# Patient Record
Sex: FEMALE | Race: WHITE | NOT HISPANIC OR LATINO
[De-identification: names, ages, dates, MRNs, and addresses within clinical notes are randomized per-mention and may not be internally consistent; named-entity substitution may affect disease eponyms.]

---

## 2017-03-14 ENCOUNTER — APPOINTMENT (OUTPATIENT)
Dept: ORTHOPEDIC SURGERY | Facility: CLINIC | Age: 82
End: 2017-03-14

## 2018-12-04 ENCOUNTER — INPATIENT (INPATIENT)
Facility: HOSPITAL | Age: 83
LOS: 15 days | Discharge: HOME | DRG: 683 | End: 2018-12-20
Attending: INTERNAL MEDICINE | Admitting: INTERNAL MEDICINE
Payer: MEDICARE

## 2018-12-04 VITALS
OXYGEN SATURATION: 98 % | HEART RATE: 71 BPM | SYSTOLIC BLOOD PRESSURE: 80 MMHG | DIASTOLIC BLOOD PRESSURE: 54 MMHG | WEIGHT: 169.98 LBS | TEMPERATURE: 98 F | RESPIRATION RATE: 19 BRPM

## 2018-12-04 DIAGNOSIS — E87.1 HYPO-OSMOLALITY AND HYPONATREMIA: ICD-10-CM

## 2018-12-04 DIAGNOSIS — N17.9 ACUTE KIDNEY FAILURE, UNSPECIFIED: ICD-10-CM

## 2018-12-04 DIAGNOSIS — N30.00 ACUTE CYSTITIS WITHOUT HEMATURIA: ICD-10-CM

## 2018-12-04 DIAGNOSIS — E03.9 HYPOTHYROIDISM, UNSPECIFIED: ICD-10-CM

## 2018-12-04 DIAGNOSIS — I10 ESSENTIAL (PRIMARY) HYPERTENSION: ICD-10-CM

## 2018-12-04 DIAGNOSIS — F32.9 MAJOR DEPRESSIVE DISORDER, SINGLE EPISODE, UNSPECIFIED: ICD-10-CM

## 2018-12-04 DIAGNOSIS — Z29.9 ENCOUNTER FOR PROPHYLACTIC MEASURES, UNSPECIFIED: ICD-10-CM

## 2018-12-04 DIAGNOSIS — Z71.89 OTHER SPECIFIED COUNSELING: ICD-10-CM

## 2018-12-04 LAB
ALBUMIN SERPL ELPH-MCNC: 4.4 G/DL — SIGNIFICANT CHANGE UP (ref 3.3–5)
ALP SERPL-CCNC: 73 U/L — SIGNIFICANT CHANGE UP (ref 40–120)
ALT FLD-CCNC: 13 U/L — SIGNIFICANT CHANGE UP (ref 10–45)
ANION GAP SERPL CALC-SCNC: 15 MMOL/L — SIGNIFICANT CHANGE UP (ref 5–17)
ANION GAP SERPL CALC-SCNC: 16 MMOL/L — SIGNIFICANT CHANGE UP (ref 5–17)
APPEARANCE UR: CLEAR — SIGNIFICANT CHANGE UP
APTT BLD: 29.3 SEC — SIGNIFICANT CHANGE UP (ref 27.5–36.3)
AST SERPL-CCNC: 13 U/L — SIGNIFICANT CHANGE UP (ref 10–40)
BASE EXCESS BLDV CALC-SCNC: -2.3 MMOL/L — LOW (ref -2–2)
BASE EXCESS BLDV CALC-SCNC: -3.3 MMOL/L — LOW (ref -2–2)
BASOPHILS # BLD AUTO: 0.1 K/UL — SIGNIFICANT CHANGE UP (ref 0–0.2)
BASOPHILS NFR BLD AUTO: 0.4 % — SIGNIFICANT CHANGE UP (ref 0–2)
BILIRUB SERPL-MCNC: 0.5 MG/DL — SIGNIFICANT CHANGE UP (ref 0.2–1.2)
BILIRUB UR-MCNC: NEGATIVE — SIGNIFICANT CHANGE UP
BUN SERPL-MCNC: 136 MG/DL — HIGH (ref 7–23)
BUN SERPL-MCNC: 157 MG/DL — HIGH (ref 7–23)
CA-I SERPL-SCNC: 1.17 MMOL/L — SIGNIFICANT CHANGE UP (ref 1.12–1.3)
CA-I SERPL-SCNC: 1.27 MMOL/L — SIGNIFICANT CHANGE UP (ref 1.12–1.3)
CALCIUM SERPL-MCNC: 10.2 MG/DL — SIGNIFICANT CHANGE UP (ref 8.4–10.5)
CALCIUM SERPL-MCNC: 9.4 MG/DL — SIGNIFICANT CHANGE UP (ref 8.4–10.5)
CHLORIDE BLDV-SCNC: 107 MMOL/L — SIGNIFICANT CHANGE UP (ref 96–108)
CHLORIDE BLDV-SCNC: 97 MMOL/L — SIGNIFICANT CHANGE UP (ref 96–108)
CHLORIDE SERPL-SCNC: 100 MMOL/L — SIGNIFICANT CHANGE UP (ref 96–108)
CHLORIDE SERPL-SCNC: 94 MMOL/L — LOW (ref 96–108)
CO2 BLDV-SCNC: 24 MMOL/L — SIGNIFICANT CHANGE UP (ref 22–30)
CO2 BLDV-SCNC: 24 MMOL/L — SIGNIFICANT CHANGE UP (ref 22–30)
CO2 SERPL-SCNC: 19 MMOL/L — LOW (ref 22–31)
CO2 SERPL-SCNC: 20 MMOL/L — LOW (ref 22–31)
COLOR SPEC: SIGNIFICANT CHANGE UP
CREAT SERPL-MCNC: 2.9 MG/DL — HIGH (ref 0.5–1.3)
CREAT SERPL-MCNC: 3.42 MG/DL — HIGH (ref 0.5–1.3)
DIFF PNL FLD: NEGATIVE — SIGNIFICANT CHANGE UP
EOSINOPHIL # BLD AUTO: 0.1 K/UL — SIGNIFICANT CHANGE UP (ref 0–0.5)
EOSINOPHIL NFR BLD AUTO: 0.8 % — SIGNIFICANT CHANGE UP (ref 0–6)
GAS PNL BLDV: 126 MMOL/L — LOW (ref 136–145)
GAS PNL BLDV: 129 MMOL/L — LOW (ref 136–145)
GAS PNL BLDV: SIGNIFICANT CHANGE UP
GAS PNL BLDV: SIGNIFICANT CHANGE UP
GLUCOSE BLDV-MCNC: 137 MG/DL — HIGH (ref 70–99)
GLUCOSE BLDV-MCNC: 148 MG/DL — HIGH (ref 70–99)
GLUCOSE SERPL-MCNC: 141 MG/DL — HIGH (ref 70–99)
GLUCOSE SERPL-MCNC: 160 MG/DL — HIGH (ref 70–99)
GLUCOSE UR QL: NEGATIVE — SIGNIFICANT CHANGE UP
HCO3 BLDV-SCNC: 22 MMOL/L — SIGNIFICANT CHANGE UP (ref 21–29)
HCO3 BLDV-SCNC: 23 MMOL/L — SIGNIFICANT CHANGE UP (ref 21–29)
HCT VFR BLD CALC: 40.5 % — SIGNIFICANT CHANGE UP (ref 34.5–45)
HCT VFR BLDA CALC: 38 % — LOW (ref 39–50)
HCT VFR BLDA CALC: 43 % — SIGNIFICANT CHANGE UP (ref 39–50)
HGB BLD CALC-MCNC: 12.5 G/DL — SIGNIFICANT CHANGE UP (ref 11.5–15.5)
HGB BLD CALC-MCNC: 13.9 G/DL — SIGNIFICANT CHANGE UP (ref 11.5–15.5)
HGB BLD-MCNC: 14.3 G/DL — SIGNIFICANT CHANGE UP (ref 11.5–15.5)
INR BLD: 1.17 RATIO — HIGH (ref 0.88–1.16)
KETONES UR-MCNC: NEGATIVE — SIGNIFICANT CHANGE UP
LACTATE BLDV-MCNC: 1.1 MMOL/L — SIGNIFICANT CHANGE UP (ref 0.7–2)
LACTATE BLDV-MCNC: 1.2 MMOL/L — SIGNIFICANT CHANGE UP (ref 0.7–2)
LEUKOCYTE ESTERASE UR-ACNC: NEGATIVE — SIGNIFICANT CHANGE UP
LYMPHOCYTES # BLD AUTO: 14.8 % — SIGNIFICANT CHANGE UP (ref 13–44)
LYMPHOCYTES # BLD AUTO: 2.4 K/UL — SIGNIFICANT CHANGE UP (ref 1–3.3)
MCHC RBC-ENTMCNC: 30.7 PG — SIGNIFICANT CHANGE UP (ref 27–34)
MCHC RBC-ENTMCNC: 35.2 GM/DL — SIGNIFICANT CHANGE UP (ref 32–36)
MCV RBC AUTO: 87.1 FL — SIGNIFICANT CHANGE UP (ref 80–100)
MONOCYTES # BLD AUTO: 1.2 K/UL — HIGH (ref 0–0.9)
MONOCYTES NFR BLD AUTO: 7.9 % — SIGNIFICANT CHANGE UP (ref 2–14)
NEUTROPHILS # BLD AUTO: 12.1 K/UL — HIGH (ref 1.8–7.4)
NEUTROPHILS NFR BLD AUTO: 76.1 % — SIGNIFICANT CHANGE UP (ref 43–77)
NITRITE UR-MCNC: NEGATIVE — SIGNIFICANT CHANGE UP
OSMOLALITY UR: 395 MOS/KG — SIGNIFICANT CHANGE UP (ref 300–900)
OTHER CELLS CSF MANUAL: 9 ML/DL — LOW (ref 18–22)
PCO2 BLDV: 43 MMHG — SIGNIFICANT CHANGE UP (ref 35–50)
PCO2 BLDV: 45 MMHG — SIGNIFICANT CHANGE UP (ref 35–50)
PH BLDV: 7.33 — LOW (ref 7.35–7.45)
PH BLDV: 7.33 — LOW (ref 7.35–7.45)
PH UR: 5.5 — SIGNIFICANT CHANGE UP (ref 5–8)
PLATELET # BLD AUTO: 244 K/UL — SIGNIFICANT CHANGE UP (ref 150–400)
PO2 BLDV: 30 MMHG — SIGNIFICANT CHANGE UP (ref 25–45)
PO2 BLDV: 31 MMHG — SIGNIFICANT CHANGE UP (ref 25–45)
POTASSIUM BLDV-SCNC: 4.1 MMOL/L — SIGNIFICANT CHANGE UP (ref 3.5–5.3)
POTASSIUM BLDV-SCNC: 5 MMOL/L — SIGNIFICANT CHANGE UP (ref 3.5–5.3)
POTASSIUM SERPL-MCNC: 4.5 MMOL/L — SIGNIFICANT CHANGE UP (ref 3.5–5.3)
POTASSIUM SERPL-MCNC: 5 MMOL/L — SIGNIFICANT CHANGE UP (ref 3.5–5.3)
POTASSIUM SERPL-SCNC: 4.5 MMOL/L — SIGNIFICANT CHANGE UP (ref 3.5–5.3)
POTASSIUM SERPL-SCNC: 5 MMOL/L — SIGNIFICANT CHANGE UP (ref 3.5–5.3)
PROT SERPL-MCNC: 8.2 G/DL — SIGNIFICANT CHANGE UP (ref 6–8.3)
PROT UR-MCNC: NEGATIVE — SIGNIFICANT CHANGE UP
PROTHROM AB SERPL-ACNC: 13.5 SEC — HIGH (ref 10–12.9)
RAPID RVP RESULT: SIGNIFICANT CHANGE UP
RBC # BLD: 4.65 M/UL — SIGNIFICANT CHANGE UP (ref 3.8–5.2)
RBC # FLD: 12 % — SIGNIFICANT CHANGE UP (ref 10.3–14.5)
SAO2 % BLDV: 51 % — LOW (ref 67–88)
SAO2 % BLDV: 55 % — LOW (ref 67–88)
SODIUM SERPL-SCNC: 130 MMOL/L — LOW (ref 135–145)
SODIUM SERPL-SCNC: 134 MMOL/L — LOW (ref 135–145)
SODIUM UR-SCNC: <20 MMOL/L — SIGNIFICANT CHANGE UP
SP GR SPEC: 1.01 — SIGNIFICANT CHANGE UP (ref 1.01–1.02)
T3 SERPL-MCNC: 59 NG/DL — LOW (ref 80–200)
T4 AB SER-ACNC: 7.2 UG/DL — SIGNIFICANT CHANGE UP (ref 4.6–12)
TSH SERPL-MCNC: 1.3 UIU/ML — SIGNIFICANT CHANGE UP (ref 0.27–4.2)
UROBILINOGEN FLD QL: NEGATIVE — SIGNIFICANT CHANGE UP
WBC # BLD: 15.9 K/UL — HIGH (ref 3.8–10.5)
WBC # FLD AUTO: 15.9 K/UL — HIGH (ref 3.8–10.5)

## 2018-12-04 PROCEDURE — 99223 1ST HOSP IP/OBS HIGH 75: CPT

## 2018-12-04 PROCEDURE — 99497 ADVNCD CARE PLAN 30 MIN: CPT | Mod: 25

## 2018-12-04 PROCEDURE — 71045 X-RAY EXAM CHEST 1 VIEW: CPT | Mod: 26

## 2018-12-04 PROCEDURE — 99291 CRITICAL CARE FIRST HOUR: CPT

## 2018-12-04 PROCEDURE — 74176 CT ABD & PELVIS W/O CONTRAST: CPT | Mod: 26

## 2018-12-04 RX ORDER — SODIUM CHLORIDE 9 MG/ML
1000 INJECTION, SOLUTION INTRAVENOUS ONCE
Qty: 0 | Refills: 0 | Status: COMPLETED | OUTPATIENT
Start: 2018-12-04 | End: 2018-12-04

## 2018-12-04 RX ORDER — PIPERACILLIN AND TAZOBACTAM 4; .5 G/20ML; G/20ML
3.38 INJECTION, POWDER, LYOPHILIZED, FOR SOLUTION INTRAVENOUS ONCE
Qty: 0 | Refills: 0 | Status: COMPLETED | OUTPATIENT
Start: 2018-12-04 | End: 2018-12-04

## 2018-12-04 RX ORDER — HEPARIN SODIUM 5000 [USP'U]/ML
5000 INJECTION INTRAVENOUS; SUBCUTANEOUS EVERY 8 HOURS
Qty: 0 | Refills: 0 | Status: DISCONTINUED | OUTPATIENT
Start: 2018-12-04 | End: 2018-12-20

## 2018-12-04 RX ORDER — LEVOTHYROXINE SODIUM 125 MCG
50 TABLET ORAL DAILY
Qty: 0 | Refills: 0 | Status: DISCONTINUED | OUTPATIENT
Start: 2018-12-04 | End: 2018-12-20

## 2018-12-04 RX ORDER — SODIUM CHLORIDE 9 MG/ML
1000 INJECTION INTRAMUSCULAR; INTRAVENOUS; SUBCUTANEOUS
Qty: 0 | Refills: 0 | Status: DISCONTINUED | OUTPATIENT
Start: 2018-12-04 | End: 2018-12-05

## 2018-12-04 RX ORDER — CEFTRIAXONE 500 MG/1
1 INJECTION, POWDER, FOR SOLUTION INTRAMUSCULAR; INTRAVENOUS EVERY 24 HOURS
Qty: 0 | Refills: 0 | Status: DISCONTINUED | OUTPATIENT
Start: 2018-12-04 | End: 2018-12-07

## 2018-12-04 RX ORDER — SODIUM CHLORIDE 9 MG/ML
2000 INJECTION, SOLUTION INTRAVENOUS ONCE
Qty: 0 | Refills: 0 | Status: COMPLETED | OUTPATIENT
Start: 2018-12-04 | End: 2018-12-04

## 2018-12-04 RX ORDER — DIAZEPAM 5 MG
2.5 TABLET ORAL AT BEDTIME
Qty: 0 | Refills: 0 | Status: DISCONTINUED | OUTPATIENT
Start: 2018-12-04 | End: 2018-12-05

## 2018-12-04 RX ORDER — NYSTATIN CREAM 100000 [USP'U]/G
1 CREAM TOPICAL
Qty: 0 | Refills: 0 | Status: DISCONTINUED | OUTPATIENT
Start: 2018-12-04 | End: 2018-12-20

## 2018-12-04 RX ORDER — ASPIRIN/CALCIUM CARB/MAGNESIUM 324 MG
81 TABLET ORAL DAILY
Qty: 0 | Refills: 0 | Status: DISCONTINUED | OUTPATIENT
Start: 2018-12-04 | End: 2018-12-20

## 2018-12-04 RX ADMIN — SODIUM CHLORIDE 100 MILLILITER(S): 9 INJECTION INTRAMUSCULAR; INTRAVENOUS; SUBCUTANEOUS at 20:38

## 2018-12-04 RX ADMIN — SODIUM CHLORIDE 1000 MILLILITER(S): 9 INJECTION, SOLUTION INTRAVENOUS at 12:50

## 2018-12-04 RX ADMIN — SODIUM CHLORIDE 2000 MILLILITER(S): 9 INJECTION, SOLUTION INTRAVENOUS at 12:20

## 2018-12-04 RX ADMIN — HEPARIN SODIUM 5000 UNIT(S): 5000 INJECTION INTRAVENOUS; SUBCUTANEOUS at 21:18

## 2018-12-04 RX ADMIN — SODIUM CHLORIDE 1000 MILLILITER(S): 9 INJECTION, SOLUTION INTRAVENOUS at 17:06

## 2018-12-04 RX ADMIN — SODIUM CHLORIDE 2000 MILLILITER(S): 9 INJECTION, SOLUTION INTRAVENOUS at 10:48

## 2018-12-04 RX ADMIN — CEFTRIAXONE 100 GRAM(S): 500 INJECTION, POWDER, FOR SOLUTION INTRAMUSCULAR; INTRAVENOUS at 21:18

## 2018-12-04 RX ADMIN — PIPERACILLIN AND TAZOBACTAM 200 GRAM(S): 4; .5 INJECTION, POWDER, LYOPHILIZED, FOR SOLUTION INTRAVENOUS at 10:48

## 2018-12-04 RX ADMIN — PIPERACILLIN AND TAZOBACTAM 3.38 GRAM(S): 4; .5 INJECTION, POWDER, LYOPHILIZED, FOR SOLUTION INTRAVENOUS at 12:20

## 2018-12-04 NOTE — ED PROVIDER NOTE - PHYSICAL EXAMINATION
Attending Maryana Blake: Gen: NAD, heent: atrauamtic, eomi, perrla, mmm, op pink, uvula midline, neck; nttp, no nuchal rigidity, chest: nttp, no crepitus, cv: rrr, +murmur, lungs: ctab, abd: soft,ostomy in placer, nondistended, no peritoneal signs, +BS, no guarding, ext: wwp, mild ttp left lower back no midline ttp skin: no rash, neuro: awake and alert, following commands, speech clear, sensation and strength intact, no focal deficits

## 2018-12-04 NOTE — H&P ADULT - HISTORY OF PRESENT ILLNESS
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 Per daughter at bedside pt lives alone in a house with an aid, who noticed that she was not her self for the last week. She seemed to have low energy and was fatigued intermittently for a week. The PT that comes to the house reported that the pt was have intermittent L flank pain as well during the PT session only. She currently has L flank pain, 8/10, non radiating, moving exacerbates it. She denies fever, chills, cough, abdominal pain, and any changes in the ileostomy output. Per daughter (Pia) started complaining of increased urinary urgency 4 days ago, so her Daughter Sandra (NP) prescribed her cipro. subsequently her urinary complaints improved. Pt denies any dysuria, increased urinary frequency. Per dtr pt has had decreased po intake, has not been drinking enough water. the family is concerned that the patient has depression, which has lead to the decreased appetite.       As per family at bedside, pt was seen by PCP 1 week ago, and was noted to have mild elevation of BUN/Cr    In the ED pt noted to have LUIS with leukocytosis

## 2018-12-04 NOTE — H&P ADULT - PROBLEM SELECTOR PLAN 8
Patient's DTR Sandra is her HCP, The patient has a MOLST form completed, and wishes to be DNR/DNI, advised family to bring in MOLST form and HCP for hospital records. Patient's DTR Sandra is her HCP, The patient has a MOLST form completed, and wishes to be DNR/DNI, advised family to bring in MOLST form and HCP for hospital records. time spent with advance care planning 31 min

## 2018-12-04 NOTE — H&P ADULT - NSHPLABSRESULTS_GEN_ALL_CORE
Labs personally reviewed:                        14.3   15.9  )-----------( 244      ( 04 Dec 2018 10:15 )             40.5       12-04    130<L>  |  94<L>  |  157<H>  ----------------------------<  160<H>  5.0   |  20<L>  |  3.42<H>    Ca    10.2      04 Dec 2018 10:15    TPro  8.2  /  Alb  4.4  /  TBili  0.5  /  DBili  x   /  AST  13  /  ALT  13  /  AlkPhos  73  12-04      PT/INR - ( 04 Dec 2018 10:15 )   PT: 13.5 sec;   INR: 1.17 ratio         PTT - ( 04 Dec 2018 10:15 )  PTT:29.3 sec    Imaging personally reviewed: CT abd results reviewed, CXR: clear lungs, follow official read    EKG personally reviewed: NSR

## 2018-12-04 NOTE — H&P ADULT - PROBLEM SELECTOR PLAN 6
presenting Na: 130, urine lytes result noted, SIADH ruled out likely due to poor po intake and dehydration   - s/p 3L LR Na improved to 134   - c/w IVF  - monitor BMP

## 2018-12-04 NOTE — ED PROVIDER NOTE - ATTENDING CONTRIBUTION TO CARE
Attending MD Maryana Blake:  I personally have seen and examined this patient.  Resident note reviewed and agree on plan of care and except where noted.  See HPI, PE, and MDM for details.

## 2018-12-04 NOTE — H&P ADULT - GENERAL
From: Fuad Lima  To: Abdifatah Cruz MD  Sent: 6/16/2017 7:21 AM CDT  Subject: Test Strips    Dr. Cruz,    I have tried four times in the past week to  a prescription for test strips and lancelets. [facility] didn't work because [facility] does not accept Medicare for those two items, so we transferred the prescription to [facility] in [place]. The most recent problem at [facility] is that the prescription does not include two items Medicare needs to pay for the strips and lancelets: an indication of whether or not I am \"insulin-dependent\" and a diagnostic code.    The pharmacist explained to me last night that if I am considered as \"diabetic,\" Medicare will cover the cost. If I am viewed as \"pre-diabetic,\" insurance will not cover the costs. A box of 100 strips would cost me just under $180.00.    I hve been checking my blood sugar once a week for the past two years, and the results have been pretty consistent. While I was taking the glibizide (or is it glyburide?) daily, the results generally averaged between 115 and 125. Since I started taking why I call \"my sugar pill\" two or three times a week, my average blood sugar reading over the past three months has been 123.    I would rather not pay $180.00 for the strips if Medicare will not cover them, so my question is whether or not I need to continue checking my blood sugar once a week, or can we just do the A1C every six months and make decisions accordingly?    Let me know how you want to proceed. I did call yesterday, and a nurse said she would make sure a new prescription was sent in to [facility] with all the necessary information completed, but when I went to  the prescription, I found out that they had not received an updated prescription form.    Hope all is well.    Fuad   details…

## 2018-12-04 NOTE — ED PROVIDER NOTE - OBJECTIVE STATEMENT
85 YOF pmh HTN, ileostomy p/w Right flank pain intermittent x 1 week, no fever, no cough, no abdominal pain, no diarrhea, no decreased output in ileostomy. Pt states she has low urine output. Pt also has had decreased po intake. Pt appears weaker than her usual self. Currently, denies any pain. R flank pain is intermittent, non-radiating. 85 YOF pmh HTN, ileostomy p/w Right flank pain intermittent x 1 week, no fever, no cough, no abdominal pain, no diarrhea, no decreased output in ileostomy. Pt states she has low urine output. Pt also has had decreased po intake. Pt appears weaker than her usual self. Currently, denies any pain. R flank pain is intermittent, non-radiating.  Attending Maryana Blake: 81y/o female h/o thyroid disease with previous ostomy presenting with weakness and lower abck pain. no recent abx use. pain for last one week. no recent falls or trauma. no dysuria but states has not been urinating as much. no vomiting or diarrhea. normal output in ostomy 85 YOF pmh HTN, ileostomy p/w left flank pain intermittent x 1 week, no fever, no cough, no abdominal pain, no diarrhea, no decreased output in ileostomy. Pt states she has low urine output. Pt also has had decreased po intake. Pt appears weaker than her usual self. Currently, denies any pain. left flank pain is intermittent, non-radiating.  Attending Maryana Blake: 81y/o female h/o thyroid disease with previous ostomy presenting with weakness and lower abck pain. no recent abx use. pain for last one week. no recent falls or trauma. no dysuria but states has not been urinating as much. no vomiting or diarrhea. normal output in ostomy

## 2018-12-04 NOTE — ED ADULT NURSE NOTE - OBJECTIVE STATEMENT
85 y f came to the ed c/o right sided lower flank pain. states the pain started for one week. patient did not want to come to the hospital so she tried to let it go away on its own. has hx of kidney cyst and uti. patient denies any fevers, chills, chest pain, sob. abdomen is soft and nontender. has ostomy bag which she states has had normal output. c/o decreased urine output. skin is warm and dry.

## 2018-12-04 NOTE — H&P ADULT - PROBLEM SELECTOR PLAN 1
LUIS likely due to dehydration, Pt is s/p 3L LR in the ED repeat BMP showed improvement in Cr, BUN/Cr ratio 45 on admission.   - start 100ml/hr NS   - avoid nephrotoxic medication  - if Cr worsens will send urine lytes    - monitor BMP

## 2018-12-04 NOTE — H&P ADULT - PROBLEM SELECTOR PLAN 5
- family is concerned about Pt being depressed, would like the patient to be see by psychiatry   - consider psych consult and starting SSRI.

## 2018-12-04 NOTE — ED PROVIDER NOTE - CONSTITUTIONAL, MLM
normal... ill appearing, awake, alert, oriented to person, place, time/situation and in mild apparent distress.

## 2018-12-04 NOTE — ED PROVIDER NOTE - PSH
S/P arthroscopy of right knee    S/P cholecystectomy  at age 20 ,s  S/P foot joint surgery  right ( 10 years ago )  S/P ileostomy  colectomy ( 2005 )

## 2018-12-04 NOTE — H&P ADULT - PROBLEM SELECTOR PLAN 2
Pt is symptomatic with urinary urgency, however UA neg today, Pt was started on cipro by Dtr(NP) 4 days ago, + leukocytosis, dose not have CVA tenderness    - will c/w CTX likely treat for 3 days   - f/u urine cx  - f/u CBC

## 2018-12-04 NOTE — ED PROVIDER NOTE - PROGRESS NOTE DETAILS
Attending Maryana Blake: BP improving. blood work showed new LUIS and elvated BUN. per family pt has not been eating and drinking as much. CT shows no evidence of stones. will continue fluid hydration

## 2018-12-04 NOTE — ED PROVIDER NOTE - NS ED ROS FT
CONSTITUTIONAL: No fevers, no chills  Eyes: no visual changes  Ears: no ear drainage, no ear pain  Nose: no nasal congestion  Mouth/Throat: no sore throat  Cardiovascular: No Chest pain  Respiratory: No SOB  Gastrointestinal: +right flank pain.   Genitourinary: no dysuria, no hematuria  SKIN: no rashes.  NEURO: no headache

## 2018-12-04 NOTE — ED ADULT NURSE REASSESSMENT NOTE - NS ED NURSE REASSESS COMMENT FT1
patient is resting in the [facility] waiting for a bed upstairs. patient is c/o being hungry and food will be provided if approved by md. denies any other complaints. family at bedside. vss/nad. will continue to monitor.

## 2018-12-04 NOTE — ED PROVIDER NOTE - GASTROINTESTINAL, MLM
Abdomen soft, non-tender, no guarding. Mild Right CVAT Abdomen soft, non-tender, no guarding. Mild left CVAT

## 2018-12-04 NOTE — ED ADULT NURSE NOTE - NSIMPLEMENTINTERV_GEN_ALL_ED
Implemented All Fall with Harm Risk Interventions:  [place] to call system. Call bell, personal items and telephone within reach. Instruct patient to call for assistance. Room bathroom lighting operational. Non-slip footwear when patient is off stretcher. Physically safe environment: no spills, clutter or unnecessary equipment. Stretcher in lowest position, wheels locked, appropriate side rails in place. Provide visual cue, wrist band, yellow gown, etc. Monitor gait and stability. Monitor for mental status changes and reorient to person, place, and time. Review medications for side effects contributing to fall risk. Reinforce activity limits and safety measures with patient and family. Provide visual clues: red socks.

## 2018-12-04 NOTE — H&P ADULT - NSHPSOCIALHISTORY_GEN_ALL_CORE
Patient denies using tobacco, illicit drugs and alcohol. She walks with a walker at home, is dependent on most of her ADls.

## 2018-12-04 NOTE — ED PROVIDER NOTE - CRITICAL CARE PROVIDED
direct patient care (not related to procedure)/additional history taking/documentation/conducted a detailed discussion of DNR status/interpretation of diagnostic studies

## 2018-12-04 NOTE — ED PROVIDER NOTE - MEDICAL DECISION MAKING DETAILS
Right flank pain, US without hydronephrosis, concern for UTI, concern for dehydration. Pt is hypotensive will hydrate and give empiric abx. Left flank pain, US without hydronephrosis, concern for UTI, concern for dehydration. Pt is hypotensive will hydrate and give empiric abx.  Attending Maryana Blake: 86 y/o female presenting with lower back pain and weakness. upon arrival pt found to be hypotensive. afebrile rectally. not on steroids. no known h/o ureteral colic. abdomen soft on exam without pulsatile mass. reviewed urine cultures which have been sensitive to zosyn. no recent dental procedures. pt does have a murmur on exam but per pt is normal for her. pt pan cultured. appears dehydrated on exam. pocus with hyperdynamic LVEF. will give IV abx and IVF, ct abd/pel and re-eval./ pt tba. less likely myxedema coma

## 2018-12-05 LAB
ANION GAP SERPL CALC-SCNC: 13 MMOL/L — SIGNIFICANT CHANGE UP (ref 5–17)
ANION GAP SERPL CALC-SCNC: 16 MMOL/L — SIGNIFICANT CHANGE UP (ref 5–17)
BUN SERPL-MCNC: 106 MG/DL — HIGH (ref 7–23)
BUN SERPL-MCNC: 95 MG/DL — HIGH (ref 7–23)
CALCIUM SERPL-MCNC: 9 MG/DL — SIGNIFICANT CHANGE UP (ref 8.4–10.5)
CALCIUM SERPL-MCNC: 9.4 MG/DL — SIGNIFICANT CHANGE UP (ref 8.4–10.5)
CHLORIDE SERPL-SCNC: 105 MMOL/L — SIGNIFICANT CHANGE UP (ref 96–108)
CHLORIDE SERPL-SCNC: 106 MMOL/L — SIGNIFICANT CHANGE UP (ref 96–108)
CO2 SERPL-SCNC: 18 MMOL/L — LOW (ref 22–31)
CO2 SERPL-SCNC: 20 MMOL/L — LOW (ref 22–31)
CREAT ?TM UR-MCNC: 79 MG/DL — SIGNIFICANT CHANGE UP
CREAT SERPL-MCNC: 1.78 MG/DL — HIGH (ref 0.5–1.3)
CREAT SERPL-MCNC: 2.27 MG/DL — HIGH (ref 0.5–1.3)
CULTURE RESULTS: NO GROWTH — SIGNIFICANT CHANGE UP
GLUCOSE SERPL-MCNC: 138 MG/DL — HIGH (ref 70–99)
GLUCOSE SERPL-MCNC: 175 MG/DL — HIGH (ref 70–99)
HCT VFR BLD CALC: 36.9 % — SIGNIFICANT CHANGE UP (ref 34.5–45)
HGB BLD-MCNC: 12.5 G/DL — SIGNIFICANT CHANGE UP (ref 11.5–15.5)
MCHC RBC-ENTMCNC: 30 PG — SIGNIFICANT CHANGE UP (ref 27–34)
MCHC RBC-ENTMCNC: 33.9 GM/DL — SIGNIFICANT CHANGE UP (ref 32–36)
MCV RBC AUTO: 88.7 FL — SIGNIFICANT CHANGE UP (ref 80–100)
OSMOLALITY SERPL: 340 MOS/KG — HIGH (ref 275–300)
PLATELET # BLD AUTO: 227 K/UL — SIGNIFICANT CHANGE UP (ref 150–400)
POTASSIUM SERPL-MCNC: 4.2 MMOL/L — SIGNIFICANT CHANGE UP (ref 3.5–5.3)
POTASSIUM SERPL-MCNC: 4.2 MMOL/L — SIGNIFICANT CHANGE UP (ref 3.5–5.3)
POTASSIUM SERPL-SCNC: 4.2 MMOL/L — SIGNIFICANT CHANGE UP (ref 3.5–5.3)
POTASSIUM SERPL-SCNC: 4.2 MMOL/L — SIGNIFICANT CHANGE UP (ref 3.5–5.3)
PROT ?TM UR-MCNC: 6 MG/DL — SIGNIFICANT CHANGE UP (ref 0–12)
PROT/CREAT UR-RTO: 0.1 RATIO — SIGNIFICANT CHANGE UP (ref 0–0.2)
RBC # BLD: 4.16 M/UL — SIGNIFICANT CHANGE UP (ref 3.8–5.2)
RBC # FLD: 13.7 % — SIGNIFICANT CHANGE UP (ref 10.3–14.5)
SODIUM SERPL-SCNC: 138 MMOL/L — SIGNIFICANT CHANGE UP (ref 135–145)
SODIUM SERPL-SCNC: 140 MMOL/L — SIGNIFICANT CHANGE UP (ref 135–145)
SPECIMEN SOURCE: SIGNIFICANT CHANGE UP
WBC # BLD: 14.96 K/UL — HIGH (ref 3.8–10.5)
WBC # FLD AUTO: 14.96 K/UL — HIGH (ref 3.8–10.5)

## 2018-12-05 RX ORDER — SODIUM CHLORIDE 9 MG/ML
1000 INJECTION, SOLUTION INTRAVENOUS
Qty: 0 | Refills: 0 | Status: DISCONTINUED | OUTPATIENT
Start: 2018-12-05 | End: 2018-12-10

## 2018-12-05 RX ORDER — ONDANSETRON 8 MG/1
4 TABLET, FILM COATED ORAL EVERY 6 HOURS
Qty: 0 | Refills: 0 | Status: DISCONTINUED | OUTPATIENT
Start: 2018-12-05 | End: 2018-12-20

## 2018-12-05 RX ORDER — PROCHLORPERAZINE MALEATE 5 MG
5 TABLET ORAL ONCE
Qty: 0 | Refills: 0 | Status: DISCONTINUED | OUTPATIENT
Start: 2018-12-05 | End: 2018-12-07

## 2018-12-05 RX ADMIN — Medication 81 MILLIGRAM(S): at 14:44

## 2018-12-05 RX ADMIN — CEFTRIAXONE 100 GRAM(S): 500 INJECTION, POWDER, FOR SOLUTION INTRAMUSCULAR; INTRAVENOUS at 21:17

## 2018-12-05 RX ADMIN — HEPARIN SODIUM 5000 UNIT(S): 5000 INJECTION INTRAVENOUS; SUBCUTANEOUS at 21:16

## 2018-12-05 RX ADMIN — HEPARIN SODIUM 5000 UNIT(S): 5000 INJECTION INTRAVENOUS; SUBCUTANEOUS at 14:43

## 2018-12-05 RX ADMIN — ONDANSETRON 4 MILLIGRAM(S): 8 TABLET, FILM COATED ORAL at 16:28

## 2018-12-05 RX ADMIN — Medication 2.5 MILLIGRAM(S): at 00:08

## 2018-12-05 RX ADMIN — Medication 50 MICROGRAM(S): at 05:35

## 2018-12-05 RX ADMIN — HEPARIN SODIUM 5000 UNIT(S): 5000 INJECTION INTRAVENOUS; SUBCUTANEOUS at 05:35

## 2018-12-05 RX ADMIN — SODIUM CHLORIDE 75 MILLILITER(S): 9 INJECTION, SOLUTION INTRAVENOUS at 21:17

## 2018-12-05 RX ADMIN — NYSTATIN CREAM 1 APPLICATION(S): 100000 CREAM TOPICAL at 06:03

## 2018-12-05 NOTE — CONSULT NOTE ADULT - ASSESSMENT
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 no history of CKD, baseline serum creatinine 0.8 in 2015 presents with LUIS and hyponatremia, renal service consulted. patient has high output ileostomy as cause for her dehydration, also has H/O Kidney stones very common in ileostomy patients.

## 2018-12-05 NOTE — PROGRESS NOTE ADULT - ASSESSMENT
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  discussed w/ Radiology Attending need official US kidney to better characterize ordered     HTN HLD - c /w current meds     DVT PPX     labs vital imaging reviewed pt counseled at length this morning.  discussed w/ NP and nursing on the floor

## 2018-12-05 NOTE — CONSULT NOTE ADULT - SUBJECTIVE AND OBJECTIVE BOX
[facility] - QUINN Cornejo / QUINN Headley / ABDIAS Greene/ QUINN Fiore/ QUINN Meng/ FANI De León / ANDREY Pham / ELI Taylor  -------------------------------------------------------------------------------------------------------  The patient seen and examined today.  HPI:  85 y.o female with PMHx of HTN, ileostomy due to complication of Cdiff () p/w feeling fatigued and left flank pain. Per daughter at bedside pt lives alone in a house with an aid, who noticed that she was not her self for the last week. She seemed to have low energy and was fatigued intermittently for a week. The PT that comes to the house reported that the pt was have intermittent L flank pain as well during the PT session only. She currently has L flank pain, 8/10, non radiating, moving exacerbates it. She denies fever, chills, cough, abdominal pain, and any changes in the ileostomy output. Per daughter (Pia) started complaining of increased urinary urgency 4 days ago, so her Daughter Sandra (NP) prescribed her cipro. subsequently her urinary complaints improved. Pt denies any dysuria, increased urinary frequency. Per dtr pt has had decreased po intake, has not been drinking enough water. the family is concerned that the patient has depression, which has lead to the decreased appetite.       As per family at bedside, pt was seen by PCP 1 week ago, and was noted to have mild elevation of BUN/Cr    In the ED pt noted to have LUIS with leukocytosis (04 Dec 2018 16:16)    The patient says that she has had kidney stones in the past.    PAST MEDICAL & SURGICAL HISTORY:  Fistula, perirectal  Hiatal hernia  Hypothyroidism  Hypertension  S/P foot joint surgery: right ( 10 years ago )  S/P arthroscopy of right knee  S/P ileostomy: colectomy (  )  S/P cholecystectomy: at age 20 ,s    Allergies :- iodine (Other; Anaphylaxis)  shellfish (Anaphylaxis)  sulfa drugs (Other)    Home Medications Reviewed  Hospital Medications:   MEDICATIONS  (STANDING):  aspirin enteric coated 81 milliGRAM(s) Oral daily  cefTRIAXone   IVPB 1 Gram(s) IV Intermittent every 24 hours  heparin  Injectable 5000 Unit(s) SubCutaneous every 8 hours  lactated ringers. 1000 milliLiter(s) (75 mL/Hr) IV Continuous <Continuous>  levothyroxine 50 MICROGram(s) Oral daily    SOCIAL HISTORY:  Denies ETOh,Smoking,   FAMILY HISTORY:  No pertinent family history      REVIEW OF SYSTEMS:  CONSTITUTIONAL: No weakness, fevers or chills  EYES/ENT: No visual changes;  No vertigo or throat pain   NECK: No pain or stiffness  RESPIRATORY: No cough, wheezing, hemoptysis; No shortness of breath  CARDIOVASCULAR: No chest pain or palpitations.  GASTROINTESTINAL: No abdominal or epigastric pain. No nausea, vomiting, or hematemesis; No diarrhea or constipation. No melena or hematochezia.  GENITOURINARY: No dysuria, frequency, foamy urine, urinary urgency, incontinence or hematuria  NEUROLOGICAL: No numbness or weakness  SKIN: No itching, burning, rashes, or lesions   VASCULAR: No bilateral lower extremity edema.   All other review of systems is negative unless indicated above.    VITALS:  T(F): 98.2 (18 @ 04:39), Max: 98.6 (18 @ 19:29)  HR: 80 (18 @ 04:39)  BP: 108/62 (18 04:39)  RR: 17 (18 @ 04:39)  SpO2: 98% (18 04:39)  Wt(kg): --     @ 07:01  -   07:00  --------------------------------------------------------  IN: 1340 mL / OUT: 450 mL / NET: 890 mL      Height (cm): 160.02 ( @ 20:00)  Weight (kg): 81 ( 20:00)  BMI (kg/m2): 31.6 ( @ 20:00)  BSA (m2): 1.84 ( @ 20:00)    PHYSICAL EXAM:  Constitutional: NAD  HEENT: anicteric sclera, oropharynx clear, MMM  Neck: supple.   Respiratory: Bilateral equal breath sounds , no wheezes, no crackles  Cardiovascular: S1, S2, Regular, Murmur present.  Gastrointestinal: Bowel Sound present, soft, NT/ND  Extremities: No cyanosis or clubbing. No peripheral edema  Neurological: Alert and oriented x 3, no focal deficits  Psychiatric: Normal mood, normal affect  : No CVA tenderness. No palafox.   Skin: No rashes    Data:      140  |  106  |  106<H>  ----------------------------<  138<H>  4.2   |  18<L>  |  2.27<H>    Ca    9.4      05 Dec 2018 07:08    TPro  8.2  /  Alb  4.4  /  TBili  0.5  /  DBili      /  AST  13  /  ALT  13  /  AlkPhos  73      Creatinine Trend: 2.27 <--, 2.90 <--, 3.42 <--                        12.5   14.96 )-----------( 227      ( 05 Dec 2018 08:15 )             36.9     Urine Studies:  Urinalysis Basic - ( 04 Dec 2018 13:28 )    Color: Light Yellow / Appearance: Clear / S.014 / pH:   Gluc:  / Ketone: Negative  / Bili: Negative / Urobili: Negative   Blood:  / Protein: Negative / Nitrite: Negative   Leuk Esterase: Negative / RBC:  / WBC    Sq Epi:  / Non Sq Epi:  / Bacteria:       Creatinine, Random Urine: 79 mg/dL ( @ 16:38)  Protein/Creatinine Ratio Calculation: 0.1 Ratio ( @ 16:38)  Osmolality, Random Urine: 395 mos/kg ( @ 13:29)  Sodium, Random Urine: <20 mmol/L ( @ 13:29)

## 2018-12-05 NOTE — CONSULT NOTE ADULT - PROBLEM SELECTOR RECOMMENDATION 9
Labs reviewed  All consistent with dehydration  Hyponatremia corrected at an acceptable rate  No longer hypotensive  Please change IVF to LR @ 75cc/hr  BMP q 8hrs for today to monitor sodium change  Will follow

## 2018-12-05 NOTE — PROGRESS NOTE ADULT - SUBJECTIVE AND OBJECTIVE BOX
Patient is a 85y old  Female who presents with a chief complaint of dehydration and LUIS (04 Dec 2018 16:16)      SUBJECTIVE / OVERNIGHT EVENTS:    pt ambulating in room notes arm discomfort has improved a bit .  pt notes continues swelling and discomfort no fever chills back pain    Vital Signs Last 24 Hrs  T(C): 36.8 (05 Dec 2018 04:39), Max: 37 (04 Dec 2018 19:29)  T(F): 98.2 (05 Dec 2018 04:39), Max: 98.6 (04 Dec 2018 19:29)  HR: 80 (05 Dec 2018 04:39) (58 - 88)  BP: 108/62 (05 Dec 2018 04:39) (94/63 - 108/62)  BP(mean): 81 (04 Dec 2018 16:16) (81 - 81)  RR: 17 (05 Dec 2018 04:39) (17 - 18)  SpO2: 98% (05 Dec 2018 04:39) (96% - 98%)  I&O's Summary    04 Dec 2018 07:01  -  05 Dec 2018 07:00  --------------------------------------------------------  IN: 1340 mL / OUT: 450 mL / NET: 890 mL            LABS:                        12.5   14.96 )-----------( 227      ( 05 Dec 2018 08:15 )             36.9     12-05    140  |  106  |  106<H>  ----------------------------<  138<H>  4.2   |  18<L>  |  2.27<H>    Ca    9.4      05 Dec 2018 07:08    TPro  8.2  /  Alb  4.4  /  TBili  0.5  /  DBili  x   /  AST  13  /  ALT  13  /  AlkPhos  73  12-04    PT/INR - ( 04 Dec 2018 10:15 )   PT: 13.5 sec;   INR: 1.17 ratio         PTT - ( 04 Dec 2018 10:15 )  PTT:29.3 sec  CAPILLARY BLOOD GLUCOSE            Urinalysis Basic - ( 04 Dec 2018 13:28 )    Color: Light Yellow / Appearance: Clear / S.014 / pH: x  Gluc: x / Ketone: Negative  / Bili: Negative / Urobili: Negative   Blood: x / Protein: Negative / Nitrite: Negative   Leuk Esterase: Negative / RBC: x / WBC x   Sq Epi: x / Non Sq Epi: x / Bacteria: x        RADIOLOGY & ADDITIONAL TESTS:    Imaging Personally Reviewed:  [x] YES  [ ] NO    Consultant(s) Notes Reviewed:  [x] YES  [ ] NO      MEDICATIONS  (STANDING):  aspirin enteric coated 81 milliGRAM(s) Oral daily  cefTRIAXone   IVPB 1 Gram(s) IV Intermittent every 24 hours  heparin  Injectable 5000 Unit(s) SubCutaneous every 8 hours  lactated ringers. 1000 milliLiter(s) (75 mL/Hr) IV Continuous <Continuous>  levothyroxine 50 MICROGram(s) Oral daily    MEDICATIONS  (PRN):  diazepam    Tablet 2.5 milliGRAM(s) Oral at bedtime PRN sleep / anxiety  nystatin Powder 1 Application(s) Topical two times a day PRN for rash      Care Discussed with Consultants/Other Providers [x] YES  [ ] NO    HEALTH ISSUES - PROBLEM Dx:  Advance care planning: Advance care planning  Prophylactic measure: Prophylactic measure  Hyponatremia: Hyponatremia  Depression: Depression  Essential hypertension: Essential hypertension  Hypothyroidism: Hypothyroidism  Acute cystitis without hematuria: Acute cystitis without hematuria  LUIS (acute kidney injury): LUIS (acute kidney injury)

## 2018-12-06 ENCOUNTER — TRANSCRIPTION ENCOUNTER (OUTPATIENT)
Age: 83
End: 2018-12-06

## 2018-12-06 LAB
ANION GAP SERPL CALC-SCNC: 13 MMOL/L — SIGNIFICANT CHANGE UP (ref 5–17)
ANION GAP SERPL CALC-SCNC: 14 MMOL/L — SIGNIFICANT CHANGE UP (ref 5–17)
BUN SERPL-MCNC: 74 MG/DL — HIGH (ref 7–23)
BUN SERPL-MCNC: 83 MG/DL — HIGH (ref 7–23)
CALCIUM SERPL-MCNC: 9 MG/DL — SIGNIFICANT CHANGE UP (ref 8.4–10.5)
CALCIUM SERPL-MCNC: 9.2 MG/DL — SIGNIFICANT CHANGE UP (ref 8.4–10.5)
CHLORIDE SERPL-SCNC: 105 MMOL/L — SIGNIFICANT CHANGE UP (ref 96–108)
CHLORIDE SERPL-SCNC: 107 MMOL/L — SIGNIFICANT CHANGE UP (ref 96–108)
CO2 SERPL-SCNC: 18 MMOL/L — LOW (ref 22–31)
CO2 SERPL-SCNC: 21 MMOL/L — LOW (ref 22–31)
CREAT SERPL-MCNC: 1.62 MG/DL — HIGH (ref 0.5–1.3)
CREAT SERPL-MCNC: 1.69 MG/DL — HIGH (ref 0.5–1.3)
GLUCOSE SERPL-MCNC: 141 MG/DL — HIGH (ref 70–99)
GLUCOSE SERPL-MCNC: 206 MG/DL — HIGH (ref 70–99)
HCT VFR BLD CALC: 34.7 % — SIGNIFICANT CHANGE UP (ref 34.5–45)
HGB BLD-MCNC: 11.5 G/DL — SIGNIFICANT CHANGE UP (ref 11.5–15.5)
MCHC RBC-ENTMCNC: 29.9 PG — SIGNIFICANT CHANGE UP (ref 27–34)
MCHC RBC-ENTMCNC: 33.1 GM/DL — SIGNIFICANT CHANGE UP (ref 32–36)
MCV RBC AUTO: 90.1 FL — SIGNIFICANT CHANGE UP (ref 80–100)
PLATELET # BLD AUTO: 205 K/UL — SIGNIFICANT CHANGE UP (ref 150–400)
POTASSIUM SERPL-MCNC: 4.4 MMOL/L — SIGNIFICANT CHANGE UP (ref 3.5–5.3)
POTASSIUM SERPL-MCNC: 4.5 MMOL/L — SIGNIFICANT CHANGE UP (ref 3.5–5.3)
POTASSIUM SERPL-SCNC: 4.4 MMOL/L — SIGNIFICANT CHANGE UP (ref 3.5–5.3)
POTASSIUM SERPL-SCNC: 4.5 MMOL/L — SIGNIFICANT CHANGE UP (ref 3.5–5.3)
RBC # BLD: 3.85 M/UL — SIGNIFICANT CHANGE UP (ref 3.8–5.2)
RBC # FLD: 14.1 % — SIGNIFICANT CHANGE UP (ref 10.3–14.5)
SODIUM SERPL-SCNC: 138 MMOL/L — SIGNIFICANT CHANGE UP (ref 135–145)
SODIUM SERPL-SCNC: 140 MMOL/L — SIGNIFICANT CHANGE UP (ref 135–145)
WBC # BLD: 19.81 K/UL — HIGH (ref 3.8–10.5)
WBC # FLD AUTO: 19.81 K/UL — HIGH (ref 3.8–10.5)

## 2018-12-06 PROCEDURE — 71045 X-RAY EXAM CHEST 1 VIEW: CPT | Mod: 26

## 2018-12-06 PROCEDURE — 76770 US EXAM ABDO BACK WALL COMP: CPT | Mod: 26

## 2018-12-06 RX ORDER — ACETAMINOPHEN 500 MG
650 TABLET ORAL EVERY 6 HOURS
Qty: 0 | Refills: 0 | Status: DISCONTINUED | OUTPATIENT
Start: 2018-12-06 | End: 2018-12-08

## 2018-12-06 RX ADMIN — Medication 650 MILLIGRAM(S): at 16:08

## 2018-12-06 RX ADMIN — ONDANSETRON 4 MILLIGRAM(S): 8 TABLET, FILM COATED ORAL at 11:13

## 2018-12-06 RX ADMIN — CEFTRIAXONE 100 GRAM(S): 500 INJECTION, POWDER, FOR SOLUTION INTRAMUSCULAR; INTRAVENOUS at 21:30

## 2018-12-06 RX ADMIN — NYSTATIN CREAM 1 APPLICATION(S): 100000 CREAM TOPICAL at 05:47

## 2018-12-06 RX ADMIN — Medication 650 MILLIGRAM(S): at 16:38

## 2018-12-06 RX ADMIN — Medication 50 MICROGRAM(S): at 05:42

## 2018-12-06 RX ADMIN — Medication 81 MILLIGRAM(S): at 12:08

## 2018-12-06 RX ADMIN — HEPARIN SODIUM 5000 UNIT(S): 5000 INJECTION INTRAVENOUS; SUBCUTANEOUS at 21:30

## 2018-12-06 RX ADMIN — HEPARIN SODIUM 5000 UNIT(S): 5000 INJECTION INTRAVENOUS; SUBCUTANEOUS at 05:42

## 2018-12-06 RX ADMIN — HEPARIN SODIUM 5000 UNIT(S): 5000 INJECTION INTRAVENOUS; SUBCUTANEOUS at 13:22

## 2018-12-06 NOTE — PHYSICAL THERAPY INITIAL EVALUATION ADULT - ACTIVE RANGE OF MOTION EXAMINATION, REHAB EVAL
Right LE Active ROM was WFL (within functional limits)/Right UE Active ROM was WFL (within functional limits)/Left LE Active ROM was WFL (within functional limits)/Left UE Active ROM was WFL (within functional limits)/hx R TKR, hx L hip sx

## 2018-12-06 NOTE — PHYSICAL THERAPY INITIAL EVALUATION ADULT - TRANSFER SAFETY CONCERNS NOTED: SIT/STAND, REHAB EVAL
decreased weight-shifting ability/decreased balance during turns/decreased step length/decreased safety awareness

## 2018-12-06 NOTE — DISCHARGE NOTE ADULT - CARE PROVIDER_API CALL
Sadnra Webb,   Primary care doctor  Phone: (   )    -  Fax: (   )    - Miguel Ángel Bledsoe), Internal Medicine  [address] [place] [address]  Phone: [phone]  Fax: [phone]    Vel Blackwood), Obstetrics and Gynecology  [address] [place] [address]  Phone: [phone]  Fax: [phone]    Braydon Ruiz), ColonRectal Surgery; Surgery  [address] [facility]  [address]  [place] [address]  Phone: [phone]  Fax: [phone]

## 2018-12-06 NOTE — DISCHARGE NOTE ADULT - HOSPITAL COURSE
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 Renal consulted. Recommended IVF and monitor BMP.   Ct abdomen/pelvis showed A 4.7 cm indeterminate slightly hyperdense lesion at the right interpole, may reflect a hemorrhagic cyst. A Renal US was done which showed. 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  Fever / leukocytosis  fevers now resolved and leukocytosis downtrending   CRP elevated 11.96  rectal culture grow enterococcus faecalis and avium  check MRI abd with contrast confirms large rectovaginal fistula as above  will obtain gyn eval  cont zosyn per ID, GI fu noted, CRS cs  urine and blood cultures NTD, can repeat BC if febrile  CT head showed sphenoid sinusitis  CT A/P splenic lesion, renal cyst, splenic US shows stable cyst 1.6 x 1.9 x 1.6 cm  TTE no thrombus  endometrial fluid on pelvic sono - known rectovaginal fistula - appreciate gyn recs, patient and dtr refused exam at this time but agreeable if MRI abnormal  LUIS / Dehydration  2/2 excessive ostomy output, creat improved, appreciate renal recs, strict i/o  Dysphagia  speech and swallow eval - pt refused MBS, ENT sp laryngoscopy showing pooling of secretions and nml vocal cords  dysphagia diet  Anemia  stool occult neg  despite red drainage from rectum, h/h stable  no further workup indicated  ferrous sulfate  HTN HLD  BP stable off meds, at times borderline hypotensive  DVT PPX   PT OOB to chair.  Pt stable for discharge. Instructed to follow up as above. 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  Fever / leukocytosis  fevers now resolved and leukocytosis downtrending   CRP elevated 11.96  rectal culture grow enterococcus faecalis and avium  check MRI abd with contrast confirms large rectovaginal fistula as above  will obtain gyn eval  cont zosyn per ID, GI fu noted, CRS cs  urine and blood cultures NTD, can repeat BC if febrile  CT head showed sphenoid sinusitis  CT A/P splenic lesion, renal cyst, splenic US shows stable cyst 1.6 x 1.9 x 1.6 cm  TTE no thrombus  endometrial fluid on pelvic sono - known rectovaginal fistula - appreciate gyn recs, patient and dtr refused exam at this time but agreeable if MRI abnormal  LUIS / Dehydration  2/2 excessive ostomy output, creat improved, appreciate renal recs, strict i/o  Dysphagia  speech and swallow eval - pt refused MBS, ENT sp laryngoscopy showing pooling of secretions and nml vocal cords  dysphagia diet  Anemia  stool occult neg  despite red drainage from rectum, h/h stable  no further workup indicated  ferrous sulfate  HTN HLD  BP stable off meds, at times borderline hypotensive  DVT PPX   PT OOB to chair.  Pt stable for discharge. Per Colorectal Surgery - No acute surgical intervention at this time.   ** Pt to  follow up urology consult ass Out Patient, for cystoscopy to evaluate cystitis and rule out rectovesical fistula.   Instructed to follow up as above.

## 2018-12-06 NOTE — DISCHARGE NOTE ADULT - NSTOBACCOHOTLINE_GEN_A_NCS
[facility] Smokers Quitline ([id number]) [facility] Smokers Quitline ([id number])

## 2018-12-06 NOTE — PHYSICAL THERAPY INITIAL EVALUATION ADULT - PERTINENT HX OF CURRENT PROBLEM, REHAB EVAL
Pt is a 85 y.o female admitted to [facility] on 12/4/18 for fatigue adn L flank pain. +low energy and fatigued x1wk.She currently has L flank pain, 8/10, non radiating, moving exacerbates it. She denies fever, chills, cough, abdominal pain, and any changes in the ileostomy output. Per daughter (Pia) started complaining of increased urinary urgency 4 days ago, so her Daughter Sandra (NP) prescribed her cipro. subsequently her urinary complaints improved.

## 2018-12-06 NOTE — DISCHARGE NOTE ADULT - PLAN OF CARE
Improved secondary to dehydration  Received IV hydration in the hospital  Follow up with your Primary care doctor in 1 week Take all antibiotics as ordered.  Call you Health care provider upon arrival home to make a one week follow up appointment.  If you develop fever, chills, malaise, or change in mental status call your Health Care Provider or go to the Emergency Department.  Nutrition is important, eat small frequent meals to help ensure you get adequate calories.  Do not stay in bed all day!  Increase your activity daily as tolerated. Low salt diet  Activity as tolerated.  Take all medication as prescribed.  Follow up with your medical doctor for routine blood pressure monitoring at your next visit.  Notify your doctor if you have any of the following symptoms:   Dizziness, Lightheadedness, Blurry vision, Headache, Chest pain, Shortness of breath You completed antibiotics.   Drink enough water and fluids to keep your urine clear or pale yellow.  Avoid caffeine, tea, and carbonated beverages. They tend to irritate your bladder.  Empty your bladder often. Avoid holding urine for long periods of time.  Empty your bladder before and after sexual intercourse.  After a bowel movement, women should cleanse from front to back. Use each tissue only once.  SEEK MEDICAL CARE IF:  You have back pain.  You develop a fever.  Your symptoms do not begin to resolve within 3 days.  SEEK IMMEDIATE MEDICAL CARE IF:  You have severe back pain or lower abdominal pain.  You develop chills.  You have nausea or vomiting.  You have continued burning or discomfort with urination.    Call you Health care provider upon arrival home to make a one week follow up appointment.  If you develop fever, chills, malaise, or change in mental status call your Health Care Provider or go to the Emergency Department.  Nutrition is important, eat small frequent meals to help ensure you get adequate calories.  Do not stay in bed all day!  Increase your activity daily as tolerated. Take your medication as prescribed.   Follow up with your medical doctor for routine blood  work monitoring, and to establish long term treatment goals. Resolved. Follow up with your Primary Care MD for monitoring of your blood work (White Blood Cells).

## 2018-12-06 NOTE — PROGRESS NOTE ADULT - SUBJECTIVE AND OBJECTIVE BOX
[facility] - QUINN Cornejo / QUINN Headley / ABDIAS Greene/ QUINN Fiore/ QUINN Meng/ FANI De León / ANDREY Pham / ELI Taylor  ---------------------------------------------------------------------------------------------------------------  seen and examined today for LUIS from dehydration  Interval : Feeling better  VITALS:  T(F): 100.3 (12-06-18 @ 14:02), Max: 100.3 (12-06-18 @ 14:02)  HR: 97 (12-06-18 @ 14:02)  BP: 123/75 (12-06-18 @ 14:02)  RR: 18 (12-06-18 @ 14:02)  SpO2: 99% (12-06-18 @ 14:02)  Wt(kg): --    12-05 @ 07:01  -  12-06 @ 07:00  --------------------------------------------------------  IN: 1100 mL / OUT: 850 mL / NET: 250 mL      Physical Exam :-  Constitutional: NAD  Neck: Supple.  Respiratory: Bilateral equal breath sounds,  Cardiovascular: S1, S2 normal,  Gastrointestinal: Bowel Sounds present, soft, non tender.  Extremities: No edema  Neurological: Alert and Oriented x 3, no focal deficits  Psychiatric: Normal mood, normal affect  Data:-  Allergies :   iodine (Other; Anaphylaxis)  shellfish (Anaphylaxis)  sulfa drugs (Other)    Hospital Medications:   MEDICATIONS  (STANDING):  aspirin enteric coated 81 milliGRAM(s) Oral daily  cefTRIAXone   IVPB 1 Gram(s) IV Intermittent every 24 hours  heparin  Injectable 5000 Unit(s) SubCutaneous every 8 hours  lactated ringers. 1000 milliLiter(s) (75 mL/Hr) IV Continuous <Continuous>  levothyroxine 50 MICROGram(s) Oral daily  prochlorperazine   Injectable 5 milliGRAM(s) IV Push once    12-06    140  |  105  |  74<H>  ----------------------------<  206<H>  4.4   |  21<L>  |  1.62<H>    Ca    9.2      06 Dec 2018 11:20      Creatinine Trend: 1.62 <--, 1.69 <--, 1.78 <--, 2.27 <--, 2.90 <--, 3.42 <--                        12.5   14.96 )-----------( 227      ( 05 Dec 2018 08:15 )             36.9

## 2018-12-06 NOTE — DISCHARGE NOTE ADULT - PATIENT PORTAL LINK FT
You can access the [facility] Portal, offered by [facility], by registering with the following website: [email or web address]

## 2018-12-06 NOTE — DISCHARGE NOTE ADULT - PROVIDER TOKENS
FREE:[LAST:[Sandra Webb],PHONE:[(   )    -],FAX:[(   )    -],ADDRESS:[Primary care doctor]] TOKEN:'[id number]',TOKEN:'[id number]',TOKEN:'[id number]'

## 2018-12-06 NOTE — PROGRESS NOTE ADULT - SUBJECTIVE AND OBJECTIVE BOX
Patient is a 85y old  Female who presents with a chief complaint of dehydration and LUIS (06 Dec 2018 14:37)      SUBJECTIVE / OVERNIGHT EVENTS:    pt resting in bed no fever chills back pain.  pt notes weakness but is improving     Vital Signs Last 24 Hrs  T(C): 37.9 (06 Dec 2018 14:02), Max: 37.9 (06 Dec 2018 14:02)  T(F): 100.3 (06 Dec 2018 14:02), Max: 100.3 (06 Dec 2018 14:02)  HR: 97 (06 Dec 2018 14:02) (60 - 97)  BP: 123/75 (06 Dec 2018 14:02) (100/65 - 123/75)  BP(mean): --  RR: 18 (06 Dec 2018 14:02) (18 - 18)  SpO2: 99% (06 Dec 2018 14:02) (96% - 99%)  I&O's Summary    05 Dec 2018 07:01  -  06 Dec 2018 07:00  --------------------------------------------------------  IN: 1100 mL / OUT: 850 mL / NET: 250 mL            LABS:                        11.5   19.81 )-----------( 205      ( 06 Dec 2018 13:41 )             34.7     12-06    140  |  105  |  74<H>  ----------------------------<  206<H>  4.4   |  21<L>  |  1.62<H>    Ca    9.2      06 Dec 2018 11:20        CAPILLARY BLOOD GLUCOSE                RADIOLOGY & ADDITIONAL TESTS:    Imaging Personally Reviewed:  [x] YES  [ ] NO    Consultant(s) Notes Reviewed:  [x] YES  [ ] NO      MEDICATIONS  (STANDING):  aspirin enteric coated 81 milliGRAM(s) Oral daily  cefTRIAXone   IVPB 1 Gram(s) IV Intermittent every 24 hours  heparin  Injectable 5000 Unit(s) SubCutaneous every 8 hours  lactated ringers. 1000 milliLiter(s) (75 mL/Hr) IV Continuous <Continuous>  levothyroxine 50 MICROGram(s) Oral daily  prochlorperazine   Injectable 5 milliGRAM(s) IV Push once    MEDICATIONS  (PRN):  diazepam    Tablet 2.5 milliGRAM(s) Oral at bedtime PRN sleep / anxiety  nystatin Powder 1 Application(s) Topical two times a day PRN for rash  ondansetron Injectable 4 milliGRAM(s) IV Push every 6 hours PRN Nausea and/or Vomiting      Care Discussed with Consultants/Other Providers [x] YES  [ ] NO    HEALTH ISSUES - PROBLEM Dx:  Advance care planning: Advance care planning  Prophylactic measure: Prophylactic measure  Hyponatremia: Hyponatremia  Depression: Depression  Essential hypertension: Essential hypertension  Hypothyroidism: Hypothyroidism  Acute cystitis without hematuria: Acute cystitis without hematuria  LUIS (acute kidney injury): LUIS (acute kidney injury)

## 2018-12-06 NOTE — PHYSICAL THERAPY INITIAL EVALUATION ADULT - PRECAUTIONS/LIMITATIONS, REHAB EVAL
Pt denies any dysuria, increased urinary frequency. Per dtr pt has had decreased po intake, has not been drinking enough water. the family is concerned that the patient has depression, which has lead to the decreased appetite. 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 Hospital course: Us kidney: No hydronephrosis. Nonobstructing left renal calculus again noted. 4.7 cm lesion on CT scan.  discussed w/ Radiology Attending need official US kidney to better characterize ordered fall precautions/Pt denies any dysuria, increased urinary frequency. Per dtr pt has had decreased po intake, has not been drinking enough water. the family is concerned that the patient has depression, which has lead to the decreased appetite. 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 Hospital course: Us kidney: No hydronephrosis. Nonobstructing left renal calculus again noted. 4.7 cm lesion on CT scan.  discussed w/ Radiology Attending need official US kidney to better characterize ordered

## 2018-12-06 NOTE — DISCHARGE NOTE ADULT - ADDITIONAL INSTRUCTIONS
- Will follow up urology consult for cystoscopy to evaluate cystitis and rule out rectovesical fistula  - No acute surgical intervention at this time Follow up with your Primary Care MD within 1 week after discharge from Rehab.  ASK YOUR PRIMARY CARE MD FOR REFERRAL TO UROLOGIST - You need to follow up urology consult for cystoscopy to evaluate cystitis and rule out rectovesical fistula - as per Colorectal Surgery recommendations.   Follow up with Dr Blackwood in 1 week after Rehab, and with Dr Ruiz in 1 month after your discharge from Rehab.  Call to schedule appointments.

## 2018-12-06 NOTE — PHYSICAL THERAPY INITIAL EVALUATION ADULT - ADDITIONAL COMMENTS
pt lives alone at home, PTA amb with rolling walker, assist recently via HHA, 2 entry  steps, independent with ADL's, self care and mobility, has walker at home.

## 2018-12-06 NOTE — PHYSICAL THERAPY INITIAL EVALUATION ADULT - LEVEL OF INDEPENDENCE: GAIT, REHAB EVAL
wt shifts laterally, 1 small shuffle step with PT assist/moderate assist (50% patients effort)/maximum assist (25% patients effort)

## 2018-12-06 NOTE — PHYSICAL THERAPY INITIAL EVALUATION ADULT - LEVEL OF INDEPENDENCE: SIT/STAND, REHAB EVAL
did not perform 12/6 did not perform 12/6/ 12/7 min-modAx2/minimum assist (75% patients effort)/moderate assist (50% patients effort)

## 2018-12-06 NOTE — DISCHARGE NOTE ADULT - MEDICATION SUMMARY - MEDICATIONS TO TAKE
I will START or STAY ON the medications listed below when I get home from the hospital:    aspirin 81 mg oral delayed release tablet  -- 1 tab(s) by mouth once a day  -- Indication: For Heart    aluminum hydroxide-magnesium hydroxide 200 mg-200 mg/5 mL oral suspension  -- 30 milliliter(s) by mouth every 4 hours, As needed, Dyspepsia  -- Indication: For Nausea    diazePAM 5 mg oral tablet  -- 0.5 to 1 tab(s) by mouth once a day (in the evening), As Needed- sleep  -- Indication: For Mood    Compazine 5 mg oral tablet  -- 1 tab(s) by mouth 3 times a day, As Needed  for nausea.   -- Indication: For Antiemetic    nadolol 40 mg oral tablet  -- 1 tab(s) by mouth once a day  -- Indication: For Essential hypertension    nystatin 100,000 units/g topical powder  -- Apply on skin to affected area 2 times a day, As Needed  -- Indication: For Rash    ferrous sulfate 325 mg (65 mg elemental iron) oral tablet  -- 1 tab(s) by mouth once a day   -- Indication: For Supplement    pantoprazole 40 mg oral delayed release tablet  -- 1 tab(s) by mouth once a day  -- Indication: For Stomach acid    Synthroid 50 mcg (0.05 mg) oral tablet  -- 1 tab(s) by mouth once a day  -- Indication: For Hypothyroidism    Multiple Vitamins oral tablet  -- 1 tab(s) by mouth once a day  -- Indication: For Supplement I will START or STAY ON the medications listed below when I get home from the hospital:    aspirin 81 mg oral delayed release tablet  -- 1 tab(s) by mouth once a day  -- Indication: For Heart    aluminum hydroxide-magnesium hydroxide 200 mg-200 mg/5 mL oral suspension  -- 30 milliliter(s) by mouth every 4 hours, As needed, Dyspepsia  -- Indication: For Nausea    diazePAM 5 mg oral tablet  -- 0.5 to 1 tab(s) by mouth once a day (in the evening), As Needed- sleep  -- Indication: For Mood    Compazine 5 mg oral tablet  -- 1 tab(s) by mouth 3 times a day, As Needed  for nausea.   -- Indication: For Antiemetic    nadolol 40 mg oral tablet  -- 1 tab(s) by mouth once a day  -- Indication: For Essential hypertension    cephalexin 250 mg oral capsule  -- 1 cap(s) by mouth once a day  -- Indication: For Prophylaxis    nystatin 100,000 units/g topical powder  -- Apply on skin to affected area 2 times a day, As Needed  -- Indication: For Rash    ferrous sulfate 325 mg (65 mg elemental iron) oral tablet  -- 1 tab(s) by mouth once a day   -- Indication: For Supplement    pantoprazole 40 mg oral delayed release tablet  -- 1 tab(s) by mouth once a day  -- Indication: For Stomach acid    Synthroid 50 mcg (0.05 mg) oral tablet  -- 1 tab(s) by mouth once a day  -- Indication: For Hypothyroidism    Multiple Vitamins oral tablet  -- 1 tab(s) by mouth once a day  -- Indication: For Supplement

## 2018-12-06 NOTE — PHYSICAL THERAPY INITIAL EVALUATION ADULT - IMPAIRED TRANSFERS: SIT/STAND, REHAB EVAL
pain/decreased strength/impaired balance/cognition/decr endurance , anxious/impaired postural control

## 2018-12-06 NOTE — PHYSICAL THERAPY INITIAL EVALUATION ADULT - DISCHARGE DISPOSITION, PT EVAL
DC subacute rehab for strengthening, balance training, endurance training, improve overall fxl mob prior to DC home, CM Thom aware, pt agrees./rehabilitation facility

## 2018-12-06 NOTE — PHYSICAL THERAPY INITIAL EVALUATION ADULT - PLANNED THERAPY INTERVENTIONS, PT EVAL
bed mobility training/strengthening/stair neg: GOAL: Pt will neg 2 steps to enter ind in 4wks./gait training/transfer training

## 2018-12-06 NOTE — DISCHARGE NOTE ADULT - CARE PROVIDERS DIRECT ADDRESSES
,DirectAddress_Unknown ,[email or web address],DirectAddress_Unknown,[email or web address]

## 2018-12-06 NOTE — PROGRESS NOTE ADULT - ASSESSMENT
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

## 2018-12-07 DIAGNOSIS — D72.829 ELEVATED WHITE BLOOD CELL COUNT, UNSPECIFIED: ICD-10-CM

## 2018-12-07 LAB
ANION GAP SERPL CALC-SCNC: 13 MMOL/L — SIGNIFICANT CHANGE UP (ref 5–17)
APPEARANCE UR: CLEAR — SIGNIFICANT CHANGE UP
BACTERIA # UR AUTO: NEGATIVE — SIGNIFICANT CHANGE UP
BILIRUB UR-MCNC: NEGATIVE — SIGNIFICANT CHANGE UP
BUN SERPL-MCNC: 53 MG/DL — HIGH (ref 7–23)
C DIFF GDH STL QL: NEGATIVE — SIGNIFICANT CHANGE UP
C DIFF GDH STL QL: SIGNIFICANT CHANGE UP
CALCIUM SERPL-MCNC: 9 MG/DL — SIGNIFICANT CHANGE UP (ref 8.4–10.5)
CHLORIDE SERPL-SCNC: 102 MMOL/L — SIGNIFICANT CHANGE UP (ref 96–108)
CO2 SERPL-SCNC: 20 MMOL/L — LOW (ref 22–31)
COLOR SPEC: YELLOW — SIGNIFICANT CHANGE UP
CREAT SERPL-MCNC: 1.49 MG/DL — HIGH (ref 0.5–1.3)
DIFF PNL FLD: ABNORMAL
EPI CELLS # UR: 1 /HPF — SIGNIFICANT CHANGE UP (ref 0–5)
GLUCOSE SERPL-MCNC: 131 MG/DL — HIGH (ref 70–99)
GLUCOSE UR QL: NEGATIVE MG/DL — SIGNIFICANT CHANGE UP
HCT VFR BLD CALC: 34.8 % — SIGNIFICANT CHANGE UP (ref 34.5–45)
HGB BLD-MCNC: 11.7 G/DL — SIGNIFICANT CHANGE UP (ref 11.5–15.5)
HYALINE CASTS # UR AUTO: 2 /LPF — SIGNIFICANT CHANGE UP (ref 0–7)
KETONES UR-MCNC: NEGATIVE — SIGNIFICANT CHANGE UP
LEUKOCYTE ESTERASE UR-ACNC: NEGATIVE — SIGNIFICANT CHANGE UP
MCHC RBC-ENTMCNC: 30.5 PG — SIGNIFICANT CHANGE UP (ref 27–34)
MCHC RBC-ENTMCNC: 33.7 GM/DL — SIGNIFICANT CHANGE UP (ref 32–36)
MCV RBC AUTO: 90.4 FL — SIGNIFICANT CHANGE UP (ref 80–100)
NITRITE UR-MCNC: NEGATIVE — SIGNIFICANT CHANGE UP
PH UR: 6 — SIGNIFICANT CHANGE UP (ref 5–8)
PLATELET # BLD AUTO: 200 K/UL — SIGNIFICANT CHANGE UP (ref 150–400)
POTASSIUM SERPL-MCNC: 4.3 MMOL/L — SIGNIFICANT CHANGE UP (ref 3.5–5.3)
POTASSIUM SERPL-SCNC: 4.3 MMOL/L — SIGNIFICANT CHANGE UP (ref 3.5–5.3)
PROT UR-MCNC: NEGATIVE MG/DL — SIGNIFICANT CHANGE UP
RBC # BLD: 3.85 M/UL — SIGNIFICANT CHANGE UP (ref 3.8–5.2)
RBC # FLD: 12.9 % — SIGNIFICANT CHANGE UP (ref 10.3–14.5)
RBC CASTS # UR COMP ASSIST: 8 /HPF — HIGH (ref 0–4)
SODIUM SERPL-SCNC: 135 MMOL/L — SIGNIFICANT CHANGE UP (ref 135–145)
SP GR SPEC: 1.02 — SIGNIFICANT CHANGE UP (ref 1.01–1.02)
UROBILINOGEN FLD QL: NEGATIVE MG/DL — SIGNIFICANT CHANGE UP
WBC # BLD: 22.7 K/UL — HIGH (ref 3.8–10.5)
WBC # FLD AUTO: 22.7 K/UL — HIGH (ref 3.8–10.5)
WBC UR QL: 0 /HPF — SIGNIFICANT CHANGE UP (ref 0–5)

## 2018-12-07 PROCEDURE — 71250 CT THORAX DX C-: CPT | Mod: 26

## 2018-12-07 RX ORDER — FAMOTIDINE 10 MG/ML
20 INJECTION INTRAVENOUS ONCE
Qty: 0 | Refills: 0 | Status: COMPLETED | OUTPATIENT
Start: 2018-12-07 | End: 2018-12-07

## 2018-12-07 RX ORDER — PROCHLORPERAZINE MALEATE 5 MG
5 TABLET ORAL ONCE
Qty: 0 | Refills: 0 | Status: COMPLETED | OUTPATIENT
Start: 2018-12-07 | End: 2018-12-07

## 2018-12-07 RX ORDER — PROCHLORPERAZINE MALEATE 5 MG
5 TABLET ORAL EVERY 8 HOURS
Qty: 0 | Refills: 0 | Status: DISCONTINUED | OUTPATIENT
Start: 2018-12-07 | End: 2018-12-20

## 2018-12-07 RX ORDER — PANTOPRAZOLE SODIUM 20 MG/1
40 TABLET, DELAYED RELEASE ORAL DAILY
Qty: 0 | Refills: 0 | Status: DISCONTINUED | OUTPATIENT
Start: 2018-12-07 | End: 2018-12-20

## 2018-12-07 RX ADMIN — SODIUM CHLORIDE 75 MILLILITER(S): 9 INJECTION, SOLUTION INTRAVENOUS at 19:41

## 2018-12-07 RX ADMIN — HEPARIN SODIUM 5000 UNIT(S): 5000 INJECTION INTRAVENOUS; SUBCUTANEOUS at 13:22

## 2018-12-07 RX ADMIN — HEPARIN SODIUM 5000 UNIT(S): 5000 INJECTION INTRAVENOUS; SUBCUTANEOUS at 21:37

## 2018-12-07 RX ADMIN — NYSTATIN CREAM 1 APPLICATION(S): 100000 CREAM TOPICAL at 05:50

## 2018-12-07 RX ADMIN — Medication 5 MILLIGRAM(S): at 13:22

## 2018-12-07 RX ADMIN — ONDANSETRON 4 MILLIGRAM(S): 8 TABLET, FILM COATED ORAL at 10:50

## 2018-12-07 RX ADMIN — HEPARIN SODIUM 5000 UNIT(S): 5000 INJECTION INTRAVENOUS; SUBCUTANEOUS at 05:50

## 2018-12-07 RX ADMIN — PANTOPRAZOLE SODIUM 40 MILLIGRAM(S): 20 TABLET, DELAYED RELEASE ORAL at 13:22

## 2018-12-07 RX ADMIN — Medication 50 MICROGRAM(S): at 05:50

## 2018-12-07 RX ADMIN — SODIUM CHLORIDE 75 MILLILITER(S): 9 INJECTION, SOLUTION INTRAVENOUS at 05:50

## 2018-12-07 RX ADMIN — FAMOTIDINE 20 MILLIGRAM(S): 10 INJECTION INTRAVENOUS at 21:37

## 2018-12-07 NOTE — PROGRESS NOTE ADULT - SUBJECTIVE AND OBJECTIVE BOX
Patient is a 85y old  Female who presents with a chief complaint of dehydration and LUIS (07 Dec 2018 08:48)      SUBJECTIVE / OVERNIGHT EVENTS:    pt resting in bed feeling a bit better notes weakness and chill no events overnight     Vital Signs Last 24 Hrs  T(C): 36.8 (07 Dec 2018 05:41), Max: 37.9 (06 Dec 2018 14:02)  T(F): 98.3 (07 Dec 2018 05:41), Max: 100.3 (06 Dec 2018 14:02)  HR: 67 (07 Dec 2018 05:41) (67 - 97)  BP: 90/63 (07 Dec 2018 05:41) (90/63 - 123/75)  BP(mean): --  RR: 18 (07 Dec 2018 05:41) (18 - 19)  SpO2: 95% (07 Dec 2018 05:41) (93% - 99%)  I&O's Summary    06 Dec 2018 07:01  -  07 Dec 2018 07:00  --------------------------------------------------------  IN: 1240 mL / OUT: 850 mL / NET: 390 mL    07 Dec 2018 07:01  -  07 Dec 2018 10:48  --------------------------------------------------------  IN: 0 mL / OUT: 400 mL / NET: -400 mL            LABS:                        11.7   22.7  )-----------( 200      ( 07 Dec 2018 07:26 )             34.8     12-07    135  |  102  |  53<H>  ----------------------------<  131<H>  4.3   |  20<L>  |  1.49<H>    Ca    9.0      07 Dec 2018 07:26        CAPILLARY BLOOD GLUCOSE                RADIOLOGY & ADDITIONAL TESTS:    Imaging Personally Reviewed:  [x] YES  [ ] NO    Consultant(s) Notes Reviewed:  [x] YES  [ ] NO      MEDICATIONS  (STANDING):  aspirin enteric coated 81 milliGRAM(s) Oral daily  heparin  Injectable 5000 Unit(s) SubCutaneous every 8 hours  lactated ringers. 1000 milliLiter(s) (75 mL/Hr) IV Continuous <Continuous>  levothyroxine 50 MICROGram(s) Oral daily  prochlorperazine   Injectable 5 milliGRAM(s) IV Push once    MEDICATIONS  (PRN):  acetaminophen   Tablet .. 650 milliGRAM(s) Oral every 6 hours PRN Temp greater or equal to 38C (100.4F), Mild Pain (1 - 3)  diazepam    Tablet 2.5 milliGRAM(s) Oral at bedtime PRN sleep / anxiety  nystatin Powder 1 Application(s) Topical two times a day PRN for rash  ondansetron Injectable 4 milliGRAM(s) IV Push every 6 hours PRN Nausea and/or Vomiting      Care Discussed with Consultants/Other Providers [x] YES  [ ] NO    HEALTH ISSUES - PROBLEM Dx:  Leukocytosis: Leukocytosis  Advance care planning: Advance care planning  Prophylactic measure: Prophylactic measure  Hyponatremia: Hyponatremia  Depression: Depression  Essential hypertension: Essential hypertension  Hypothyroidism: Hypothyroidism  Acute cystitis without hematuria: Acute cystitis without hematuria  LUIS (acute kidney injury): LUIS (acute kidney injury)

## 2018-12-07 NOTE — CONSULT NOTE ADULT - ASSESSMENT
85f with htn, hypothyroid admitted with weakness  leukocytosis, martita   complicated by low grade temp

## 2018-12-07 NOTE — PROGRESS NOTE ADULT - ASSESSMENT
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

## 2018-12-07 NOTE — CONSULT NOTE ADULT - SUBJECTIVE AND OBJECTIVE BOX
[facility], Division of Infectious Diseases  ABDIAS Minor A. Lee  [phone]  GEORGE ROSE  85y, Female  [id number]    HPI: history per pt and chart.    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    pt lives alone in a house with an aid, who noticed that she was not her self for the last week. She seemed to have low energy and was fatigued intermittently for a week. The PT that comes to the house reported that the pt was have intermittent L flank pain as well during the PT session only. She currently has L flank pain, 8/10, non radiating, moving exacerbates it. She denies fever, chills, cough, abdominal pain, and any changes in the ileostomy output. Per daughter (Pia) started complaining of increased urinary urgency 4 days ago, so her Daughter Sandra (NP) prescribed her cipro. subsequently her urinary complaints improved. Pt denies any dysuria, increased urinary frequency. Per dtr pt has had decreased po intake, has not been drinking enough water.   Today pt would like to go home.  denies sick contacts  IN ED, pt with leukocytosis given a dose of zosyn  then on floor low grade temp, started on ceftriaxone      PMH/PSH--  Fistula, perirectal  Hiatal hernia  Hypothyroidism  Hypertension  S/P foot joint surgery  S/P arthroscopy of right knee  S/P ileostomy  S/P cholecystectomy      Allergies--iodine      Medications--  Antibiotics: cefTRIAXone   IVPB 1 Gram(s) IV Intermittent every 24 hours    Immunologic:   Other: acetaminophen   Tablet .. PRN  aspirin enteric coated  diazepam    Tablet PRN  heparin  Injectable  lactated ringers.  levothyroxine  nystatin Powder PRN  ondansetron Injectable PRN  prochlorperazine   Injectable      Social History--  EtOH: denies ***  Tobacco: denies ***  Drug Use: denies ***    Family/Marital History--  NC  not     Remainder not relevant to clinical concern.    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     Pertinent positives and negatives--  Constitutional: No fevers. No Chills. No Rigors.   Eyes: no blurry vision  ENMT: no sore throat  Cardiovascular: No chest pain. No palpitations.  Respiratory: No shortness of breath. No cough.  Gastrointestinal: No nausea or vomiting. No diarrhea or constipation.   Genitourinary: no dysuria  Musculoskeletal: + weekness  Skin: no rash  Neurologic: no headache  Psychiatric: + depression    Review of systems otherwise negative except as previously noted.    Physical Exam--  Vital Signs: T(F): 98.3 (12-07-18 @ 05:41), Max: 100.3 (12-06-18 @ 14:02)  HR: 67 (12-07-18 @ 05:41)  BP: 90/63 (12-07-18 @ 05:41)  RR: 18 (12-07-18 @ 05:41)  SpO2: 95% (12-07-18 @ 05:41)  Wt(kg): --  General: Nontoxic-appearing Female in no acute distress.  HEENT: AT/NC. . Anicteric. Conjunctiva pink and moist. Oropharynx clear. Dentition fair.  Neck: Not rigid. No sense of mass.  Nodes: None palpable.  Lungs: Clear bilaterally without rales, wheezing or rhonchi  Heart: Regular rate and rhythm. + murmur  Abdomen: Bowel sounds present and normoactive. Soft. Nondistended.   Back: No spinal tenderness. No costovertebral angle tenderness.   Extremities: No cyanosis or clubbing. ++ edema.   Skin: Warm. Dry. Good turgor. No rash. No vasculitic stigmata.  Psychiatric: Appropriate affect and mood for situation.         Laboratory & Imaging Data--  CBC                        11.7   22.7  )-----------( 200      ( 07 Dec 2018 07:26 )             34.8       Chemistries  12-07    135  |  102  |  53<H>  ----------------------------<  131<H>  4.3   |  20<L>  |  1.49<H>    Ca    9.0      07 Dec 2018 07:26        Culture Data    Culture - Urine (collected 04 Dec 2018 17:06)  Source: .Urine Clean Catch (Midstream)  Final Report (05 Dec 2018 22:51):    No growth    Culture - Blood (collected 04 Dec 2018 14:59)  Source: .Blood Blood-Peripheral  Preliminary Report (05 Dec 2018 15:05):    No growth to date.    Culture - Blood (collected 04 Dec 2018 12:52)  Source: .Blood Blood-Peripheral  Preliminary Report (05 Dec 2018 13:02):    No growth to date.      < from: US Kidney and Bladder (12.06.18 @ 09:56) >    EXAM:  US KIDNEYS AND BLADDER                            PROCEDURE DATE:  12/06/2018            INTERPRETATION:  CLINICAL INFORMATION: Left flank pain, renal lesion seen   on CT scan    COMPARISON: CT abdomen 12/4/2018, and ultrasound of the abdomen 12/4/2018    TECHNIQUE: Sonography of the kidneys and bladder.     FINDINGS:    Right kidney:  Right kidney measures 10.5 cm. There is a 4.8 x 3.3 x 4.0   cm lesion in the right mid pole with questionable septation.  There is also a 3.2 x 2.3 x 2.7 cm right lower pole cyst. There is no   hydronephrosis.    Left kidney:  The left kidney measures 10.7 cm. 1.1 cm renal calculus   visualized within the inferior pole of the left kidney. There is a small   subcentimeter cyst in the left lateral inferior pole. There is no   hydronephrosis.    Urinary bladder: Within normal limits.    IMPRESSION:     No hydronephrosis. Nonobstructing left renal calculus again noted.      < end of copied text >    < from: CT Abdomen and Pelvis No Cont (12.04.18 @ 11:21) >    EXAM:  CT ABDOMEN AND PELVIS                            PROCEDURE DATE:  12/04/2018            INTERPRETATION:  CLINICAL INFORMATION: Right flank pain for a few weeks.    COMPARISON: CT abdomen pelvis 10/1/2010.    PROCEDURE:   CT of the Abdomen and Pelvis was performed without intravenous contrast.   Intravenous contrast: None.  Oral contrast: None.  Sagittal and coronal reformats were performed.    FINDINGS:    LOWER CHEST: Scattered areas of tree-in-bud opacities bilaterally, most   compatible with small airways disease. Coronary artery atherosclerotic   calcifications.    Evaluation of the solid visceral organs is limited without the   administration of intravenous contrast.    LIVER: Within normal limits.  BILE DUCTS: Normal caliber.  GALLBLADDER: Cholecystectomy.  SPLEEN: A 1.8 cm lesion in the spleen, indeterminate, new since 10/1/2010.  PANCREAS: Within normal limits.  ADRENALS: Within normal limits.  KIDNEYS/URETERS: 2 adjacent nonobstructing stones at the lower pole   measuring up to 0.6 cm, stable since 2010 . No hydronephrosis. A right   lower pole renal cyst. An indeterminate hyperdense lesion at the right   interpole measuring 4.7 cm, at site of prior cyst, likely hemorrhagic   cyst.     BLADDER: Within normal limits.  REPRODUCTIVE ORGANS: Fluid distended cervical canal measuring 1.8 cm. No   adnexal masses.    BOWEL: Subtotal colectomy with a right lower quadrant ileostomy with a   moderate to large parastomal hernia containing nonobstructed small bowel.   No bowel obstruction.   PERITONEUM: No ascites. A 1.7 cm round fat and soft tissue attenuation   lesion in the left upper quadrant with rim calcification is decreased   from 2.8 cm on 2010 study, likely reflects fat necrosis.  VESSELS:  Atherosclerotic change. Circumaortic left renal vein.  RETROPERITONEUM: No lymphadenopathy.    ABDOMINAL WALL: Right lower quadrant ileostomy with parastomal hernia, as   above.    BONES: Multilevel degenerative change. Left hip replacement.    IMPRESSION:     A stable 0.6 cm nonobstructing left lower pole stone. No hydronephrosis.    A 4.7 cm indeterminate slightly hyperdense lesion at the right interpole,   may reflect a hemorrhagic cyst. Consider further characterization with   ultrasound.    Fluid distended cervical canal. Recommend clinical correlation and   consider pelvic ultrasound for further evaluation.      < end of copied text >    < from: Xray Chest 1 View AP/PA (12.04.18 @ 16:24) >    EXAM:  XR CHEST AP OR PA 1V                            PROCEDURE DATE:  12/04/2018            INTERPRETATION:  CLINICAL INFORMATION: Hypotension. Back pain.    EXAM: AP portable chest from 12/4/2018.    COMPARISON: Chest radiograph from 11/17/2015    FINDINGS:  The lungs are clear.  There is no evidence of pleural effusion or pneumothorax.  The cardiomediastinal silhouette is unremarkable.  The visualized osseous and soft tissue structures demonstrate no acute   pathology.    IMPRESSION:   Clear lungs.      < end of copied text >      Rapid Respiratory Viral Panel (12.04.18 @ 11:22)    Rapid RVP Result: NotDetec: The Film[employer] RVP Rapid uses polymerase chain reaction (PCR) and melt  curve analysis to screen for adenovirus; coronavirus HKU1, NL63, 229E,  OC43; human metapneumovirus (hMPV); human enterovirus/rhinovirus  (Entero/RV); influenza A; influenza A/H1;influenza A/H3; influenza  A/H1-2009; influenza B; parainfluenza viruses 1, 2, 3, 4; respiratory  syncytial virus; Bordetella pertussis; Mycoplasma pneumoniae; and  Chlamydophila pneumoniae.        Urinalysis (12.04.18 @ 13:28)    Blood, Urine: Negative    Glucose Qualitative, Urine: Negative    pH Urine: 5.5    Color: Light Yellow    Urine Appearance: Clear    Bilirubin: Negative    Ketone - Urine: Negative    Specific Gravity: 1.014    Protein, Urine: Negative    Urobilinogen: Negative    Nitrite: Negative    Leukocyte Esterase Concentration: Negative

## 2018-12-07 NOTE — CONSULT NOTE ADULT - PROBLEM SELECTOR RECOMMENDATION 9
blood cx ntd  urine without pyuria and cx neg and pt did have recent ciprofloxacin  but no symptoms or imaging consistent with upper tract disease  subtotal colectomy, can send stool for cdiff   anc check CT chest  follow wbc  would d/c ceftriaxone now. blood cx ntd  rvp neg  urine without pyuria and cx neg and pt did have recent ciprofloxacin  but no symptoms or imaging consistent with upper tract disease  subtotal colectomy, can send stool for cdiff   anc check CT chest  follow wbc  would d/c ceftriaxone now.

## 2018-12-08 LAB
ANION GAP SERPL CALC-SCNC: 10 MMOL/L — SIGNIFICANT CHANGE UP (ref 5–17)
BUN SERPL-MCNC: 39 MG/DL — HIGH (ref 7–23)
CALCIUM SERPL-MCNC: 8.6 MG/DL — SIGNIFICANT CHANGE UP (ref 8.4–10.5)
CHLORIDE SERPL-SCNC: 106 MMOL/L — SIGNIFICANT CHANGE UP (ref 96–108)
CO2 SERPL-SCNC: 20 MMOL/L — LOW (ref 22–31)
CREAT SERPL-MCNC: 1.26 MG/DL — SIGNIFICANT CHANGE UP (ref 0.5–1.3)
CULTURE RESULTS: NO GROWTH — SIGNIFICANT CHANGE UP
GLUCOSE SERPL-MCNC: 130 MG/DL — HIGH (ref 70–99)
HCT VFR BLD CALC: 30.6 % — LOW (ref 34.5–45)
HGB BLD-MCNC: 10.3 G/DL — LOW (ref 11.5–15.5)
MCHC RBC-ENTMCNC: 30.6 PG — SIGNIFICANT CHANGE UP (ref 27–34)
MCHC RBC-ENTMCNC: 33.7 GM/DL — SIGNIFICANT CHANGE UP (ref 32–36)
MCV RBC AUTO: 90.8 FL — SIGNIFICANT CHANGE UP (ref 80–100)
PLATELET # BLD AUTO: 191 K/UL — SIGNIFICANT CHANGE UP (ref 150–400)
POTASSIUM SERPL-MCNC: 4.5 MMOL/L — SIGNIFICANT CHANGE UP (ref 3.5–5.3)
POTASSIUM SERPL-SCNC: 4.5 MMOL/L — SIGNIFICANT CHANGE UP (ref 3.5–5.3)
RBC # BLD: 3.37 M/UL — LOW (ref 3.8–5.2)
RBC # FLD: 14.2 % — SIGNIFICANT CHANGE UP (ref 10.3–14.5)
SODIUM SERPL-SCNC: 136 MMOL/L — SIGNIFICANT CHANGE UP (ref 135–145)
SPECIMEN SOURCE: SIGNIFICANT CHANGE UP
WBC # BLD: 21.1 K/UL — HIGH (ref 3.8–10.5)
WBC # FLD AUTO: 21.1 K/UL — HIGH (ref 3.8–10.5)

## 2018-12-08 RX ORDER — ACETAMINOPHEN 500 MG
650 TABLET ORAL EVERY 6 HOURS
Qty: 0 | Refills: 0 | Status: DISCONTINUED | OUTPATIENT
Start: 2018-12-08 | End: 2018-12-20

## 2018-12-08 RX ADMIN — HEPARIN SODIUM 5000 UNIT(S): 5000 INJECTION INTRAVENOUS; SUBCUTANEOUS at 14:38

## 2018-12-08 RX ADMIN — HEPARIN SODIUM 5000 UNIT(S): 5000 INJECTION INTRAVENOUS; SUBCUTANEOUS at 05:35

## 2018-12-08 RX ADMIN — Medication 5 MILLIGRAM(S): at 11:36

## 2018-12-08 RX ADMIN — Medication 50 MICROGRAM(S): at 05:35

## 2018-12-08 RX ADMIN — HEPARIN SODIUM 5000 UNIT(S): 5000 INJECTION INTRAVENOUS; SUBCUTANEOUS at 21:29

## 2018-12-08 RX ADMIN — Medication 650 MILLIGRAM(S): at 16:19

## 2018-12-08 RX ADMIN — PANTOPRAZOLE SODIUM 40 MILLIGRAM(S): 20 TABLET, DELAYED RELEASE ORAL at 11:37

## 2018-12-08 RX ADMIN — SODIUM CHLORIDE 75 MILLILITER(S): 9 INJECTION, SOLUTION INTRAVENOUS at 21:29

## 2018-12-08 NOTE — PROGRESS NOTE ADULT - PROBLEM SELECTOR PLAN 1
Defer antibiotics  ECHO eval for thrombus  Need for any further W/U of splenic lesion TBD  Monitor CBC

## 2018-12-08 NOTE — PROGRESS NOTE ADULT - ASSESSMENT
85f with htn, hypothyroid admitted with weakness  leukocytosis, martita   complicated by low grade temp  No clear or convincing evidence of infection on the basis of exam  Per admit note left flank pain was present on admission as well  Given finding of a splenic lesion, question whether there is any causality  Doubt splenic abscess  Splenic infarct possible, though not classically wedge shaped  Per d/w daughter at bedside (RN) there is also a strong family history of lymphoma  No other significant LAD on CT A/P. Primary splenic lymphoma unusual not impossible  Per daughter WBC in October was normal

## 2018-12-08 NOTE — PROGRESS NOTE ADULT - SUBJECTIVE AND OBJECTIVE BOX
[facility], Division of Infectious Diseases  ABDIAS Minor A. Lee  [phone]    Name: GEORGE ROSE  Age: 85y  Gender: Female  MRN: [id number]    Interval History--  Notes reviewed. Seen earlier today. No new issues.  For placement Monday  WBC remains elevated  Afebrile    Past Medical History--  Fistula, perirectal  Hiatal hernia  Hypothyroidism  Hypertension  S/P foot joint surgery  S/P arthroscopy of right knee  S/P ileostomy  S/P cholecystectomy      For details regarding the patient's social history, family history, and other miscellaneous elements, please refer the initial infectious diseases consultation and/or the admitting history and physical examination for this admission.    Allergies    iodine (Other; Anaphylaxis)  shellfish (Anaphylaxis)  sulfa drugs (Other)    Intolerances        Medications--  Antibiotics:    Immunologic:    Other:  acetaminophen   Tablet .. PRN  aspirin enteric coated  diazepam    Tablet PRN  heparin  Injectable  lactated ringers.  levothyroxine  nystatin Powder PRN  ondansetron Injectable PRN  pantoprazole    Tablet  prochlorperazine   Injectable PRN      Review of Systems--  A 10-point review of systems was obtained.   Review of systems otherwise negative except as previously noted.    Physical Examination--  Vital Signs: T(F): 98.9 (12-08-18 @ 13:37), Max: 100 (12-07-18 @ 21:34)  HR: 64 (12-08-18 @ 13:37)  BP: 122/76 (12-08-18 @ 13:37)  RR: 18 (12-08-18 @ 13:37)  SpO2: 97% (12-08-18 @ 13:37)  Wt(kg): --  General: Nontoxic-appearing Female in no acute distress.  HEENT: AT/NC. Anicteric. Conjunctiva pink and moist. Oropharynx clear.   Neck: Not rigid. No sense of mass.  Nodes: None palpable.  Lungs: Clear bilaterally without rales, wheezing or rhonchi  Heart: Regular rate and rhythm. No Murmur. No rub. No gallop. No palpable thrill.  Abdomen: Bowel sounds present and normoactive. Soft. Nondistended. Nontender. No sense of mass. No organomegaly.  Extremities: No cyanosis or clubbing. No edema.   Skin: Warm. Dry. Good turgor. No rash. No vasculitic stigmata.  Psychiatric: Appropriate affect and mood for situation.         Laboratory Studies--  CBC                        10.3   21.10 )-----------( 191      ( 08 Dec 2018 09:05 )             30.6         Chemistries  12-08    136  |  106  |  39<H>  ----------------------------<  130<H>  4.5   |  20<L>  |  1.26    Ca    8.6      08 Dec 2018 07:14        Culture Data    Culture - Blood (collected 06 Dec 2018 18:45)  Source: .Blood Blood  Preliminary Report (07 Dec 2018 19:01):    No growth to date.    Culture - Urine (collected 04 Dec 2018 17:06)  Source: .Urine Clean Catch (Midstream)  Final Report (05 Dec 2018 22:51):    No growth    Culture - Blood (collected 04 Dec 2018 14:59)  Source: .Blood Blood-Peripheral  Preliminary Report (05 Dec 2018 15:05):    No growth to date.    Culture - Blood (collected 04 Dec 2018 12:52)  Source: .Blood Blood-Peripheral  Preliminary Report (05 Dec 2018 13:02):    No growth to date.

## 2018-12-08 NOTE — PROGRESS NOTE ADULT - SUBJECTIVE AND OBJECTIVE BOX
Patient is a 85y old  Female who presents with a chief complaint of dehydration and LUIS (07 Dec 2018 10:50)      INTERVAL HPI/OVERNIGHT EVENTS:      Vital Signs Last 24 Hrs  T(C): 37.3 (08 Dec 2018 05:31), Max: 37.8 (07 Dec 2018 21:34)  T(F): 99.1 (08 Dec 2018 05:31), Max: 100 (07 Dec 2018 21:34)  HR: 82 (08 Dec 2018 05:31) (68 - 86)  BP: 100/63 (08 Dec 2018 05:31) (100/63 - 136/66)  BP(mean): --  RR: 18 (08 Dec 2018 05:31) (18 - 18)  SpO2: 95% (08 Dec 2018 05:31) (95% - 97%)    acetaminophen   Tablet .. 650 milliGRAM(s) Oral every 6 hours PRN  aspirin enteric coated 81 milliGRAM(s) Oral daily  diazepam    Tablet 2.5 milliGRAM(s) Oral at bedtime PRN  heparin  Injectable 5000 Unit(s) SubCutaneous every 8 hours  lactated ringers. 1000 milliLiter(s) IV Continuous <Continuous>  levothyroxine 50 MICROGram(s) Oral daily  nystatin Powder 1 Application(s) Topical two times a day PRN  ondansetron Injectable 4 milliGRAM(s) IV Push every 6 hours PRN  pantoprazole    Tablet 40 milliGRAM(s) Oral daily  prochlorperazine   Injectable 5 milliGRAM(s) IV Push every 8 hours PRN      PHYSICAL EXAM:  GENERAL: NAD,   EYES: conjunctiva and sclera clear  ENMT: Moist mucous membranes  NECK: Supple, No JVD, Normal thyroid  NERVOUS SYSTEM:  Alert & Oriented X3,   CHEST/LUNG: Clear to auscultation bilaterally; No rales, rhonchi, wheezing, or rubs  HEART: Regular rate and rhythm; No murmurs, rubs, or gallops  ABDOMEN: Soft, Nontender, Nondistended; Bowel sounds present  EXTREMITIES:  2+ Peripheral Pulses, No clubbing, cyanosis, or edema  LYMPH: No lymphadenopathy noted  SKIN: No rashes or lesions    Consultant(s) Notes Reviewed:  [x ] YES  [ ] NO  Care Discussed with Consultants/Other Providers [ x] YES  [ ] NO    LABS:                        10.3   21.10 )-----------( 191      ( 08 Dec 2018 09:05 )             30.6     12-08    136  |  106  |  39<H>  ----------------------------<  130<H>  4.5   |  20<L>  |  1.26    Ca    8.6      08 Dec 2018 07:14        Urinalysis Basic - ( 07 Dec 2018 15:02 )    Color: Yellow / Appearance: Clear / S.018 / pH: x  Gluc: x / Ketone: Negative  / Bili: Negative / Urobili: Negative mg/dL   Blood: x / Protein: Negative mg/dL / Nitrite: Negative   Leuk Esterase: Negative / RBC: 8 /HPF / WBC 0 /HPF   Sq Epi: x / Non Sq Epi: 1 /HPF / Bacteria: Negative      CAPILLARY BLOOD GLUCOSE            Urinalysis Basic - ( 07 Dec 2018 15:02 )    Color: Yellow / Appearance: Clear / S.018 / pH: x  Gluc: x / Ketone: Negative  / Bili: Negative / Urobili: Negative mg/dL   Blood: x / Protein: Negative mg/dL / Nitrite: Negative   Leuk Esterase: Negative / RBC: 8 /HPF / WBC 0 /HPF   Sq Epi: x / Non Sq Epi: 1 /HPF / Bacteria: Negative        RADIOLOGY & ADDITIONAL TESTS:    Imaging Personally Reviewed:  [ ] YES  [ ] NO

## 2018-12-08 NOTE — PROGRESS NOTE ADULT - SUBJECTIVE AND OBJECTIVE BOX
Patient seen and examined  no complaints    iodine (Other; Anaphylaxis)  shellfish (Anaphylaxis)  sulfa drugs (Other)    Hospital Medications:   MEDICATIONS  (STANDING):  aspirin enteric coated 81 milliGRAM(s) Oral daily  heparin  Injectable 5000 Unit(s) SubCutaneous every 8 hours  lactated ringers. 1000 milliLiter(s) (75 mL/Hr) IV Continuous <Continuous>  levothyroxine 50 MICROGram(s) Oral daily  pantoprazole    Tablet 40 milliGRAM(s) Oral daily      VITALS:  T(F): 98.8 (18 @ 17:42), Max: 101.3 (18 @ 15:50)  HR: 95 (18 @ 15:50)  BP: 111/67 (18 @ 15:50)  RR: 18 (18 @ 15:50)  SpO2: 97% (18 @ 15:50)  Wt(kg): --     @ 07:01  -   @ 07:00  --------------------------------------------------------  IN: 2460 mL / OUT: 700 mL / NET: 1760 mL     @ 07:01  -   @ 18:05  --------------------------------------------------------  IN: 300 mL / OUT: 1 mL / NET: 299 mL      Physical Exam :-  Constitutional: NAD  Neck: Supple.  Respiratory: Bilateral equal breath sounds,  Cardiovascular: S1, S2 normal,  Gastrointestinal: Bowel Sounds present, soft, non tender.  Extremities: No edema  Neurological: Alert and Oriented x 3, no focal deficits  Psychiatric: Normal mood, normal affect    LABS:      136  |  106  |  39<H>  ----------------------------<  130<H>  4.5   |  20<L>  |  1.26    Ca    8.6      08 Dec 2018 07:14      Creatinine Trend: 1.26 <--, 1.49 <--, 1.62 <--, 1.69 <--, 1.78 <--, 2.27 <--, 2.90 <--, 3.42 <--                        10.3   21.10 )-----------( 191      ( 08 Dec 2018 09:05 )             30.6     Urine Studies:  Urinalysis Basic - ( 07 Dec 2018 15:02 )    Color: Yellow / Appearance: Clear / S.018 / pH:   Gluc:  / Ketone: Negative  / Bili: Negative / Urobili: Negative mg/dL   Blood:  / Protein: Negative mg/dL / Nitrite: Negative   Leuk Esterase: Negative / RBC: 8 /HPF / WBC 0 /HPF   Sq Epi:  / Non Sq Epi: 1 /HPF / Bacteria: Negative      Creatinine, Random Urine: 79 mg/dL ( @ 16:38)  Protein/Creatinine Ratio Calculation: 0.1 Ratio ( @ 16:38)  Osmolality, Random Urine: 395 mos/kg ( @ 13:29)  Sodium, Random Urine: <20 mmol/L ( @ 13:29)    RADIOLOGY & ADDITIONAL STUDIES:

## 2018-12-08 NOTE — CHART NOTE - NSCHARTNOTEFT_GEN_A_CORE
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     BLood cx's neg to date; await urine cx results (in-lab)    Patient is followed by ID who saw her today; and not recc abx at this time 2/2 No clear or convincing evidence of infection on the basis of exam    If patient spikes temp again, will repeat fever w/up    Addendum: D/w with Dr Huff; blood cx x2 sets will be ordered      MONI Lyon  [id number]

## 2018-12-08 NOTE — PROGRESS NOTE ADULT - ASSESSMENT
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

## 2018-12-08 NOTE — PROGRESS NOTE ADULT - ATTENDING COMMENTS
Left message for Dr. Velasquez.    I'm available for issues over the remainder of the weekend, please call if ID input needed.    Arvind Sarmiento MD  [phone]

## 2018-12-09 LAB
ANION GAP SERPL CALC-SCNC: 13 MMOL/L — SIGNIFICANT CHANGE UP (ref 5–17)
BUN SERPL-MCNC: 30 MG/DL — HIGH (ref 7–23)
CALCIUM SERPL-MCNC: 8.7 MG/DL — SIGNIFICANT CHANGE UP (ref 8.4–10.5)
CHLORIDE SERPL-SCNC: 105 MMOL/L — SIGNIFICANT CHANGE UP (ref 96–108)
CO2 SERPL-SCNC: 20 MMOL/L — LOW (ref 22–31)
CREAT SERPL-MCNC: 1.19 MG/DL — SIGNIFICANT CHANGE UP (ref 0.5–1.3)
CULTURE RESULTS: SIGNIFICANT CHANGE UP
CULTURE RESULTS: SIGNIFICANT CHANGE UP
GLUCOSE SERPL-MCNC: 113 MG/DL — HIGH (ref 70–99)
HCT VFR BLD CALC: 29.6 % — LOW (ref 34.5–45)
HGB BLD-MCNC: 9.7 G/DL — LOW (ref 11.5–15.5)
MCHC RBC-ENTMCNC: 29.8 PG — SIGNIFICANT CHANGE UP (ref 27–34)
MCHC RBC-ENTMCNC: 32.8 GM/DL — SIGNIFICANT CHANGE UP (ref 32–36)
MCV RBC AUTO: 91.1 FL — SIGNIFICANT CHANGE UP (ref 80–100)
PLATELET # BLD AUTO: 206 K/UL — SIGNIFICANT CHANGE UP (ref 150–400)
POTASSIUM SERPL-MCNC: 4.5 MMOL/L — SIGNIFICANT CHANGE UP (ref 3.5–5.3)
POTASSIUM SERPL-SCNC: 4.5 MMOL/L — SIGNIFICANT CHANGE UP (ref 3.5–5.3)
RBC # BLD: 3.25 M/UL — LOW (ref 3.8–5.2)
RBC # FLD: 14.2 % — SIGNIFICANT CHANGE UP (ref 10.3–14.5)
SODIUM SERPL-SCNC: 138 MMOL/L — SIGNIFICANT CHANGE UP (ref 135–145)
SPECIMEN SOURCE: SIGNIFICANT CHANGE UP
SPECIMEN SOURCE: SIGNIFICANT CHANGE UP
WBC # BLD: 18.83 K/UL — HIGH (ref 3.8–10.5)
WBC # FLD AUTO: 18.83 K/UL — HIGH (ref 3.8–10.5)

## 2018-12-09 RX ORDER — CELECOXIB 200 MG/1
200 CAPSULE ORAL DAILY
Qty: 0 | Refills: 0 | Status: DISCONTINUED | OUTPATIENT
Start: 2018-12-09 | End: 2018-12-09

## 2018-12-09 RX ADMIN — Medication 5 MILLIGRAM(S): at 17:08

## 2018-12-09 RX ADMIN — SODIUM CHLORIDE 75 MILLILITER(S): 9 INJECTION, SOLUTION INTRAVENOUS at 21:05

## 2018-12-09 RX ADMIN — Medication 50 MICROGRAM(S): at 05:03

## 2018-12-09 RX ADMIN — HEPARIN SODIUM 5000 UNIT(S): 5000 INJECTION INTRAVENOUS; SUBCUTANEOUS at 12:25

## 2018-12-09 RX ADMIN — Medication 650 MILLIGRAM(S): at 13:06

## 2018-12-09 RX ADMIN — PANTOPRAZOLE SODIUM 40 MILLIGRAM(S): 20 TABLET, DELAYED RELEASE ORAL at 12:19

## 2018-12-09 RX ADMIN — HEPARIN SODIUM 5000 UNIT(S): 5000 INJECTION INTRAVENOUS; SUBCUTANEOUS at 05:03

## 2018-12-09 RX ADMIN — Medication 650 MILLIGRAM(S): at 12:21

## 2018-12-09 RX ADMIN — Medication 81 MILLIGRAM(S): at 12:19

## 2018-12-09 RX ADMIN — HEPARIN SODIUM 5000 UNIT(S): 5000 INJECTION INTRAVENOUS; SUBCUTANEOUS at 21:05

## 2018-12-09 RX ADMIN — Medication 1 TABLET(S): at 12:19

## 2018-12-09 NOTE — PROGRESS NOTE ADULT - SUBJECTIVE AND OBJECTIVE BOX
Patient is a 85y old  Female who presents with a chief complaint of dehydration and LUIS (09 Dec 2018 12:07)      INTERVAL HPI/OVERNIGHT EVENTS: noted, tm101, dgtr at bedside  pt was unable to take in solid food-c/o dry mouth and choking sensation      Vital Signs Last 24 Hrs  T(C): 37.1 (09 Dec 2018 11:37), Max: 38.5 (08 Dec 2018 15:50)  T(F): 98.7 (09 Dec 2018 11:37), Max: 101.3 (08 Dec 2018 15:50)  HR: 84 (09 Dec 2018 11:37) (67 - 95)  BP: 114/73 (09 Dec 2018 04:56) (99/67 - 137/80)  BP(mean): --  RR: 18 (09 Dec 2018 11:37) (18 - 18)  SpO2: 96% (09 Dec 2018 11:37) (95% - 97%)    acetaminophen   Tablet .. 650 milliGRAM(s) Oral every 6 hours PRN  aspirin enteric coated 81 milliGRAM(s) Oral daily  diazepam    Tablet 2.5 milliGRAM(s) Oral at bedtime PRN  heparin  Injectable 5000 Unit(s) SubCutaneous every 8 hours  lactated ringers. 1000 milliLiter(s) IV Continuous <Continuous>  levothyroxine 50 MICROGram(s) Oral daily  multivitamin 1 Tablet(s) Oral daily  nystatin Powder 1 Application(s) Topical two times a day PRN  ondansetron Injectable 4 milliGRAM(s) IV Push every 6 hours PRN  pantoprazole    Tablet 40 milliGRAM(s) Oral daily  prochlorperazine   Injectable 5 milliGRAM(s) IV Push every 8 hours PRN      PHYSICAL EXAM:  GENERAL: NAD,   EYES: conjunctiva and sclera clear  ENMT: Moist mucous membranes  NECK: Supple, No JVD, Normal thyroid  NERVOUS SYSTEM:  Alert & Oriented X3,   CHEST/LUNG: Clear to auscultation bilaterally; No rales, rhonchi, wheezing, or rubs  HEART: Regular rate and rhythm; No murmurs, rubs, or gallops  ABDOMEN: Soft, Nontender, Nondistended; Bowel sounds present  EXTREMITIES:  2+ Peripheral Pulses, No clubbing, cyanosis, or edema  LYMPH: No lymphadenopathy noted  SKIN: No rashes or lesions    Consultant(s) Notes Reviewed:  [x ] YES  [ ] NO  Care Discussed with Consultants/Other Providers [ x] YES  [ ] NO    LABS:                        9.7    18.83 )-----------( 206      ( 09 Dec 2018 08:05 )             29.6     12-    138  |  105  |  30<H>  ----------------------------<  113<H>  4.5   |  20<L>  |  1.19    Ca    8.7      09 Dec 2018 06:45        Urinalysis Basic - ( 07 Dec 2018 15:02 )    Color: Yellow / Appearance: Clear / S.018 / pH: x  Gluc: x / Ketone: Negative  / Bili: Negative / Urobili: Negative mg/dL   Blood: x / Protein: Negative mg/dL / Nitrite: Negative   Leuk Esterase: Negative / RBC: 8 /HPF / WBC 0 /HPF   Sq Epi: x / Non Sq Epi: 1 /HPF / Bacteria: Negative      CAPILLARY BLOOD GLUCOSE            Urinalysis Basic - ( 07 Dec 2018 15:02 )    Color: Yellow / Appearance: Clear / S.018 / pH: x  Gluc: x / Ketone: Negative  / Bili: Negative / Urobili: Negative mg/dL   Blood: x / Protein: Negative mg/dL / Nitrite: Negative   Leuk Esterase: Negative / RBC: 8 /HPF / WBC 0 /HPF   Sq Epi: x / Non Sq Epi: 1 /HPF / Bacteria: Negative        RADIOLOGY & ADDITIONAL TESTS:    Imaging Personally Reviewed:  x] YES  [ ] NO

## 2018-12-09 NOTE — DIETITIAN INITIAL EVALUATION ADULT. - OTHER INFO
Nutrition consult received for decreased oral intake. Patient's  family concerned about depression as noted, patient admitted w/ fatigue and left flank pain found to have LUIS w/ UTI. Patient admitted from home where she lives with an aide. Nutrition consult received for decreased oral intake. Patient's daughter at bedside staes he sebastian stopped eating well 2 months ago, refuses ensure because it makes her ileostomy output high. Patient also complained about loss of appetiet and  nausea (now relieved with zofran)., Patient counseled with daughter presne trabout relationship of food, calories and protein with strength maintenance, skin integrity, mood elevation(family concerned about depression).Patient's daughter  requests ensure 3x daily. Patient agreed to drink. Patient admitted w/ fatigue and left flank pain found to have LUIS w/ UTI. Patient admitted from home where she lives with an HHA.

## 2018-12-09 NOTE — PROGRESS NOTE ADULT - ASSESSMENT
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

## 2018-12-09 NOTE — DIETITIAN INITIAL EVALUATION ADULT. - PROBLEM SELECTOR PLAN 4
- will hold HTN meds at pt had hypotension on presentation and currently BP is stable at 110s/60s, 2/2 dehydration   - start home nadolol once BP improves

## 2018-12-09 NOTE — DIETITIAN INITIAL EVALUATION ADULT. - PROBLEM SELECTOR PLAN 8
Patient's DTR Sandra is her HCP, The patient has a MOLST form completed, and wishes to be DNR/DNI, advised family to bring in MOLST form and HCP for hospital records. time spent with advance care planning 31 min

## 2018-12-09 NOTE — CHART NOTE - NSCHARTNOTEFT_GEN_A_CORE
Upon Nutritional Assessment by the Registered Dietitian your patient was determined to meet criteria / has evidence of the following diagnosis/diagnoses:          [ ]  Mild Protein Calorie Malnutrition        [X ]  Moderate Protein Calorie Malnutrition        [ ] Severe Protein Calorie Malnutrition        [ ] Unspecified Protein Calorie Malnutrition        [ ] Underweight / BMI <19        [ ] Morbid Obesity / BMI > 40      Findings as based on:  [ X] Comprehensive nutrition assessment: acute decrease in oral intake with  weight loss 10% body weight  in 2 months  [X ] Nutrition Focused Physical Exam:    generalized muscle loss, hanging loose skin upper body  [X ] Other:  patient c/o loss of appetite, nausea, addressed with zofran      Nutrition Plan/Recommendations: 1.  1. add ensure 3x daily (patient's daughter in an RN and specifically requests)  2.Monitor/encourage PO intake and hydration.   PROVIDER Section:     By signing this assessment you are acknowledging and agree with the diagnosis/diagnoses assigned by the Registered Dietitian    Comments:

## 2018-12-09 NOTE — DIETITIAN INITIAL EVALUATION ADULT. - ENERGY NEEDS
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

## 2018-12-09 NOTE — PROGRESS NOTE ADULT - SUBJECTIVE AND OBJECTIVE BOX
Patient seen and examined  has dec po intake     iodine (Other; Anaphylaxis)  shellfish (Anaphylaxis)  sulfa drugs (Other)    Hospital Medications:   MEDICATIONS  (STANDING):  aspirin enteric coated 81 milliGRAM(s) Oral daily  celecoxib 200 milliGRAM(s) Oral daily  heparin  Injectable 5000 Unit(s) SubCutaneous every 8 hours  lactated ringers. 1000 milliLiter(s) (75 mL/Hr) IV Continuous <Continuous>  levothyroxine 50 MICROGram(s) Oral daily  multivitamin 1 Tablet(s) Oral daily  pantoprazole    Tablet 40 milliGRAM(s) Oral daily        VITALS:  T(F): 98.7 (18 @ 11:37), Max: 101.3 (18 @ 15:50)  HR: 84 (18 @ 11:37)  BP: 114/73 (18 @ 04:56)  RR: 18 (18 @ 11:37)  SpO2: 96% (18 @ 11:37)  Wt(kg): --     @ 07:01  -   @ 07:00  --------------------------------------------------------  IN: 1490 mL / OUT: 301 mL / NET: 1189 mL     @ 07:01  -   @ 12:08  --------------------------------------------------------  IN: 240 mL / OUT: 0 mL / NET: 240 mL      Physical Exam :-  Constitutional: NAD  Neck: Supple.  Respiratory: Bilateral equal breath sounds,  Cardiovascular: S1, S2 normal,  Gastrointestinal: Bowel Sounds present, soft, non tender.  Extremities: No edema  Neurological: Alert and Oriented x 3, no focal deficits  Psychiatric: Normal mood, normal affect      LABS:      138  |  105  |  30<H>  ----------------------------<  113<H>  4.5   |  20<L>  |  1.19    Ca    8.7      09 Dec 2018 06:45      Creatinine Trend: 1.19 <--, 1.26 <--, 1.49 <--, 1.62 <--, 1.69 <--, 1.78 <--, 2.27 <--, 2.90 <--, 3.42 <--                        9.7    18.83 )-----------( 206      ( 09 Dec 2018 08:05 )             29.6     Urine Studies:  Urinalysis Basic - ( 07 Dec 2018 15:02 )    Color: Yellow / Appearance: Clear / S.018 / pH:   Gluc:  / Ketone: Negative  / Bili: Negative / Urobili: Negative mg/dL   Blood:  / Protein: Negative mg/dL / Nitrite: Negative   Leuk Esterase: Negative / RBC: 8 /HPF / WBC 0 /HPF   Sq Epi:  / Non Sq Epi: 1 /HPF / Bacteria: Negative      Creatinine, Random Urine: 79 mg/dL ( @ 16:38)  Protein/Creatinine Ratio Calculation: 0.1 Ratio ( @ 16:38)  Osmolality, Random Urine: 395 mos/kg ( @ 13:29)  Sodium, Random Urine: <20 mmol/L ( @ 13:29)    RADIOLOGY & ADDITIONAL STUDIES:

## 2018-12-10 LAB
ANION GAP SERPL CALC-SCNC: 12 MMOL/L — SIGNIFICANT CHANGE UP (ref 5–17)
ANISOCYTOSIS BLD QL: SLIGHT — SIGNIFICANT CHANGE UP
BASOPHILS # BLD AUTO: 0 K/UL — SIGNIFICANT CHANGE UP (ref 0–0.2)
BUN SERPL-MCNC: 24 MG/DL — HIGH (ref 7–23)
CALCIUM SERPL-MCNC: 9.1 MG/DL — SIGNIFICANT CHANGE UP (ref 8.4–10.5)
CHLORIDE SERPL-SCNC: 104 MMOL/L — SIGNIFICANT CHANGE UP (ref 96–108)
CO2 SERPL-SCNC: 22 MMOL/L — SIGNIFICANT CHANGE UP (ref 22–31)
CREAT SERPL-MCNC: 1.17 MG/DL — SIGNIFICANT CHANGE UP (ref 0.5–1.3)
EOSINOPHIL # BLD AUTO: 0.1 K/UL — SIGNIFICANT CHANGE UP (ref 0–0.5)
GLUCOSE SERPL-MCNC: 162 MG/DL — HIGH (ref 70–99)
HCT VFR BLD CALC: 31.6 % — LOW (ref 34.5–45)
HGB BLD-MCNC: 10.7 G/DL — LOW (ref 11.5–15.5)
HYPOCHROMIA BLD QL: SLIGHT — SIGNIFICANT CHANGE UP
LYMPHOCYTES # BLD AUTO: 1.3 K/UL — SIGNIFICANT CHANGE UP (ref 1–3.3)
LYMPHOCYTES # BLD AUTO: 9 % — LOW (ref 13–44)
MACROCYTES BLD QL: SLIGHT — SIGNIFICANT CHANGE UP
MAGNESIUM SERPL-MCNC: 1.4 MG/DL — LOW (ref 1.6–2.6)
MCHC RBC-ENTMCNC: 30.8 PG — SIGNIFICANT CHANGE UP (ref 27–34)
MCHC RBC-ENTMCNC: 33.8 GM/DL — SIGNIFICANT CHANGE UP (ref 32–36)
MCV RBC AUTO: 90.9 FL — SIGNIFICANT CHANGE UP (ref 80–100)
MICROCYTES BLD QL: SLIGHT — SIGNIFICANT CHANGE UP
MONOCYTES # BLD AUTO: 0.8 K/UL — SIGNIFICANT CHANGE UP (ref 0–0.9)
MONOCYTES NFR BLD AUTO: 6 % — SIGNIFICANT CHANGE UP (ref 2–14)
MYELOCYTES NFR BLD: 1 % — HIGH (ref 0–0)
NEUTROPHILS # BLD AUTO: 13.3 K/UL — HIGH (ref 1.8–7.4)
NEUTROPHILS NFR BLD AUTO: 79 % — HIGH (ref 43–77)
NEUTS BAND # BLD: 5 % — SIGNIFICANT CHANGE UP (ref 0–8)
PHOSPHATE SERPL-MCNC: 2.6 MG/DL — SIGNIFICANT CHANGE UP (ref 2.5–4.5)
PLAT MORPH BLD: NORMAL — SIGNIFICANT CHANGE UP
PLATELET # BLD AUTO: 217 K/UL — SIGNIFICANT CHANGE UP (ref 150–400)
POTASSIUM SERPL-MCNC: 4.4 MMOL/L — SIGNIFICANT CHANGE UP (ref 3.5–5.3)
POTASSIUM SERPL-SCNC: 4.4 MMOL/L — SIGNIFICANT CHANGE UP (ref 3.5–5.3)
RAPID RVP RESULT: SIGNIFICANT CHANGE UP
RBC # BLD: 3.48 M/UL — LOW (ref 3.8–5.2)
RBC # FLD: 12.7 % — SIGNIFICANT CHANGE UP (ref 10.3–14.5)
RBC BLD AUTO: ABNORMAL
SODIUM SERPL-SCNC: 138 MMOL/L — SIGNIFICANT CHANGE UP (ref 135–145)
WBC # BLD: 15.5 K/UL — HIGH (ref 3.8–10.5)
WBC # FLD AUTO: 15.5 K/UL — HIGH (ref 3.8–10.5)

## 2018-12-10 PROCEDURE — 71045 X-RAY EXAM CHEST 1 VIEW: CPT | Mod: 26

## 2018-12-10 PROCEDURE — 76856 US EXAM PELVIC COMPLETE: CPT | Mod: 26

## 2018-12-10 RX ORDER — ACETAMINOPHEN 500 MG
325 TABLET ORAL ONCE
Qty: 0 | Refills: 0 | Status: COMPLETED | OUTPATIENT
Start: 2018-12-10 | End: 2018-12-10

## 2018-12-10 RX ORDER — MAGNESIUM SULFATE 500 MG/ML
2 VIAL (ML) INJECTION ONCE
Qty: 0 | Refills: 0 | Status: COMPLETED | OUTPATIENT
Start: 2018-12-10 | End: 2018-12-10

## 2018-12-10 RX ADMIN — Medication 50 MICROGRAM(S): at 05:52

## 2018-12-10 RX ADMIN — Medication 1 TABLET(S): at 12:35

## 2018-12-10 RX ADMIN — Medication 81 MILLIGRAM(S): at 12:35

## 2018-12-10 RX ADMIN — HEPARIN SODIUM 5000 UNIT(S): 5000 INJECTION INTRAVENOUS; SUBCUTANEOUS at 21:18

## 2018-12-10 RX ADMIN — HEPARIN SODIUM 5000 UNIT(S): 5000 INJECTION INTRAVENOUS; SUBCUTANEOUS at 05:52

## 2018-12-10 RX ADMIN — HEPARIN SODIUM 5000 UNIT(S): 5000 INJECTION INTRAVENOUS; SUBCUTANEOUS at 12:35

## 2018-12-10 RX ADMIN — Medication 325 MILLIGRAM(S): at 17:47

## 2018-12-10 RX ADMIN — Medication 650 MILLIGRAM(S): at 13:06

## 2018-12-10 RX ADMIN — Medication 650 MILLIGRAM(S): at 13:36

## 2018-12-10 RX ADMIN — PANTOPRAZOLE SODIUM 40 MILLIGRAM(S): 20 TABLET, DELAYED RELEASE ORAL at 12:35

## 2018-12-10 RX ADMIN — Medication 325 MILLIGRAM(S): at 18:19

## 2018-12-10 RX ADMIN — Medication 50 GRAM(S): at 17:15

## 2018-12-10 NOTE — PROGRESS NOTE ADULT - SUBJECTIVE AND OBJECTIVE BOX
infectious diseases progress note:    GEORGE ROSE is a 85y y. o. Female patient    Patient reports: no new issues    ROS:    EYES:  Negative  blurry vision or double vision  GASTROINTESTINAL:  Negative for nausea, vomiting, diarrhea  -otherwise negative except for subjective    Allergies    iodine (Other; Anaphylaxis)  shellfish (Anaphylaxis)  sulfa drugs (Other)    Intolerances        ANTIBIOTICS/RELEVANT:  antimicrobials    immunologic:    OTHER:  acetaminophen   Tablet .. 650 milliGRAM(s) Oral every 6 hours PRN  aspirin enteric coated 81 milliGRAM(s) Oral daily  diazepam    Tablet 2.5 milliGRAM(s) Oral at bedtime PRN  heparin  Injectable 5000 Unit(s) SubCutaneous every 8 hours  levothyroxine 50 MICROGram(s) Oral daily  multivitamin 1 Tablet(s) Oral daily  nystatin Powder 1 Application(s) Topical two times a day PRN  ondansetron Injectable 4 milliGRAM(s) IV Push every 6 hours PRN  pantoprazole    Tablet 40 milliGRAM(s) Oral daily  prochlorperazine   Injectable 5 milliGRAM(s) IV Push every 8 hours PRN      Objective:  Last 24-Vital Signs Last 24 Hrs  T(C): 37.6 (10 Dec 2018 00:15), Max: 37.7 (09 Dec 2018 15:39)  T(F): 99.6 (10 Dec 2018 00:15), Max: 99.9 (09 Dec 2018 15:39)  HR: 71 (10 Dec 2018 00:15) (71 - 93)  BP: 110/70 (10 Dec 2018 00:15) (110/70 - 116/67)  BP(mean): --  RR: 18 (10 Dec 2018 00:15) (18 - 19)  SpO2: 95% (10 Dec 2018 00:15) (93% - 96%)    T(C): 37.6 (12-10-18 @ 00:15), Max: 38.5 (12-08-18 @ 15:50)  T(F): 99.6 (12-10-18 @ 00:15), Max: 101.3 (12-08-18 @ 15:50)  T(C): 37.6 (12-10-18 @ 00:15), Max: 38.5 (12-08-18 @ 15:50)  T(F): 99.6 (12-10-18 @ 00:15), Max: 101.3 (12-08-18 @ 15:50)  T(C): 37.6 (12-10-18 @ 00:15), Max: 38.5 (12-08-18 @ 15:50)  T(F): 99.6 (12-10-18 @ 00:15), Max: 101.3 (12-08-18 @ 15:50)    PHYSICAL EXAM:  Constitutional: Well-developed, well nourished  Eyes: PERRLA, EOMI  Ear/Nose/Throat: oropharynx normal	  Neck: no JVD, no lymphadenopathy, supple  Respiratory: no accessory muscle use, lung fields bilaterally clear  Cardiovascular: RRR, normal S1, S2  Gastrointestinal: soft, NT, no HSM, BS-normal, noted ostomy with bag  Extremities: no clubbing, no cyanosis, edema absent  Neuro: patient alert, oriented and appropriate  Skin: no sig lesions      LABS:                        9.7    18.83 )-----------( 206      ( 09 Dec 2018 08:05 )             29.6       WBC 18.83  12-09 @ 08:05  WBC 21.10  12-08 @ 09:05  WBC 22.7  12-07 @ 07:26  WBC 19.81  12-06 @ 13:41  WBC 14.96  12-05 @ 08:15  WBC 15.9  12-04 @ 10:15      12-09    138  |  105  |  30<H>  ----------------------------<  113<H>  4.5   |  20<L>  |  1.19    Ca    8.7      09 Dec 2018 06:45        Creatinine, Serum: 1.19 mg/dL (12-09-18 @ 06:45)  Creatinine, Serum: 1.26 mg/dL (12-08-18 @ 07:14)  Creatinine, Serum: 1.49 mg/dL (12-07-18 @ 07:26)  Creatinine, Serum: 1.62 mg/dL (12-06-18 @ 11:20)  Creatinine, Serum: 1.69 mg/dL (12-06-18 @ 00:37)  Creatinine, Serum: 1.78 mg/dL (12-05-18 @ 16:24)  Creatinine, Serum: 2.27 mg/dL (12-05-18 @ 07:08)  Creatinine, Serum: 2.90 mg/dL (12-04-18 @ 15:54)  Creatinine, Serum: 3.42 mg/dL (12-04-18 @ 10:15)                MICROBIOLOGY:              RADIOLOGY & ADDITIONAL STUDIES:

## 2018-12-10 NOTE — PROGRESS NOTE ADULT - SUBJECTIVE AND OBJECTIVE BOX
Patient seen and examined   still has dec po intake    iodine (Other; Anaphylaxis)  shellfish (Anaphylaxis)  sulfa drugs (Other)    Hospital Medications:   MEDICATIONS  (STANDING):  aspirin enteric coated 81 milliGRAM(s) Oral daily  heparin  Injectable 5000 Unit(s) SubCutaneous every 8 hours  lactated ringers. 1000 milliLiter(s) (75 mL/Hr) IV Continuous <Continuous>  levothyroxine 50 MICROGram(s) Oral daily  multivitamin 1 Tablet(s) Oral daily  pantoprazole    Tablet 40 milliGRAM(s) Oral daily        VITALS:  T(F): 99.6 (12-10-18 @ 00:15), Max: 99.9 (18 @ 15:39)  HR: 71 (12-10-18 @ 00:15)  BP: 110/70 (12-10-18 @ 00:15)  RR: 18 (12-10-18 @ 00:15)  SpO2: 95% (12-10-18 @ 00:15)  Wt(kg): --     @ 07:01  -   @ 07:00  --------------------------------------------------------  IN: 1490 mL / OUT: 301 mL / NET: 1189 mL     @ 07:01  -  12-10 @ 05:54  --------------------------------------------------------  IN: 1015 mL / OUT: 250 mL / NET: 765 mL      Physical Exam :-  Constitutional: NAD  Neck: Supple.  Respiratory: Bilateral equal breath sounds,  Cardiovascular: S1, S2 normal,  Gastrointestinal: Bowel Sounds present, soft, non tender.  Extremities: No edema  Neurological: Alert and Oriented x 3, no focal deficits  Psychiatric: Normal mood, normal affect    LABS:      138  |  105  |  30<H>  ----------------------------<  113<H>  4.5   |  20<L>  |  1.19    Ca    8.7      09 Dec 2018 06:45      Creatinine Trend: 1.19 <--, 1.26 <--, 1.49 <--, 1.62 <--, 1.69 <--, 1.78 <--, 2.27 <--, 2.90 <--, 3.42 <--                        9.7    18.83 )-----------( 206      ( 09 Dec 2018 08:05 )             29.6     Urine Studies:  Urinalysis Basic - ( 07 Dec 2018 15:02 )    Color: Yellow / Appearance: Clear / S.018 / pH:   Gluc:  / Ketone: Negative  / Bili: Negative / Urobili: Negative mg/dL   Blood:  / Protein: Negative mg/dL / Nitrite: Negative   Leuk Esterase: Negative / RBC: 8 /HPF / WBC 0 /HPF   Sq Epi:  / Non Sq Epi: 1 /HPF / Bacteria: Negative      Creatinine, Random Urine: 79 mg/dL ( @ 16:38)  Protein/Creatinine Ratio Calculation: 0.1 Ratio ( @ 16:38)  Osmolality, Random Urine: 395 mos/kg ( @ 13:29)  Sodium, Random Urine: <20 mmol/L ( @ 13:29)    RADIOLOGY & ADDITIONAL STUDIES:

## 2018-12-10 NOTE — PROGRESS NOTE ADULT - ASSESSMENT
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

## 2018-12-10 NOTE — PROGRESS NOTE ADULT - SUBJECTIVE AND OBJECTIVE BOX
Patient is a 85y old  Female who presents with a chief complaint of dehydration and LUIS (10 Dec 2018 09:48)      INTERVAL HPI/OVERNIGHT EVENTS: feels weak, denies cough/sob      Vital Signs Last 24 Hrs  T(C): 37.6 (10 Dec 2018 00:15), Max: 37.7 (09 Dec 2018 15:39)  T(F): 99.6 (10 Dec 2018 00:15), Max: 99.9 (09 Dec 2018 15:39)  HR: 71 (10 Dec 2018 00:15) (71 - 93)  BP: 110/70 (10 Dec 2018 00:15) (110/70 - 116/67)  BP(mean): --  RR: 18 (10 Dec 2018 00:15) (18 - 19)  SpO2: 95% (10 Dec 2018 00:15) (93% - 96%)    acetaminophen   Tablet .. 650 milliGRAM(s) Oral every 6 hours PRN  aspirin enteric coated 81 milliGRAM(s) Oral daily  diazepam    Tablet 2.5 milliGRAM(s) Oral at bedtime PRN  heparin  Injectable 5000 Unit(s) SubCutaneous every 8 hours  levothyroxine 50 MICROGram(s) Oral daily  multivitamin 1 Tablet(s) Oral daily  nystatin Powder 1 Application(s) Topical two times a day PRN  ondansetron Injectable 4 milliGRAM(s) IV Push every 6 hours PRN  pantoprazole    Tablet 40 milliGRAM(s) Oral daily  prochlorperazine   Injectable 5 milliGRAM(s) IV Push every 8 hours PRN      PHYSICAL EXAM:  GENERAL: NAD,   EYES: conjunctiva and sclera clear  ENMT: Moist mucous membranes  NECK: Supple, No JVD, Normal thyroid  NERVOUS SYSTEM:  Alert & Oriented X3,   CHEST/LUNG: Clear to auscultation bilaterally; No rales, rhonchi, wheezing, or rubs  HEART: Regular rate and rhythm; No murmurs, rubs, or gallops  ABDOMEN: Soft, Nontender, Nondistended; Bowel sounds present  EXTREMITIES:  2+ Peripheral Pulses, No clubbing, cyanosis, or edema  LYMPH: No lymphadenopathy noted  SKIN: No rashes or lesions    Consultant(s) Notes Reviewed:  [x ] YES  [ ] NO  Care Discussed with Consultants/Other Providers [ x] YES  [ ] NO    LABS:                        10.7   15.5  )-----------( 217      ( 10 Dec 2018 10:42 )             31.6     12-10    138  |  104  |  24<H>  ----------------------------<  162<H>  4.4   |  22  |  1.17    Ca    9.1      10 Dec 2018 10:39  Phos  2.6     12-10  Mg     1.4     12-10          CAPILLARY BLOOD GLUCOSE                RADIOLOGY & ADDITIONAL TESTS:    Imaging Personally Reviewed:  [ ] YES  [ ] NO

## 2018-12-11 DIAGNOSIS — R49.0 DYSPHONIA: ICD-10-CM

## 2018-12-11 LAB
ANION GAP SERPL CALC-SCNC: 10 MMOL/L — SIGNIFICANT CHANGE UP (ref 5–17)
APPEARANCE UR: ABNORMAL
BACTERIA # UR AUTO: ABNORMAL
BASOPHILS # BLD AUTO: 0 K/UL — SIGNIFICANT CHANGE UP (ref 0–0.2)
BASOPHILS NFR BLD AUTO: 0 % — SIGNIFICANT CHANGE UP (ref 0–2)
BILIRUB UR-MCNC: NEGATIVE — SIGNIFICANT CHANGE UP
BUN SERPL-MCNC: 27 MG/DL — HIGH (ref 7–23)
CALCIUM SERPL-MCNC: 8.7 MG/DL — SIGNIFICANT CHANGE UP (ref 8.4–10.5)
CHLORIDE SERPL-SCNC: 106 MMOL/L — SIGNIFICANT CHANGE UP (ref 96–108)
CO2 SERPL-SCNC: 25 MMOL/L — SIGNIFICANT CHANGE UP (ref 22–31)
COLOR SPEC: YELLOW — SIGNIFICANT CHANGE UP
CREAT SERPL-MCNC: 1.19 MG/DL — SIGNIFICANT CHANGE UP (ref 0.5–1.3)
CULTURE RESULTS: SIGNIFICANT CHANGE UP
CULTURE RESULTS: SIGNIFICANT CHANGE UP
DIFF PNL FLD: ABNORMAL
EOSINOPHIL # BLD AUTO: 0.12 K/UL — SIGNIFICANT CHANGE UP (ref 0–0.5)
EOSINOPHIL NFR BLD AUTO: 0.9 % — SIGNIFICANT CHANGE UP (ref 0–6)
EPI CELLS # UR: 1 /HPF — SIGNIFICANT CHANGE UP
GLUCOSE SERPL-MCNC: 151 MG/DL — HIGH (ref 70–99)
GLUCOSE UR QL: NEGATIVE — SIGNIFICANT CHANGE UP
HCT VFR BLD CALC: 26.5 % — LOW (ref 34.5–45)
HGB BLD-MCNC: 8.8 G/DL — LOW (ref 11.5–15.5)
HYALINE CASTS # UR AUTO: 1 /LPF — SIGNIFICANT CHANGE UP (ref 0–2)
KETONES UR-MCNC: NEGATIVE — SIGNIFICANT CHANGE UP
LEUKOCYTE ESTERASE UR-ACNC: ABNORMAL
LYMPHOCYTES # BLD AUTO: 0.24 K/UL — LOW (ref 1–3.3)
LYMPHOCYTES # BLD AUTO: 1.8 % — LOW (ref 13–44)
MAGNESIUM SERPL-MCNC: 1.9 MG/DL — SIGNIFICANT CHANGE UP (ref 1.6–2.6)
MANUAL SMEAR VERIFICATION: SIGNIFICANT CHANGE UP
MCHC RBC-ENTMCNC: 29.7 PG — SIGNIFICANT CHANGE UP (ref 27–34)
MCHC RBC-ENTMCNC: 33.2 GM/DL — SIGNIFICANT CHANGE UP (ref 32–36)
MCV RBC AUTO: 89.5 FL — SIGNIFICANT CHANGE UP (ref 80–100)
MONOCYTES # BLD AUTO: 0.7 K/UL — SIGNIFICANT CHANGE UP (ref 0–0.9)
MONOCYTES NFR BLD AUTO: 5.3 % — SIGNIFICANT CHANGE UP (ref 2–14)
NEUTROPHILS # BLD AUTO: 12.2 K/UL — HIGH (ref 1.8–7.4)
NEUTROPHILS NFR BLD AUTO: 87.6 % — HIGH (ref 43–77)
NEUTS BAND # BLD: 4.4 % — SIGNIFICANT CHANGE UP (ref 0–8)
NITRITE UR-MCNC: NEGATIVE — SIGNIFICANT CHANGE UP
OB PNL STL: NEGATIVE — SIGNIFICANT CHANGE UP
PH UR: 6 — SIGNIFICANT CHANGE UP (ref 5–8)
PLAT MORPH BLD: NORMAL — SIGNIFICANT CHANGE UP
PLATELET # BLD AUTO: 223 K/UL — SIGNIFICANT CHANGE UP (ref 150–400)
POTASSIUM SERPL-MCNC: 4.7 MMOL/L — SIGNIFICANT CHANGE UP (ref 3.5–5.3)
POTASSIUM SERPL-SCNC: 4.7 MMOL/L — SIGNIFICANT CHANGE UP (ref 3.5–5.3)
PROT UR-MCNC: ABNORMAL
RBC # BLD: 2.96 M/UL — LOW (ref 3.8–5.2)
RBC # FLD: 14.5 % — SIGNIFICANT CHANGE UP (ref 10.3–14.5)
RBC BLD AUTO: NORMAL — SIGNIFICANT CHANGE UP
RBC CASTS # UR COMP ASSIST: 27 /HPF — HIGH (ref 0–4)
SODIUM SERPL-SCNC: 141 MMOL/L — SIGNIFICANT CHANGE UP (ref 135–145)
SP GR SPEC: 1.01 — SIGNIFICANT CHANGE UP (ref 1.01–1.02)
SPECIMEN SOURCE: SIGNIFICANT CHANGE UP
SPECIMEN SOURCE: SIGNIFICANT CHANGE UP
UROBILINOGEN FLD QL: NEGATIVE — SIGNIFICANT CHANGE UP
WBC # BLD: 13.26 K/UL — HIGH (ref 3.8–10.5)
WBC # FLD AUTO: 13.26 K/UL — HIGH (ref 3.8–10.5)
WBC UR QL: 111 /HPF — HIGH (ref 0–5)

## 2018-12-11 PROCEDURE — 93306 TTE W/DOPPLER COMPLETE: CPT | Mod: 26

## 2018-12-11 PROCEDURE — 99222 1ST HOSP IP/OBS MODERATE 55: CPT | Mod: 25

## 2018-12-11 PROCEDURE — 31575 DIAGNOSTIC LARYNGOSCOPY: CPT

## 2018-12-11 RX ADMIN — HEPARIN SODIUM 5000 UNIT(S): 5000 INJECTION INTRAVENOUS; SUBCUTANEOUS at 13:11

## 2018-12-11 RX ADMIN — Medication 81 MILLIGRAM(S): at 13:11

## 2018-12-11 RX ADMIN — PANTOPRAZOLE SODIUM 40 MILLIGRAM(S): 20 TABLET, DELAYED RELEASE ORAL at 13:11

## 2018-12-11 RX ADMIN — HEPARIN SODIUM 5000 UNIT(S): 5000 INJECTION INTRAVENOUS; SUBCUTANEOUS at 06:11

## 2018-12-11 RX ADMIN — Medication 50 MICROGRAM(S): at 06:11

## 2018-12-11 RX ADMIN — HEPARIN SODIUM 5000 UNIT(S): 5000 INJECTION INTRAVENOUS; SUBCUTANEOUS at 21:42

## 2018-12-11 RX ADMIN — Medication 1 TABLET(S): at 13:11

## 2018-12-11 RX ADMIN — Medication 650 MILLIGRAM(S): at 18:10

## 2018-12-11 NOTE — SWALLOW BEDSIDE ASSESSMENT ADULT - SLP GENERAL OBSERVATIONS
Pt found awake and alert in bed. Daughter present. Patient appears anxious at times. Patient A&O x3. Inconsistent cooperation requiring frequent reinforcement to participate in exam. Pt repeatedly requesting "water" and stating "I don't want it" re" swallow study. Patient able ot follow directions for evaluation purposes. patient on room air.

## 2018-12-11 NOTE — CHART NOTE - NSCHARTNOTEFT_GEN_A_CORE
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     BLood & urine cx's neg to date    ID following, saw patient today, found no clear or convincing evidence of infection on the basis of exam.    On exam no significant change in overall status.    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     BLood & urine cx's neg to date    ID following, saw patient today, found no clear or convincing evidence of infection on the basis of exam.    On exam no significant change in overall status.    Case d/w Dr Velasquez ([facility] attdg)    Plan:  Blood cx x2 sets  ENT evaluation (called)  ID follow-up (to be called by Attdg)    POC d/w patient and daughter at bedside      MONI [place] [id number]

## 2018-12-11 NOTE — PROGRESS NOTE ADULT - SUBJECTIVE AND OBJECTIVE BOX
infectious diseases progress note:    GEORGE ROSE is a 85y y. o. Female patient    Patient reports: no new concerns    ROS:    EYES:  Negative  blurry vision or double vision  GASTROINTESTINAL:  Negative for nausea, vomiting, diarrhea  -otherwise negative except for subjective    Allergies    iodine (Other; Anaphylaxis)  shellfish (Anaphylaxis)  sulfa drugs (Other)    Intolerances        ANTIBIOTICS/RELEVANT:  antimicrobials    immunologic:    OTHER:  acetaminophen   Tablet .. 650 milliGRAM(s) Oral every 6 hours PRN  aspirin enteric coated 81 milliGRAM(s) Oral daily  diazepam    Tablet 2.5 milliGRAM(s) Oral at bedtime PRN  heparin  Injectable 5000 Unit(s) SubCutaneous every 8 hours  levothyroxine 50 MICROGram(s) Oral daily  multivitamin 1 Tablet(s) Oral daily  nystatin Powder 1 Application(s) Topical two times a day PRN  ondansetron Injectable 4 milliGRAM(s) IV Push every 6 hours PRN  pantoprazole    Tablet 40 milliGRAM(s) Oral daily  prochlorperazine   Injectable 5 milliGRAM(s) IV Push every 8 hours PRN      Objective:  Vital Signs Last 24 Hrs  T(C): 37.4 (11 Dec 2018 06:04), Max: 38.7 (10 Dec 2018 13:04)  T(F): 99.3 (11 Dec 2018 06:04), Max: 101.6 (10 Dec 2018 13:04)  HR: 93 (11 Dec 2018 06:04) (89 - 118)  BP: 132/71 (11 Dec 2018 06:04) (96/60 - 132/71)  BP(mean): --  RR: 18 (11 Dec 2018 06:04) (18 - 18)  SpO2: 92% (11 Dec 2018 06:04) (92% - 95%)    T(C): 37.4 (12-11-18 @ 06:04), Max: 38.7 (12-10-18 @ 13:04)  T(C): 37.4 (12-11-18 @ 06:04), Max: 38.7 (12-10-18 @ 13:04)  T(C): 37.4 (12-11-18 @ 06:04), Max: 38.7 (12-10-18 @ 13:04)    PHYSICAL EXAM:  Constitutional: Well-developed, well nourished  Eyes: PERRLA, EOMI  Ear/Nose/Throat: oropharynx normal	  Neck: no JVD, no lymphadenopathy, supple  Respiratory: no accessory muscle use  Cardiovascular: RRR,   Gastrointestinal: soft, NT, ostomy in place  Extremities: no clubbing, no cyanosis, edema absent      LABS:                        8.8    13.26 )-----------( 223      ( 11 Dec 2018 08:00 )             26.5       13.26 12-11 @ 08:00  15.5 12-10 @ 10:42  18.83 12-09 @ 08:05  21.10 12-08 @ 09:05  22.7 12-07 @ 07:26  19.81 12-06 @ 13:41  14.96 12-05 @ 08:15      12-11    141  |  106  |  27<H>  ----------------------------<  151<H>  4.7   |  25  |  1.19    Ca    8.7      11 Dec 2018 07:00  Phos  2.6     12-10  Mg     1.9     12-11        Creatinine, Serum: 1.19 mg/dL (12-11-18 @ 07:00)  Creatinine, Serum: 1.17 mg/dL (12-10-18 @ 10:39)  Creatinine, Serum: 1.19 mg/dL (12-09-18 @ 06:45)  Creatinine, Serum: 1.26 mg/dL (12-08-18 @ 07:14)  Creatinine, Serum: 1.49 mg/dL (12-07-18 @ 07:26)  Creatinine, Serum: 1.62 mg/dL (12-06-18 @ 11:20)  Creatinine, Serum: 1.69 mg/dL (12-06-18 @ 00:37)  Creatinine, Serum: 1.78 mg/dL (12-05-18 @ 16:24)  Creatinine, Serum: 2.27 mg/dL (12-05-18 @ 07:08)  Creatinine, Serum: 2.90 mg/dL (12-04-18 @ 15:54)                MICROBIOLOGY:  Culture Results:   GI PCR Results: NOT detected  *******Please Note:*******  GI panel PCR evaluates for:  Campylobacter, Plesiomonas shigelloides, Salmonella,  Vibrio, Yersinia enterocolitica, Enteroaggregative  Escherichia coli (EAEC), Enteropathogenic E.coli (EPEC),  Enterotoxigenic E. coli (ETEC) lt/st, Shiga-like  toxin-producing E. coli (STEC) stx1/stx2,  Shigella/ Enteroinvasive E. coli (EIEC), Cryptosporidium,  Cyclospora cayetanensis, Entamoeba histolytica,  Giardia lamblia, Adenovirus F 40/41, Astrovirus,  Norovirus GI/GII, Rotavirus A, Sapovirus (12-11 @ 01:52)    RADIOLOGY & ADDITIONAL STUDIES:

## 2018-12-11 NOTE — CONSULT NOTE ADULT - SUBJECTIVE AND OBJECTIVE BOX
CC: hoarseness and fever      HPI: 86 y/o female with PMHx of HTN, ileostomy due to complication of Cdiff (2005) p/w feeling fatigued and left flank pain on 12/4. Per daughter at bedside pt lives alone in a house with an aid, who noticed that she was not her self for the last week. She was admitted for leukocytosis workup, fever developed 3 days ago. According to ID, no clear source of infection found.     ENT called for new onset hoarseness and dysphagia.     As per the patient, she does not have any hoarseness and her voice sounds the same, daughter is at bedside and confirmed the voice sounds the same. Patient denies any pain with swallowing, however, according to S&S bedside swallow exam revealed delayed pharyngeal swallow; + discoordination of swallow most prominent on thin liquids.   Patient further denies any headaches, sinus pain, ear pain, ot throat pain.     PAST MEDICAL & SURGICAL HISTORY:  Fistula, perirectal  Hiatal hernia  Hypothyroidism  Hypertension  S/P foot joint surgery: right ( 10 years ago )  S/P arthroscopy of right knee  S/P ileostomy: colectomy ( 2005 )  S/P cholecystectomy: at age 20 ,s    Allergies    iodine (Other; Anaphylaxis)  shellfish (Anaphylaxis)  sulfa drugs (Other)    Intolerances      MEDICATIONS  (STANDING):  aspirin enteric coated 81 milliGRAM(s) Oral daily  heparin  Injectable 5000 Unit(s) SubCutaneous every 8 hours  levothyroxine 50 MICROGram(s) Oral daily  multivitamin 1 Tablet(s) Oral daily  pantoprazole    Tablet 40 milliGRAM(s) Oral daily    MEDICATIONS  (PRN):  acetaminophen   Tablet .. 650 milliGRAM(s) Oral every 6 hours PRN Temp greater or equal to 38.5C (101.3F), Mild Pain (1 - 3)  diazepam    Tablet 2.5 milliGRAM(s) Oral at bedtime PRN sleep / anxiety  nystatin Powder 1 Application(s) Topical two times a day PRN for rash  ondansetron Injectable 4 milliGRAM(s) IV Push every 6 hours PRN Nausea and/or Vomiting  prochlorperazine   Injectable 5 milliGRAM(s) IV Push every 8 hours PRN nausea      Social History: Patient denies using tobacco, illicit drugs and alcohol. She walks with a walker at home, is dependent on most of her ADls.  Family history: No pertient family hx.    ROS:   ENT: all negative except as noted in HPI   CV: denies palpitations  Pulm: denies SOB, cough, hemoptysis  GI: denies change in apetite, indigestion, n/v  : denies pertinent urinary symptoms, urgency  Neuro: denies numbness/tingling, loss of sensation  Psych: denies anxiety  MS: denies muscle weakness, instability  Heme: denies easy bruising or bleeding  Endo: denies heat/cold intolerance, excessive sweating  Vascular: denies LE edema    Vital Signs Last 24 Hrs  T(C): 39.6 (11 Dec 2018 17:12), Max: 39.6 (11 Dec 2018 17:12)  T(F): 103.3 (11 Dec 2018 17:12), Max: 103.3 (11 Dec 2018 17:12)  HR: 97 (11 Dec 2018 16:59) (89 - 102)  BP: 105/67 (11 Dec 2018 16:59) (96/60 - 132/71)  BP(mean): --  RR: 18 (11 Dec 2018 16:59) (18 - 20)  SpO2: 95% (11 Dec 2018 16:59) (92% - 96%)                          8.8    13.26 )-----------( 223      ( 11 Dec 2018 08:00 )             26.5    12-11    141  |  106  |  27<H>  ----------------------------<  151<H>  4.7   |  25  |  1.19    Ca    8.7      11 Dec 2018 07:00  Phos  2.6     12-10  Mg     1.9     12-11         PHYSICAL EXAM:  Gen: NAD  Skin: No rashes, bruises, or lesions  Head: Normocephalic, Atraumatic  Face: no edema, erythema, or fluctuance. Parotid glands soft without mass  Eyes: no scleral injection  Ears: Right - ear canal clear, TM intact without effusion or erythema. No evidence of any fluid drainage. No mastoid tenderness, erythema, or ear bulging            Left - ear canal clear, TM intact without effusion or erythema. No evidence of any fluid drainage. No mastoid tenderness, erythema, or ear bulging  Nose: Nares bilaterally patent, no discharge  Mouth: No Stridor / Drooling / Trismus.  Mucosa moist, tongue/uvula midline, oropharynx clear  Neck: Flat, supple, no lymphadenopathy, trachea midline, no masses  Lymphatic: No lymphadenopathy  Resp: breathing easily, no stridor  CV: no peripheral edema/cyanosis  GI: nondistended   Peripheral vascular: no JVD or edema  Neuro: facial nerve intact, no facial droop    Indirect laryngoscopy: deferred, patient would like ot have this done tomorrow. At this time there is no emergent need to scope at this time. Will reassess tomorrow with attending CC: hoarseness and fever      HPI: 86 y/o female with PMHx of HTN, ileostomy due to complication of Cdiff (2005) p/w feeling fatigued and left flank pain on 12/4. Per daughter at bedside pt lives alone in a house with an aid, who noticed that she was not her self for the last week. She was admitted for leukocytosis workup, fever developed 3 days ago. According to ID, no clear source of infection found.     ENT called for new onset hoarseness and dysphagia.     As per the patient, she does not have any hoarseness and her voice sounds the same, daughter is at bedside and confirmed the voice sounds the same. Patient denies any pain with swallowing, however, according to S&S bedside swallow exam revealed delayed pharyngeal swallow; + discoordination of swallow most prominent on thin liquids.   Patient further denies any headaches, sinus pain, ear pain, or throat pain. Patient further denies weakness, facial droop, visual or hearing disturbances. No history of CVA.     PAST MEDICAL & SURGICAL HISTORY:  Fistula, perirectal  Hiatal hernia  Hypothyroidism  Hypertension  S/P foot joint surgery: right ( 10 years ago )  S/P arthroscopy of right knee  S/P ileostomy: colectomy ( 2005 )  S/P cholecystectomy: at age 20 ,s    Allergies    iodine (Other; Anaphylaxis)  shellfish (Anaphylaxis)  sulfa drugs (Other)    Intolerances      MEDICATIONS  (STANDING):  aspirin enteric coated 81 milliGRAM(s) Oral daily  heparin  Injectable 5000 Unit(s) SubCutaneous every 8 hours  levothyroxine 50 MICROGram(s) Oral daily  multivitamin 1 Tablet(s) Oral daily  pantoprazole    Tablet 40 milliGRAM(s) Oral daily    MEDICATIONS  (PRN):  acetaminophen   Tablet .. 650 milliGRAM(s) Oral every 6 hours PRN Temp greater or equal to 38.5C (101.3F), Mild Pain (1 - 3)  diazepam    Tablet 2.5 milliGRAM(s) Oral at bedtime PRN sleep / anxiety  nystatin Powder 1 Application(s) Topical two times a day PRN for rash  ondansetron Injectable 4 milliGRAM(s) IV Push every 6 hours PRN Nausea and/or Vomiting  prochlorperazine   Injectable 5 milliGRAM(s) IV Push every 8 hours PRN nausea      Social History: Patient denies using tobacco, illicit drugs and alcohol. She walks with a walker at home, is dependent on most of her ADls.  Family history: No pertient family hx.    ROS:   ENT: all negative except as noted in HPI   CV: denies palpitations  Pulm: denies SOB, cough, hemoptysis  GI: denies change in apetite, indigestion, n/v  : denies pertinent urinary symptoms, urgency  Neuro: denies numbness/tingling, loss of sensation  Psych: denies anxiety  MS: denies muscle weakness, instability  Heme: denies easy bruising or bleeding  Endo: denies heat/cold intolerance, excessive sweating  Vascular: denies LE edema    Vital Signs Last 24 Hrs  T(C): 39.6 (11 Dec 2018 17:12), Max: 39.6 (11 Dec 2018 17:12)  T(F): 103.3 (11 Dec 2018 17:12), Max: 103.3 (11 Dec 2018 17:12)  HR: 97 (11 Dec 2018 16:59) (89 - 102)  BP: 105/67 (11 Dec 2018 16:59) (96/60 - 132/71)  BP(mean): --  RR: 18 (11 Dec 2018 16:59) (18 - 20)  SpO2: 95% (11 Dec 2018 16:59) (92% - 96%)                          8.8    13.26 )-----------( 223      ( 11 Dec 2018 08:00 )             26.5    12-11    141  |  106  |  27<H>  ----------------------------<  151<H>  4.7   |  25  |  1.19    Ca    8.7      11 Dec 2018 07:00  Phos  2.6     12-10  Mg     1.9     12-11         PHYSICAL EXAM:  Gen: NAD  Skin: No rashes, bruises, or lesions  Head: Normocephalic, Atraumatic  Face: no edema, erythema, or fluctuance. Parotid glands soft without mass  Eyes: no scleral injection  Ears: Right - ear canal clear, TM intact without effusion or erythema. No evidence of any fluid drainage. No mastoid tenderness, erythema, or ear bulging            Left - ear canal clear, TM intact without effusion or erythema. No evidence of any fluid drainage. No mastoid tenderness, erythema, or ear bulging  Nose: Nares bilaterally patent, no discharge  Mouth: No Stridor / Drooling / Trismus.  Mucosa moist, tongue/uvula midline, oropharynx clear  Neck: Flat, supple, no lymphadenopathy, trachea midline, no masses  Lymphatic: No lymphadenopathy  Resp: breathing easily, no stridor  CV: no peripheral edema/cyanosis  GI: nondistended   Peripheral vascular: no JVD or edema  Neuro: facial nerve intact, no facial droop    Indirect laryngoscopy: deferred, patient would like ot have this done tomorrow. At this time there is no emergent need to scope at this time. Will reassess tomorrow with attending CC: hoarseness and fever      HPI: 84 y/o female with PMHx of HTN, ileostomy due to complication of Cdiff (2005) p/w feeling fatigued and left flank pain on 12/4. Per daughter at bedside pt lives alone in a house with an aid, who noticed that she was not her self for the last week. She was admitted for leukocytosis workup, fever developed 3 days ago. According to ID, no clear source of infection found.     ENT called for new onset hoarseness and dysphagia.     As per the patient, she does not have any hoarseness and her voice sounds the same, daughter is at bedside and confirmed the voice sounds the same. Patient denies any pain with swallowing, however, according to S&S bedside swallow exam revealed delayed pharyngeal swallow; + discoordination of swallow most prominent on thin liquids.   Patient further denies any headaches, sinus pain, ear pain, or throat pain. Patient further denies weakness, facial droop, visual or hearing disturbances. No history of CVA.     PAST MEDICAL & SURGICAL HISTORY:  Fistula, perirectal  Hiatal hernia  Hypothyroidism  Hypertension  S/P foot joint surgery: right ( 10 years ago )  S/P arthroscopy of right knee  S/P ileostomy: colectomy ( 2005 )  S/P cholecystectomy: at age 20 ,s    Allergies    iodine (Other; Anaphylaxis)  shellfish (Anaphylaxis)  sulfa drugs (Other)    Intolerances      MEDICATIONS  (STANDING):  aspirin enteric coated 81 milliGRAM(s) Oral daily  heparin  Injectable 5000 Unit(s) SubCutaneous every 8 hours  levothyroxine 50 MICROGram(s) Oral daily  multivitamin 1 Tablet(s) Oral daily  pantoprazole    Tablet 40 milliGRAM(s) Oral daily    MEDICATIONS  (PRN):  acetaminophen   Tablet .. 650 milliGRAM(s) Oral every 6 hours PRN Temp greater or equal to 38.5C (101.3F), Mild Pain (1 - 3)  diazepam    Tablet 2.5 milliGRAM(s) Oral at bedtime PRN sleep / anxiety  nystatin Powder 1 Application(s) Topical two times a day PRN for rash  ondansetron Injectable 4 milliGRAM(s) IV Push every 6 hours PRN Nausea and/or Vomiting  prochlorperazine   Injectable 5 milliGRAM(s) IV Push every 8 hours PRN nausea      Social History: Patient denies using tobacco, illicit drugs and alcohol. She walks with a walker at home, is dependent on most of her ADls.  Family history: No pertient family hx.    ROS:   ENT: all negative except as noted in HPI   CV: denies palpitations  Pulm: denies SOB, cough, hemoptysis  GI: denies change in apetite, indigestion, n/v  : denies pertinent urinary symptoms, urgency  Neuro: denies numbness/tingling, loss of sensation  Psych: denies anxiety  MS: denies muscle weakness, instability  Heme: denies easy bruising or bleeding  Endo: denies heat/cold intolerance, excessive sweating  Vascular: denies LE edema    Vital Signs Last 24 Hrs  T(C): 39.6 (11 Dec 2018 17:12), Max: 39.6 (11 Dec 2018 17:12)  T(F): 103.3 (11 Dec 2018 17:12), Max: 103.3 (11 Dec 2018 17:12)  HR: 97 (11 Dec 2018 16:59) (89 - 102)  BP: 105/67 (11 Dec 2018 16:59) (96/60 - 132/71)  BP(mean): --  RR: 18 (11 Dec 2018 16:59) (18 - 20)  SpO2: 95% (11 Dec 2018 16:59) (92% - 96%)                          8.8    13.26 )-----------( 223      ( 11 Dec 2018 08:00 )             26.5    12-11    141  |  106  |  27<H>  ----------------------------<  151<H>  4.7   |  25  |  1.19    Ca    8.7      11 Dec 2018 07:00  Phos  2.6     12-10  Mg     1.9     12-11         PHYSICAL EXAM:  Gen: NAD  Skin: No rashes, bruises, or lesions  Head: Normocephalic, Atraumatic  Face: no edema, erythema, or fluctuance. Parotid glands soft without mass  Eyes: no scleral injection  Ears: Right - ear canal clear, TM intact without effusion or erythema. No evidence of any fluid drainage. No mastoid tenderness, erythema, or ear bulging            Left - ear canal clear, TM intact without effusion or erythema. No evidence of any fluid drainage. No mastoid tenderness, erythema, or ear bulging  Nose: Nares bilaterally patent, no discharge  Mouth: No Stridor / Drooling / Trismus.  Mucosa moist, tongue/uvula midline, oropharynx clear  Neck: Flat, supple, no lymphadenopathy, trachea midline, no masses  Lymphatic: No lymphadenopathy  Resp: breathing easily, no stridor  CV: no peripheral edema/cyanosis  GI: nondistended   Peripheral vascular: no JVD or edema  Neuro: facial nerve intact, no facial droop    Reason for Laryngoscopy:  Patient was unable to cooperate with mirror.  Nasopharynx, oropharynx, and hypopharynx clear, no bleeding. Tongue base, posterior pharyngeal wall, vallecula, epiglottis, and subglottis appear normal. Pooling of secretions noted in the postcricoid region. No erythema, edema, masses or lesions. Airway patent, no foreign body visualized. No glottic/supraglottic edema. True vocal cords, arytenoids, vestibular folds, ventricles, pyriform sinuses, and aryepiglottic folds appear normal bilaterally. Vocal cords mobile with good contact b/l.

## 2018-12-11 NOTE — CONSULT NOTE ADULT - ASSESSMENT
84 y/o female with PMHx of HTN, ileostomy due to complication of Cdiff (2005) p/w feeling fatigued and left flank pain on 12/4, fever of unknown origin. ENT called for eval of hoarseness and dysphagia. 86 y/o female with PMHx of HTN, ileostomy due to complication of Cdiff (2005) p/w feeling fatigue and left flank pain on 12/4, fever of unknown origin. ENT called for eval of hoarseness and dysphagia. Bedside laryngoscope revealed pooling of secretions, vocal cords appear normal bilaterally. 84 y/o female with PMHx of HTN, ileostomy due to complication of Cdiff (2005) p/w feeling fatigue and left flank pain on 12/4, fever of unknown origin. ENT called for eval of hoarseness and dysphagia. Bedside laryngoscope revealed pooling of secretions, vocal cords appear normal bilaterally, airway patent. No source of infection found on ENT exam.

## 2018-12-11 NOTE — SWALLOW BEDSIDE ASSESSMENT ADULT - SWALLOW EVAL: RECOMMENDED FEEDING/EATING TECHNIQUES
crush medication (when feasible)/position upright (90 degrees)/maintain upright posture during/after eating for 30 mins/oral hygiene

## 2018-12-11 NOTE — SWALLOW BEDSIDE ASSESSMENT ADULT - ASR SWALLOW ASPIRATION MONITOR
fever/change of breathing pattern/cough/gurgly voice/pneumonia/throat clearing/upper respiratory infection/Monitor for s/s aspiration/laryngeal penetration. If noted:  D/C p.o. intake, provide non-oral nutrition/hydration/meds, and contact this service @ [phone]

## 2018-12-11 NOTE — PROGRESS NOTE ADULT - SUBJECTIVE AND OBJECTIVE BOX
Patient seen and examined  no complaints    iodine (Other; Anaphylaxis)  shellfish (Anaphylaxis)  sulfa drugs (Other)    Hospital Medications:   MEDICATIONS  (STANDING):  aspirin enteric coated 81 milliGRAM(s) Oral daily  heparin  Injectable 5000 Unit(s) SubCutaneous every 8 hours  levothyroxine 50 MICROGram(s) Oral daily  multivitamin 1 Tablet(s) Oral daily  pantoprazole    Tablet 40 milliGRAM(s) Oral daily      VITALS:  T(F): 98.3 (18 @ 13:26), Max: 100 (12-10-18 @ 17:10)  HR: 102 (18 @ 13:26)  BP: 110/71 (18 @ 13:26)  RR: 20 (18 @ 13:26)  SpO2: 96% (18 @ 13:26)  Wt(kg): --    12-10 @ 07:01  -   @ 07:00  --------------------------------------------------------  IN: 180 mL / OUT: 300 mL / NET: -120 mL     @ 07:01  -   @ 14:34  --------------------------------------------------------  IN: 240 mL / OUT: 0 mL / NET: 240 mL          Physical Exam :-  Constitutional: NAD  Neck: Supple.  Respiratory: Bilateral equal breath sounds,  Cardiovascular: S1, S2 normal,  Gastrointestinal: Bowel Sounds present, soft, non tender.  Extremities: No edema  Neurological: Alert and Oriented x 3, no focal deficits  Psychiatric: Normal mood, normal affect      LABS:      141  |  106  |  27<H>  ----------------------------<  151<H>  4.7   |  25  |  1.19    Ca    8.7      11 Dec 2018 07:00  Phos  2.6     12-10  Mg     1.9           Creatinine Trend: 1.19 <--, 1.17 <--, 1.19 <--, 1.26 <--, 1.49 <--, 1.62 <--, 1.69 <--, 1.78 <--, 2.27 <--, 2.90 <--                        8.8    13.26 )-----------( 223      ( 11 Dec 2018 08:00 )             26.5     Urine Studies:  Urinalysis Basic - ( 07 Dec 2018 15:02 )    Color: Yellow / Appearance: Clear / S.018 / pH:   Gluc:  / Ketone: Negative  / Bili: Negative / Urobili: Negative mg/dL   Blood:  / Protein: Negative mg/dL / Nitrite: Negative   Leuk Esterase: Negative / RBC: 8 /HPF / WBC 0 /HPF   Sq Epi:  / Non Sq Epi: 1 /HPF / Bacteria: Negative      Creatinine, Random Urine: 79 mg/dL ( @ 16:38)  Protein/Creatinine Ratio Calculation: 0.1 Ratio ( @ 16:38)    RADIOLOGY & ADDITIONAL STUDIES:

## 2018-12-11 NOTE — SWALLOW BEDSIDE ASSESSMENT ADULT - COMMENTS
Continued: Pt denies any dysuria, increased urinary frequency. Per dtr pt has had decreased po intake, has not been drinking enough water. the family is concerned that the patient has depression, which has lead to the decreased appetite.  In the ED pt noted to have LUIS with leukocytosis  Pt admitted with LUIS likely due to dehydration, Acute cystitis without hematuria, hypothyroidism, essential hypertension, depression, hyponatremia.   Pt is DNR/DNI.   : Renal consult: All consistent with dehydration. Hyponatremia corrected at an acceptable rate. No longer hypotensive  : ID consult re: fever and leukocytosis: blood cx ntd, rvp neg, urine without pyuria and cx neg and pt did have recent ciprofloxacin  but no symptoms or imaging consistent with upper tract disease, subtotal colectomy, can send stool for cdiff, check CT chest, abx d/c'd   : Internal medicine: pt was unable to take in solid food-c/o dry mouth and choking sensation. speech and swallow evaluation for silent aspiration.  12/10: ID: No clear or convincing evidence of infection on the basis of exam. Given finding of a splenic lesion, question whether there is any causality  Doubt splenic abscess. Splenic infarct possible, though not classically wedge shaped     Imagin/7: CT Chest: IMPRESSION: Scattered bilateral mucus impacted distal small airways. No pneumonia. A 6 mm left upper lobe solid pulmonary nodule. Follow-up CT in 6-12 months can be obtained to evaluate for resolution.    CXRAY 12/10: IMPRESSION: Clear lungs.    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 Please correlate with patient's clinical history and obtain further evaluation as warranted.    : US Kidney and bladder: IMPRESSION:  No hydronephrosis. Nonobstructing left renal calculus again noted.

## 2018-12-11 NOTE — CONSULT NOTE ADULT - ATTENDING COMMENTS
Thank you for this consult    For any question, call:  Cell # [phone]  Pager # [phone]  Callback # [phone]
Pt seen and examined with team at time of service, I was physically present for the key portions for evaluation and management (E/M) service provided, and preformed key portions of the procedure. Agree with above. Plan discussed with primary team.

## 2018-12-11 NOTE — SWALLOW BEDSIDE ASSESSMENT ADULT - ORAL PHASE
suspected premature spillover Delayed oral transit time patient expectorated bolus stating "I can't do it" unable to assess

## 2018-12-11 NOTE — SWALLOW BEDSIDE ASSESSMENT ADULT - SWALLOW EVAL: PATIENT/FAMILY GOALS STATEMENT
Pt repeatedly requesting "water". Daughter reports patient will "sometimes" cough post consumption of water.

## 2018-12-11 NOTE — SWALLOW BEDSIDE ASSESSMENT ADULT - PHARYNGEAL PHASE
Delayed pharyngeal swallow/+ dis-coordination of swallow most prominent on thin liquid Within functional limits unable to assess

## 2018-12-11 NOTE — SWALLOW BEDSIDE ASSESSMENT ADULT - ASR SWALLOW RECOMMEND DIAG
A modified barium swallow is recommended however patient declines to participate in further dysphagia work-up.

## 2018-12-11 NOTE — SWALLOW BEDSIDE ASSESSMENT ADULT - SWALLOW EVAL: RECOMMENDED DIET
Based upon results of this exam, a dysphagia 1 diet with nectar thick liquids is recommended. Despite repeated education by clinician and daughter at bedside re: risks/consequences of aspiration (including aspiration pna, respiratory distress, and death), patient continued to request "water".

## 2018-12-11 NOTE — SWALLOW BEDSIDE ASSESSMENT ADULT - SWALLOW EVAL: DIAGNOSIS
Limited exam 2/2 patient refusal for PO trials. Based upon limited exam, patient presents with an oral and suspected pharyngeal dysphagia superimposed upon a reduced coordination of respiration and deglutition. The swallow sequence is characterized by delayed oral transit time, a delayed pharyngeal swallow on all liquid viscosities (most prominent on thin fluid) and s/s indicative of laryngeal penetration/aspiration on thin fluid. Objective swallow study (MBS) recommended to further assess swallow mechanism however, at this time, patient is declining to pursue further dysphagia work-up and dysphagia diet.

## 2018-12-11 NOTE — PROGRESS NOTE ADULT - PROBLEM SELECTOR PLAN 1
Defer antibiotics  ECHO eval for thrombus-will follow official read  -no obv infectious process present.

## 2018-12-11 NOTE — PROGRESS NOTE ADULT - ATTENDING COMMENTS
Effie Velasquez MD  [facility]    Dr Donald will be covering me starting  12 /12 /18  Please page

## 2018-12-11 NOTE — PROGRESS NOTE ADULT - ASSESSMENT
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) p/w feeling fatigued and left flank pain found to have LUIS w/ UTI    LUIS / Dehydration?excessive ostomy output - monitor renal function off ivf , cr improving fu renal , strict i/o    UTI / Fever / leukocytosis - completed Rocephin, monitor off abx, cdiff neg, leucocytosis -wbc trending down- monitor   id consult fu  ct chest reviewed- scattered b/l mucus impacted distal airways, no pna, 6mm pulm nodule  cta/p splenic leison, TTE pending-fu  endometrial fluid- pelvic sono f/u- reviewed-outpt gyn fu  speech and swallow eval -silent aspiration- cont dysphagia diet, pt also c/o hoarse voice-will obtain ent cs      HTN HLD - bp stable off meds, at times borderline hypotensive  sinus tach: rebound from holding bblockers vs dehydration-monitor closely      DVT PPX   pt eval-rehab

## 2018-12-11 NOTE — PROGRESS NOTE ADULT - SUBJECTIVE AND OBJECTIVE BOX
Patient is a 85y old  Female who presents with a chief complaint of dehydration and LUIS (11 Dec 2018 12:06)      INTERVAL HPI/OVERNIGHT EVENTS: noted, swallow eval-dysphagia diet, feels thirsty, off ivf      Vital Signs Last 24 Hrs  T(C): 37.4 (11 Dec 2018 06:04), Max: 37.8 (10 Dec 2018 17:10)  T(F): 99.3 (11 Dec 2018 06:04), Max: 100 (10 Dec 2018 17:10)  HR: 93 (11 Dec 2018 06:04) (89 - 118)  BP: 132/71 (11 Dec 2018 06:04) (96/60 - 132/71)  BP(mean): --  RR: 18 (11 Dec 2018 06:04) (18 - 18)  SpO2: 92% (11 Dec 2018 06:04) (92% - 95%)    acetaminophen   Tablet .. 650 milliGRAM(s) Oral every 6 hours PRN  aspirin enteric coated 81 milliGRAM(s) Oral daily  diazepam    Tablet 2.5 milliGRAM(s) Oral at bedtime PRN  heparin  Injectable 5000 Unit(s) SubCutaneous every 8 hours  levothyroxine 50 MICROGram(s) Oral daily  multivitamin 1 Tablet(s) Oral daily  nystatin Powder 1 Application(s) Topical two times a day PRN  ondansetron Injectable 4 milliGRAM(s) IV Push every 6 hours PRN  pantoprazole    Tablet 40 milliGRAM(s) Oral daily  prochlorperazine   Injectable 5 milliGRAM(s) IV Push every 8 hours PRN      PHYSICAL EXAM:  GENERAL: NAD,   EYES: conjunctiva and sclera clear  ENMT: Moist mucous membranes  NECK: Supple, No JVD, Normal thyroid  NERVOUS SYSTEM:  Alert & Oriented X3,   CHEST/LUNG: Clear to auscultation bilaterally; No rales, rhonchi, wheezing, or rubs  HEART: Regular rate and rhythm; No murmurs, rubs, or gallops  ABDOMEN: Soft, Nontender, Nondistended; Bowel sounds present  EXTREMITIES:  2+ Peripheral Pulses, No clubbing, cyanosis, or edema  LYMPH: No lymphadenopathy noted  SKIN: No rashes or lesions    Consultant(s) Notes Reviewed:  [x ] YES  [ ] NO  Care Discussed with Consultants/Other Providers [ x] YES  [ ] NO    LABS:                        8.8    13.26 )-----------( 223      ( 11 Dec 2018 08:00 )             26.5     12-11    141  |  106  |  27<H>  ----------------------------<  151<H>  4.7   |  25  |  1.19    Ca    8.7      11 Dec 2018 07:00  Phos  2.6     12-10  Mg     1.9     12-11          CAPILLARY BLOOD GLUCOSE                RADIOLOGY & ADDITIONAL TESTS:    Imaging Personally Reviewed:  [x ] YES  [ ] NO

## 2018-12-12 LAB
ANION GAP SERPL CALC-SCNC: 13 MMOL/L — SIGNIFICANT CHANGE UP (ref 5–17)
APPEARANCE UR: CLEAR — SIGNIFICANT CHANGE UP
BACTERIA # UR AUTO: NEGATIVE — SIGNIFICANT CHANGE UP
BILIRUB UR-MCNC: NEGATIVE — SIGNIFICANT CHANGE UP
BUN SERPL-MCNC: 26 MG/DL — HIGH (ref 7–23)
CALCIUM SERPL-MCNC: 8.7 MG/DL — SIGNIFICANT CHANGE UP (ref 8.4–10.5)
CHLORIDE SERPL-SCNC: 104 MMOL/L — SIGNIFICANT CHANGE UP (ref 96–108)
CO2 SERPL-SCNC: 22 MMOL/L — SIGNIFICANT CHANGE UP (ref 22–31)
COLOR SPEC: SIGNIFICANT CHANGE UP
CREAT SERPL-MCNC: 1.08 MG/DL — SIGNIFICANT CHANGE UP (ref 0.5–1.3)
DIFF PNL FLD: ABNORMAL
EPI CELLS # UR: 1 /HPF — SIGNIFICANT CHANGE UP
GLUCOSE SERPL-MCNC: 204 MG/DL — HIGH (ref 70–99)
GLUCOSE UR QL: NEGATIVE — SIGNIFICANT CHANGE UP
HCT VFR BLD CALC: 26.5 % — LOW (ref 34.5–45)
HGB BLD-MCNC: 8.7 G/DL — LOW (ref 11.5–15.5)
HYALINE CASTS # UR AUTO: 2 /LPF — SIGNIFICANT CHANGE UP (ref 0–2)
KETONES UR-MCNC: NEGATIVE — SIGNIFICANT CHANGE UP
LEUKOCYTE ESTERASE UR-ACNC: ABNORMAL
MCHC RBC-ENTMCNC: 29.4 PG — SIGNIFICANT CHANGE UP (ref 27–34)
MCHC RBC-ENTMCNC: 32.8 GM/DL — SIGNIFICANT CHANGE UP (ref 32–36)
MCV RBC AUTO: 89.5 FL — SIGNIFICANT CHANGE UP (ref 80–100)
NITRITE UR-MCNC: NEGATIVE — SIGNIFICANT CHANGE UP
PH UR: 5.5 — SIGNIFICANT CHANGE UP (ref 5–8)
PLATELET # BLD AUTO: 257 K/UL — SIGNIFICANT CHANGE UP (ref 150–400)
POTASSIUM SERPL-MCNC: 4.7 MMOL/L — SIGNIFICANT CHANGE UP (ref 3.5–5.3)
POTASSIUM SERPL-SCNC: 4.7 MMOL/L — SIGNIFICANT CHANGE UP (ref 3.5–5.3)
PROT UR-MCNC: SIGNIFICANT CHANGE UP
RBC # BLD: 2.96 M/UL — LOW (ref 3.8–5.2)
RBC # FLD: 14.5 % — SIGNIFICANT CHANGE UP (ref 10.3–14.5)
RBC CASTS # UR COMP ASSIST: 5 /HPF — HIGH (ref 0–4)
SODIUM SERPL-SCNC: 139 MMOL/L — SIGNIFICANT CHANGE UP (ref 135–145)
SP GR SPEC: 1.01 — SIGNIFICANT CHANGE UP (ref 1.01–1.02)
TSH SERPL-MCNC: 0.74 UIU/ML — SIGNIFICANT CHANGE UP (ref 0.27–4.2)
UROBILINOGEN FLD QL: NEGATIVE — SIGNIFICANT CHANGE UP
VIT B12 SERPL-MCNC: 922 PG/ML — SIGNIFICANT CHANGE UP (ref 232–1245)
WBC # BLD: 11.13 K/UL — HIGH (ref 3.8–10.5)
WBC # FLD AUTO: 11.13 K/UL — HIGH (ref 3.8–10.5)
WBC UR QL: 8 /HPF — HIGH (ref 0–5)

## 2018-12-12 PROCEDURE — 70450 CT HEAD/BRAIN W/O DYE: CPT | Mod: 26

## 2018-12-12 PROCEDURE — 99223 1ST HOSP IP/OBS HIGH 75: CPT

## 2018-12-12 RX ORDER — PIPERACILLIN AND TAZOBACTAM 4; .5 G/20ML; G/20ML
3.38 INJECTION, POWDER, LYOPHILIZED, FOR SOLUTION INTRAVENOUS EVERY 8 HOURS
Qty: 0 | Refills: 0 | Status: DISCONTINUED | OUTPATIENT
Start: 2018-12-12 | End: 2018-12-20

## 2018-12-12 RX ORDER — PIPERACILLIN AND TAZOBACTAM 4; .5 G/20ML; G/20ML
3.38 INJECTION, POWDER, LYOPHILIZED, FOR SOLUTION INTRAVENOUS ONCE
Qty: 0 | Refills: 0 | Status: COMPLETED | OUTPATIENT
Start: 2018-12-12 | End: 2018-12-12

## 2018-12-12 RX ADMIN — PIPERACILLIN AND TAZOBACTAM 200 GRAM(S): 4; .5 INJECTION, POWDER, LYOPHILIZED, FOR SOLUTION INTRAVENOUS at 17:36

## 2018-12-12 RX ADMIN — Medication 650 MILLIGRAM(S): at 12:44

## 2018-12-12 RX ADMIN — Medication 81 MILLIGRAM(S): at 13:23

## 2018-12-12 RX ADMIN — Medication 50 MICROGRAM(S): at 06:20

## 2018-12-12 RX ADMIN — HEPARIN SODIUM 5000 UNIT(S): 5000 INJECTION INTRAVENOUS; SUBCUTANEOUS at 06:20

## 2018-12-12 RX ADMIN — Medication 650 MILLIGRAM(S): at 13:14

## 2018-12-12 RX ADMIN — PANTOPRAZOLE SODIUM 40 MILLIGRAM(S): 20 TABLET, DELAYED RELEASE ORAL at 12:45

## 2018-12-12 RX ADMIN — Medication 1 TABLET(S): at 12:45

## 2018-12-12 RX ADMIN — HEPARIN SODIUM 5000 UNIT(S): 5000 INJECTION INTRAVENOUS; SUBCUTANEOUS at 12:46

## 2018-12-12 RX ADMIN — HEPARIN SODIUM 5000 UNIT(S): 5000 INJECTION INTRAVENOUS; SUBCUTANEOUS at 21:58

## 2018-12-12 NOTE — PROGRESS NOTE ADULT - ASSESSMENT
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    # LUIS / Dehydration  2/2 excessive ostomy output, creat improver, appreciate renal recs, strict i/o    # UTI / Fever / leukocytosis  completed Rocephin, currently off abx, cdiff neg, wbc trending down  tmax 103 yesterday  dtr asked for 2nd opinion for ID, will consult ID  repeat UA positive, fu urine cx  CT chest reviewed - scattered b/l mucus impacted distal airways, no pna, 6mm pulm nodule  cta/p splenic leison, TTE no thrombus  endometrial fluid on pelvic sono - outpt gyn fu    # dysphagia  CT head  speech and swallow eval - pt refused MBS, ENT eval - pt refused laryngoscopy  dysphagia diet    HTN HLD - bp stable off meds, at times borderline hypotensive  sinus tach: rebound from holding bblockers vs dehydration-monitor closely      DVT PPX   pt eval-rehab 84 yo female with PMHx HTN, hypothyroidism, 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    # LUIS / Dehydration  2/2 excessive ostomy output, creat improver, appreciate renal recs, strict i/o    # UTI / Fever / leukocytosis  completed Rocephin, currently off abx, cdiff neg, wbc trending down  tmax 103 yesterday  dtr asked for 2nd opinion for ID, will consult ID  repeat UA positive, fu urine cx  CT chest reviewed - scattered b/l mucus impacted distal airways, no pna, 6mm pulm nodule  cta/p splenic leison, renal cyst  TTE no thrombus  endometrial fluid on pelvic sono - rectovaginal fistula - gyn consult    # dysphagia  CT head  speech and swallow eval - pt refused MBS, ENT eval - pt refused laryngoscopy  dysphagia diet    # splenic lesion  US    # anemia  check stool occult  GI consult    # HTN HLD - bp stable off meds, at times borderline hypotensive  sinus tach: rebound from holding bblockers vs dehydration-monitor closely      DVT PPX   pt eval-rehab

## 2018-12-12 NOTE — PROGRESS NOTE ADULT - SUBJECTIVE AND OBJECTIVE BOX
[facility] - QUINN Cornejo / QUINN Headley / ABDIAS Greene/ QUINN Fiore/ QUINN Meng/ FANI De León / ANDREY Pham / ELI Taylor  ---------------------------------------------------------------------------------------------------------------  seen and examined today for LUIS  Interval : Febrile overnight and today, Luis resolved  VITALS:  T(F): 99.1 (12-12-18 @ 09:12), Max: 103.3 (12-11-18 @ 17:12)  HR: 98 (12-12-18 @ 09:12)  BP: 120/68 (12-12-18 @ 09:12)  RR: 18 (12-12-18 @ 09:12)  SpO2: 97% (12-12-18 @ 09:12)  Wt(kg): --    12-11 @ 07:01  -  12-12 @ 07:00  --------------------------------------------------------  IN: 480 mL / OUT: 100 mL / NET: 380 mL      Physical Exam :-  Constitutional: NAD  Neck: Supple.  Respiratory: Bilateral equal breath sounds,  Cardiovascular: S1, S2 normal,  Gastrointestinal: Bowel Sounds present, soft, non tender.  Extremities: No edema  Neurological: Alert and Oriented x 3, no focal deficits  Psychiatric: Normal mood, normal affect  Data:-  Allergies :   iodine (Other; Anaphylaxis)  shellfish (Anaphylaxis)  sulfa drugs (Other)    Hospital Medications:   MEDICATIONS  (STANDING):  aspirin enteric coated 81 milliGRAM(s) Oral daily  heparin  Injectable 5000 Unit(s) SubCutaneous every 8 hours  levothyroxine 50 MICROGram(s) Oral daily  multivitamin 1 Tablet(s) Oral daily  pantoprazole    Tablet 40 milliGRAM(s) Oral daily    12-12    139  |  104  |  26<H>  ----------------------------<  204<H>  4.7   |  22  |  1.08    Ca    8.7      12 Dec 2018 07:10  Phos  2.6     12-10  Mg     1.9     12-11      Creatinine Trend: 1.08 <--, 1.19 <--, 1.17 <--, 1.19 <--, 1.26 <--, 1.49 <--, 1.62 <--, 1.69 <--, 1.78 <--                        8.7    11.13 )-----------( 257      ( 12 Dec 2018 07:49 )             26.5

## 2018-12-12 NOTE — CHART NOTE - NSCHARTNOTEFT_GEN_A_CORE
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     Patient has been having high fevers but all cx's negative to date; blood cx done 12/11 not resulted as yet.    Family requested 2nd opinion from ID; House ID consulted and pt started on zosyn; obtained urine sample via straight cath for repeat ua & urine cx prior to starting Abx.    ENT Bedside laryngoscope revealed pooling of secretions, vocal cords appear normal bilaterally, airway patent. No source of infection found on ENT exam.     GYN called for consult but patient & dtr refuses exam at this time.      Will continue to monitor.      MONI Lyon [id number]

## 2018-12-12 NOTE — PROGRESS NOTE ADULT - SUBJECTIVE AND OBJECTIVE BOX
Patient is a 85y old  Female who presents with a chief complaint of dehydration and LUIS (11 Dec 2018 18:13)      SUBJECTIVE / OVERNIGHT EVENTS:    Patient seen and examined. states she is not well but does not know why. denies trouble swallowing. denies cp sob. denies dysuria.  tmax 103 yesterday    Vital Signs Last 24 Hrs  T(C): 37.3 (12 Dec 2018 09:12), Max: 39.6 (11 Dec 2018 17:12)  T(F): 99.1 (12 Dec 2018 09:12), Max: 103.3 (11 Dec 2018 17:12)  HR: 98 (12 Dec 2018 09:12) (89 - 102)  BP: 120/68 (12 Dec 2018 09:12) (101/66 - 120/68)  BP(mean): --  RR: 18 (12 Dec 2018 09:12) (18 - 20)  SpO2: 97% (12 Dec 2018 09:12) (95% - 97%)  I&O's Summary    11 Dec 2018 07:01  -  12 Dec 2018 07:00  --------------------------------------------------------  IN: 480 mL / OUT: 100 mL / NET: 380 mL        PE:  GENERAL: NAD, AAOx3  HEAD:  Atraumatic, Normocephalic  EYES: EOMI, PERRLA, conjunctiva and sclera clear  NECK: Supple, No JVD  CHEST/LUNG: CTABL, No wheeze  HEART: Regular rate and rhythm; no murmur  ABDOMEN: Soft, Nontender, Nondistended; Bowel sounds present, ostomy  EXTREMITIES:  2+ Peripheral Pulses, No clubbing, cyanosis, or edema  SKIN: No rashes or lesions  NEURO: No focal deficits    LABS:                        8.7    11.13 )-----------( 257      ( 12 Dec 2018 07:49 )             26.5     12-    139  |  104  |  26<H>  ----------------------------<  204<H>  4.7   |  22  |  1.08    Ca    8.7      12 Dec 2018 07:10  Phos  2.6     12-10  Mg     1.9     12-11        CAPILLARY BLOOD GLUCOSE            Urinalysis Basic - ( 11 Dec 2018 18:43 )    Color: Yellow / Appearance: Slightly Turbid / S.015 / pH: x  Gluc: x / Ketone: Negative  / Bili: Negative / Urobili: Negative   Blood: x / Protein: Trace / Nitrite: Negative   Leuk Esterase: Large / RBC: 27 /hpf /  /hpf   Sq Epi: x / Non Sq Epi: 1 /hpf / Bacteria: Few        RADIOLOGY & ADDITIONAL TESTS:    Imaging Personally Reviewed:  [x] YES  [ ] NO    Consultant(s) Notes Reviewed:  [x] YES  [ ] NO    MEDICATIONS  (STANDING):  aspirin enteric coated 81 milliGRAM(s) Oral daily  heparin  Injectable 5000 Unit(s) SubCutaneous every 8 hours  levothyroxine 50 MICROGram(s) Oral daily  multivitamin 1 Tablet(s) Oral daily  pantoprazole    Tablet 40 milliGRAM(s) Oral daily    MEDICATIONS  (PRN):  acetaminophen   Tablet .. 650 milliGRAM(s) Oral every 6 hours PRN Temp greater or equal to 38.5C (101.3F), Mild Pain (1 - 3)  nystatin Powder 1 Application(s) Topical two times a day PRN for rash  ondansetron Injectable 4 milliGRAM(s) IV Push every 6 hours PRN Nausea and/or Vomiting  prochlorperazine   Injectable 5 milliGRAM(s) IV Push every 8 hours PRN nausea      Care Discussed with Consultants/Other Providers [x] YES  [ ] NO    HEALTH ISSUES - PROBLEM Dx:  Hoarseness: Hoarseness  Leukocytosis: Leukocytosis  Advance care planning: Advance care planning  Prophylactic measure: Prophylactic measure  Hyponatremia: Hyponatremia  Depression: Depression  Essential hypertension: Essential hypertension  Hypothyroidism: Hypothyroidism  Acute cystitis without hematuria: Acute cystitis without hematuria  LUIS (acute kidney injury): LUIS (acute kidney injury) Patient is a 85y old  Female who presents with a chief complaint of dehydration and LUIS (11 Dec 2018 18:13)      SUBJECTIVE / OVERNIGHT EVENTS:    Patient seen and examined. states she is not well but does not know why. denies trouble swallowing. denies cp sob. denies dysuria.  tmax 103 yesterday    Vital Signs Last 24 Hrs  T(C): 37.3 (12 Dec 2018 09:12), Max: 39.6 (11 Dec 2018 17:12)  T(F): 99.1 (12 Dec 2018 09:12), Max: 103.3 (11 Dec 2018 17:12)  HR: 98 (12 Dec 2018 09:12) (89 - 102)  BP: 120/68 (12 Dec 2018 09:12) (101/66 - 120/68)  BP(mean): --  RR: 18 (12 Dec 2018 09:12) (18 - 20)  SpO2: 97% (12 Dec 2018 09:12) (95% - 97%)  I&O's Summary    11 Dec 2018 07:01  -  12 Dec 2018 07:00  --------------------------------------------------------  IN: 480 mL / OUT: 100 mL / NET: 380 mL        PE:  GENERAL: NAD, AAOx3  HEAD:  Atraumatic, Normocephalic  EYES: EOMI, PERRLA, conjunctiva and sclera clear  NECK: Supple, No JVD  CHEST/LUNG: CTABL, No wheeze  HEART: Regular rate and rhythm; + murmur  ABDOMEN: Soft, Nontender, Nondistended; Bowel sounds present, ostomy  EXTREMITIES:  2+ Peripheral Pulses, No clubbing, cyanosis, or edema  SKIN: No rashes or lesions  NEURO: No focal deficits    LABS:                        8.7    11.13 )-----------( 257      ( 12 Dec 2018 07:49 )             26.5     12-    139  |  104  |  26<H>  ----------------------------<  204<H>  4.7   |  22  |  1.08    Ca    8.7      12 Dec 2018 07:10  Phos  2.6     12-10  Mg     1.9     12-11        CAPILLARY BLOOD GLUCOSE            Urinalysis Basic - ( 11 Dec 2018 18:43 )    Color: Yellow / Appearance: Slightly Turbid / S.015 / pH: x  Gluc: x / Ketone: Negative  / Bili: Negative / Urobili: Negative   Blood: x / Protein: Trace / Nitrite: Negative   Leuk Esterase: Large / RBC: 27 /hpf /  /hpf   Sq Epi: x / Non Sq Epi: 1 /hpf / Bacteria: Few        RADIOLOGY & ADDITIONAL TESTS:    Imaging Personally Reviewed:  [x] YES  [ ] NO    Consultant(s) Notes Reviewed:  [x] YES  [ ] NO    MEDICATIONS  (STANDING):  aspirin enteric coated 81 milliGRAM(s) Oral daily  heparin  Injectable 5000 Unit(s) SubCutaneous every 8 hours  levothyroxine 50 MICROGram(s) Oral daily  multivitamin 1 Tablet(s) Oral daily  pantoprazole    Tablet 40 milliGRAM(s) Oral daily    MEDICATIONS  (PRN):  acetaminophen   Tablet .. 650 milliGRAM(s) Oral every 6 hours PRN Temp greater or equal to 38.5C (101.3F), Mild Pain (1 - 3)  nystatin Powder 1 Application(s) Topical two times a day PRN for rash  ondansetron Injectable 4 milliGRAM(s) IV Push every 6 hours PRN Nausea and/or Vomiting  prochlorperazine   Injectable 5 milliGRAM(s) IV Push every 8 hours PRN nausea      Care Discussed with Consultants/Other Providers [x] YES  [ ] NO    HEALTH ISSUES - PROBLEM Dx:  Hoarseness: Hoarseness  Leukocytosis: Leukocytosis  Advance care planning: Advance care planning  Prophylactic measure: Prophylactic measure  Hyponatremia: Hyponatremia  Depression: Depression  Essential hypertension: Essential hypertension  Hypothyroidism: Hypothyroidism  Acute cystitis without hematuria: Acute cystitis without hematuria  LUIS (acute kidney injury): LUIS (acute kidney injury)

## 2018-12-12 NOTE — CONSULT NOTE ADULT - ASSESSMENT
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 Per daughter at bedside pt lives alone in a house with an aid, who noticed that she was not her self for the last week. She seemed to have low energy and was fatigued intermittently for a week. The PT that comes to the house reported that the pt was have intermittent L flank pain as well during the PT session only. She currently has L flank pain, 8/10, non radiating, moving exacerbates it. She denies fever, chills, cough, abdominal pain, and any changes in the ileostomy output. Per daughter (Pia) started complaining of increased urinary urgency 4 days ago, so her Daughter Sandra (NP) prescribed her cipro. subsequently her urinary complaints improved. Pt denies any dysuria, increased urinary frequency. Per dtr pt has had decreased po intake, has not been drinking enough water. the family is concerned that the patient has depression, which has lead to the decreased appetite.       As per family at bedside, pt was seen by PCP 1 week ago, and was noted to have mild elevation of BUN/Cr    In the ED pt noted to have LUIS with leukocytosis (04 Dec 2018 16:16)  CT--> Left lower pole stone, 4.7 cm in the right interpole ( kidney)? hemorrhagic cust, fluid distended cervical canal  pt started on  ROcephin  Pt noted to have low grade fever ID called  12/7 seen by ID Rocephin d/mart  12/8 persistent leukocytosis noted  ECHO suggested  12/9 fevers persisted  12/10- fevers  12/11 echo - < from: Transthoracic Echocardiogram (12.11.18 @ 10:26) >  1. Aortic valve not well visualized; appears mildly  calcified.  2. Normal left ventricular internal dimensions and wall  thicknesses. Mid LV cavity obstruction noted with peak  gradients = 60 mm Hg.  3. Hyperdynamic left ventricular systolic function.  12/12 - family requests a second ID opinion  Pt now with temps up to 103.3 ( recetal)      Unclear why patient with fevers    Of note:  - pt with rectovaginal fistula ( previously known about)  pt with splenic lesion- unclear how old. If lymphoma, would be surprising that no adenopathy and not characteristic look fo infarct  pt with murmur  RVP negative  Pt with pyuria - ? clean catch  pt with progressive anemia  ileostomy fluid is quite liquid- negative c diff and negative stool PCR  pt with possible trouble swallowing     SUGGEST:  repeat blood cultures ( sent last night)  straight cath for u/a , c&s  GYN evaluation  REPEAT LFTS  anemia workup   check ESR and CRP - no sxs to suggest PMR  ultrasound of spleen to better characterize lesion  check DIFf on WBC=- as temps go up the WBC seems to go down   consider empiric zosyn to cover UTI  consider HUONG with murmur- but patient with trouble swallowing now so may not be an option  fungal rash to groin- suggest topical cream 85 y.o female with PMHx of HTN, 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 Per daughter at bedside pt lives alone in a house with an aid, who noticed that she was not her self for the last week. She seemed to have low energy and was fatigued intermittently for a week. The PT that comes to the house reported that the pt was have intermittent L flank pain as well during the PT session only. She currently has L flank pain, 8/10, non radiating, moving exacerbates it. She denies fever, chills, cough, abdominal pain, and any changes in the ileostomy output. Per daughter (Pia) started complaining of increased urinary urgency 4 days ago, so her Daughter Sandra (NP) prescribed her cipro. subsequently her urinary complaints improved. Pt denies any dysuria, increased urinary frequency. Per dtr pt has had decreased po intake, has not been drinking enough water. the family is concerned that the patient has depression, which has lead to the decreased appetite.       As per family at bedside, pt was seen by PCP 1 week ago, and was noted to have mild elevation of BUN/Cr    In the ED pt noted to have LUIS with leukocytosis (04 Dec 2018 16:16)  CT--> Left lower pole stone, 4.7 cm in the right interpole ( kidney)? hemorrhagic cust, fluid distended cervical canal  pt started on  ROcephin  Pt noted to have low grade fever ID called  12/7 seen by ID Rocephin d/mart  12/8 persistent leukocytosis noted  ECHO suggested  12/9 fevers persisted  12/10- fevers  12/11 echo - < from: Transthoracic Echocardiogram (12.11.18 @ 10:26) >  1. Aortic valve not well visualized; appears mildly  calcified.  2. Normal left ventricular internal dimensions and wall  thicknesses. Mid LV cavity obstruction noted with peak  gradients = 60 mm Hg.  3. Hyperdynamic left ventricular systolic function.  12/12 - family requests a second ID opinion  Pt now with temps up to 103.3 ( recetal)      Unclear why patient with fevers    Of note:  - pt with rectovaginal fistula ( previously known about)  pt with splenic lesion- unclear how old. If lymphoma, would be surprising that no adenopathy and not characteristic look fo infarct  pt with murmur  RVP negative  Pt with pyuria - ? clean catch  pt with progressive anemia  ileostomy fluid is quite liquid- negative c diff and negative stool PCR  pt with possible trouble swallowing     SUGGEST:  repeat blood cultures ( sent last night)  straight cath for u/a , c&s  GYN evaluation  REPEAT LFTS  anemia workup   check ESR and CRP - no sxs to suggest PMR  ultrasound of spleen to better characterize lesion  check DIFf on WBC=- as temps go up the WBC seems to go down   consider empiric zosyn to cover UTI  consider HUONG with murmur- but patient with trouble swallowing now so may not be an option  fungal rash to groin- suggest topical cream  neurological evaluation - CT of head to start ( avoid IV contrast)

## 2018-12-12 NOTE — CHART NOTE - NSCHARTNOTEFT_GEN_A_CORE
Nutrition Follow Up Note  Patient seen for malnutrition follow-up. Pt admitted for dehydration, high ileostomy output, LUIS and UTI. Pt refused MBS swallow study and laryngoscopy, and had limited participation in bedside swallow assessment. 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    Source: Patient, medical record    Diet : Dysphagia 1; puree, nectar-thick liquids, low-fiber, Ensure Enlive x3 daily.    Patient reports poor appetite, fair PO intake, feels full quickly and dislikes the food. Pt denies nausea/vomiting, GI distress and no change in ileostomy output. This morning, pt states she consumed Ensure from previous night, half of Ensure from breakfast, and one yogurt. Pt reports that she did not receive assistance today during breakfast, however,.... Pt thought that she did not receive water and was shown the thickened water beverages on her bedside table and pt consumed one water during assessment. Small frequent meals were reinforced and pt was encouraged to continue to drink Ensure. Pt also expressed a concern that she was losing mobility during her hospital stay, consider PT as she was last seen by PT on .    PO intake: poor, <50% and is improving.     Daily Weight in k.1 (-)  No new weight since admission    Pertinent Medications: MEDICATIONS  (STANDING):  aspirin enteric coated 81 milliGRAM(s) Oral daily  heparin  Injectable 5000 Unit(s) SubCutaneous every 8 hours  levothyroxine 50 MICROGram(s) Oral daily  multivitamin 1 Tablet(s) Oral daily  pantoprazole    Tablet 40 milliGRAM(s) Oral daily    MEDICATIONS  (PRN):  acetaminophen   Tablet .. 650 milliGRAM(s) Oral every 6 hours PRN Temp greater or equal to 38.5C (101.3F), Mild Pain (1 - 3)  nystatin Powder 1 Application(s) Topical two times a day PRN for rash  ondansetron Injectable 4 milliGRAM(s) IV Push every 6 hours PRN Nausea and/or Vomiting  prochlorperazine   Injectable 5 milliGRAM(s) IV Push every 8 hours PRN nausea    Pertinent Labs: 12-12 @ 07:10: Na 139, BUN 26<H>, Cr 1.08, <H>, K+ 4.7    Skin per nursing documentation: no pressure ulcers noted  Edema: none    Estimated Needs:   [x] no change since previous assessment  [ ] recalculated:     Previous Nutrition Diagnosis: Severe malnutrition  Nutrition Diagnosis is: ongoing- being addressed with Ensure x3 daily and dysphagia diet.    New Nutrition Diagnosis: none    Interventions:     Recommend  1) Continue dysphagia 1 puree/nectar low-fiber diet  2) Continue Ensure x3 daily- will switch to strawberry upon pt preference.  3) Take current weight    Monitoring and Evaluation:     Continue to monitor Nutritional intake, Tolerance to diet prescription, weights, labs, skin integrity    RD remains available upon request and will follow up per protocol Nutrition Follow Up Note  Patient seen for malnutrition follow-up. Pt admitted for dehydration, high ileostomy output, LUIS and UTI. Pt refused MBS swallow study and laryngoscopy, and had limited participation in bedside swallow assessment. 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    Source: Patient, medical record    Diet : Dysphagia 1; puree, nectar-thick liquids, low-fiber, Ensure Enlive x3 daily.    Patient reports poor appetite, fair PO intake, feels full quickly and dislikes the food. Pt denies nausea/vomiting, GI distress and has no change in ileostomy output (output 300mL today). This morning, pt states she consumed Ensure from previous night, half of Ensure from breakfast, and one yogurt. Pt reports that she did not receive assistance today during breakfast, however,.... Pt thought that she did not receive water and was shown the thickened water beverages on her bedside table and pt consumed one water during assessment. Small frequent meals were reinforced and pt was encouraged to continue to drink Ensure. Pt also expressed a concern that she was losing mobility during her hospital stay, consider PT as she was last seen by PT on .    PO intake: poor, <50% and is improving.     Daily Weight in k.1 (12-04)  No new weight since admission    Pertinent Medications: MEDICATIONS  (STANDING):  aspirin enteric coated 81 milliGRAM(s) Oral daily  heparin  Injectable 5000 Unit(s) SubCutaneous every 8 hours  levothyroxine 50 MICROGram(s) Oral daily  multivitamin 1 Tablet(s) Oral daily  pantoprazole    Tablet 40 milliGRAM(s) Oral daily    MEDICATIONS  (PRN):  acetaminophen   Tablet .. 650 milliGRAM(s) Oral every 6 hours PRN Temp greater or equal to 38.5C (101.3F), Mild Pain (1 - 3)  nystatin Powder 1 Application(s) Topical two times a day PRN for rash  ondansetron Injectable 4 milliGRAM(s) IV Push every 6 hours PRN Nausea and/or Vomiting  prochlorperazine   Injectable 5 milliGRAM(s) IV Push every 8 hours PRN nausea    Pertinent Labs: 12-12 @ 07:10: Na 139, BUN 26<H>, Cr 1.08, <H>, K+ 4.7    Skin per nursing documentation: no pressure ulcers noted  Edema: none    Estimated Needs:   [x] no change since previous assessment  [ ] recalculated:     Previous Nutrition Diagnosis: Severe malnutrition  Nutrition Diagnosis is: ongoing- being addressed with Ensure x3 daily and dysphagia diet.    New Nutrition Diagnosis: none    Recommend  1) Continue dysphagia 1 puree/nectar low-fiber diet  2) Provide feeding assistance at each meal.  3) Continue Ensure x3 daily- will switch to strawberry upon pt preference.  4) Take current weight  5) Continue Multivitamin     Monitoring and Evaluation:   Continue to monitor Nutritional intake, Tolerance to diet prescription, weights, labs, skin integrity    RD remains available upon request and will follow up per protocol Nutrition Follow Up Note  Patient seen for malnutrition follow-up. Pt admitted for dehydration, high ileostomy output, LUIS and UTI. Pt refused MBS swallow study and laryngoscopy, and had limited participation in bedside swallow assessment. 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    Source: Patient, medical record, RN    Diet : Dysphagia 1; puree, nectar-thick liquids, low-fiber, Ensure Enlive x3 daily.    Patient reports poor appetite, fair PO intake, feels full quickly and dislikes the food. Pt denies nausea/vomiting, GI distress and has no change in ileostomy output (output 300mL today). This morning, pt states she consumed Ensure from previous night, half of Ensure from breakfast, and one yogurt. Pt reports that she did not receive assistance today during breakfast, however, per nurse pt is forgetful and has been receiving assistance. Pt thought that she did not receive water and was shown the thickened water beverages on her bedside table and pt consumed one water during assessment. Small frequent meals were reinforced and pt was encouraged to continue to drink Ensure. Pt also expressed a concern that she was losing mobility during her hospital stay, consider PT as she was last seen by PT on , discussed with RN.     PO intake: poor, <50% and is improving.     Daily Weight in k.1 (-)  No new weight since admission    Pertinent Medications: MEDICATIONS  (STANDING):  aspirin enteric coated 81 milliGRAM(s) Oral daily  heparin  Injectable 5000 Unit(s) SubCutaneous every 8 hours  levothyroxine 50 MICROGram(s) Oral daily  multivitamin 1 Tablet(s) Oral daily  pantoprazole    Tablet 40 milliGRAM(s) Oral daily    MEDICATIONS  (PRN):  acetaminophen   Tablet .. 650 milliGRAM(s) Oral every 6 hours PRN Temp greater or equal to 38.5C (101.3F), Mild Pain (1 - 3)  nystatin Powder 1 Application(s) Topical two times a day PRN for rash  ondansetron Injectable 4 milliGRAM(s) IV Push every 6 hours PRN Nausea and/or Vomiting  prochlorperazine   Injectable 5 milliGRAM(s) IV Push every 8 hours PRN nausea    Pertinent Labs: - @ 07:10: Na 139, BUN 26<H>, Cr 1.08, <H>, K+ 4.7    Skin per nursing documentation: no pressure ulcers noted  Edema: none    Estimated Needs:   [x] no change since previous assessment  [ ] recalculated:     Previous Nutrition Diagnosis: Severe malnutrition  Nutrition Diagnosis is: ongoing- being addressed with Ensure x3 daily and dysphagia diet.    New Nutrition Diagnosis: none    Recommend  1) Continue dysphagia 1 puree/nectar low-fiber diet  2) Provide feeding assistance at each meal-discussed with RN  3) Continue Ensure x3 daily- will switch to strawberry upon pt preference.  4) Take current weight  5) Continue Multivitamin     Monitoring and Evaluation:   Continue to monitor Nutritional intake, Tolerance to diet prescription, weights, labs, skin integrity    RD remains available upon request and will follow up per protocol Nutrition Follow Up Note  Patient seen for malnutrition follow-up. Pt admitted for dehydration, high ileostomy output, LUIS and UTI. Pt refused MBS swallow study and laryngoscopy, and had limited participation in bedside swallow assessment. 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 Possible discharge after echo report is received per case coordinator note ().     Source: Patient, medical record, RN    Diet : Dysphagia 1; puree, nectar-thick liquids, low-fiber, Ensure Enlive x3 daily.    Patient reports poor appetite, fair PO intake, feels full quickly and dislikes the food. Pt denies nausea/vomiting, GI distress and has no change in ileostomy output (output 300mL today). This morning, pt states she consumed Ensure from previous night, half of Ensure from breakfast, and one yogurt. Pt reports that she did not receive assistance today during breakfast, however, per nurse pt is forgetful and has been receiving assistance. Pt thought that she did not receive water and was shown the thickened water beverages on her bedside table and pt consumed one water during assessment. Small frequent meals were reinforced and pt was encouraged to continue to drink Ensure. Pt also expressed a concern that she was losing mobility during her hospital stay, consider PT as she was last seen by PT on , discussed with RN.     PO intake: poor, <50% and is improving.     Daily Weight in k.1 (-)  No new weight since admission    Pertinent Medications: MEDICATIONS  (STANDING):  aspirin enteric coated 81 milliGRAM(s) Oral daily  heparin  Injectable 5000 Unit(s) SubCutaneous every 8 hours  levothyroxine 50 MICROGram(s) Oral daily  multivitamin 1 Tablet(s) Oral daily  pantoprazole    Tablet 40 milliGRAM(s) Oral daily    MEDICATIONS  (PRN):  acetaminophen   Tablet .. 650 milliGRAM(s) Oral every 6 hours PRN Temp greater or equal to 38.5C (101.3F), Mild Pain (1 - 3)  nystatin Powder 1 Application(s) Topical two times a day PRN for rash  ondansetron Injectable 4 milliGRAM(s) IV Push every 6 hours PRN Nausea and/or Vomiting  prochlorperazine   Injectable 5 milliGRAM(s) IV Push every 8 hours PRN nausea    Pertinent Labs: - @ 07:10: Na 139, BUN 26<H>, Cr 1.08, <H>, K+ 4.7    Skin per nursing documentation: no pressure ulcers noted  Edema: none    Estimated Needs:   [x] no change since previous assessment  [ ] recalculated:     Previous Nutrition Diagnosis: Severe malnutrition  Nutrition Diagnosis is: ongoing- being addressed with Ensure x3 daily and dysphagia diet.    New Nutrition Diagnosis: none    Recommend  1) Continue dysphagia 1 puree/nectar low-fiber diet  2) Provide feeding assistance at each meal-discussed with RN  3) Continue Ensure x3 daily- will switch to strawberry upon pt preference.  4) Take current weight  5) Continue Multivitamin     Monitoring and Evaluation:   Continue to monitor Nutritional intake, Tolerance to diet prescription, weights, labs, skin integrity    RD remains available upon request and will follow up per protocol Nutrition Follow Up Note  Patient seen for malnutrition follow-up. Pt admitted for dehydration, high ileostomy output, LUIS and UTI. Pt refused MBS swallow study and laryngoscopy, and had limited participation in bedside swallow assessment. 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 Possible discharge after echo report is received per case coordinator note ().     Source: Patient, medical record, RN    Diet : Dysphagia 1; puree, nectar-thick liquids, low-fiber, Ensure Enlive x3 daily.    Patient reports poor appetite, poor PO intake, feels full quickly and dislikes the food. Pt denies nausea/vomiting, GI distress and has no change in ileostomy output (output 300mL today). This morning, pt states she consumed Ensure from previous night, half of Ensure from breakfast, and one yogurt. Pt reports that she did not receive assistance today during breakfast, however, per nurse pt is forgetful and has been receiving assistance. Pt thought that she did not receive water and was shown the thickened water beverages on her bedside table and pt consumed one water during assessment. Small frequent meals were reinforced and pt was encouraged to continue to drink Ensure. Pt also expressed a concern that she was losing mobility during her hospital stay, consider PT as she was last seen by PT on , discussed with RN.     PO intake: poor, <50% and is improving.     Daily Weight in k.1 (-)  No new weight since admission    Pertinent Medications: MEDICATIONS  (STANDING):  aspirin enteric coated 81 milliGRAM(s) Oral daily  heparin  Injectable 5000 Unit(s) SubCutaneous every 8 hours  levothyroxine 50 MICROGram(s) Oral daily  multivitamin 1 Tablet(s) Oral daily  pantoprazole    Tablet 40 milliGRAM(s) Oral daily    MEDICATIONS  (PRN):  acetaminophen   Tablet .. 650 milliGRAM(s) Oral every 6 hours PRN Temp greater or equal to 38.5C (101.3F), Mild Pain (1 - 3)  nystatin Powder 1 Application(s) Topical two times a day PRN for rash  ondansetron Injectable 4 milliGRAM(s) IV Push every 6 hours PRN Nausea and/or Vomiting  prochlorperazine   Injectable 5 milliGRAM(s) IV Push every 8 hours PRN nausea    Pertinent Labs: - @ 07:10: Na 139, BUN 26<H>, Cr 1.08, <H>, K+ 4.7    Skin per nursing documentation: no pressure ulcers noted  Edema: none    Estimated Needs:   [x] no change since previous assessment  [ ] recalculated:     Previous Nutrition Diagnosis: Severe malnutrition  Nutrition Diagnosis is: ongoing- being addressed with Ensure x3 daily and dysphagia diet.    New Nutrition Diagnosis: none    Recommend  1) Continue dysphagia 1 puree/nectar low-fiber diet  2) Provide feeding assistance at each meal-discussed with RN  3) Continue Ensure x3 daily- switched to strawberry upon pt preference.  4) Take current weight  5) Continue Multivitamin     Monitoring and Evaluation:   Continue to monitor Nutritional intake, Tolerance to diet prescription, weights, labs, skin integrity    RD remains available upon request and will follow up per protocol

## 2018-12-12 NOTE — CHART NOTE - NSCHARTNOTEFT_GEN_A_CORE
Pt seen at bedside with family member. Pt and pt's sister currently do not want a GYN evaluation at this time, stating that Dr. 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 She also denies vaginal discharge. The pt's sister notes that she had given the patient an enema prior to her proctoscopy (which did not find any abnormalities) and nothing came out of her vagina.    Please reconsult GYN as needed.    James Mccauley MD PGY2

## 2018-12-12 NOTE — CONSULT NOTE ADULT - SUBJECTIVE AND OBJECTIVE BOX
Patient is a 85y old  Female who presents with a chief complaint of dehydration and LUIS (12 Dec 2018 10:32)      HPI:  85 y.o female with PMHx of HTN, ileostomy due to complication of Cdiff () p/w feeling fatigued and left flank pain. Per daughter at bedside pt lives alone in a house with an aid, who noticed that she was not her self for the last week. She seemed to have low energy and was fatigued intermittently for a week. The PT that comes to the house reported that the pt was have intermittent L flank pain as well during the PT session only. She currently has L flank pain, 8/10, non radiating, moving exacerbates it. She denies fever, chills, cough, abdominal pain, and any changes in the ileostomy output. Per daughter (Pia) started complaining of increased urinary urgency 4 days ago, so her Daughter Sandra (MONI) prescribed her cipro. subsequently her urinary complaints improved. Pt denies any dysuria, increased urinary frequency. Per dtr pt has had decreased po intake, has not been drinking enough water. the family is concerned that the patient has depression, which has lead to the decreased appetite.       As per family at bedside, pt was seen by PCP 1 week ago, and was noted to have mild elevation of BUN/Cr    In the ED pt noted to have LUIS with leukocytosis (04 Dec 2018 16:16)  CT--> Left lower pole stone, 4.7 cm in the right interpole ( kidney)? hemorrhagic cust, fluid distended cervical canal  pt started on  ROcephin  Pt noted to have low grade fever ID called   seen by ID Rocephin d/mart   persistent leukocytosis noted  ECHO suggested   fevers persisted        PAST MEDICAL & SURGICAL HISTORY:  Fistula, perirectal  Hiatal hernia  Hypothyroidism  Hypertension  S/P foot joint surgery: right ( 10 years ago )  S/P arthroscopy of right knee  S/P ileostomy: colectomy (  )  S/P cholecystectomy: at age 20 ,s      Social history:   Social History:    Marital Status:   Occupation:   Lives with:     Substance Use :  Tobacco Usage:  (   ) never smoked   (   ) former smoker   (   ) current smoker  (     ) pack year  (        ) last tobacco use date  Alcohol Usage:  Travel:  Pets:          FAMILY HISTORY:  No pertinent family history      REVIEW OF SYSTEMS  General:	Denies any malaise fatigue or chills. Fevers absent    Skin:No rash  	  Ophthalmologic:Denies any visual complaints,discharge redness or photophobia  	  ENMT:No nasal discharge,headache,sinus congestion or throat pain.No dental complaints    Respiratory and Thorax:No cough,sputum or chest pain.Denies shortness of breath  	  Cardiovascular:	No chest pain,palpitaions or dizziness    Gastrointestinal:	NO nausea,abdominal pain or diarrhea.    Genitourinary:	No dysuria,frequency. No flank pain    Musculoskeletal:	No joint swelling or pain.No weakness    Neurological:No confusion,diziness.No extremity weakness.No bladder or bowel incontinence	    Psychiatric:No delusions or hallucinations	    Hematology/Lymphatics:	No LN swelling.No gum bleeding     Endocrine:	No recent weight gain or loss.No abnormal heat/cold intolerance    Allergic/Immunologic:	No hives or rash     Allergies    iodine (Other; Anaphylaxis)  shellfish (Anaphylaxis)  sulfa drugs (Other)    Intolerances        Antimicrobials:       MEDICATIONS  (STANDING):  cefTRIAXone   IVPB   100 mL/Hr IV Intermittent (18 @ 21:30)   100 mL/Hr IV Intermittent (18 @ 21:17)   100 mL/Hr IV Intermittent (18 @ 21:18)    piperacillin/tazobactam IVPB.   200 mL/Hr IV Intermittent (18 @ 10:48)                 MEDICATIONS  (STANDING):  aspirin enteric coated 81 milliGRAM(s) Oral daily  heparin  Injectable 5000 Unit(s) SubCutaneous every 8 hours  levothyroxine 50 MICROGram(s) Oral daily  multivitamin 1 Tablet(s) Oral daily  pantoprazole    Tablet 40 milliGRAM(s) Oral daily    MEDICATIONS  (PRN):  acetaminophen   Tablet .. 650 milliGRAM(s) Oral every 6 hours PRN Temp greater or equal to 38.5C (101.3F), Mild Pain (1 - 3)  nystatin Powder 1 Application(s) Topical two times a day PRN for rash  ondansetron Injectable 4 milliGRAM(s) IV Push every 6 hours PRN Nausea and/or Vomiting  prochlorperazine   Injectable 5 milliGRAM(s) IV Push every 8 hours PRN nausea        Vital Signs Last 24 Hrs  T(C): 39.3 (12 Dec 2018 12:18), Max: 39.6 (11 Dec 2018 17:12)  T(F): 102.7 (12 Dec 2018 12:18), Max: 103.3 (11 Dec 2018 17:12)  HR: 106 (12 Dec 2018 12:18) (89 - 106)  BP: 128/79 (12 Dec 2018 12:18) (101/66 - 128/79)  BP(mean): --  RR: 18 (12 Dec 2018 12:18) (18 - 20)  SpO2: 96% (12 Dec 2018 12:18) (95% - 97%)    PHYSICAL EXAM:Pleasant patient in no acute distress.      Constitutional:Comfortable.Awake and alert  No cachexia     Eyes:PERRL EOMI.NO discharge or conjunctival injection    ENMT:No sinus tenderness.No thrush.No pharyngeal exudate or erythema.Fair dental hygiene    Neck:Supple,No LN,no JVD      Respiratory:Good air entry bilaterally,CTA    Cardiovascular:S1 S2 wnl, No murmurs,rub or gallops    Gastrointestinal:Soft BS(+) no tenderness no masses ,No rebound or guarding    Genitourinary:No CVA tendereness     Rectal:    Extremities:No cyanosis,clubbing or edema.    Vascular:peripheral pulses felt    Neurological:AAO X 3,No grossly focal deficits    Skin:No rash     Lymph Nodes:No palpable LNs    Musculoskeletal:No joint swelling or LOM    Psychiatric:Affect normal.                                8.7    11.13 )-----------( 257      ( 12 Dec 2018 07:49 )             26.5             139  |  104  |  26<H>  ----------------------------<  204<H>  4.7   |  22  |  1.08    Ca    8.7      12 Dec 2018 07:10  Mg     1.9                 Urinalysis Basic - ( 11 Dec 2018 18:43 )    Color: Yellow / Appearance: Slightly Turbid / S.015 / pH: x  Gluc: x / Ketone: Negative  / Bili: Negative / Urobili: Negative   Blood: x / Protein: Trace / Nitrite: Negative   Leuk Esterase: Large / RBC: 27 /hpf /  /hpf   Sq Epi: x / Non Sq Epi: 1 /hpf / Bacteria: Few        RECENT CULTURES:   @ 01:52  .Stool Feces  --  --  --    GI PCR Results: NOT detected  *******Please Note:*******  GI panel PCR evaluates for:  Campylobacter, Plesiomonas shigelloides, Salmonella,  Vibrio, Yersinia enterocolitica, Enteroaggregative  Escherichia coli (EAEC), Enteropathogenic E.coli (EPEC),  Enterotoxigenic E. coli (ETEC) lt/st, Shiga-like  toxin-producing E. coli (STEC) stx1/stx2,  Shigella/ Enteroinvasive E. coli (EIEC), Cryptosporidium,  Cyclospora cayetanensis, Entamoeba histolytica,  Giardia lamblia, Adenovirus F 40/41, Astrovirus,  Norovirus GI/GII, Rotavirus A, Sapovirus  --   @ 22:28  .Blood Blood-Peripheral  --  --  --    No growth to date.  --   @ :27  .Blood Blood-Peripheral  --  --  --    No growth to date.  --   @ 16:19  .Urine Clean Catch (Midstream)  --  --  --    No growth  --   @ 18:45  .Blood Blood  --  --  --    No growth at 5 days.  --      MICROBIOLOGY:  Culture Results:   GI PCR Results: NOT detected  *******Please Note:*******  GI panel PCR evaluates for:  Campylobacter, Plesiomonas shigelloides, Salmonella,  Vibrio, Yersinia enterocolitica, Enteroaggregative  Escherichia coli (EAEC), Enteropathogenic E.coli (EPEC),  Enterotoxigenic E. coli (ETEC) lt/st, Shiga-like  toxin-producing E. coli (STEC) stx1/stx2,  Shigella/ Enteroinvasive E. coli (EIEC), Cryptosporidium,  Cyclospora cayetanensis, Entamoeba histolytica,  Giardia lamblia, Adenovirus F 40/41, Astrovirus,  Norovirus GI/GII, Rotavirus A, Sapovirus ( @ 01:52)  Culture Results:   No growth to date. ( @ 22:28)  Culture Results:   No growth to date. ( @ 22:27)  Culture Results:   No growth ( @ 16:19)  Culture Results:   No growth at 5 days. ( @ 18:45)        blood culture  --    GI PCR Results: NOT detected  *******Please Note:*******  GI panel PCR evaluates for:  Campylobacter, Plesiomonas shigelloides, Salmonella,  Vibrio, Yersinia enterocolitica, Enteroaggregative  Escherichia coli (EAEC), Enteropathogenic E.coli (EPEC),  Enterotoxigenic E. coli (ETEC) lt/st, Shiga-like  toxin-producing E. coli (STEC) stx1/stx2,  Shigella/ Enteroinvasive E. coli (EIEC), Cryptosporidium,  Cyclospora cayetanensis, Entamoeba histolytica,  Giardia lamblia, Adenovirus F 40/41, Astrovirus,  Norovirus GI/GII, Rotavirus A, Sapovirus  blood culture gram stain  --  surgical site  --  culture urine  --  culture tissue  --        Radiology: Patient is a 85y old  Female who presents with a chief complaint of dehydration and LUIS (12 Dec 2018 10:32)      HPI:  85 y.o female with PMHx of HTN, ileostomy due to complication of Cdiff () p/w feeling fatigued and left flank pain. Per daughter at bedside pt lives alone in a house with an aid, who noticed that she was not her self for the last week. She seemed to have low energy and was fatigued intermittently for a week. The PT that comes to the house reported that the pt was have intermittent L flank pain as well during the PT session only. She currently has L flank pain, 8/10, non radiating, moving exacerbates it. She denies fever, chills, cough, abdominal pain, and any changes in the ileostomy output. Per daughter (Pia) started complaining of increased urinary urgency 4 days ago, so her Daughter Sandra (MONI) prescribed her cipro. subsequently her urinary complaints improved. Pt denies any dysuria, increased urinary frequency. Per dtr pt has had decreased po intake, has not been drinking enough water. the family is concerned that the patient has depression, which has lead to the decreased appetite.       As per family at bedside, pt was seen by PCP 1 week ago, and was noted to have mild elevation of BUN/Cr    In the ED pt noted to have LUIS with leukocytosis (04 Dec 2018 16:16)  CT--> Left lower pole stone, 4.7 cm in the right interpole ( kidney)? hemorrhagic cust, fluid distended cervical canal  pt started on  ROcephin  Pt noted to have low grade fever ID called   seen by ID Rocephin d/mart   persistent leukocytosis noted  ECHO suggested   fevers persisted  12/10- fevers   echo - < from: Transthoracic Echocardiogram (.11.18 @ 10:26) >  1. Aortic valve not well visualized; appears mildly  calcified.  2. Normal left ventricular internal dimensions and wall  thicknesses. Mid LV cavity obstruction noted with peak  gradients = 60 mm Hg.  3. Hyperdynamic left ventricular systolic function.        PAST MEDICAL & SURGICAL HISTORY:  Fistula, perirectal  Hiatal hernia  Hypothyroidism  Hypertension  S/P foot joint surgery: right ( 10 years ago )  S/P arthroscopy of right knee  S/P ileostomy: colectomy (  )  S/P cholecystectomy: at age 20 ,s      Social history:   Marital Status:   Occupation:  former bookeeper  Lives with: self with aide    Substance Use : denies  Tobacco Usage:  (  x ) never smoked   (   ) former smoker   (   ) current smoker  (     ) pack year  (        ) last tobacco use date  Alcohol Usage: denies  Travel: denies  Pets:denies          FAMILY HISTORY:  mother-  cancer of the pituitary  father  had diabetes      REVIEW OF SYSTEMS  General:	fevers, chills , dry mouth    Skin:No rash  	  Ophthalmologic:Denies any visual complaints,discharge redness or photophobia  	  ENMT: dry mouth    Respiratory and Thorax: occasional cough when eats  	  Cardiovascular:	No chest pain,palpitaions or dizziness    Gastrointestinal:	 ileosastomy    Genitourinary:	 frequncy no dysuria  had back pain that resolved     Musculoskeletal:	No joint swelling or pain.No weakness    Neurological:No confusion,diziness.No extremity weakness.No bladder or bowel incontinence	    Psychiatric:No delusions or hallucinations	    Hematology/Lymphatics:	No LN swelling.No gum bleeding     Endocrine:	No recent weight gain or loss.No abnormal heat/cold intolerance    Allergic/Immunologic:	No hives or rash     Allergies    iodine (Other; Anaphylaxis)  shellfish (Anaphylaxis)  sulfa drugs (Other)    Intolerances        Antimicrobials:       MEDICATIONS  (STANDING):  cefTRIAXone   IVPB   100 mL/Hr IV Intermittent (18 @ 21:30)   100 mL/Hr IV Intermittent (18 @ 21:17)   100 mL/Hr IV Intermittent (18 @ 21:18)    piperacillin/tazobactam IVPB.   200 mL/Hr IV Intermittent (18 @ 10:48)           MEDICATIONS  (STANDING):  aspirin enteric coated 81 milliGRAM(s) Oral daily  heparin  Injectable 5000 Unit(s) SubCutaneous every 8 hours  levothyroxine 50 MICROGram(s) Oral daily  multivitamin 1 Tablet(s) Oral daily  pantoprazole    Tablet 40 milliGRAM(s) Oral daily    MEDICATIONS  (PRN):  acetaminophen   Tablet .. 650 milliGRAM(s) Oral every 6 hours PRN Temp greater or equal to 38.5C (101.3F), Mild Pain (1 - 3)  nystatin Powder 1 Application(s) Topical two times a day PRN for rash  ondansetron Injectable 4 milliGRAM(s) IV Push every 6 hours PRN Nausea and/or Vomiting  prochlorperazine   Injectable 5 milliGRAM(s) IV Push every 8 hours PRN nausea        Vital Signs Last 24 Hrs  T(C): 39.3 (12 Dec 2018 12:18), Max: 39.6 (11 Dec 2018 17:12)  T(F): 102.7 (12 Dec 2018 12:18), Max: 103.3 (11 Dec 2018 17:12)  HR: 106 (12 Dec 2018 12:18) (89 - 106)  BP: 128/79 (12 Dec 2018 12:18) (101/66 - 128/79)  BP(mean): --  RR: 18 (12 Dec 2018 12:18) (18 - 20)  SpO2: 96% (12 Dec 2018 12:18) (95% - 97%)    PHYSICAL EXAM:  pt lying in bed, uncomfortable, weak      Constitutional weak but not toxic      Eyes:PERRL EOMI.NO discharge or conjunctival injection    ENMT:No sinus tenderness.No thrush.No pharyngeal exudate or erythema.Fair dental hygiene    Neck:Supple,No LN,no JVD      Respiratory: clear     Cardiovascular:S1 S2 wnl, murmur  Gastrointestinal:Soft BS(+) no tenderness ostomy hernia    Genitourinary:No CVA tendereness     Rectal: hemorrhoids    Extremities:No cyanosis,clubbing or edema.    Vascular:peripheral pulses felt    Neurological:AAO X 3,No grossly focal deficits    Skin:No rash     Lymph Nodes:No palpable LNs    Musculoskeletal:No joint swelling or LOM    Psychiatric:Affect depressed                               8.7    11.13 )-----------( 257      ( 12 Dec 2018 07:49 )             26.5         12-12    139  |  104  |  26<H>  ----------------------------<  204<H>  4.7   |  22  |  1.08    Ca    8.7      12 Dec 2018 07:10  Mg     1.9     12-11            Urinalysis Basic - ( 11 Dec 2018 18:43 )    Color: Yellow / Appearance: Slightly Turbid / S.015 / pH: x  Gluc: x / Ketone: Negative  / Bili: Negative / Urobili: Negative   Blood: x / Protein: Trace / Nitrite: Negative   Leuk Esterase: Large / RBC: 27 /hpf /  /hpf   Sq Epi: x / Non Sq Epi: 1 /hpf / Bacteria: Few        RECENT CULTURES:   @ 01:52  .Stool Feces  --  --  --    GI PCR Results: NOT detected  *******Please Note:*******  GI panel PCR evaluates for:  Campylobacter, Plesiomonas shigelloides, Salmonella,  Vibrio, Yersinia enterocolitica, Enteroaggregative  Escherichia coli (EAEC), Enteropathogenic E.coli (EPEC),  Enterotoxigenic E. coli (ETEC) lt/st, Shiga-like  toxin-producing E. coli (STEC) stx1/stx2,  Shigella/ Enteroinvasive E. coli (EIEC), Cryptosporidium,  Cyclospora cayetanensis, Entamoeba histolytica,  Giardia lamblia, Adenovirus F 40/41, Astrovirus,  Norovirus GI/GII, Rotavirus A, Sapovirus  --    --  --  --    No growth at 5 days.  --      MICROBIOLOGY:  Culture Results:   GI PCR Results: NOT detected  *******Please Note:*******  GI panel PCR evaluates for:  Campylobacter, Plesiomonas shigelloides, Salmonella,  Vibrio, Yersinia enterocolitica, Enteroaggregative  Escherichia coli (EAEC), Enteropathogenic E.coli (EPEC),  Enterotoxigenic E. coli (ETEC) lt/st, Shiga-like  toxin-producing E. coli (STEC) stx1/stx2,  Shigella/ Enteroinvasive E. coli (EIEC), Cryptosporidium,  Cyclospora cayetanensis, Entamoeba histolytica,  Giardia lamblia, Adenovirus F 40/41, Astrovirus,  Norovirus GI/GII, Rotavirus A, Sapovirus ( @ 01:52)  Culture Results:   No growth to date. ( @ 22:28)  Culture Results:   No growth to date. ( @ 22:27)  Culture Results:   No growth ( @ 16:19)  Culture Results:   No growth at 5 days. 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 Please correlate   with patient's clinical history and obtain further evaluation as   warranted.    < end of copied text >        < from: Xray Chest 1 View- PORTABLE-Urgent (12.10.18 @ 14:12) >  IMPRESSION:    Clear lungs.    < end of copied text >    < from: CT Chest No Cont (18 @ 21:19) >  DURE DATE:  2018            INTERPRETATION:  CLINICAL INFORMATION: Fever. Rule out infection.    COMPARISON: CT abdomen and pelvis 2018    PROCEDURE:   CT of the Chest was performed withoutintravenous contrast.  Sagittal and coronal reformats were performed.      FINDINGS:    CHEST:     LUNGS AND LARGE AIRWAYS: Patent central airways. Scattered nodular and   tree-in-bud opacities in all 5 lobes consistent with mucus impacted   distal small airways. Bibasilar subsegmental atelectasis. A 6 mm left   upper lobe solid pulmonary nodule (series 3 image 53)  PLEURA: No pleural effusion.  VESSELS: Atherosclerotic changes of the aorta.  HEART: Heart size is normal. Coronary artery calcifications and coronary   stents. No pericardial effusion.  MEDIASTINUM AND KANDACE: No lymphadenopathy.  CHEST WALL AND LOWER NECK: Within normal limits.  VISUALIZED UPPER ABDOMEN: Unchanged indeterminate 1.8 cm hypodense lesion   in the spleen. Small hiatalhernia is noted.  BONES: Degenerative spinal changes.    IMPRESSION:   Scattered bilateral mucus impacted distal small airways. No pneumonia.    A 6 mm left upper lobe solid pulmonary nodule. Follow-up CT in 6-12   months can be obtained to evaluate for resolution.    < end of copied text > Patient is a 85y old  Female who presents with a chief complaint of dehydration and LUIS (12 Dec 2018 10:32)      HPI:  85 y.o female with PMHx of HTN, ileostomy due to complication of Cdiff () p/w feeling fatigued and left flank pain. Per daughter at bedside pt lives alone in a house with an aid, who noticed that she was not her self for the last week. She seemed to have low energy and was fatigued intermittently for a week. The PT that comes to the house reported that the pt was have intermittent L flank pain as well during the PT session only. She currently has L flank pain, 8/10, non radiating, moving exacerbates it. She denies fever, chills, cough, abdominal pain, and any changes in the ileostomy output. Per daughter (Pia) started complaining of increased urinary urgency 4 days ago, so her Daughter Sandra (MONI) prescribed her cipro. subsequently her urinary complaints improved. Pt denies any dysuria, increased urinary frequency. Per dtr pt has had decreased po intake, has not been drinking enough water. the family is concerned that the patient has depression, which has lead to the decreased appetite.       As per family at bedside, pt was seen by PCP 1 week ago, and was noted to have mild elevation of BUN/Cr    In the ED pt noted to have LUIS with leukocytosis (04 Dec 2018 16:16)  CT--> Left lower pole stone, 4.7 cm in the right interpole ( kidney)? hemorrhagic cust, fluid distended cervical canal  pt started on  ROcephin  Pt noted to have low grade fever ID called   seen by ID Rocephin d/mart   persistent leukocytosis noted  ECHO suggested   fevers persisted  12/10- fevers   echo - < from: Transthoracic Echocardiogram (..18 @ 10:26) >  1. Aortic valve not well visualized; appears mildly  calcified.  2. Normal left ventricular internal dimensions and wall  thicknesses. Mid LV cavity obstruction noted with peak  gradients = 60 mm Hg.  3. Hyperdynamic left ventricular systolic function.   - family requests a second ID opinion        PAST MEDICAL & SURGICAL HISTORY:  Fistula, perirectal  Hiatal hernia  Hypothyroidism  Hypertension  S/P foot joint surgery: right ( 10 years ago )  S/P arthroscopy of right knee  S/P ileostomy: colectomy (  )  S/P cholecystectomy: at age 20 ,s      Social history:   Marital Status:   Occupation:  former   Lives with: self with aide    Substance Use : denies  Tobacco Usage:  (  x ) never smoked   (   ) former smoker   (   ) current smoker  (     ) pack year  (        ) last tobacco use date  Alcohol Usage: denies  Travel: denies  Pets:denies          FAMILY HISTORY:  mother-  cancer of the pituitary  father  had diabetes      REVIEW OF SYSTEMS  General:	fevers, chills , dry mouth    Skin:No rash  	  Ophthalmologic:Denies any visual complaints,discharge redness or photophobia  	  ENMT: dry mouth    Respiratory and Thorax: occasional cough when eats  	  Cardiovascular:	No chest pain,palpitaions or dizziness    Gastrointestinal:	 ileosastomy    Genitourinary:	 frequncy no dysuria  had back pain that resolved     Musculoskeletal:	No joint swelling or pain.No weakness    Neurological:No confusion,diziness.No extremity weakness.No bladder or bowel incontinence	    Psychiatric:No delusions or hallucinations	    Hematology/Lymphatics:	No LN swelling.No gum bleeding     Endocrine:	No recent weight gain or loss.No abnormal heat/cold intolerance    Allergic/Immunologic:	No hives or rash     Allergies    iodine (Other; Anaphylaxis)  shellfish (Anaphylaxis)  sulfa drugs (Other)    Intolerances        Antimicrobials:       MEDICATIONS  (STANDING):  cefTRIAXone   IVPB   100 mL/Hr IV Intermittent (18 @ 21:30)   100 mL/Hr IV Intermittent (18 @ 21:17)   100 mL/Hr IV Intermittent (18 @ 21:18)    piperacillin/tazobactam IVPB.   200 mL/Hr IV Intermittent (18 @ 10:48)           MEDICATIONS  (STANDING):  aspirin enteric coated 81 milliGRAM(s) Oral daily  heparin  Injectable 5000 Unit(s) SubCutaneous every 8 hours  levothyroxine 50 MICROGram(s) Oral daily  multivitamin 1 Tablet(s) Oral daily  pantoprazole    Tablet 40 milliGRAM(s) Oral daily    MEDICATIONS  (PRN):  acetaminophen   Tablet .. 650 milliGRAM(s) Oral every 6 hours PRN Temp greater or equal to 38.5C (101.3F), Mild Pain (1 - 3)  nystatin Powder 1 Application(s) Topical two times a day PRN for rash  ondansetron Injectable 4 milliGRAM(s) IV Push every 6 hours PRN Nausea and/or Vomiting  prochlorperazine   Injectable 5 milliGRAM(s) IV Push every 8 hours PRN nausea        Vital Signs Last 24 Hrs  T(C): 39.3 (12 Dec 2018 12:18), Max: 39.6 (11 Dec 2018 17:12)  T(F): 102.7 (12 Dec 2018 12:18), Max: 103.3 (11 Dec 2018 17:12)  HR: 106 (12 Dec 2018 12:18) (89 - 106)  BP: 128/79 (12 Dec 2018 12:18) (101/66 - 128/79)  BP(mean): --  RR: 18 (12 Dec 2018 12:18) (18 - 20)  SpO2: 96% (12 Dec 2018 12:18) (95% - 97%)    PHYSICAL EXAM:  pt lying in bed, uncomfortable, weak      Constitutional weak but not toxic      Eyes:PERRL EOMI.NO discharge or conjunctival injection    ENMT:No sinus tenderness.No thrush.No pharyngeal exudate or erythema.Fair dental hygiene    Neck:Supple,No LN,no JVD      Respiratory: clear     Cardiovascular:S1 S2 wnl, murmur  Gastrointestinal:Soft BS(+) no tenderness ostomy hernia    Genitourinary:No CVA tendereness     Rectal: hemorrhoids    Extremities:No cyanosis,clubbing or edema.    Vascular:peripheral pulses felt    Neurological:AAO X 3,No grossly focal deficits    Skin:No rash     Lymph Nodes:No palpable LNs    Musculoskeletal:No joint swelling or LOM    Psychiatric:Affect depressed                               8.7    11.13 )-----------( 257      ( 12 Dec 2018 07:49 )             26.5             139  |  104  |  26<H>  ----------------------------<  204<H>  4.7   |  22  |  1.08    Ca    8.7      12 Dec 2018 07:10  Mg     1.9     12-            Urinalysis Basic - ( 11 Dec 2018 18:43 )    Color: Yellow / Appearance: Slightly Turbid / S.015 / pH: x  Gluc: x / Ketone: Negative  / Bili: Negative / Urobili: Negative   Blood: x / Protein: Trace / Nitrite: Negative   Leuk Esterase: Large / RBC: 27 /hpf /  /hpf   Sq Epi: x / Non Sq Epi: 1 /hpf / Bacteria: Few        RECENT CULTURES:   @ 01:52  .Stool Feces  --  --  --    GI PCR Results: NOT detected  *******Please Note:*******  GI panel PCR evaluates for:  Campylobacter, Plesiomonas shigelloides, Salmonella,  Vibrio, Yersinia enterocolitica, Enteroaggregative  Escherichia coli (EAEC), Enteropathogenic E.coli (EPEC),  Enterotoxigenic E. coli (ETEC) lt/st, Shiga-like  toxin-producing E. coli (STEC) stx1/stx2,  Shigella/ Enteroinvasive E. coli (EIEC), Cryptosporidium,  Cyclospora cayetanensis, Entamoeba histolytica,  Giardia lamblia, Adenovirus F 40/41, Astrovirus,  Norovirus GI/GII, Rotavirus A, Sapovirus  --    --  --  --    No growth at 5 days.  --      MICROBIOLOGY:  Culture Results:   GI PCR Results: NOT detected  *******Please Note:*******  GI panel PCR evaluates for:  Campylobacter, Plesiomonas shigelloides, Salmonella,  Vibrio, Yersinia enterocolitica, Enteroaggregative  Escherichia coli (EAEC), Enteropathogenic E.coli (EPEC),  Enterotoxigenic E. coli (ETEC) lt/st, Shiga-like  toxin-producing E. coli (STEC) stx1/stx2,  Shigella/ Enteroinvasive E. coli (EIEC), Cryptosporidium,  Cyclospora cayetanensis, Entamoeba histolytica,  Giardia lamblia, Adenovirus F 40/41, Astrovirus,  Norovirus GI/GII, Rotavirus A, Sapovirus ( @ 01:52)  Culture Results:   No growth to date. ( @ 22:28)  Culture Results:   No growth to date. ( @ 22:27)  Culture Results:   No growth ( @ 16:19)  Culture Results:   No growth at 5 days. 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 Please correlate   with patient's clinical history and obtain further evaluation as   warranted.    < end of copied text >        < from: Xray Chest 1 View- PORTABLE-Urgent (12.10.18 @ 14:12) >  IMPRESSION:    Clear lungs.    < end of copied text >    < from: CT Chest No Cont (18 @ 21:19) >  DURE DATE:  2018            INTERPRETATION:  CLINICAL INFORMATION: Fever. Rule out infection.    COMPARISON: CT abdomen and pelvis 2018    PROCEDURE:   CT of the Chest was performed without intravenous contrast.  Sagittal and coronal reformats were performed.      FINDINGS:    CHEST:     LUNGS AND LARGE AIRWAYS: Patent central airways. Scattered nodular and   tree-in-bud opacities in all 5 lobes consistent with mucus impacted   distal small airways. Bibasilar subsegmental atelectasis. A 6 mm left   upper lobe solid pulmonary nodule (series 3 image 53)  PLEURA: No pleural effusion.  VESSELS: Atherosclerotic changes of the aorta.  HEART: Heart size is normal. Coronary artery calcifications and coronary   stents. No pericardial effusion.  MEDIASTINUM AND KANDACE: No lymphadenopathy.  CHEST WALL AND LOWER NECK: Within normal limits.  VISUALIZED UPPER ABDOMEN: Unchanged indeterminate 1.8 cm hypodense lesion   in the spleen. Small hiatalhernia is noted.  BONES: Degenerative spinal changes.    IMPRESSION:   Scattered bilateral mucus impacted distal small airways. No pneumonia.    A 6 mm left upper lobe solid pulmonary nodule. Follow-up CT in 6-12   months can be obtained to evaluate for resolution.    < end of copied text >      C. difficile GDH &amp; toxins A/B by EIA . (18 @ 16:27)    Clostridium difficile GDH Toxins A&amp;B, EIA:   Negative    Clostridium difficile GDH Interpretation: Negative for toxigenic C. Difficile.  This specimen is negative for C.  Difficile glutamate dehydrogenase (GDH) antigen and negative for C.  Difficile Toxins A & B, by EIA.  GDH is a highly sensitive screening  marker for C. Difficile that is produced in large amounts by all C.  Difficile strains, both toxigenic and nontoxigenic.  This assay has not  been validated as a test of cure.  Repeat testing during the same episode  of diarrhea is of limited value and is discouraged.  The results of this  assay should always be interpreted in conjunction with pateint's clinical  history.    Rapid Respiratory Viral Panel (12.10.18 @ 13:44)    Rapid RVP Result: NotDetec: The [employer] RVP Rapid uses polymerase chain reaction (PCR) and melt  curve analysis to screen for adenovirus; coronavirus HKU1, NL63, 229E,  OC43; human metapneumovirus (hMPV); human enterovirus/rhinovirus  (Entero/RV); influenza A; influenza A/H1;influenza A/H3; influenza  A/H1-2009; influenza B; parainfluenza viruses 1, 2, 3, 4; respiratory  syncytial virus; Bordetella pertussis; Mycoplasma pneumoniae; and  Chlamydophila pneumoniae.

## 2018-12-12 NOTE — PROGRESS NOTE ADULT - SUBJECTIVE AND OBJECTIVE BOX
Per discussion with Dr. Donald family desires second opinion.  Await their input.    Arvind Sarmiento MD  [phone]

## 2018-12-13 LAB
ALBUMIN SERPL ELPH-MCNC: 2.7 G/DL — LOW (ref 3.3–5)
ALP SERPL-CCNC: 81 U/L — SIGNIFICANT CHANGE UP (ref 40–120)
ALT FLD-CCNC: 46 U/L — HIGH (ref 10–45)
ANION GAP SERPL CALC-SCNC: 10 MMOL/L — SIGNIFICANT CHANGE UP (ref 5–17)
AST SERPL-CCNC: 24 U/L — SIGNIFICANT CHANGE UP (ref 10–40)
BASOPHILS # BLD AUTO: 0.02 K/UL — SIGNIFICANT CHANGE UP (ref 0–0.2)
BASOPHILS NFR BLD AUTO: 0.2 % — SIGNIFICANT CHANGE UP (ref 0–2)
BILIRUB SERPL-MCNC: 0.3 MG/DL — SIGNIFICANT CHANGE UP (ref 0.2–1.2)
BUN SERPL-MCNC: 25 MG/DL — HIGH (ref 7–23)
CALCIUM SERPL-MCNC: 8.9 MG/DL — SIGNIFICANT CHANGE UP (ref 8.4–10.5)
CHLORIDE SERPL-SCNC: 104 MMOL/L — SIGNIFICANT CHANGE UP (ref 96–108)
CO2 SERPL-SCNC: 25 MMOL/L — SIGNIFICANT CHANGE UP (ref 22–31)
CREAT SERPL-MCNC: 1.13 MG/DL — SIGNIFICANT CHANGE UP (ref 0.5–1.3)
CRP SERPL-MCNC: 11.96 MG/DL — HIGH (ref 0–0.4)
CULTURE RESULTS: NO GROWTH — SIGNIFICANT CHANGE UP
CULTURE RESULTS: SIGNIFICANT CHANGE UP
CULTURE RESULTS: SIGNIFICANT CHANGE UP
EOSINOPHIL # BLD AUTO: 0.2 K/UL — SIGNIFICANT CHANGE UP (ref 0–0.5)
EOSINOPHIL NFR BLD AUTO: 1.8 % — SIGNIFICANT CHANGE UP (ref 0–6)
ERYTHROCYTE [SEDIMENTATION RATE] IN BLOOD: 43 MM/HR — HIGH (ref 0–20)
FERRITIN SERPL-MCNC: 646 NG/ML — HIGH (ref 15–150)
FOLATE SERPL-MCNC: 19.4 NG/ML — SIGNIFICANT CHANGE UP
GLUCOSE SERPL-MCNC: 160 MG/DL — HIGH (ref 70–99)
HCT VFR BLD CALC: 25.6 % — LOW (ref 34.5–45)
HGB BLD-MCNC: 8.5 G/DL — LOW (ref 11.5–15.5)
IMM GRANULOCYTES NFR BLD AUTO: 1.2 % — SIGNIFICANT CHANGE UP (ref 0–1.5)
IRON SATN MFR SERPL: 11 % — LOW (ref 14–50)
IRON SATN MFR SERPL: 17 UG/DL — LOW (ref 30–160)
LYMPHOCYTES # BLD AUTO: 1.45 K/UL — SIGNIFICANT CHANGE UP (ref 1–3.3)
LYMPHOCYTES # BLD AUTO: 12.8 % — LOW (ref 13–44)
MAGNESIUM SERPL-MCNC: 1.5 MG/DL — LOW (ref 1.6–2.6)
MCHC RBC-ENTMCNC: 29.8 PG — SIGNIFICANT CHANGE UP (ref 27–34)
MCHC RBC-ENTMCNC: 33.2 GM/DL — SIGNIFICANT CHANGE UP (ref 32–36)
MCV RBC AUTO: 89.8 FL — SIGNIFICANT CHANGE UP (ref 80–100)
MONOCYTES # BLD AUTO: 1.11 K/UL — HIGH (ref 0–0.9)
MONOCYTES NFR BLD AUTO: 9.8 % — SIGNIFICANT CHANGE UP (ref 2–14)
NEUTROPHILS # BLD AUTO: 8.38 K/UL — HIGH (ref 1.8–7.4)
NEUTROPHILS NFR BLD AUTO: 74.2 % — SIGNIFICANT CHANGE UP (ref 43–77)
OB PNL STL: NEGATIVE — SIGNIFICANT CHANGE UP
PHOSPHATE SERPL-MCNC: 1.7 MG/DL — LOW (ref 2.5–4.5)
PLATELET # BLD AUTO: 294 K/UL — SIGNIFICANT CHANGE UP (ref 150–400)
POTASSIUM SERPL-MCNC: 4.6 MMOL/L — SIGNIFICANT CHANGE UP (ref 3.5–5.3)
POTASSIUM SERPL-SCNC: 4.6 MMOL/L — SIGNIFICANT CHANGE UP (ref 3.5–5.3)
PROT SERPL-MCNC: 5.9 G/DL — LOW (ref 6–8.3)
RBC # BLD: 2.85 M/UL — LOW (ref 3.8–5.2)
RBC # FLD: 14.4 % — SIGNIFICANT CHANGE UP (ref 10.3–14.5)
SODIUM SERPL-SCNC: 139 MMOL/L — SIGNIFICANT CHANGE UP (ref 135–145)
SPECIMEN SOURCE: SIGNIFICANT CHANGE UP
TIBC SERPL-MCNC: 159 UG/DL — LOW (ref 220–430)
UIBC SERPL-MCNC: 142 UG/DL — SIGNIFICANT CHANGE UP (ref 110–370)
VIT B12 SERPL-MCNC: 1086 PG/ML — SIGNIFICANT CHANGE UP (ref 232–1245)
WBC # BLD: 11.3 K/UL — HIGH (ref 3.8–10.5)
WBC # FLD AUTO: 11.3 K/UL — HIGH (ref 3.8–10.5)

## 2018-12-13 PROCEDURE — 99232 SBSQ HOSP IP/OBS MODERATE 35: CPT

## 2018-12-13 PROCEDURE — 76705 ECHO EXAM OF ABDOMEN: CPT | Mod: 26

## 2018-12-13 RX ORDER — MAGNESIUM SULFATE 500 MG/ML
1 VIAL (ML) INJECTION ONCE
Qty: 0 | Refills: 0 | Status: COMPLETED | OUTPATIENT
Start: 2018-12-13 | End: 2018-12-13

## 2018-12-13 RX ORDER — FERROUS SULFATE 325(65) MG
325 TABLET ORAL DAILY
Qty: 0 | Refills: 0 | Status: DISCONTINUED | OUTPATIENT
Start: 2018-12-13 | End: 2018-12-20

## 2018-12-13 RX ADMIN — Medication 50 MICROGRAM(S): at 05:35

## 2018-12-13 RX ADMIN — PANTOPRAZOLE SODIUM 40 MILLIGRAM(S): 20 TABLET, DELAYED RELEASE ORAL at 12:08

## 2018-12-13 RX ADMIN — Medication 62.5 MILLIMOLE(S): at 13:59

## 2018-12-13 RX ADMIN — HEPARIN SODIUM 5000 UNIT(S): 5000 INJECTION INTRAVENOUS; SUBCUTANEOUS at 05:35

## 2018-12-13 RX ADMIN — Medication 1 TABLET(S): at 12:08

## 2018-12-13 RX ADMIN — HEPARIN SODIUM 5000 UNIT(S): 5000 INJECTION INTRAVENOUS; SUBCUTANEOUS at 21:30

## 2018-12-13 RX ADMIN — HEPARIN SODIUM 5000 UNIT(S): 5000 INJECTION INTRAVENOUS; SUBCUTANEOUS at 12:09

## 2018-12-13 RX ADMIN — PIPERACILLIN AND TAZOBACTAM 25 GRAM(S): 4; .5 INJECTION, POWDER, LYOPHILIZED, FOR SOLUTION INTRAVENOUS at 17:24

## 2018-12-13 RX ADMIN — PIPERACILLIN AND TAZOBACTAM 25 GRAM(S): 4; .5 INJECTION, POWDER, LYOPHILIZED, FOR SOLUTION INTRAVENOUS at 02:15

## 2018-12-13 RX ADMIN — Medication 81 MILLIGRAM(S): at 12:09

## 2018-12-13 RX ADMIN — Medication 100 GRAM(S): at 09:34

## 2018-12-13 RX ADMIN — PIPERACILLIN AND TAZOBACTAM 25 GRAM(S): 4; .5 INJECTION, POWDER, LYOPHILIZED, FOR SOLUTION INTRAVENOUS at 09:53

## 2018-12-13 NOTE — PROGRESS NOTE ADULT - ASSESSMENT
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 Per daughter at bedside pt lives alone in a house with an aid, who noticed that she was not her self for the last week. She seemed to have low energy and was fatigued intermittently for a week. The PT that comes to the house reported that the pt was have intermittent L flank pain as well during the PT session only. She currently has L flank pain, 8/10, non radiating, moving exacerbates it. She denies fever, chills, cough, abdominal pain, and any changes in the ileostomy output. Per daughter (Pia) started complaining of increased urinary urgency 4 days ago, so her Daughter Sandra (NP) prescribed her cipro. subsequently her urinary complaints improved. Pt denies any dysuria, increased urinary frequency. Per dtr pt has had decreased po intake, has not been drinking enough water. the family is concerned that the patient has depression, which has lead to the decreased appetite.       As per family at bedside, pt was seen by PCP 1 week ago, and was noted to have mild elevation of BUN/Cr    In the ED pt noted to have LUIS with leukocytosis (04 Dec 2018 16:16)  CT--> Left lower pole stone, 4.7 cm in the right interpole ( kidney)? hemorrhagic cust, fluid distended cervical canal  pt started on  ROcephin  Pt noted to have low grade fever ID called  12/7 seen by ID Rocephin d/mart  12/8 persistent leukocytosis noted  ECHO suggested  12/9 fevers persisted  12/10- fevers  12/11 echo - < from: Transthoracic Echocardiogram (12.11.18 @ 10:26) >  1. Aortic valve not well visualized; appears mildly  calcified.  2. Normal left ventricular internal dimensions and wall  thicknesses. Mid LV cavity obstruction noted with peak  gradients = 60 mm Hg.  3. Hyperdynamic left ventricular systolic function.  12/12 - family requests a second ID opinion  Pt now with temps up to 103.3 ( recetal)      Unclear why patient with fevers    Of note:  - pt with rectovaginal fistula ( previously known about)  pt with splenic lesion- unclear how old. If lymphoma, would be surprising that no adenopathy and not characteristic look for infarct  pt with murmur  RVP negative  Pt with pyuria - ? clean catch  pt with progressive anemia  ileostomy fluid is quite liquid- negative c diff and negative stool PCR  pt with possible trouble swallowing     SUGGESTed   repeat blood cultures ( sent ) - NGTD  straight cath for u/a , c&s- no dignificant pyuria  GYN evaluation- declined  know fistula   REPEAT LFTS- not elevated  anemia workup - check iron studies   check babesia with splenic lesion and anemia  check haptoglobin, LDH ( hemolysis workup)  check ESR and CRP - no sxs to suggest PMR  ultrasound of spleen to better characterize lesion  check DIFf on WBC=- as temps go up the WBC seems to go down   on zosyn- this should cover the sinuses  consider HUONG with murmur- but patient with trouble swallowing now so may not be an option  fungal rash to groin- suggest topical cream   neurologically improved today 85 y.o female with PMHx of HTN, 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 Per daughter at bedside pt lives alone in a house with an aid, who noticed that she was not her self for the last week. She seemed to have low energy and was fatigued intermittently for a week. The PT that comes to the house reported that the pt was have intermittent L flank pain as well during the PT session only. She currently has L flank pain, 8/10, non radiating, moving exacerbates it. She denies fever, chills, cough, abdominal pain, and any changes in the ileostomy output. Per daughter (Pia) started complaining of increased urinary urgency 4 days ago, so her Daughter Sandra (NP) prescribed her cipro. subsequently her urinary complaints improved. Pt denies any dysuria, increased urinary frequency. Per dtr pt has had decreased po intake, has not been drinking enough water. the family is concerned that the patient has depression, which has lead to the decreased appetite.       As per family at bedside, pt was seen by PCP 1 week ago, and was noted to have mild elevation of BUN/Cr    In the ED pt noted to have LUIS with leukocytosis (04 Dec 2018 16:16)  CT--> Left lower pole stone, 4.7 cm in the right interpole ( kidney)? hemorrhagic cust, fluid distended cervical canal  pt started on  ROcephin  Pt noted to have low grade fever ID called  12/7 seen by ID Rocephin d/mart  12/8 persistent leukocytosis noted  ECHO suggested  12/9 fevers persisted  12/10- fevers  12/11 echo - < from: Transthoracic Echocardiogram (12.11.18 @ 10:26) >  1. Aortic valve not well visualized; appears mildly  calcified.  2. Normal left ventricular internal dimensions and wall  thicknesses. Mid LV cavity obstruction noted with peak  gradients = 60 mm Hg.  3. Hyperdynamic left ventricular systolic function.  12/12 - family requests a second ID opinion  Pt now with temps up to 103.3 ( recetal)      Unclear why patient with fevers    Of note:  - pt with rectovaginal fistula ( previously known about)  pt with splenic lesion- unclear how old. If lymphoma, would be surprising that no adenopathy and not characteristic look for infarct  pt with murmur  RVP negative  Pt with pyuria - ? clean catch  pt with progressive anemia  ileostomy fluid is quite liquid- negative c diff and negative stool PCR  pt with possible trouble swallowing       repeat blood cultures ( sent ) - NGTD  straight cath for u/a , c&s- no significant pyuria  GYN evaluation- declined-  know fistula   REPEAT LFTS- not elevated  anemia workup - check iron studies   check babesia with splenic lesion and anemia  check haptoglobin, LDH ( hemolysis workup)  check ESR and CRP - no sxs to suggest PMR  ultrasound of spleen to better characterize lesion  check DIFf on WBC=- as temps go up the WBC seems to go down   on zosyn- this should cover the sinuses. Acute sphenoid sinusitus seen on CT  consider HUONG with murmur- but patient with trouble swallowing now so may not be an option  fungal rash to groin- suggest topical cream   neurologically improved today

## 2018-12-13 NOTE — CONSULT NOTE ADULT - SUBJECTIVE AND OBJECTIVE BOX
Patient is a 85y old  Female who presents with a chief complaint of dehydration and LUIS (13 Dec 2018 09:29)      HPI:  85 y.o female with PMHx of HTN, ileostomy due to complication of Cdiff () p/w feeling fatigued and left flank pain. Per daughter at bedside pt lives alone in a house with an aid, who noticed that she was not her self for the last week. She seemed to have low energy and was fatigued intermittently for a week. The PT that comes to the house reported that the pt was have intermittent L flank pain as well during the PT session only. She currently has L flank pain, 8/10, non radiating, moving exacerbates it. She denies fever, chills, cough, abdominal pain, and any changes in the ileostomy output. Per daughter (Pia) started complaining of increased urinary urgency 4 days ago, so her Daughter Sandra (MONI) prescribed her cipro. subsequently her urinary complaints improved. Pt denies any dysuria, increased urinary frequency. Per dtr pt has had decreased po intake, has not been drinking enough water.     While hospitalized she has had recurrent fevers and progressive anemia. Noted to have small volume of red blood in her diaper yesterday but unclear if this was rectal/vaginal/urinary and the acute drop in her Hb could not be explained by this. No blood in ostomy with normal ostomy output.      PAST MEDICAL & SURGICAL HISTORY:  Fistula, perirectal  Hiatal hernia  Hypothyroidism  Hypertension  S/P foot joint surgery: right ( 10 years ago )  S/P arthroscopy of right knee  S/P ileostomy: colectomy (  )  S/P cholecystectomy: at age 20 ,s      MEDICATIONS  (STANDING):  aspirin enteric coated 81 milliGRAM(s) Oral daily  heparin  Injectable 5000 Unit(s) SubCutaneous every 8 hours  levothyroxine 50 MICROGram(s) Oral daily  multivitamin 1 Tablet(s) Oral daily  pantoprazole    Tablet 40 milliGRAM(s) Oral daily  piperacillin/tazobactam IVPB. 3.375 Gram(s) IV Intermittent every 8 hours  sodium phosphate IVPB 15 milliMole(s) IV Intermittent once    MEDICATIONS  (PRN):  acetaminophen   Tablet .. 650 milliGRAM(s) Oral every 6 hours PRN Temp greater or equal to 38.5C (101.3F), Mild Pain (1 - 3)  nystatin Powder 1 Application(s) Topical two times a day PRN for rash  ondansetron Injectable 4 milliGRAM(s) IV Push every 6 hours PRN Nausea and/or Vomiting  prochlorperazine   Injectable 5 milliGRAM(s) IV Push every 8 hours PRN nausea      Allergies    iodine (Other; Anaphylaxis)  shellfish (Anaphylaxis)  sulfa drugs (Other)    Intolerances        Review of Systems:    General:  see HPI  CV:  No pain, palpitations, hypo/hypertension  Resp:  No dyspnea, cough, tachypnea, wheezing  GI:  see HPI  :  No pain, bleeding, incontinence, nocturia  Muscle:  No pain, weakness  Neuro:  No weakness, tingling, memory problems  Psych:  No fatigue, insomnia, mood problems, depression  Endocrine:  No polyuria, polydypsia, cold/heat intolerance  Heme:  No petechiae, ecchymosis, easy bruisability  Skin:  No rash, tattoos, scars, edema      Vital Signs Last 24 Hrs  T(C): 37.1 (13 Dec 2018 05:33), Max: 39.3 (12 Dec 2018 12:18)  T(F): 98.8 (13 Dec 2018 05:33), Max: 102.7 (12 Dec 2018 12:18)  HR: 90 (13 Dec 2018 05:33) (90 - 106)  BP: 113/71 (13 Dec 2018 05:33) (112/72 - 128/79)  BP(mean): --  RR: 17 (13 Dec 2018 05:33) (17 - 18)  SpO2: 97% (13 Dec 2018 05:33) (94% - 97%)    PHYSICAL EXAM:    Constitutional: NAD, well-developed  Neck: No LAD, supple  Respiratory: CTA and P  Cardiovascular: S1 and S2, RRR, no M  Gastrointestinal: BS+, soft, NT/ND, neg HSM, ostomy with brown stool. midline scar.  Extremities: No peripheral edema, neg clubbing, cyanosis  Vascular: 2+ peripheral pulses  Neurological: A/O x 3, no focal deficits  Psychiatric: Normal mood, normal affect  Skin: No rashes      LABS:                        8.5    11.30 )-----------( 294      ( 13 Dec 2018 07:30 )             25.6                         8.7    11.13 )-----------( 257      ( 12 Dec 2018 07:49 )             26.5                         8.8    13.26 )-----------( 223      ( 11 Dec 2018 08:00 )             26.5     Hemoglobin: 8.5 g/dL (18 @ 07:30)  Hemoglobin: 8.7 g/dL (18 @ 07:49)  Hemoglobin: 8.8 g/dL (18 @ 08:00)  Hemoglobin: 10.7 g/dL (12-10-18 @ 10:42)  Hemoglobin: 9.7 g/dL (18 @ 08:05)        139  |  104  |  25<H>  ----------------------------<  160<H>  4.6   |  25  |  1.13    Ca    8.9      13 Dec 2018 06:55  Phos  1.7       Mg     1.5         TPro  5.9<L>  /  Alb  2.7<L>  /  TBili  0.3  /  DBili  x   /  AST  24  /  ALT  46<H>  /  AlkPhos  81        Urinalysis Basic - ( 12 Dec 2018 17:35 )    Color: Light Yellow / Appearance: Clear / S.015 / pH: x  Gluc: x / Ketone: Negative  / Bili: Negative / Urobili: Negative   Blood: x / Protein: Trace / Nitrite: Negative   Leuk Esterase: Small / RBC: 5 /hpf / WBC 8 /hpf   Sq Epi: x / Non Sq Epi: 1 /hpf / Bacteria: Negative      LIVER FUNCTIONS - ( 13 Dec 2018 06:55 )  Alb: 2.7 g/dL / Pro: 5.9 g/dL / ALK PHOS: 81 U/L / ALT: 46 U/L / AST: 24 U/L / GGT: x                 RADIOLOGY & ADDITIONAL TESTS:

## 2018-12-13 NOTE — CONSULT NOTE ADULT - ASSESSMENT
Impression  85F admitted with leukocytosis, intermittent recurrent fevers, worsening anemia with minimal red blood seen in diaper.    Red blood seen could be GI related (hemorrhoidal, diversion colitis) or /GYN related, but the signficant drop in Hb can not be explained by this. Her ostomy has brown stool so I doubt her anemia is related to a GI source. FOBT neg x 2.  Normal MCV. CT abd without enteritis.    Rec further non GI workup (hemolysis though normal bili), ? relation to acute febrile illness / bone marrow suppression.    [phone]

## 2018-12-13 NOTE — CHART NOTE - NSCHARTNOTEFT_GEN_A_CORE
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     All cultures remain negative to date; found to have moderate to large amount rectal drainage, whitish, bloody, mucous drainage; Drainage sent to lab for culture    Tylenol given.    ID Following and patient remains on IV Zosyn.    Signed out follow-up to covering night provider.      MONI [place] [id number]

## 2018-12-13 NOTE — PROGRESS NOTE ADULT - SUBJECTIVE AND OBJECTIVE BOX
Patient is a 85y old  Female who presents with a chief complaint of dehydration and LUIS (13 Dec 2018 10:34)      SUBJECTIVE / OVERNIGHT EVENTS:    Patient seen and examined.       Vital Signs Last 24 Hrs  T(C): 37.1 (13 Dec 2018 05:33), Max: 39.3 (12 Dec 2018 12:18)  T(F): 98.8 (13 Dec 2018 05:33), Max: 102.7 (12 Dec 2018 12:18)  HR: 90 (13 Dec 2018 05:33) (90 - 106)  BP: 113/71 (13 Dec 2018 05:33) (112/72 - 128/79)  BP(mean): --  RR: 17 (13 Dec 2018 05:33) (17 - 18)  SpO2: 97% (13 Dec 2018 05:33) (94% - 97%)  I&O's Summary    12 Dec 2018 07:01  -  13 Dec 2018 07:00  --------------------------------------------------------  IN: 460 mL / OUT: 0 mL / NET: 460 mL        PE:  GENERAL: NAD, AAOx3  HEAD:  Atraumatic, Normocephalic  EYES: EOMI, PERRLA, conjunctiva and sclera clear  NECK: Supple, No JVD  CHEST/LUNG: CTABL, No wheeze  HEART: Regular rate and rhythm; + murmur  ABDOMEN: Soft, Nontender, Nondistended; Bowel sounds present, ostomy  EXTREMITIES:  2+ Peripheral Pulses, No clubbing, cyanosis, or edema  SKIN: No rashes or lesions  NEURO: No focal deficits    LABS:                        8.5    11.30 )-----------( 294      ( 13 Dec 2018 07:30 )             25.6     12-13    139  |  104  |  25<H>  ----------------------------<  160<H>  4.6   |  25  |  1.13    Ca    8.9      13 Dec 2018 06:55  Phos  1.7     12-13  Mg     1.5     12-13    TPro  5.9<L>  /  Alb  2.7<L>  /  TBili  0.3  /  DBili  x   /  AST  24  /  ALT  46<H>  /  AlkPhos  81  12-13      CAPILLARY BLOOD GLUCOSE            Urinalysis Basic - ( 12 Dec 2018 17:35 )    Color: Light Yellow / Appearance: Clear / S.015 / pH: x  Gluc: x / Ketone: Negative  / Bili: Negative / Urobili: Negative   Blood: x / Protein: Trace / Nitrite: Negative   Leuk Esterase: Small / RBC: 5 /hpf / WBC 8 /hpf   Sq Epi: x / Non Sq Epi: 1 /hpf / Bacteria: Negative        RADIOLOGY & ADDITIONAL TESTS:    Imaging Personally Reviewed:  [x] YES  [ ] NO    Consultant(s) Notes Reviewed:  [x] YES  [ ] NO    MEDICATIONS  (STANDING):  aspirin enteric coated 81 milliGRAM(s) Oral daily  heparin  Injectable 5000 Unit(s) SubCutaneous every 8 hours  levothyroxine 50 MICROGram(s) Oral daily  multivitamin 1 Tablet(s) Oral daily  pantoprazole    Tablet 40 milliGRAM(s) Oral daily  piperacillin/tazobactam IVPB. 3.375 Gram(s) IV Intermittent every 8 hours  sodium phosphate IVPB 15 milliMole(s) IV Intermittent once    MEDICATIONS  (PRN):  acetaminophen   Tablet .. 650 milliGRAM(s) Oral every 6 hours PRN Temp greater or equal to 38.5C (101.3F), Mild Pain (1 - 3)  nystatin Powder 1 Application(s) Topical two times a day PRN for rash  ondansetron Injectable 4 milliGRAM(s) IV Push every 6 hours PRN Nausea and/or Vomiting  prochlorperazine   Injectable 5 milliGRAM(s) IV Push every 8 hours PRN nausea      Care Discussed with Consultants/Other Providers [x] YES  [ ] NO    HEALTH ISSUES - PROBLEM Dx:  Hoarseness: Hoarseness  Leukocytosis: Leukocytosis  Advance care planning: Advance care planning  Prophylactic measure: Prophylactic measure  Hyponatremia: Hyponatremia  Depression: Depression  Essential hypertension: Essential hypertension  Hypothyroidism: Hypothyroidism  Acute cystitis without hematuria: Acute cystitis without hematuria  LUIS (acute kidney injury): LUIS (acute kidney injury) Patient is a 85y old  Female who presents with a chief complaint of dehydration and LUIS (13 Dec 2018 10:34)      SUBJECTIVE / OVERNIGHT EVENTS:    Patient seen and examined. states she feels better. asking to go home. denies sinus pain. denies cp sob nvd abd pain afebrile.      Vital Signs Last 24 Hrs  T(C): 37.1 (13 Dec 2018 05:33), Max: 39.3 (12 Dec 2018 12:18)  T(F): 98.8 (13 Dec 2018 05:33), Max: 102.7 (12 Dec 2018 12:18)  HR: 90 (13 Dec 2018 05:33) (90 - 106)  BP: 113/71 (13 Dec 2018 05:33) (112/72 - 128/79)  BP(mean): --  RR: 17 (13 Dec 2018 05:33) (17 - 18)  SpO2: 97% (13 Dec 2018 05:33) (94% - 97%)  I&O's Summary    12 Dec 2018 07:01  -  13 Dec 2018 07:00  --------------------------------------------------------  IN: 460 mL / OUT: 0 mL / NET: 460 mL        PE:  GENERAL: NAD, AAOx3, OBESE  HEAD:  Atraumatic, Normocephalic, no maxillary or sphenoid tenderness  EYES: EOMI, PERRLA, conjunctiva and sclera clear  NECK: Supple, No JVD  CHEST/LUNG: CTABL, No wheeze  HEART: Regular rate and rhythm; no murMUR  ABDOMEN: Soft, Nontender, Nondistended; Bowel sounds present, ostomy  EXTREMITIES:  2+ Peripheral Pulses, No clubbing, cyanosis, or edema  SKIN: No rashes or lesions  NEURO: No focal deficits    LABS:                        8.5    11.30 )-----------( 294      ( 13 Dec 2018 07:30 )             25.6         139  |  104  |  25<H>  ----------------------------<  160<H>  4.6   |  25  |  1.13    Ca    8.9      13 Dec 2018 06:55  Phos  1.7     12-13  Mg     1.5     12-13    TPro  5.9<L>  /  Alb  2.7<L>  /  TBili  0.3  /  DBili  x   /  AST  24  /  ALT  46<H>  /  AlkPhos  81  12-13      CAPILLARY BLOOD GLUCOSE            Urinalysis Basic - ( 12 Dec 2018 17:35 )    Color: Light Yellow / Appearance: Clear / S.015 / pH: x  Gluc: x / Ketone: Negative  / Bili: Negative / Urobili: Negative   Blood: x / Protein: Trace / Nitrite: Negative   Leuk Esterase: Small / RBC: 5 /hpf / WBC 8 /hpf   Sq Epi: x / Non Sq Epi: 1 /hpf / Bacteria: Negative        RADIOLOGY & ADDITIONAL TESTS:    Imaging Personally Reviewed:  [x] YES  [ ] NO    Consultant(s) Notes Reviewed:  [x] YES  [ ] NO    MEDICATIONS  (STANDING):  aspirin enteric coated 81 milliGRAM(s) Oral daily  heparin  Injectable 5000 Unit(s) SubCutaneous every 8 hours  levothyroxine 50 MICROGram(s) Oral daily  multivitamin 1 Tablet(s) Oral daily  pantoprazole    Tablet 40 milliGRAM(s) Oral daily  piperacillin/tazobactam IVPB. 3.375 Gram(s) IV Intermittent every 8 hours  sodium phosphate IVPB 15 milliMole(s) IV Intermittent once    MEDICATIONS  (PRN):  acetaminophen   Tablet .. 650 milliGRAM(s) Oral every 6 hours PRN Temp greater or equal to 38.5C (101.3F), Mild Pain (1 - 3)  nystatin Powder 1 Application(s) Topical two times a day PRN for rash  ondansetron Injectable 4 milliGRAM(s) IV Push every 6 hours PRN Nausea and/or Vomiting  prochlorperazine   Injectable 5 milliGRAM(s) IV Push every 8 hours PRN nausea      Care Discussed with Consultants/Other Providers [x] YES  [ ] NO    HEALTH ISSUES - PROBLEM Dx:  Hoarseness: Hoarseness  Leukocytosis: Leukocytosis  Advance care planning: Advance care planning  Prophylactic measure: Prophylactic measure  Hyponatremia: Hyponatremia  Depression: Depression  Essential hypertension: Essential hypertension  Hypothyroidism: Hypothyroidism  Acute cystitis without hematuria: Acute cystitis without hematuria  LUIS (acute kidney injury): LUIS (acute kidney injury)

## 2018-12-13 NOTE — PROGRESS NOTE ADULT - SUBJECTIVE AND OBJECTIVE BOX
[facility] - QUINN Cornejo / QUINN Headley / ABDIAS Greene/ QUINN Fiore/ QUINN Meng/ FANI De León / ANDREY Pham / ELI Taylor  ---------------------------------------------------------------------------------------------------------------  seen and examined today for LUIS  Interval : Renal function stable  VITALS:  T(F): 98.7 (12-13-18 @ 12:58), Max: 99.3 (12-12-18 @ 17:50)  HR: 99 (12-13-18 @ 12:58)  BP: 121/77 (12-13-18 @ 12:58)  RR: 18 (12-13-18 @ 12:58)  SpO2: 96% (12-13-18 @ 12:58)  Wt(kg): --    12-12 @ 07:01  -  12-13 @ 07:00  --------------------------------------------------------  IN: 460 mL / OUT: 0 mL / NET: 460 mL    12-13 @ 07:01  -  12-13 @ 13:29  --------------------------------------------------------  IN: 240 mL / OUT: 0 mL / NET: 240 mL      Physical Exam :-  Constitutional: NAD  Neck: Supple.  Respiratory: Bilateral equal breath sounds,  Cardiovascular: S1, S2 normal,  Gastrointestinal: Bowel Sounds present, soft, non tender.  Extremities: No edema  Neurological: Alert and Oriented x 3, no focal deficits  Psychiatric: Normal mood, normal affect  Data:-  Allergies :   iodine (Other; Anaphylaxis)  shellfish (Anaphylaxis)  sulfa drugs (Other)    Hospital Medications:   MEDICATIONS  (STANDING):  aspirin enteric coated 81 milliGRAM(s) Oral daily  heparin  Injectable 5000 Unit(s) SubCutaneous every 8 hours  levothyroxine 50 MICROGram(s) Oral daily  multivitamin 1 Tablet(s) Oral daily  pantoprazole    Tablet 40 milliGRAM(s) Oral daily  piperacillin/tazobactam IVPB. 3.375 Gram(s) IV Intermittent every 8 hours  sodium phosphate IVPB 15 milliMole(s) IV Intermittent once    12-13    139  |  104  |  25<H>  ----------------------------<  160<H>  4.6   |  25  |  1.13    Ca    8.9      13 Dec 2018 06:55  Phos  1.7     12-13  Mg     1.5     12-13    TPro  5.9<L>  /  Alb  2.7<L>  /  TBili  0.3  /  DBili      /  AST  24  /  ALT  46<H>  /  AlkPhos  81  12-13    Creatinine Trend: 1.13 <--, 1.08 <--, 1.19 <--, 1.17 <--, 1.19 <--, 1.26 <--, 1.49 <--                        8.5    11.30 )-----------( 294      ( 13 Dec 2018 07:30 )             25.6

## 2018-12-13 NOTE — PROGRESS NOTE ADULT - SUBJECTIVE AND OBJECTIVE BOX
Patient is a 85y old  Female who presents with a chief complaint of dehydration and LUIS (12 Dec 2018 13:57)    Being followed by ID for fevers        Interval history:  I feel better!  pt notes that she has constant post nasal drip  no abdominal pain  swallowing better  remainder ROS neg  No other acute events      PAST MEDICAL & SURGICAL HISTORY:  Fistula, perirectal  Hiatal hernia  Hypothyroidism  Hypertension  S/P foot joint surgery: right ( 10 years ago )  S/P arthroscopy of right knee  S/P ileostomy: colectomy (  )  S/P cholecystectomy: at age 20 ,s    Allergies    iodine (Other; Anaphylaxis)  shellfish (Anaphylaxis)  sulfa drugs (Other)    Intolerances      Antimicrobials:    piperacillin/tazobactam IVPB. 3.375 Gram(s) IV Intermittent every 8 hours    MEDICATIONS  (STANDING):  aspirin enteric coated 81 milliGRAM(s) Oral daily  heparin  Injectable 5000 Unit(s) SubCutaneous every 8 hours  levothyroxine 50 MICROGram(s) Oral daily  magnesium sulfate  IVPB 1 Gram(s) IV Intermittent once  multivitamin 1 Tablet(s) Oral daily  pantoprazole    Tablet 40 milliGRAM(s) Oral daily  piperacillin/tazobactam IVPB. 3.375 Gram(s) IV Intermittent every 8 hours  sodium phosphate IVPB 15 milliMole(s) IV Intermittent once      Vital Signs Last 24 Hrs  T(C): 37.1 (18 @ 05:33), Max: 39.3 (18 @ 12:18)  T(F): 98.8 (18 @ 05:33), Max: 102.7 (18 @ 12:18)  HR: 90 (18 @ 05:33) (90 - 106)  BP: 113/71 (18 @ 05:33) (112/72 - 128/79)  BP(mean): --  RR: 17 (18 @ 05:33) (17 - 18)  SpO2: 97% (18 @ 05:33) (94% - 97%)    Physical Exam:    Constitutional well preserved,comfortable,pleasant    HEENT PERRLA EOMI,No pallor or icterus    No oral exudate or erythema  no sinus tenderness    Neck supple no JVD or LN    Chest Good AE,CTA    CVS RRR S1 S2 WNl    Abd soft BS normal No tenderness  ostomy    Ext No cyanosis clubbing or edema    IV site no erythema tenderness or discharge    Joints no swelling or LOM    CNS AAO X 3 no focal    Lab Data:                          8.5     )-----------( 294      ( 13 Dec 2018 07:30 )             25.6           139  |  104  |  25<H>  ----------------------------<  160<H>  4.6   |  25  |  1.13    Ca    8.9      13 Dec 2018 06:55  Phos  1.7       Mg     1.5         TPro  5.9<L>  /  Alb  2.7<L>  /  TBili  0.3  /  DBili  x   /  AST  24  /  ALT  46<H>  /  AlkPhos  81        Urinalysis Basic - ( 12 Dec 2018 17:35 )    Color: Light Yellow / Appearance: Clear / S.015 / pH: x  Gluc: x / Ketone: Negative  / Bili: Negative / Urobili: Negative   Blood: x / Protein: Trace / Nitrite: Negative   Leuk Esterase: Small / RBC: 5 /hpf / WBC 8 /hpf   Sq Epi: x / Non Sq Epi: 1 /hpf / Bacteria: Negative        .Blood Blood-Peripheral  18   No growth to date.  --  --      .Stool Feces  18   GI PCR Results: NOT detected  *******Please Note:*******  GI panel PCR evaluates for:  Campylobacter, Plesiomonas shigelloides, Salmonella,  Vibrio, Yersinia enterocolitica, Enteroaggregative  Escherichia coli (EAEC), Enteropathogenic E.coli (EPEC),  Enterotoxigenic E. coli (ETEC) lt/st, Shiga-like  toxin-producing E. coli (STEC) stx1/stx2,  Shigella/ Enteroinvasive E. coli (EIEC), Cryptosporidium,  Cyclospora cayetanensis, Entamoeba histolytica,  Giardia lamblia, Adenovirus F 40/41, Astrovirus,  Norovirus GI/GII, Rotavirus A, Sapovirus  --  --      .Blood Blood-Peripheral  18   No growth to date.  --  --      .Blood Blood-Peripheral  18   No growth to date.  --  --      .Urine Clean Catch (Midstream)  18   No growth  --  --      .Blood Blood  18   No growth at 5 days.  --  --      < from: CT Head No Cont (18 @ 16:18) >  EXAM:  CT BRAIN                            PROCEDURE DATE:  2018            INTERPRETATION:  EXAM: CT HEAD WITHOUT INTRAVENOUS CONTRAST    CLINICAL INFORMATION: New onset dysphagia. New hoarseness.    TECHNIQUE: Noncontrast axial CT images were acquired through the head.   Two-dimensional sagittal and coronal reformats were generated.    COMPARISON STUDY: No similar prior comparisons available.    FINDINGS:   There is no evidence of acute intracranial hemorrhage, extra-axial   collection,midline shift, or hydrocephalus.     There is moderate cerebral volume loss. There is moderate white matter   hypoattenuation which is likely related to chronic microvascular disease.    Opacification of the left sphenoid sinus with an air-fluid level. The   other visualized paranasal sinuses are clear. The mastoid air cells and   middle ear cavities are grossly clear. No acute displaced calvarial   fracture.    IMPRESSION:     No evidence of acute intracranial hemorrhage, mass effect, midline shift,   or hydrocephalus. If symptoms persist, consider short interval follow-up   head CT or brain MRI follow-up.    Findings suggesting sphenoid sinusitis.    < end of copied text >        WBC Count: 11.30 (18 @ 07:30)  WBC Count: 11.13 (18 @ 07:49)  WBC Count: 13.26 (18 @ 08:00)  WBC Count: 15.5 (12-10-18 @ 10:42)  WBC Count: 18.83 (18 @ 08:05)  WBC Count: 21.10 (18 @ 09:05)  WBC Count: 22.7 (18 @ 07:26)  WBC Count: 19.81 (18 @ 13:41)

## 2018-12-13 NOTE — PROGRESS NOTE ADULT - ASSESSMENT
84 yo female with PMHx HTN, hypothyroidism, 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 - appreciate gyn recs, patient and dtr refused exam, outpt fu with gyn    # dysphagia  speech and swallow eval - pt refused MBS, ENT sp laryngoscopy showing pooling of secretions and nml vocal cords  dysphagia diet    # anemia  stool occult neg  no further workup indicated    # HTN HLD - bp stable off meds, at times borderline hypotensive  sinus tach: rebound from holding bblockers vs dehydration-monitor closely      DVT PPX   pt eval-rehab 84 yo female with PMHx HTN, hypothyroidism, ileostomy due to complication of Cdiff (2005) p/w feeling fatigued and left flank pain found to have LUIS and fevers 2/2 sinusitis.    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

## 2018-12-13 NOTE — PROGRESS NOTE ADULT - ATTENDING COMMENTS
I will be away as of tomorrow. My associates will be covering. Please call [phone] with acute issues, questions.        Suri Cummings M.D. ,   Pager [phone]     after 5PM/ weekends [phone]

## 2018-12-14 LAB
ANION GAP SERPL CALC-SCNC: 11 MMOL/L — SIGNIFICANT CHANGE UP (ref 5–17)
BUN SERPL-MCNC: 20 MG/DL — SIGNIFICANT CHANGE UP (ref 7–23)
CALCIUM SERPL-MCNC: 8.7 MG/DL — SIGNIFICANT CHANGE UP (ref 8.4–10.5)
CHLORIDE SERPL-SCNC: 102 MMOL/L — SIGNIFICANT CHANGE UP (ref 96–108)
CO2 SERPL-SCNC: 25 MMOL/L — SIGNIFICANT CHANGE UP (ref 22–31)
CREAT SERPL-MCNC: 1.08 MG/DL — SIGNIFICANT CHANGE UP (ref 0.5–1.3)
GLUCOSE SERPL-MCNC: 178 MG/DL — HIGH (ref 70–99)
HCT VFR BLD CALC: 27 % — LOW (ref 34.5–45)
HGB BLD-MCNC: 9.1 G/DL — LOW (ref 11.5–15.5)
LACTATE SERPL-SCNC: 2.9 MMOL/L — HIGH (ref 0.7–2)
MAGNESIUM SERPL-MCNC: 1.7 MG/DL — SIGNIFICANT CHANGE UP (ref 1.6–2.6)
MCHC RBC-ENTMCNC: 30.6 PG — SIGNIFICANT CHANGE UP (ref 27–34)
MCHC RBC-ENTMCNC: 33.8 GM/DL — SIGNIFICANT CHANGE UP (ref 32–36)
MCV RBC AUTO: 90.4 FL — SIGNIFICANT CHANGE UP (ref 80–100)
PHOSPHATE SERPL-MCNC: 3 MG/DL — SIGNIFICANT CHANGE UP (ref 2.5–4.5)
PLATELET # BLD AUTO: 314 K/UL — SIGNIFICANT CHANGE UP (ref 150–400)
POTASSIUM SERPL-MCNC: 4.4 MMOL/L — SIGNIFICANT CHANGE UP (ref 3.5–5.3)
POTASSIUM SERPL-SCNC: 4.4 MMOL/L — SIGNIFICANT CHANGE UP (ref 3.5–5.3)
RBC # BLD: 2.99 M/UL — LOW (ref 3.8–5.2)
RBC # FLD: 12.7 % — SIGNIFICANT CHANGE UP (ref 10.3–14.5)
SODIUM SERPL-SCNC: 138 MMOL/L — SIGNIFICANT CHANGE UP (ref 135–145)
WBC # BLD: 9.7 K/UL — SIGNIFICANT CHANGE UP (ref 3.8–10.5)
WBC # FLD AUTO: 9.7 K/UL — SIGNIFICANT CHANGE UP (ref 3.8–10.5)

## 2018-12-14 PROCEDURE — 71045 X-RAY EXAM CHEST 1 VIEW: CPT | Mod: 26

## 2018-12-14 PROCEDURE — 99232 SBSQ HOSP IP/OBS MODERATE 35: CPT

## 2018-12-14 RX ORDER — ACETAMINOPHEN 500 MG
650 TABLET ORAL EVERY 6 HOURS
Qty: 0 | Refills: 0 | Status: COMPLETED | OUTPATIENT
Start: 2018-12-14 | End: 2018-12-15

## 2018-12-14 RX ORDER — SODIUM CHLORIDE 9 MG/ML
500 INJECTION INTRAMUSCULAR; INTRAVENOUS; SUBCUTANEOUS ONCE
Qty: 0 | Refills: 0 | Status: COMPLETED | OUTPATIENT
Start: 2018-12-14 | End: 2018-12-15

## 2018-12-14 RX ADMIN — PIPERACILLIN AND TAZOBACTAM 25 GRAM(S): 4; .5 INJECTION, POWDER, LYOPHILIZED, FOR SOLUTION INTRAVENOUS at 17:43

## 2018-12-14 RX ADMIN — Medication 650 MILLIGRAM(S): at 20:19

## 2018-12-14 RX ADMIN — PANTOPRAZOLE SODIUM 40 MILLIGRAM(S): 20 TABLET, DELAYED RELEASE ORAL at 11:31

## 2018-12-14 RX ADMIN — Medication 50 MICROGRAM(S): at 05:14

## 2018-12-14 RX ADMIN — Medication 1 TABLET(S): at 11:31

## 2018-12-14 RX ADMIN — Medication 650 MILLIGRAM(S): at 20:49

## 2018-12-14 RX ADMIN — HEPARIN SODIUM 5000 UNIT(S): 5000 INJECTION INTRAVENOUS; SUBCUTANEOUS at 22:11

## 2018-12-14 RX ADMIN — Medication 81 MILLIGRAM(S): at 13:19

## 2018-12-14 RX ADMIN — Medication 325 MILLIGRAM(S): at 11:31

## 2018-12-14 RX ADMIN — PIPERACILLIN AND TAZOBACTAM 25 GRAM(S): 4; .5 INJECTION, POWDER, LYOPHILIZED, FOR SOLUTION INTRAVENOUS at 08:22

## 2018-12-14 RX ADMIN — Medication 5 MILLIGRAM(S): at 20:19

## 2018-12-14 RX ADMIN — PIPERACILLIN AND TAZOBACTAM 25 GRAM(S): 4; .5 INJECTION, POWDER, LYOPHILIZED, FOR SOLUTION INTRAVENOUS at 00:08

## 2018-12-14 RX ADMIN — HEPARIN SODIUM 5000 UNIT(S): 5000 INJECTION INTRAVENOUS; SUBCUTANEOUS at 05:14

## 2018-12-14 RX ADMIN — HEPARIN SODIUM 5000 UNIT(S): 5000 INJECTION INTRAVENOUS; SUBCUTANEOUS at 13:19

## 2018-12-14 NOTE — PROGRESS NOTE ADULT - ASSESSMENT
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    #  fever / leukocytosis  despite fevers, patient wbc trending down  CRP elevated 11.96  c diff was negative from ostomy  sent c diff from rectal drainage  fu rectal culture  cont zosyn per ID  blood cultures NTD  straight cath UA neg, urine cx neg  CT head sphenoid sinusitis but still febrile on zosyn  CT A/P splenic lesion, renal cyst, Splenic US shows stable cyst 1.6 x 1.9 x 1.6 cm  TTE no thrombus  endometrial fluid on pelvic sono - known rectovaginal fistula - appreciate gyn recs, patient and dtr refused exam, outpt fu with gyn    # LUIS / Dehydration  2/2 excessive ostomy output, creat improved, appreciate renal recs, strict i/o    # dysphagia  speech and swallow eval - pt refused MBS, ENT sp laryngoscopy showing pooling of secretions and nml vocal cords  dysphagia diet    # anemia  stool occult neg  no further workup indicated  ferrous sulfate    # HTN HLD  BP stable off meds, at times borderline hypotensive      DVT PPX   PT OOB to chair    dispo: fu cultures as remains febrile, ID fu

## 2018-12-14 NOTE — PROGRESS NOTE ADULT - ASSESSMENT
Impression  85F admitted with leukocytosis, intermittent recurrent fevers, worsening anemia with minimal red blood seen in diaper.     Her ostomy has brown stool and limited rectal output. FOBT neg x 2.  Normal MCV. CT abd without enteritis.  Continue anemia workup. Doubt GI etiology.    [phone]

## 2018-12-14 NOTE — CHART NOTE - NSCHARTNOTEFT_GEN_A_CORE
MEDICINE NP    Notified by RN patient with temperature 101.5. Seen and examined at bedside. Patient is alert, NAD. Denies HA, CP, SOB, cough, N/V, or abd pain.    VITAL SIGNS:  T(C): 38.6 (12-14-18 @ 20:13), Max: 38.6 (12-14-18 @ 20:13)  HR: 117 (12-14-18 @ 20:13) (94 - 117)  BP: 115/71 (12-14-18 @ 20:13) (103/68 - 123/73)  RR: 18 (12-14-18 @ 20:13) (17 - 18)  SpO2: 85% (12-14-18 @ 20:13) (85% - 96%)  Wt(kg): --      LABORATORY:                          9.1    9.7   )-----------( 314      ( 14 Dec 2018 11:46 )             27.0       12-14    138  |  102  |  20  ----------------------------<  178<H>  4.4   |  25  |  1.08    Ca    8.7      14 Dec 2018 11:46  Phos  3.0     12-14  Mg     1.7     12-14    TPro  5.9<L>  /  Alb  2.7<L>  /  TBili  0.3  /  DBili  x   /  AST  24  /  ALT  46<H>  /  AlkPhos  81  12-13          MICROBIOLOGY:     MEDICATIONS  (STANDING):  aspirin enteric coated 81 milliGRAM(s) Oral daily  ferrous    sulfate 325 milliGRAM(s) Oral daily  heparin  Injectable 5000 Unit(s) SubCutaneous every 8 hours  levothyroxine 50 MICROGram(s) Oral daily  multivitamin 1 Tablet(s) Oral daily  pantoprazole    Tablet 40 milliGRAM(s) Oral daily  piperacillin/tazobactam IVPB. 3.375 Gram(s) IV Intermittent every 8 hours        RADIOLOGY:          PHYSICAL EXAM:    Constitutional: AOx3. NAD.    Respiratory: diminished lungs at base bilaterally. No wheezing or crackles.    Cardiovascular: S1 S2. No murmurs.    Gastrointestinal: BS X4 active. soft. nontender.        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 Per daughter at bedside pt lives alone in a house with an aid, who noticed that she was not her self for the last week. She seemed to have low energy and was fatigued intermittently for a week. The PT that comes to the house reported that the pt was have intermittent L flank pain as well during the PT session only. She currently has L flank pain, 8/10, non radiating, moving exacerbates it. She denies fever, chills, cough, abdominal pain, and any changes in the ileostomy output. Per daughter (Pia) started complaining of increased urinary urgency 4 days ago, so her Daughter Sandra (NP) prescribed her cipro. subsequently her urinary complaints improved. Pt denies any dysuria, increased urinary frequency. Per dtr pt has had decreased po intake, has not been drinking enough water. the family is concerned that the patient has depression, which has lead to the decreased appetite.       As per family at bedside, pt was seen by PCP 1 week ago, and was noted to have mild elevation of BUN/Cr    In the ED pt noted to have LUIS with leukocytosis (04 Dec 2018 16:16)        1) Fever with unknown etiology  -tylenol and cooling measures prn for pyrexia  -BC x2, UA/UC  -CXR  -c/w courant ATB's  -F/U primary team in AM MEDICINE NP    Notified by RN patient with temperature 101.5. Seen and examined at bedside. Patient is alert, NAD. Denies HA, CP, SOB, cough, N/V, or abd pain.    VITAL SIGNS:  T(C): 38.6 (12-14-18 @ 20:13), Max: 38.6 (12-14-18 @ 20:13)  HR: 117 (12-14-18 @ 20:13) (94 - 117)  BP: 115/71 (12-14-18 @ 20:13) (103/68 - 123/73)  RR: 18 (12-14-18 @ 20:13) (17 - 18)  SpO2: 85% (12-14-18 @ 20:13) (85% - 96%)  Wt(kg): --      LABORATORY:                          9.1    9.7   )-----------( 314      ( 14 Dec 2018 11:46 )             27.0       12-14    138  |  102  |  20  ----------------------------<  178<H>  4.4   |  25  |  1.08    Ca    8.7      14 Dec 2018 11:46  Phos  3.0     12-14  Mg     1.7     12-14    TPro  5.9<L>  /  Alb  2.7<L>  /  TBili  0.3  /  DBili  x   /  AST  24  /  ALT  46<H>  /  AlkPhos  81  12-13          MICROBIOLOGY:     MEDICATIONS  (STANDING):  aspirin enteric coated 81 milliGRAM(s) Oral daily  ferrous    sulfate 325 milliGRAM(s) Oral daily  heparin  Injectable 5000 Unit(s) SubCutaneous every 8 hours  levothyroxine 50 MICROGram(s) Oral daily  multivitamin 1 Tablet(s) Oral daily  pantoprazole    Tablet 40 milliGRAM(s) Oral daily  piperacillin/tazobactam IVPB. 3.375 Gram(s) IV Intermittent every 8 hours        RADIOLOGY:          PHYSICAL EXAM:    Constitutional: AOx3. NAD.    Respiratory: diminished lungs at base bilaterally. No wheezing or crackles.    Cardiovascular: S1 S2. No murmurs.    Gastrointestinal: BS X4 active. soft. nontender.        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 Per daughter at bedside pt lives alone in a house with an aid, who noticed that she was not her self for the last week. She seemed to have low energy and was fatigued intermittently for a week. The PT that comes to the house reported that the pt was have intermittent L flank pain as well during the PT session only. She currently has L flank pain, 8/10, non radiating, moving exacerbates it. She denies fever, chills, cough, abdominal pain, and any changes in the ileostomy output. Per daughter (Pia) started complaining of increased urinary urgency 4 days ago, so her Daughter Sandra (NP) prescribed her cipro. subsequently her urinary complaints improved. Pt denies any dysuria, increased urinary frequency. Per dtr pt has had decreased po intake, has not been drinking enough water. the family is concerned that the patient has depression, which has lead to the decreased appetite.       As per family at bedside, pt was seen by PCP 1 week ago, and was noted to have mild elevation of BUN/Cr    In the ED pt noted to have LUIS with leukocytosis (04 Dec 2018 16:16)        1) Fever with unknown etiology  -tylenol and cooling measures prn for pyrexia  -BC x2, UA/UC  -CXR  -c/w courant ATB's  -Lactate 2.9    NS 500cc iv bolus x1  -cont assess and monitor  -F/U primary team in AM

## 2018-12-14 NOTE — PROGRESS NOTE ADULT - ASSESSMENT
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 Also, on admission, complained of low energy, fatigue for about a week.  The intermittent L flank pain as well during the PT session only. She currently has L flank pain was 8/10 - non radiating, moving exacerbated it. She had increased urinary urgency 4 days PTA.  Was placed on cipro by family member and her urinary complaints improved.  Drainage from rectum - unclear.     Suggest:  - f/u "abscess" culture results  - continue zosyn  - for Cdiff as per medicine    Please call the ID service [phone] with questions or concerns over the weekend    above d/w NP

## 2018-12-14 NOTE — PROGRESS NOTE ADULT - SUBJECTIVE AND OBJECTIVE BOX
Patient seen and examined  no complaints    iodine (Other; Anaphylaxis)  shellfish (Anaphylaxis)  sulfa drugs (Other)    Hospital Medications:   MEDICATIONS  (STANDING):  aspirin enteric coated 81 milliGRAM(s) Oral daily  ferrous    sulfate 325 milliGRAM(s) Oral daily  heparin  Injectable 5000 Unit(s) SubCutaneous every 8 hours  levothyroxine 50 MICROGram(s) Oral daily  multivitamin 1 Tablet(s) Oral daily  pantoprazole    Tablet 40 milliGRAM(s) Oral daily  piperacillin/tazobactam IVPB. 3.375 Gram(s) IV Intermittent every 8 hours        VITALS:  T(F): 99.6 (18 @ 09:50), Max: 101.6 (18 @ 17:55)  HR: 99 (18 @ 09:50)  BP: 103/68 (18 @ 09:50)  RR: 17 (18 @ 09:50)  SpO2: 96% (18 @ 09:50)  Wt(kg): --     @ 07:01  -   @ 07:00  --------------------------------------------------------  IN: 655 mL / OUT: 300 mL / NET: 355 mL     @ 07:01  -   @ 10:33  --------------------------------------------------------  IN: 100 mL / OUT: 0 mL / NET: 100 mL      Physical Exam :-  Constitutional: NAD  Neck: Supple.  Respiratory: Bilateral equal breath sounds,  Cardiovascular: S1, S2 normal,  Gastrointestinal: Bowel Sounds present, soft, non tender.  Extremities: No edema  Neurological: Alert and Oriented x 3, no focal deficits  Psychiatric: Normal mood, normal affect    LABS:      139  |  104  |  25<H>  ----------------------------<  160<H>  4.6   |  25  |  1.13    Ca    8.9      13 Dec 2018 06:55  Phos  3.0     12-14  Mg     1.7     12-14    TPro  5.9<L>  /  Alb  2.7<L>  /  TBili  0.3  /  DBili      /  AST  24  /  ALT  46<H>  /  AlkPhos  81      Creatinine Trend: 1.13 <--, 1.08 <--, 1.19 <--, 1.17 <--, 1.19 <--, 1.26 <--                        8.5    11.30 )-----------( 294      ( 13 Dec 2018 07:30 )             25.6     Urine Studies:  Urinalysis Basic - ( 12 Dec 2018 17:35 )    Color: Light Yellow / Appearance: Clear / S.015 / pH:   Gluc:  / Ketone: Negative  / Bili: Negative / Urobili: Negative   Blood:  / Protein: Trace / Nitrite: Negative   Leuk Esterase: Small / RBC: 5 /hpf / WBC 8 /hpf   Sq Epi:  / Non Sq Epi: 1 /hpf / Bacteria: Negative        RADIOLOGY & ADDITIONAL STUDIES:

## 2018-12-14 NOTE — PROGRESS NOTE ADULT - SUBJECTIVE AND OBJECTIVE BOX
Patient is a 85y old  Female who presents with a chief complaint of dehydration and LUIS (14 Dec 2018 10:33)      SUBJECTIVE / OVERNIGHT EVENTS:    Patient seen and examined. denies cp sob. yesterday, rectal temp 101.6, also noted to have large amount of rectal drainage. dtr describes as red liquid. RN today states there was still drainage this morning but clear pus like drainage. per RN, no stool in drainage from rectum.  dtr states patient chronically has drainage from rectum but usually just goes to bathroom and releases.      Vital Signs Last 24 Hrs  T(C): 37.6 (14 Dec 2018 09:50), Max: 38.7 (13 Dec 2018 17:55)  T(F): 99.6 (14 Dec 2018 09:50), Max: 101.6 (13 Dec 2018 17:55)  HR: 99 (14 Dec 2018 09:50) (94 - 101)  BP: 103/68 (14 Dec 2018 09:50) (101/69 - 121/77)  BP(mean): --  RR: 17 (14 Dec 2018 09:50) (17 - 18)  SpO2: 96% (14 Dec 2018 09:50) (94% - 96%)  I&O's Summary    13 Dec 2018 07:01  -  14 Dec 2018 07:00  --------------------------------------------------------  IN: 655 mL / OUT: 300 mL / NET: 355 mL    14 Dec 2018 07:01  -  14 Dec 2018 12:37  --------------------------------------------------------  IN: 100 mL / OUT: 0 mL / NET: 100 mL        PE:  GENERAL: NAD, AAOx3, obese  HEAD:  Atraumatic, Normocephalic  EYES: EOMI, PERRLA, conjunctiva and sclera clear  NECK: Supple, No JVD  CHEST/LUNG: CTABL, No wheeze  HEART: Regular rate and rhythm; + murmur  ABDOMEN: Soft, Nontender, Nondistended; ostomy with brown stool, Bowel sounds present  EXTREMITIES:  2+ Peripheral Pulses, No clubbing, cyanosis, or edema  SKIN: No rashes or lesions  NEURO: No focal deficits    LABS:                        9.1    9.7   )-----------( 314      ( 14 Dec 2018 11:46 )             27.0     12-14    138  |  102  |  20  ----------------------------<  178<H>  4.4   |  25  |  1.08    Ca    8.7      14 Dec 2018 11:46  Phos  3.0     12-14  Mg     1.7     12-14    TPro  5.9<L>  /  Alb  2.7<L>  /  TBili  0.3  /  DBili  x   /  AST  24  /  ALT  46<H>  /  AlkPhos  81  12-13      CAPILLARY BLOOD GLUCOSE            Urinalysis Basic - ( 12 Dec 2018 17:35 )    Color: Light Yellow / Appearance: Clear / S.015 / pH: x  Gluc: x / Ketone: Negative  / Bili: Negative / Urobili: Negative   Blood: x / Protein: Trace / Nitrite: Negative   Leuk Esterase: Small / RBC: 5 /hpf / WBC 8 /hpf   Sq Epi: x / Non Sq Epi: 1 /hpf / Bacteria: Negative        RADIOLOGY & ADDITIONAL TESTS:    Imaging Personally Reviewed:  [x] YES  [ ] NO    Consultant(s) Notes Reviewed:  [x] YES  [ ] NO    MEDICATIONS  (STANDING):  aspirin enteric coated 81 milliGRAM(s) Oral daily  ferrous    sulfate 325 milliGRAM(s) Oral daily  heparin  Injectable 5000 Unit(s) SubCutaneous every 8 hours  levothyroxine 50 MICROGram(s) Oral daily  multivitamin 1 Tablet(s) Oral daily  pantoprazole    Tablet 40 milliGRAM(s) Oral daily  piperacillin/tazobactam IVPB. 3.375 Gram(s) IV Intermittent every 8 hours    MEDICATIONS  (PRN):  acetaminophen   Tablet .. 650 milliGRAM(s) Oral every 6 hours PRN Temp greater or equal to 38.5C (101.3F), Mild Pain (1 - 3)  nystatin Powder 1 Application(s) Topical two times a day PRN for rash  ondansetron Injectable 4 milliGRAM(s) IV Push every 6 hours PRN Nausea and/or Vomiting  prochlorperazine   Injectable 5 milliGRAM(s) IV Push every 8 hours PRN nausea      Care Discussed with Consultants/Other Providers [x] YES  [ ] NO    HEALTH ISSUES - PROBLEM Dx:  Hoarseness: Hoarseness  Leukocytosis: Leukocytosis  Advance care planning: Advance care planning  Prophylactic measure: Prophylactic measure  Hyponatremia: Hyponatremia  Depression: Depression  Essential hypertension: Essential hypertension  Hypothyroidism: Hypothyroidism  Acute cystitis without hematuria: Acute cystitis without hematuria  LUIS (acute kidney injury): LUIS (acute kidney injury)

## 2018-12-14 NOTE — PROGRESS NOTE ADULT - SUBJECTIVE AND OBJECTIVE BOX
No further blood in diaper.  Brown ostomy output.  Recurrent fevers. Recto vag fistulous output.    PAST MEDICAL & SURGICAL HISTORY:  Fistula, perirectal  Hiatal hernia  Hypothyroidism  Hypertension  S/P foot joint surgery: right ( 10 years ago )  S/P arthroscopy of right knee  S/P ileostomy: colectomy (  )  S/P cholecystectomy: at age 20 ,s      MEDICATIONS  (STANDING):  aspirin enteric coated 81 milliGRAM(s) Oral daily  heparin  Injectable 5000 Unit(s) SubCutaneous every 8 hours  levothyroxine 50 MICROGram(s) Oral daily  multivitamin 1 Tablet(s) Oral daily  pantoprazole    Tablet 40 milliGRAM(s) Oral daily  piperacillin/tazobactam IVPB. 3.375 Gram(s) IV Intermittent every 8 hours  sodium phosphate IVPB 15 milliMole(s) IV Intermittent once    MEDICATIONS  (PRN):  acetaminophen   Tablet .. 650 milliGRAM(s) Oral every 6 hours PRN Temp greater or equal to 38.5C (101.3F), Mild Pain (1 - 3)  nystatin Powder 1 Application(s) Topical two times a day PRN for rash  ondansetron Injectable 4 milliGRAM(s) IV Push every 6 hours PRN Nausea and/or Vomiting  prochlorperazine   Injectable 5 milliGRAM(s) IV Push every 8 hours PRN nausea      Allergies    iodine (Other; Anaphylaxis)  shellfish (Anaphylaxis)  sulfa drugs (Other)    Intolerances        Review of Systems:    General:  see HPI  CV:  No pain, palpitations, hypo/hypertension  Resp:  No dyspnea, cough, tachypnea, wheezing  GI:  see HPI  :  No pain, bleeding, incontinence, nocturia  Muscle:  No pain, weakness  Neuro:  No weakness, tingling, memory problems  Psych:  No fatigue, insomnia, mood problems, depression  Endocrine:  No polyuria, polydypsia, cold/heat intolerance  Heme:  No petechiae, ecchymosis, easy bruisability  Skin:  No rash, tattoos, scars, edema    Vital Signs Last 24 Hrs  T(C): 36.9 (14 Dec 2018 13:02), Max: 38.7 (13 Dec 2018 17:55)  T(F): 98.4 (14 Dec 2018 13:02), Max: 101.6 (13 Dec 2018 17:55)  HR: 105 (14 Dec 2018 13:02) (94 - 105)  BP: 123/73 (14 Dec 2018 13:02) (101/69 - 123/73)  BP(mean): --  RR: 18 (14 Dec 2018 13:02) (17 - 18)  SpO2: 95% (14 Dec 2018 13:02) (94% - 96%)  PHYSICAL EXAM:    Constitutional: NAD, well-developed  Neck: No LAD, supple  Respiratory: CTA and P  Cardiovascular: S1 and S2, RRR, no M  Gastrointestinal: BS+, soft, NT/ND, neg HSM, ostomy with brown stool. midline scar.  Extremities: No peripheral edema, neg clubbing, cyanosis  Vascular: 2+ peripheral pulses  Neurological: A/O x 3, no focal deficits  Psychiatric: Normal mood, normal affect  Skin: No rashes      LABS:                        8.5    11.30 )-----------( 294      ( 13 Dec 2018 07:30 )             25.6                         8.7    11.13 )-----------( 257      ( 12 Dec 2018 07:49 )             26.5                         8.8    13.26 )-----------( 223      ( 11 Dec 2018 08:00 )             26.5     Hemoglobin: 8.5 g/dL (18 @ 07:30)  Hemoglobin: 8.7 g/dL (18 @ 07:49)  Hemoglobin: 8.8 g/dL (18 @ 08:00)  Hemoglobin: 10.7 g/dL (12-10-18 @ 10:42)  Hemoglobin: 9.7 g/dL (18 @ 08:05)        139  |  104  |  25<H>  ----------------------------<  160<H>  4.6   |  25  |  1.13    Ca    8.9      13 Dec 2018 06:55  Phos  1.7       Mg     1.5         TPro  5.9<L>  /  Alb  2.7<L>  /  TBili  0.3  /  DBili  x   /  AST  24  /  ALT  46<H>  /  AlkPhos  81        Urinalysis Basic - ( 12 Dec 2018 17:35 )    Color: Light Yellow / Appearance: Clear / S.015 / pH: x  Gluc: x / Ketone: Negative  / Bili: Negative / Urobili: Negative   Blood: x / Protein: Trace / Nitrite: Negative   Leuk Esterase: Small / RBC: 5 /hpf / WBC 8 /hpf   Sq Epi: x / Non Sq Epi: 1 /hpf / Bacteria: Negative      LIVER FUNCTIONS - ( 13 Dec 2018 06:55 )  Alb: 2.7 g/dL / Pro: 5.9 g/dL / ALK PHOS: 81 U/L / ALT: 46 U/L / AST: 24 U/L / GGT: x                 RADIOLOGY & ADDITIONAL TESTS:

## 2018-12-14 NOTE — PROGRESS NOTE ADULT - SUBJECTIVE AND OBJECTIVE BOX
Patient is a 85y old  Female who presents with a chief complaint of dehydration and LUIS (12 Dec 2018 13:57)    Being followed by ID for fevers    Interval History/ROS:  had fever overnight.  reportedly had some rectal drainage (she has a known rectovaginal fistula).  the drainage was cultured.  pending.  no n/v/d.  some SOB.  Rest of ROS otherwise negative.    Hospital course:  In the ED - LUIS with leukocytosis (04 Dec 2018 16:16)  CT--> Left lower pole stone, 4.7 cm in the right interpole ( kidney)? hemorrhagic cust, fluid distended cervical canal  Started on Rocephin   - seen by ID for low grade fever and Rocephin d/mart   - persistent leukocytosis noted ECHO ordered   - fevers persisted  12/10 - fevers   echo showed - Aortic valve not well visualized; appears mildly calcified.  Normal left ventricular internal dimensions and wall  thicknesses. Mid LV cavity obstruction noted with peak gradients = 60 mm Hg.  Hyperdynamic left ventricular systolic function.    - family requests a second ID opinion  Pt now with temps up to 103.3 (rectal)    PAST MEDICAL & SURGICAL HISTORY:  Fistula, perirectal  Hiatal hernia  Hypothyroidism  Hypertension  S/P foot joint surgery: right ( 10 years ago )  S/P arthroscopy of right knee  S/P ileostomy: colectomy (  )  S/P cholecystectomy: at age 20 ,s    Allergies  iodine (Other; Anaphylaxis)  shellfish (Anaphylaxis)  sulfa drugs (Other)    Antimicrobials:  piperacillin/tazobactam IVPB. 3.375 every 8 hours    MEDICATIONS  (STANDING):  aspirin enteric coated 81 daily  heparin  Injectable 5000 every 8 hours  levothyroxine 50 daily  pantoprazole    Tablet 40 daily    Vital Signs Last 24 Hrs  T(F): 99.6 (18 @ 09:50), Max: 101.6 (18 @ 17:55)  HR: 99 (18 @ 09:50)  BP: 103/68 (18 @ 09:50)  RR: 17 (18 @ 09:50)  SpO2: 96% (18 @ 09:50) (94% - 96%)  Wt(kg): --    PHYSICAL EXAM:  General: non-toxic; in bed; no distress  HEAD/EYES: anicteric  ENT:  supple; no thrush  Cardiovascular:   S1, S2  Respiratory:  clear bilaterally  GI:  soft, non-tender, normal bowel sounds; ileostomy c/d/i  :  no foely  Musculoskeletal:  no synovitis  Neurologic:  grossly non-focal  Skin:  no rash  Psychiatric:  appropriate affect  Vascular:  no phlebitis    WBC Count: 9.7 (18 @ 11:46)  WBC Count: 11.30 (18 @ 07:30)  WBC Count: 11.13 (18 @ 07:49)  WBC Count: 13.26 (18 @ 08:00)  WBC Count: 15.5 (12-10-18 @ 10:42)  WBC Count: 18.83 (18 @ 08:05)  WBC Count: 21.10 (18 @ 09:05)                        9.1    9.7   )-----------( 314      ( 14 Dec 2018 11:46 )             27.0 -    138  |  102  |  20  ----------------------------<  178  4.4   |  25  |  1.08  Ca    8.7      14 Dec 2018 11:46Phos  3.0     12-Mg     1.7       TPro  5.9  /  Alb  2.7  /  TBili  0.3  /  DBili  x   /  AST  24  /  ALT  46  /  AlkPhos  81  12-      Urinalysis Basic - ( 12 Dec 2018 17:35 )  Color: Light Yellow / Appearance: Clear / S.015 / pH: x  Gluc: x / Ketone: Negative  / Bili: Negative / Urobili: Negative   Blood: x / Protein: Trace / Nitrite: Negative   Leuk Esterase: Small / RBC: 5 /hpf / WBC 8 /hpf   Sq Epi: x / Non Sq Epi: 1 /hpf / Bacteria: Negative    MICROBIOLOGY:  .Blood Blood-Peripheral  18   No growth to date.  --  --    .Stool Feces  18   GI PCR Results: NOT detected    .Blood Blood-Peripheral  18   No growth to date.  --  --    .Blood Blood-Peripheral  18   No growth to date.  --  --    .Urine Clean Catch (Midstream)  12-07-18   No growth  --  --    .Blood Blood  18   No growth at 5 days.  --  --    RADIOLOGY:  CT Head No Cont (18 @ 16:18) >  IPRESSION: No evidence of acute intracranial hemorrhage, mass effect, midline shift, or hydrocephalus. If symptoms persist, consider short interval follow-up head CT or brain MRI follow-up.  Findings suggesting sphenoid sinusitis.

## 2018-12-15 LAB
ANION GAP SERPL CALC-SCNC: 11 MMOL/L — SIGNIFICANT CHANGE UP (ref 5–17)
BUN SERPL-MCNC: 20 MG/DL — SIGNIFICANT CHANGE UP (ref 7–23)
CALCIUM SERPL-MCNC: 8.7 MG/DL — SIGNIFICANT CHANGE UP (ref 8.4–10.5)
CHLORIDE SERPL-SCNC: 102 MMOL/L — SIGNIFICANT CHANGE UP (ref 96–108)
CO2 SERPL-SCNC: 25 MMOL/L — SIGNIFICANT CHANGE UP (ref 22–31)
CREAT SERPL-MCNC: 1.05 MG/DL — SIGNIFICANT CHANGE UP (ref 0.5–1.3)
GLUCOSE SERPL-MCNC: 154 MG/DL — HIGH (ref 70–99)
HCT VFR BLD CALC: 27.1 % — LOW (ref 34.5–45)
HGB BLD-MCNC: 8.8 G/DL — LOW (ref 11.5–15.5)
MCHC RBC-ENTMCNC: 29.2 PG — SIGNIFICANT CHANGE UP (ref 27–34)
MCHC RBC-ENTMCNC: 32.5 GM/DL — SIGNIFICANT CHANGE UP (ref 32–36)
MCV RBC AUTO: 90 FL — SIGNIFICANT CHANGE UP (ref 80–100)
PLATELET # BLD AUTO: 335 K/UL — SIGNIFICANT CHANGE UP (ref 150–400)
POTASSIUM SERPL-MCNC: 4.2 MMOL/L — SIGNIFICANT CHANGE UP (ref 3.5–5.3)
POTASSIUM SERPL-SCNC: 4.2 MMOL/L — SIGNIFICANT CHANGE UP (ref 3.5–5.3)
RBC # BLD: 3.01 M/UL — LOW (ref 3.8–5.2)
RBC # FLD: 14.5 % — SIGNIFICANT CHANGE UP (ref 10.3–14.5)
SODIUM SERPL-SCNC: 138 MMOL/L — SIGNIFICANT CHANGE UP (ref 135–145)
WBC # BLD: 9.74 K/UL — SIGNIFICANT CHANGE UP (ref 3.8–10.5)
WBC # FLD AUTO: 9.74 K/UL — SIGNIFICANT CHANGE UP (ref 3.8–10.5)

## 2018-12-15 PROCEDURE — 99232 SBSQ HOSP IP/OBS MODERATE 35: CPT

## 2018-12-15 RX ORDER — SODIUM CHLORIDE 9 MG/ML
500 INJECTION INTRAMUSCULAR; INTRAVENOUS; SUBCUTANEOUS ONCE
Qty: 0 | Refills: 0 | Status: COMPLETED | OUTPATIENT
Start: 2018-12-15 | End: 2018-12-15

## 2018-12-15 RX ADMIN — HEPARIN SODIUM 5000 UNIT(S): 5000 INJECTION INTRAVENOUS; SUBCUTANEOUS at 22:40

## 2018-12-15 RX ADMIN — ONDANSETRON 4 MILLIGRAM(S): 8 TABLET, FILM COATED ORAL at 15:37

## 2018-12-15 RX ADMIN — PIPERACILLIN AND TAZOBACTAM 25 GRAM(S): 4; .5 INJECTION, POWDER, LYOPHILIZED, FOR SOLUTION INTRAVENOUS at 02:08

## 2018-12-15 RX ADMIN — HEPARIN SODIUM 5000 UNIT(S): 5000 INJECTION INTRAVENOUS; SUBCUTANEOUS at 13:08

## 2018-12-15 RX ADMIN — Medication 650 MILLIGRAM(S): at 15:40

## 2018-12-15 RX ADMIN — ONDANSETRON 4 MILLIGRAM(S): 8 TABLET, FILM COATED ORAL at 02:03

## 2018-12-15 RX ADMIN — SODIUM CHLORIDE 500 MILLILITER(S): 9 INJECTION INTRAMUSCULAR; INTRAVENOUS; SUBCUTANEOUS at 11:58

## 2018-12-15 RX ADMIN — PIPERACILLIN AND TAZOBACTAM 25 GRAM(S): 4; .5 INJECTION, POWDER, LYOPHILIZED, FOR SOLUTION INTRAVENOUS at 16:11

## 2018-12-15 RX ADMIN — PANTOPRAZOLE SODIUM 40 MILLIGRAM(S): 20 TABLET, DELAYED RELEASE ORAL at 11:27

## 2018-12-15 RX ADMIN — Medication 650 MILLIGRAM(S): at 14:35

## 2018-12-15 RX ADMIN — Medication 81 MILLIGRAM(S): at 13:09

## 2018-12-15 RX ADMIN — SODIUM CHLORIDE 500 MILLILITER(S): 9 INJECTION INTRAMUSCULAR; INTRAVENOUS; SUBCUTANEOUS at 00:30

## 2018-12-15 RX ADMIN — Medication 1 TABLET(S): at 11:27

## 2018-12-15 RX ADMIN — Medication 325 MILLIGRAM(S): at 11:27

## 2018-12-15 RX ADMIN — Medication 50 MICROGRAM(S): at 06:10

## 2018-12-15 RX ADMIN — PIPERACILLIN AND TAZOBACTAM 25 GRAM(S): 4; .5 INJECTION, POWDER, LYOPHILIZED, FOR SOLUTION INTRAVENOUS at 08:20

## 2018-12-15 RX ADMIN — HEPARIN SODIUM 5000 UNIT(S): 5000 INJECTION INTRAVENOUS; SUBCUTANEOUS at 06:10

## 2018-12-15 NOTE — PROGRESS NOTE ADULT - SUBJECTIVE AND OBJECTIVE BOX
Patient is a 85y old  Female who presents with a chief complaint of dehydration and LUIS (15 Dec 2018 08:37)      SUBJECTIVE / OVERNIGHT EVENTS:    Patient seen and examined. febrile yday 101.5. denies cp sob cough abd pain. upset about prolonged hospitalization.      Vital Signs Last 24 Hrs  T(C): 36.8 (15 Dec 2018 06:07), Max: 38.6 (14 Dec 2018 20:13)  T(F): 98.2 (15 Dec 2018 06:07), Max: 101.5 (14 Dec 2018 20:13)  HR: 84 (15 Dec 2018 06:07) (84 - 117)  BP: 111/73 (15 Dec 2018 06:07) (98/62 - 123/73)  BP(mean): --  RR: 18 (15 Dec 2018 06:07) (17 - 18)  SpO2: 95% (15 Dec 2018 06:07) (95% - 95%)  I&O's Summary    14 Dec 2018 07:01  -  15 Dec 2018 07:00  --------------------------------------------------------  IN: 1260 mL / OUT: 150 mL / NET: 1110 mL    15 Dec 2018 07:01  -  15 Dec 2018 11:37  --------------------------------------------------------  IN: 100 mL / OUT: 0 mL / NET: 100 mL        PE:  GENERAL: NAD, AAOx3, obese  HEAD:  Atraumatic, Normocephalic  EYES: EOMI, PERRLA, conjunctiva and sclera clear  NECK: Supple, No JVD  CHEST/LUNG: CTABL, No wheeze  HEART: Regular rate and rhythm; no murmur  ABDOMEN: Soft, Nontender, Nondistended; ileostomy brown stool, Bowel sounds present  EXTREMITIES:  2+ Peripheral Pulses, No clubbing, cyanosis, or edema  SKIN: No rashes or lesions  NEURO: No focal deficits    LABS:                        8.8    9.74  )-----------( 335      ( 15 Dec 2018 09:18 )             27.1     12-15    138  |  102  |  20  ----------------------------<  154<H>  4.2   |  25  |  1.05    Ca    8.7      15 Dec 2018 07:32  Phos  3.0     12-14  Mg     1.7     12-14        CAPILLARY BLOOD GLUCOSE                RADIOLOGY & ADDITIONAL TESTS:    Imaging Personally Reviewed:  [x] YES  [ ] NO    Consultant(s) Notes Reviewed:  [x] YES  [ ] NO    MEDICATIONS  (STANDING):  aspirin enteric coated 81 milliGRAM(s) Oral daily  ferrous    sulfate 325 milliGRAM(s) Oral daily  heparin  Injectable 5000 Unit(s) SubCutaneous every 8 hours  levothyroxine 50 MICROGram(s) Oral daily  multivitamin 1 Tablet(s) Oral daily  pantoprazole    Tablet 40 milliGRAM(s) Oral daily  piperacillin/tazobactam IVPB. 3.375 Gram(s) IV Intermittent every 8 hours  sodium chloride 0.9% Bolus 500 milliLiter(s) IV Bolus once    MEDICATIONS  (PRN):  acetaminophen   Tablet .. 650 milliGRAM(s) Oral every 6 hours PRN Temp greater or equal to 38.5C (101.3F), Mild Pain (1 - 3)  acetaminophen   Tablet .. 650 milliGRAM(s) Oral every 6 hours PRN Temp greater or equal to 38C (100.4F)  aluminum hydroxide/magnesium hydroxide/simethicone Suspension 30 milliLiter(s) Oral every 4 hours PRN Dyspepsia  nystatin Powder 1 Application(s) Topical two times a day PRN for rash  ondansetron Injectable 4 milliGRAM(s) IV Push every 6 hours PRN Nausea and/or Vomiting  prochlorperazine   Injectable 5 milliGRAM(s) IV Push every 8 hours PRN nausea      Care Discussed with Consultants/Other Providers [x] YES  [ ] NO    HEALTH ISSUES - PROBLEM Dx:  Hoarseness: Hoarseness  Leukocytosis: Leukocytosis  Advance care planning: Advance care planning  Prophylactic measure: Prophylactic measure  Hyponatremia: Hyponatremia  Depression: Depression  Essential hypertension: Essential hypertension  Hypothyroidism: Hypothyroidism  Acute cystitis without hematuria: Acute cystitis without hematuria  LUIS (acute kidney injury): LUIS (acute kidney injury)

## 2018-12-15 NOTE — PROGRESS NOTE ADULT - ASSESSMENT
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 Also, on admission, complained of low energy, fatigue for about a week.  The intermittent L flank pain as well during the PT session only. She currently has L flank pain was 8/10 - non radiating, moving exacerbated it. She had increased urinary urgency 4 days PTA.  Was placed on cipro by family member and her urinary complaints improved.  Drainage from rectum - unclear.  Early phlebitis.  Sinusitis    Suggest:  - f/u "abscess" culture results  - f/u BC  - continue zosyn  - please remove PIV    Please call the ID service [phone] with questions or concerns over the weekend

## 2018-12-15 NOTE — PROGRESS NOTE ADULT - SUBJECTIVE AND OBJECTIVE BOX
Nephrology Followup Note - [phone] - Dr Headley / Dr Cornejo / Dr Meng / Dr Greene / Dr Fiore / Dr Taylor / Dr De León / Dr Pham  Pt seen and examined at bedside  Pt comfortable, febrile now.     Allergies:  iodine (Other; Anaphylaxis)  shellfish (Anaphylaxis)  sulfa drugs (Other)    Hospital Medications:   MEDICATIONS  (STANDING):  aspirin enteric coated 81 milliGRAM(s) Oral daily  ferrous    sulfate 325 milliGRAM(s) Oral daily  heparin  Injectable 5000 Unit(s) SubCutaneous every 8 hours  levothyroxine 50 MICROGram(s) Oral daily  multivitamin 1 Tablet(s) Oral daily  pantoprazole    Tablet 40 milliGRAM(s) Oral daily  piperacillin/tazobactam IVPB. 3.375 Gram(s) IV Intermittent every 8 hours    VITALS:  T(F): 101.2 (12-15-18 @ 14:32), Max: 101.5 (18 @ 20:13)  HR: 112 (12-15-18 @ 14:32)  BP: 139/70 (12-15-18 @ 14:32)  RR: 18 (12-15-18 @ 14:32)  SpO2: 97% (12-15-18 @ 11:30)  Wt(kg): --     @ 07:01  -  12-15 @ 07:00  --------------------------------------------------------  IN: 1260 mL / OUT: 150 mL / NET: 1110 mL    12-15 @ 07:01  -  12-15 @ 15:18  --------------------------------------------------------  IN: 840 mL / OUT: 0 mL / NET: 840 mL        PHYSICAL EXAM:  Constitutional: NAD  HEENT: anicteric sclera, oropharynx clear, MMM  Neck: No JVD  Respiratory: CTAB, no wheezes, rales or rhonchi  Cardiovascular: S1, S2, RRR  Gastrointestinal: BS+, soft, NT/ND, ostomy   Extremities: No cyanosis or clubbing. No peripheral edema  Neurological: A/O x 3, no focal deficits  Psychiatric: Normal mood, normal affect  : No CVA tenderness. No palafox.   Skin: No rashes    LABS:  12-15    138  |  102  |  20  ----------------------------<  154<H>  4.2   |  25  |  1.05    Ca    8.7      15 Dec 2018 07:32  Phos  3.0     12-14  Mg     1.7     12      Creatinine Trend: 1.05 <--, 1.08 <--, 1.13 <--, 1.08 <--, 1.19 <--, 1.17 <--, 1.19 <--                        8.8    9.74  )-----------( 335      ( 15 Dec 2018 09:18 )             27.1     Urine Studies:  Urinalysis Basic - ( 12 Dec 2018 17:35 )    Color: Light Yellow / Appearance: Clear / S.015 / pH:   Gluc:  / Ketone: Negative  / Bili: Negative / Urobili: Negative   Blood:  / Protein: Trace / Nitrite: Negative   Leuk Esterase: Small / RBC: 5 /hpf / WBC 8 /hpf   Sq Epi:  / Non Sq Epi: 1 /hpf / Bacteria: Negative        RADIOLOGY & ADDITIONAL STUDIES:

## 2018-12-15 NOTE — PROGRESS NOTE ADULT - ASSESSMENT
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    #  fever / leukocytosis  despite fevers, patient wbc trending down  CRP elevated 11.96  c diff was negative from ostomy, sent c diff from rectal drainage if continues  rectal culture grew enterococcus faecalis and avium  cont zosyn per ID  urine and blood cultures NTD  straight cath UA neg, urine cx neg  CT head showed sphenoid sinusitis but still febrile on zosyn  CT A/P splenic lesion, renal cyst, splenic US shows stable cyst 1.6 x 1.9 x 1.6 cm  TTE no thrombus  if remain febrile, may need to consider HUONG, but at this point not the best candidate for this test so would like to avoid any unnecessary invasive procedures  endometrial fluid on pelvic sono - known rectovaginal fistula - appreciate gyn recs, patient and dtr refused exam, outpt fu with gyn    # LUIS / Dehydration  2/2 excessive ostomy output, creat improved, appreciate renal recs, strict i/o    # dysphagia  speech and swallow eval - pt refused MBS, ENT sp laryngoscopy showing pooling of secretions and nml vocal cords  dysphagia diet    # anemia  stool occult neg  no further workup indicated  ferrous sulfate    # HTN HLD  BP stable off meds, at times borderline hypotensive      DVT PPX   PT OOB to chair    dispo: fu cultures as remains febrile, ID following

## 2018-12-15 NOTE — PROGRESS NOTE ADULT - SUBJECTIVE AND OBJECTIVE BOX
Patient is a 85y old  Female who presents with a chief complaint of dehydration and LUIS (12 Dec 2018 13:57)    Being followed by ID for fevers    Interval History/ROS:  had fever again overnight.  complaining of pain at IV site. no n/v/d.  some SOB.  Rest of ROS otherwise negative.    Hospital course:  In the ED - LUIS with leukocytosis (04 Dec 2018 16:16)  CT--> Left lower pole stone, 4.7 cm in the right interpole ( kidney)? hemorrhagic cust, fluid distended cervical canal  Started on Rocephin   - seen by ID for low grade fever and Rocephin d/mart   - persistent leukocytosis noted ECHO ordered   - fevers persisted  12/10 - fevers   echo showed - Aortic valve not well visualized; appears mildly calcified.  Normal left ventricular internal dimensions and wall  thicknesses. Mid LV cavity obstruction noted with peak gradients = 60 mm Hg.  Hyperdynamic left ventricular systolic function.    - family requests a second ID opinion  Pt now with temps up to 103.3 (rectal)    PAST MEDICAL & SURGICAL HISTORY:  Fistula, perirectal  Hiatal hernia  Hypothyroidism  Hypertension  S/P foot joint surgery: right ( 10 years ago )  S/P arthroscopy of right knee  S/P ileostomy: colectomy (  )  S/P cholecystectomy: at age 20 ,s    Allergies  iodine (Other; Anaphylaxis)  shellfish (Anaphylaxis)  sulfa drugs (Other)    Antimicrobials:  piperacillin/tazobactam IVPB. 3.375 every 8 hours    MEDICATIONS  (STANDING):  aspirin enteric coated 81 daily  heparin  Injectable 5000 every 8 hours  levothyroxine 50 daily  pantoprazole    Tablet 40 daily    Vital Signs Last 24 Hrs  T(F): 98.2 (12-15-18 @ 06:07), Max: 101.5 (18 @ 20:13)  HR: 84 (12-15-18 @ 06:07)  BP: 111/73 (12-15-18 @ 06:07)  RR: 18 (12-15-18 @ 06:07)  SpO2: 95% (12-15-18 @ 06:07) (95% - 96%)    PHYSICAL EXAM:  General: non-toxic; in bed; no distress; aide at bedside  HEAD/EYES: anicteric  ENT:  supple; no thrush  Cardiovascular:   S1, S2  Respiratory:  clear bilaterally  GI:  soft, non-tender, normal bowel sounds; ileostomy c/d/i  :  no foely  Musculoskeletal:  no synovitis  Neurologic:  grossly non-focal  Skin:  no rash  Psychiatric:  appropriate affect  Vascular:  early L PIV                        9.1    9.7   )-----------( 314      ( 14 Dec 2018 11:46 )             27.0 12-15    138  |  102  |  20  ----------------------------<  154  4.2   |  25  |  1.05  Ca    8.7      15 Dec 2018 07:32Phos  3.0     Mg     1.7           Urinalysis Basic - ( 12 Dec 2018 17:35 )  Color: Light Yellow / Appearance: Clear / S.015 / pH: x  Gluc: x / Ketone: Negative  / Bili: Negative / Urobili: Negative   Blood: x / Protein: Trace / Nitrite: Negative   Leuk Esterase: Small / RBC: 5 /hpf / WBC 8 /hpf   Sq Epi: x / Non Sq Epi: 1 /hpf / Bacteria: Negative    MICROBIOLOGY:   - "abscess" culture; BC pending    Culture - Urine (18 @ 19:41)    Specimen Source: .Urine Catheterized    Culture Results:   No growth    .Blood Blood-Peripheral  18   No growth to date.  --  --    .Stool Feces  18   GI PCR Results: NOT detected    .Blood Blood-Peripheral  18   No growth to date.  --  --    .Blood Blood-Peripheral  18   No growth to date.  --  --    .Urine Clean Catch (Midstream)  18   No growth  --  --    .Blood Blood  18   No growth at 5 days.  --  --    RADIOLOGY:  CT Head No Cont (18 @ 16:18) >  IPRESSION: No evidence of acute intracranial hemorrhage, mass effect, midline shift, or hydrocephalus. If symptoms persist, consider short interval follow-up head CT or brain MRI follow-up.  Findings suggesting sphenoid sinusitis.

## 2018-12-15 NOTE — PROGRESS NOTE ADULT - PROBLEM SELECTOR PLAN 1
cr stable, electrolytes acceptable. Tolerating PO.    encourage po intake  supplement Phos as needed  trend bmp, off IVF

## 2018-12-16 LAB
-  AMPICILLIN: SIGNIFICANT CHANGE UP
-  AMPICILLIN: SIGNIFICANT CHANGE UP
-  TETRACYCLINE: SIGNIFICANT CHANGE UP
-  TETRACYCLINE: SIGNIFICANT CHANGE UP
-  VANCOMYCIN: SIGNIFICANT CHANGE UP
-  VANCOMYCIN: SIGNIFICANT CHANGE UP
ANION GAP SERPL CALC-SCNC: 11 MMOL/L — SIGNIFICANT CHANGE UP (ref 5–17)
BUN SERPL-MCNC: 18 MG/DL — SIGNIFICANT CHANGE UP (ref 7–23)
CALCIUM SERPL-MCNC: 8.4 MG/DL — SIGNIFICANT CHANGE UP (ref 8.4–10.5)
CHLORIDE SERPL-SCNC: 103 MMOL/L — SIGNIFICANT CHANGE UP (ref 96–108)
CO2 SERPL-SCNC: 23 MMOL/L — SIGNIFICANT CHANGE UP (ref 22–31)
CREAT SERPL-MCNC: 1.07 MG/DL — SIGNIFICANT CHANGE UP (ref 0.5–1.3)
CULTURE RESULTS: SIGNIFICANT CHANGE UP
GLUCOSE SERPL-MCNC: 137 MG/DL — HIGH (ref 70–99)
HCT VFR BLD CALC: 26.4 % — LOW (ref 34.5–45)
HCT VFR BLD CALC: 26.6 % — LOW (ref 34.5–45)
HGB BLD-MCNC: 8.7 G/DL — LOW (ref 11.5–15.5)
HGB BLD-MCNC: 8.9 G/DL — LOW (ref 11.5–15.5)
MCHC RBC-ENTMCNC: 29.4 PG — SIGNIFICANT CHANGE UP (ref 27–34)
MCHC RBC-ENTMCNC: 30.4 PG — SIGNIFICANT CHANGE UP (ref 27–34)
MCHC RBC-ENTMCNC: 32.7 GM/DL — SIGNIFICANT CHANGE UP (ref 32–36)
MCHC RBC-ENTMCNC: 33.8 GM/DL — SIGNIFICANT CHANGE UP (ref 32–36)
MCV RBC AUTO: 89.9 FL — SIGNIFICANT CHANGE UP (ref 80–100)
MCV RBC AUTO: 89.9 FL — SIGNIFICANT CHANGE UP (ref 80–100)
METHOD TYPE: SIGNIFICANT CHANGE UP
METHOD TYPE: SIGNIFICANT CHANGE UP
ORGANISM # SPEC MICROSCOPIC CNT: SIGNIFICANT CHANGE UP
PLATELET # BLD AUTO: 361 K/UL — SIGNIFICANT CHANGE UP (ref 150–400)
PLATELET # BLD AUTO: 386 K/UL — SIGNIFICANT CHANGE UP (ref 150–400)
POTASSIUM SERPL-MCNC: 4.2 MMOL/L — SIGNIFICANT CHANGE UP (ref 3.5–5.3)
POTASSIUM SERPL-SCNC: 4.2 MMOL/L — SIGNIFICANT CHANGE UP (ref 3.5–5.3)
RBC # BLD: 2.94 M/UL — LOW (ref 3.8–5.2)
RBC # BLD: 2.96 M/UL — LOW (ref 3.8–5.2)
RBC # FLD: 12.9 % — SIGNIFICANT CHANGE UP (ref 10.3–14.5)
RBC # FLD: 14.5 % — SIGNIFICANT CHANGE UP (ref 10.3–14.5)
SODIUM SERPL-SCNC: 137 MMOL/L — SIGNIFICANT CHANGE UP (ref 135–145)
SPECIMEN SOURCE: SIGNIFICANT CHANGE UP
WBC # BLD: 11.54 K/UL — HIGH (ref 3.8–10.5)
WBC # BLD: 13.7 K/UL — HIGH (ref 3.8–10.5)
WBC # FLD AUTO: 11.54 K/UL — HIGH (ref 3.8–10.5)
WBC # FLD AUTO: 13.7 K/UL — HIGH (ref 3.8–10.5)

## 2018-12-16 RX ADMIN — Medication 1 TABLET(S): at 11:52

## 2018-12-16 RX ADMIN — PIPERACILLIN AND TAZOBACTAM 25 GRAM(S): 4; .5 INJECTION, POWDER, LYOPHILIZED, FOR SOLUTION INTRAVENOUS at 00:47

## 2018-12-16 RX ADMIN — HEPARIN SODIUM 5000 UNIT(S): 5000 INJECTION INTRAVENOUS; SUBCUTANEOUS at 05:04

## 2018-12-16 RX ADMIN — Medication 325 MILLIGRAM(S): at 11:52

## 2018-12-16 RX ADMIN — HEPARIN SODIUM 5000 UNIT(S): 5000 INJECTION INTRAVENOUS; SUBCUTANEOUS at 22:00

## 2018-12-16 RX ADMIN — HEPARIN SODIUM 5000 UNIT(S): 5000 INJECTION INTRAVENOUS; SUBCUTANEOUS at 12:54

## 2018-12-16 RX ADMIN — Medication 650 MILLIGRAM(S): at 13:30

## 2018-12-16 RX ADMIN — PANTOPRAZOLE SODIUM 40 MILLIGRAM(S): 20 TABLET, DELAYED RELEASE ORAL at 11:52

## 2018-12-16 RX ADMIN — Medication 50 MICROGRAM(S): at 05:04

## 2018-12-16 RX ADMIN — Medication 650 MILLIGRAM(S): at 12:54

## 2018-12-16 RX ADMIN — Medication 81 MILLIGRAM(S): at 11:52

## 2018-12-16 RX ADMIN — PIPERACILLIN AND TAZOBACTAM 25 GRAM(S): 4; .5 INJECTION, POWDER, LYOPHILIZED, FOR SOLUTION INTRAVENOUS at 17:40

## 2018-12-16 RX ADMIN — PIPERACILLIN AND TAZOBACTAM 25 GRAM(S): 4; .5 INJECTION, POWDER, LYOPHILIZED, FOR SOLUTION INTRAVENOUS at 09:04

## 2018-12-16 NOTE — PROGRESS NOTE ADULT - ASSESSMENT
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    #  fever / leukocytosis  despite fevers, patient wbc stable  CRP elevated 11.96  c diff was negative from ostomy  rectal culture grow enterococcus faecalis and avium, fu sensitivities  cont zosyn per ID  urine and blood cultures NTD  straight cath UA neg, urine cx neg  CT head showed sphenoid sinusitis but still febrile on zosyn  CT A/P splenic lesion, renal cyst, splenic US shows stable cyst 1.6 x 1.9 x 1.6 cm  TTE no thrombus  if remain febrile, may need to consider HUONG, but at this point not the best candidate for this test so would like to avoid any unnecessary invasive procedures  endometrial fluid on pelvic sono - known rectovaginal fistula - appreciate gyn recs, patient and dtr refused exam, outpt fu with gyn    # LUIS / Dehydration  2/2 excessive ostomy output, creat improved, appreciate renal recs, strict i/o    # dysphagia  speech and swallow eval - pt refused MBS, ENT sp laryngoscopy showing pooling of secretions and nml vocal cords  dysphagia diet    # anemia  stool occult neg  no further workup indicated  ferrous sulfate    # HTN HLD  BP stable off meds, at times borderline hypotensive      DVT PPX   PT OOB to chair    dispo: fu cultures as remains febrile, ID following

## 2018-12-16 NOTE — PROGRESS NOTE ADULT - SUBJECTIVE AND OBJECTIVE BOX
Patient is a 85y old  Female who presents with a chief complaint of dehydration and LUIS (15 Dec 2018 15:18)      SUBJECTIVE / OVERNIGHT EVENTS:    Patient seen and examined. denies complaints. febrile yday 101.5. denies cp sob nvd.      Vital Signs Last 24 Hrs  T(C): 37.8 (16 Dec 2018 08:48), Max: 38.6 (15 Dec 2018 15:50)  T(F): 100 (16 Dec 2018 08:48), Max: 101.5 (15 Dec 2018 15:50)  HR: 96 (16 Dec 2018 08:48) (91 - 112)  BP: 112/68 (16 Dec 2018 08:48) (97/59 - 139/70)  BP(mean): --  RR: 18 (16 Dec 2018 08:48) (17 - 18)  SpO2: 94% (16 Dec 2018 08:48) (94% - 98%)  I&O's Summary    15 Dec 2018 07:01  -  16 Dec 2018 07:00  --------------------------------------------------------  IN: 1680 mL / OUT: 250 mL / NET: 1430 mL    16 Dec 2018 07:01  -  16 Dec 2018 10:55  --------------------------------------------------------  IN: 340 mL / OUT: 0 mL / NET: 340 mL        PE:  GENERAL: NAD, AAOx3, obese  HEAD:  Atraumatic, Normocephalic  EYES: EOMI, PERRLA, conjunctiva and sclera clear  NECK: Supple, No JVD  CHEST/LUNG: CTABL, No wheeze  HEART: Regular rate and rhythm; + murmur  ABDOMEN: Soft, Nontender, Nondistended; ileostomy, Bowel sounds present  EXTREMITIES:  2+ Peripheral Pulses, No clubbing, cyanosis, or edema  SKIN: No rashes or lesions  NEURO: No focal deficits    LABS:                        8.7    11.54 )-----------( 361      ( 16 Dec 2018 08:17 )             26.6     12-16    137  |  103  |  18  ----------------------------<  137<H>  4.2   |  23  |  1.07    Ca    8.4      16 Dec 2018 06:39        CAPILLARY BLOOD GLUCOSE                RADIOLOGY & ADDITIONAL TESTS:    Imaging Personally Reviewed:  [x] YES  [ ] NO    Consultant(s) Notes Reviewed:  [x] YES  [ ] NO    MEDICATIONS  (STANDING):  aspirin enteric coated 81 milliGRAM(s) Oral daily  ferrous    sulfate 325 milliGRAM(s) Oral daily  heparin  Injectable 5000 Unit(s) SubCutaneous every 8 hours  levothyroxine 50 MICROGram(s) Oral daily  multivitamin 1 Tablet(s) Oral daily  pantoprazole    Tablet 40 milliGRAM(s) Oral daily  piperacillin/tazobactam IVPB. 3.375 Gram(s) IV Intermittent every 8 hours    MEDICATIONS  (PRN):  acetaminophen   Tablet .. 650 milliGRAM(s) Oral every 6 hours PRN Temp greater or equal to 38.5C (101.3F), Mild Pain (1 - 3)  acetaminophen   Tablet .. 650 milliGRAM(s) Oral every 6 hours PRN Temp greater or equal to 38C (100.4F)  aluminum hydroxide/magnesium hydroxide/simethicone Suspension 30 milliLiter(s) Oral every 4 hours PRN Dyspepsia  nystatin Powder 1 Application(s) Topical two times a day PRN for rash  ondansetron Injectable 4 milliGRAM(s) IV Push every 6 hours PRN Nausea and/or Vomiting  prochlorperazine   Injectable 5 milliGRAM(s) IV Push every 8 hours PRN nausea      Care Discussed with Consultants/Other Providers [x] YES  [ ] NO    HEALTH ISSUES - PROBLEM Dx:  Hoarseness: Hoarseness  Leukocytosis: Leukocytosis  Advance care planning: Advance care planning  Prophylactic measure: Prophylactic measure  Hyponatremia: Hyponatremia  Depression: Depression  Essential hypertension: Essential hypertension  Hypothyroidism: Hypothyroidism  Acute cystitis without hematuria: Acute cystitis without hematuria  LUIS (acute kidney injury): LUIS (acute kidney injury)

## 2018-12-17 LAB
HCT VFR BLD CALC: 26.8 % — LOW (ref 34.5–45)
HGB BLD-MCNC: 8.9 G/DL — LOW (ref 11.5–15.5)
LACTATE SERPL-SCNC: 1.2 MMOL/L — SIGNIFICANT CHANGE UP (ref 0.7–2)
MCHC RBC-ENTMCNC: 30.3 PG — SIGNIFICANT CHANGE UP (ref 27–34)
MCHC RBC-ENTMCNC: 33.2 GM/DL — SIGNIFICANT CHANGE UP (ref 32–36)
MCV RBC AUTO: 91.2 FL — SIGNIFICANT CHANGE UP (ref 80–100)
PLATELET # BLD AUTO: 430 K/UL — HIGH (ref 150–400)
RBC # BLD: 2.94 M/UL — LOW (ref 3.8–5.2)
RBC # FLD: 14.2 % — SIGNIFICANT CHANGE UP (ref 10.3–14.5)
WBC # BLD: 15.25 K/UL — HIGH (ref 3.8–10.5)
WBC # FLD AUTO: 15.25 K/UL — HIGH (ref 3.8–10.5)

## 2018-12-17 PROCEDURE — 99233 SBSQ HOSP IP/OBS HIGH 50: CPT

## 2018-12-17 RX ADMIN — PANTOPRAZOLE SODIUM 40 MILLIGRAM(S): 20 TABLET, DELAYED RELEASE ORAL at 13:24

## 2018-12-17 RX ADMIN — PIPERACILLIN AND TAZOBACTAM 25 GRAM(S): 4; .5 INJECTION, POWDER, LYOPHILIZED, FOR SOLUTION INTRAVENOUS at 08:55

## 2018-12-17 RX ADMIN — Medication 50 MICROGRAM(S): at 06:14

## 2018-12-17 RX ADMIN — Medication 81 MILLIGRAM(S): at 13:24

## 2018-12-17 RX ADMIN — Medication 1 TABLET(S): at 13:24

## 2018-12-17 RX ADMIN — HEPARIN SODIUM 5000 UNIT(S): 5000 INJECTION INTRAVENOUS; SUBCUTANEOUS at 06:14

## 2018-12-17 RX ADMIN — Medication 325 MILLIGRAM(S): at 13:24

## 2018-12-17 RX ADMIN — PIPERACILLIN AND TAZOBACTAM 25 GRAM(S): 4; .5 INJECTION, POWDER, LYOPHILIZED, FOR SOLUTION INTRAVENOUS at 18:12

## 2018-12-17 RX ADMIN — PIPERACILLIN AND TAZOBACTAM 25 GRAM(S): 4; .5 INJECTION, POWDER, LYOPHILIZED, FOR SOLUTION INTRAVENOUS at 02:06

## 2018-12-17 RX ADMIN — HEPARIN SODIUM 5000 UNIT(S): 5000 INJECTION INTRAVENOUS; SUBCUTANEOUS at 21:32

## 2018-12-17 RX ADMIN — HEPARIN SODIUM 5000 UNIT(S): 5000 INJECTION INTRAVENOUS; SUBCUTANEOUS at 13:24

## 2018-12-17 NOTE — PROGRESS NOTE ADULT - SUBJECTIVE AND OBJECTIVE BOX
[facility] - QUINN Cornejo / QUINN Headley / ABDIAS Greene/ QUINN Fiore/ QUINN Meng/ FANI De León / ANDREY Pham / ELI Taylor  ---------------------------------------------------------------------------------------------------------------  seen and examined today for LUIS  Interval : Patient persistently febrile but less so since Zosyn started  VITALS:  T(F): 98.4 (12-17-18 @ 06:10), Max: 100.1 (12-16-18 @ 12:22)  HR: 94 (12-17-18 @ 06:10)  BP: 105/67 (12-17-18 @ 06:10)  RR: 18 (12-17-18 @ 06:10)  SpO2: 94% (12-17-18 @ 06:10)  Wt(kg): --    12-16 @ 07:01  -  12-17 @ 07:00  --------------------------------------------------------  IN: 1130 mL / OUT: 280 mL / NET: 850 mL      Physical Exam :-  Constitutional: NAD  Neck: Supple.  Respiratory: Bilateral equal breath sounds,  Cardiovascular: S1, S2 normal,  Gastrointestinal: Bowel Sounds present, soft, non tender.  Extremities: No edema  Neurological: Alert and Oriented x 3, no focal deficits  Psychiatric: Normal mood, normal affect  Data:-  Allergies :   iodine (Other; Anaphylaxis)  shellfish (Anaphylaxis)  sulfa drugs (Other)    Hospital Medications:   MEDICATIONS  (STANDING):  aspirin enteric coated 81 milliGRAM(s) Oral daily  ferrous    sulfate 325 milliGRAM(s) Oral daily  heparin  Injectable 5000 Unit(s) SubCutaneous every 8 hours  levothyroxine 50 MICROGram(s) Oral daily  multivitamin 1 Tablet(s) Oral daily  pantoprazole    Tablet 40 milliGRAM(s) Oral daily  piperacillin/tazobactam IVPB. 3.375 Gram(s) IV Intermittent every 8 hours    12-16    137  |  103  |  18  ----------------------------<  137<H>  4.2   |  23  |  1.07    Ca    8.4      16 Dec 2018 06:39      Creatinine Trend: 1.07 <--, 1.05 <--, 1.08 <--, 1.13 <--, 1.08 <--, 1.19 <--                        8.9    15.25 )-----------( 430      ( 17 Dec 2018 07:50 )             26.8

## 2018-12-17 NOTE — PROGRESS NOTE ADULT - SUBJECTIVE AND OBJECTIVE BOX
Patient is a 85y old  Female who presents with a chief complaint of dehydration and LUIS (17 Dec 2018 11:36)      SUBJECTIVE / OVERNIGHT EVENTS:    Patient seen and examined. denies cp sob nvd.       Vital Signs Last 24 Hrs  T(C): 36.9 (17 Dec 2018 06:10), Max: 37.8 (16 Dec 2018 12:22)  T(F): 98.4 (17 Dec 2018 06:10), Max: 100.1 (16 Dec 2018 12:22)  HR: 94 (17 Dec 2018 06:10) (90 - 105)  BP: 105/67 (17 Dec 2018 06:10) (105/67 - 129/75)  BP(mean): --  RR: 18 (17 Dec 2018 06:10) (18 - 18)  SpO2: 94% (17 Dec 2018 06:10) (85% - 96%)  I&O's Summary    16 Dec 2018 07:01  -  17 Dec 2018 07:00  --------------------------------------------------------  IN: 1130 mL / OUT: 280 mL / NET: 850 mL        PE:  GENERAL: NAD, AAOx3, obese  HEAD:  Atraumatic, Normocephalic  EYES: EOMI, PERRLA, conjunctiva and sclera clear  NECK: Supple, No JVD  CHEST/LUNG: CTABL, No wheeze  HEART: Regular rate and rhythm; + murmur  ABDOMEN: Soft, Nontender, Nondistended; Bowel sounds present  EXTREMITIES:  2+ Peripheral Pulses, No clubbing, cyanosis, or edema  SKIN: No rashes or lesions  NEURO: No focal deficits    LABS:                        8.9    15.25 )-----------( 430      ( 17 Dec 2018 07:50 )             26.8     12-16    137  |  103  |  18  ----------------------------<  137<H>  4.2   |  23  |  1.07    Ca    8.4      16 Dec 2018 06:39        CAPILLARY BLOOD GLUCOSE                RADIOLOGY & ADDITIONAL TESTS:    Imaging Personally Reviewed:  [x] YES  [ ] NO    Consultant(s) Notes Reviewed:  [x] YES  [ ] NO    MEDICATIONS  (STANDING):  aspirin enteric coated 81 milliGRAM(s) Oral daily  ferrous    sulfate 325 milliGRAM(s) Oral daily  heparin  Injectable 5000 Unit(s) SubCutaneous every 8 hours  levothyroxine 50 MICROGram(s) Oral daily  multivitamin 1 Tablet(s) Oral daily  pantoprazole    Tablet 40 milliGRAM(s) Oral daily  piperacillin/tazobactam IVPB. 3.375 Gram(s) IV Intermittent every 8 hours    MEDICATIONS  (PRN):  acetaminophen   Tablet .. 650 milliGRAM(s) Oral every 6 hours PRN Temp greater or equal to 38.5C (101.3F), Mild Pain (1 - 3)  aluminum hydroxide/magnesium hydroxide/simethicone Suspension 30 milliLiter(s) Oral every 4 hours PRN Dyspepsia  nystatin Powder 1 Application(s) Topical two times a day PRN for rash  ondansetron Injectable 4 milliGRAM(s) IV Push every 6 hours PRN Nausea and/or Vomiting  prochlorperazine   Injectable 5 milliGRAM(s) IV Push every 8 hours PRN nausea      Care Discussed with Consultants/Other Providers [x] YES  [ ] NO    HEALTH ISSUES - PROBLEM Dx:  Hoarseness: Hoarseness  Leukocytosis: Leukocytosis  Advance care planning: Advance care planning  Prophylactic measure: Prophylactic measure  Hyponatremia: Hyponatremia  Depression: Depression  Essential hypertension: Essential hypertension  Hypothyroidism: Hypothyroidism  Acute cystitis without hematuria: Acute cystitis without hematuria  LUIS (acute kidney injury): LUIS (acute kidney injury) Patient is a 85y old  Female who presents with a chief complaint of dehydration and LUIS (17 Dec 2018 11:36)      SUBJECTIVE / OVERNIGHT EVENTS:    Patient seen and examined. denies cp sob nvd. t max 100.1      Vital Signs Last 24 Hrs  T(C): 36.9 (17 Dec 2018 06:10), Max: 37.8 (16 Dec 2018 12:22)  T(F): 98.4 (17 Dec 2018 06:10), Max: 100.1 (16 Dec 2018 12:22)  HR: 94 (17 Dec 2018 06:10) (90 - 105)  BP: 105/67 (17 Dec 2018 06:10) (105/67 - 129/75)  BP(mean): --  RR: 18 (17 Dec 2018 06:10) (18 - 18)  SpO2: 94% (17 Dec 2018 06:10) (85% - 96%)  I&O's Summary    16 Dec 2018 07:01  -  17 Dec 2018 07:00  --------------------------------------------------------  IN: 1130 mL / OUT: 280 mL / NET: 850 mL        PE:  GENERAL: NAD, AAOx3, obese  HEAD:  Atraumatic, Normocephalic  EYES: EOMI, PERRLA, conjunctiva and sclera clear  NECK: Supple, No JVD  CHEST/LUNG: CTABL, No wheeze  HEART: Regular rate and rhythm; + murmur  ABDOMEN: Soft, Nontender, Nondistended; + ostomy, Bowel sounds present  EXTREMITIES:  2+ Peripheral Pulses, No clubbing, cyanosis, or edema  SKIN: No rashes or lesions  NEURO: No focal deficits    LABS:                        8.9    15.25 )-----------( 430      ( 17 Dec 2018 07:50 )             26.8     12-16    137  |  103  |  18  ----------------------------<  137<H>  4.2   |  23  |  1.07    Ca    8.4      16 Dec 2018 06:39        CAPILLARY BLOOD GLUCOSE                RADIOLOGY & ADDITIONAL TESTS:    Imaging Personally Reviewed:  [x] YES  [ ] NO    Consultant(s) Notes Reviewed:  [x] YES  [ ] NO    MEDICATIONS  (STANDING):  aspirin enteric coated 81 milliGRAM(s) Oral daily  ferrous    sulfate 325 milliGRAM(s) Oral daily  heparin  Injectable 5000 Unit(s) SubCutaneous every 8 hours  levothyroxine 50 MICROGram(s) Oral daily  multivitamin 1 Tablet(s) Oral daily  pantoprazole    Tablet 40 milliGRAM(s) Oral daily  piperacillin/tazobactam IVPB. 3.375 Gram(s) IV Intermittent every 8 hours    MEDICATIONS  (PRN):  acetaminophen   Tablet .. 650 milliGRAM(s) Oral every 6 hours PRN Temp greater or equal to 38.5C (101.3F), Mild Pain (1 - 3)  aluminum hydroxide/magnesium hydroxide/simethicone Suspension 30 milliLiter(s) Oral every 4 hours PRN Dyspepsia  nystatin Powder 1 Application(s) Topical two times a day PRN for rash  ondansetron Injectable 4 milliGRAM(s) IV Push every 6 hours PRN Nausea and/or Vomiting  prochlorperazine   Injectable 5 milliGRAM(s) IV Push every 8 hours PRN nausea      Care Discussed with Consultants/Other Providers [x] YES  [ ] NO    HEALTH ISSUES - PROBLEM Dx:  Hoarseness: Hoarseness  Leukocytosis: Leukocytosis  Advance care planning: Advance care planning  Prophylactic measure: Prophylactic measure  Hyponatremia: Hyponatremia  Depression: Depression  Essential hypertension: Essential hypertension  Hypothyroidism: Hypothyroidism  Acute cystitis without hematuria: Acute cystitis without hematuria  LUIS (acute kidney injury): LUIS (acute kidney injury)

## 2018-12-17 NOTE — PROGRESS NOTE ADULT - ASSESSMENT
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 Also, on admission, complained of low energy, fatigue for about a week.  The intermittent L flank pain as well during the PT session only. She currently has L flank pain was 8/10 - non radiating, moving exacerbated it. She had increased urinary urgency 4 days PTA.  Was placed on cipro by family member and her urinary complaints improved.  Drainage from rectum - unclear.  Sinusitis.  Worsening leukocytosis and continued fever.  Unclear source.  Possibly related to fistula.  R/u Cdiff of the stump    Suggest:  - continue zosyn  - GI to see  - contrast study or MRI to evaluate area of the fistula - will defer to GI  - if possible, send Cdiff of rectal discharge    above d/w NP on the floor

## 2018-12-17 NOTE — PROGRESS NOTE ADULT - SUBJECTIVE AND OBJECTIVE BOX
+ rectal secretions w/o bleeding today    Vital Signs Last 24 Hrs  T(C): 36.7 (17 Dec 2018 12:41), Max: 37.3 (16 Dec 2018 20:32)  T(F): 98 (17 Dec 2018 12:41), Max: 99.1 (16 Dec 2018 20:32)  HR: 94 (17 Dec 2018 13:59) (90 - 99)  BP: 104/63 (17 Dec 2018 13:59) (103/63 - 129/75)  BP(mean): --  RR: 18 (17 Dec 2018 13:59) (18 - 18)  SpO2: 96% (17 Dec 2018 13:59) (85% - 96%)    PHYSICAL EXAM:    Constitutional: NAD, well-developed  Neck: No LAD, supple  Respiratory: CTA and P  Cardiovascular: S1 and S2, RRR, no M  Gastrointestinal: BS+, soft, NT/ND, neg HSM,  Extremities: No peripheral edema, neg clubing, cyanosis  Vascular: 2+ peripheral pulses  Neurological: A/O x 3, no focal deficits  Psychiatric: Normal mood, normal affect  Skin: No rashes    MEDICATIONS  (STANDING):  aspirin enteric coated 81 milliGRAM(s) Oral daily  ferrous    sulfate 325 milliGRAM(s) Oral daily  heparin  Injectable 5000 Unit(s) SubCutaneous every 8 hours  levothyroxine 50 MICROGram(s) Oral daily  multivitamin 1 Tablet(s) Oral daily  pantoprazole    Tablet 40 milliGRAM(s) Oral daily  piperacillin/tazobactam IVPB. 3.375 Gram(s) IV Intermittent every 8 hours    MEDICATIONS  (PRN):  acetaminophen   Tablet .. 650 milliGRAM(s) Oral every 6 hours PRN Temp greater or equal to 38.5C (101.3F), Mild Pain (1 - 3)  aluminum hydroxide/magnesium hydroxide/simethicone Suspension 30 milliLiter(s) Oral every 4 hours PRN Dyspepsia  nystatin Powder 1 Application(s) Topical two times a day PRN for rash  ondansetron Injectable 4 milliGRAM(s) IV Push every 6 hours PRN Nausea and/or Vomiting  prochlorperazine   Injectable 5 milliGRAM(s) IV Push every 8 hours PRN nausea      Allergies    iodine (Other; Anaphylaxis)  shellfish (Anaphylaxis)  sulfa drugs (Other)    Intolerances          LABS:                        8.9    15.25 )-----------( 430      ( 17 Dec 2018 07:50 )             26.8                         8.9    13.7  )-----------( 386      ( 16 Dec 2018 15:17 )             26.4                         8.7    11.54 )-----------( 361      ( 16 Dec 2018 08:17 )             26.6     12-16    137  |  103  |  18  ----------------------------<  137<H>  4.2   |  23  |  1.07    Ca    8.4      16 Dec 2018 06:39            Lactate, Blood: 1.2 mmol/L (12-17-18 @ 07:04)      RADIOLOGY & ADDITIONAL TESTS:

## 2018-12-17 NOTE — PROGRESS NOTE ADULT - SUBJECTIVE AND OBJECTIVE BOX
Patient is a 85y old  Female who presents with a chief complaint of dehydration and LUIS (12 Dec 2018 13:57)    Being followed by ID for fevers    Interval History/ROS:  continues to have low grade fever.  wbc rising.  other than sinus congestions, wants to go home.  Rest of ROS otherwise negative.    Hospital course:  In the ED - LUIS with leukocytosis (04 Dec 2018 16:16)  CT--> Left lower pole stone, 4.7 cm in the right interpole ( kidney)? hemorrhagic cust, fluid distended cervical canal  Started on Rocephin   - seen by ID for low grade fever and Rocephin d/mart   - persistent leukocytosis noted ECHO ordered   - fevers persisted  12/10 - fevers   echo showed - Aortic valve not well visualized; appears mildly calcified.  Normal left ventricular internal dimensions and wall  thicknesses. Mid LV cavity obstruction noted with peak gradients = 60 mm Hg.  Hyperdynamic left ventricular systolic function.    - family requests a second ID opinion  Pt now with temps up to 103.3 (rectal)    PAST MEDICAL & SURGICAL HISTORY:  Fistula, perirectal  Hiatal hernia  Hypothyroidism  Hypertension  S/P foot joint surgery: right ( 10 years ago )  S/P arthroscopy of right knee  S/P ileostomy: colectomy (  )  S/P cholecystectomy: at age 20 ,s    Allergies  iodine (Other; Anaphylaxis)  shellfish (Anaphylaxis)  sulfa drugs (Other)    Antimicrobials:  piperacillin/tazobactam IVPB. 3.375 every 8 hours (-)    MEDICATIONS  (STANDING):  aspirin enteric coated 81 daily  heparin  Injectable 5000 every 8 hours  levothyroxine 50 daily  pantoprazole    Tablet 40 daily    Vital Signs Last 24 Hrs  T(F): 98.4 (18 @ 06:10), Max: 100.1 (18 @ 12:22)  HR: 94 (18 @ 06:10)  BP: 105/67 (18 @ 06:10)  RR: 18 (18 @ 06:10)  SpO2: 94% (18 @ 06:10) (85% - 96%)    PHYSICAL EXAM:  General: non-toxic; in bed; no distress  HEAD/EYES: anicteric  ENT:  supple; no thrush  Cardiovascular:   S1, S2  Respiratory:  clear bilaterally  GI:  soft, non-tender, normal bowel sounds; ileostomy c/d/i  :  no foely  Musculoskeletal:  no synovitis  Neurologic:  grossly non-focal  Skin:  no rash  Psychiatric:  appropriate affect  Vascular:  PIV okay    WBC Count: 15.25 (18 @ 07:50)  WBC Count: 13.7 (18 @ 15:17)  WBC Count: 11.54 (18 @ 08:17)  WBC Count: 9.74 (12-15-18 @ 09:18)                          8.9    15.25 )-----------( 430      ( 17 Dec 2018 07:50 )             26.8     137  |  103  |  18  ----------------------------<  137  4.2   |  23  |  1.07  Ca    8.4      16 Dec 2018 06:39     Urinalysis Basic - ( 12 Dec 2018 17:35 )  Color: Light Yellow / Appearance: Clear / S.015 / pH: x  Gluc: x / Ketone: Negative  / Bili: Negative / Urobili: Negative   Blood: x / Protein: Trace / Nitrite: Negative   Leuk Esterase: Small / RBC: 5 /hpf / WBC 8 /hpf   Sq Epi: x / Non Sq Epi: 1 /hpf / Bacteria: Negative    MICROBIOLOGY:  Culture - Blood (12.15.18 @ 00:45)    Specimen Source: .Blood Blood-Peripheral    Culture Results:   No growth to date.    Culture - Blood (12.15.18 @ 00:45)    Specimen Source: .Blood Blood-Peripheral    Culture Results:   No growth to date.    Culture - Abscess with Gram Stain (18 @ 06:29) - this is really rectal drainage    -  Vancomycin: S 0.5    -  Vancomycin: S 1    -  Tetra/Doxy: R >8    -  Tetra/Doxy: R >8    -  Ampicillin: S <=2 Predicts results to ampicillin/sulbactam, amoxacillin-clavulanate and  piperacillin-tazobactam.    -  Ampicillin: S <=2 Predicts results to ampicillin/sulbactam, amoxacillin-clavulanate and  piperacillin-tazobactam.    Specimen Source: .Abscess Rectal drainage    Culture Results:   Few Enterococcus faecalis  Few Enterococcus avium  Moderate Streptococcus anginosus "Susceptibilities not performed"  Moderate Prevotella bivia "Susceptibilities not performed"    Organism Identification: Enterococcus faecalis  Enterococcus avium    Organism: Enterococcus faecalis    Organism: Enterococcus avium    Method Type: PALMER    Method Type: PALMER    Culture - Urine (18 @ 19:41)    Specimen Source: .Urine Catheterized    Culture Results:   No growth    .Blood Blood-Peripheral  18   No growth to date.  --  --    .Stool Feces  18   GI PCR Results: NOT detected    .Blood Blood-Peripheral  18   No growth to date.  --  --    .Blood Blood-Peripheral  18   No growth to date.  --  --    .Urine Clean Catch (Midstream)  18   No growth  --  --    .Blood Blood  18   No growth at 5 days.  --  --    RADIOLOGY:  CT Head No Cont (18 @ 16:18) >  IPRESSION: No evidence of acute intracranial hemorrhage, mass effect, midline shift, or hydrocephalus. If symptoms persist, consider short interval follow-up head CT or brain MRI follow-up.  Findings suggesting sphenoid sinusitis.    US Spleen (18 @ 10:53) >  IMPRESSION: Splenic cyst, unchanged since 2018.    US Pelvis Complete (12.10.18 @ 15:19) >  IMPRESSION:  Distended endometrial cavity and endocervical canal containing complex fluid likely related to patient's rectovaginal fistula. Please correlate with patient's clinical history and obtain further evaluation as warranted.    CT Abdomen and Pelvis No Cont (18 @ 11:21) >  IMPRESSION:   A stable 0.6 cm nonobstructing left lower pole stone. No hydronephrosis.  A 4.7 cm indeterminate slightly hyperdense lesion at the right interpole, may reflect a hemorrhagic cyst. Consider further characterization with ultrasound.  Fluid distended cervical canal. Recommend clinical correlation and consider pelvic ultrasound for further evaluation.

## 2018-12-17 NOTE — PROGRESS NOTE ADULT - ASSESSMENT
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

## 2018-12-17 NOTE — PROGRESS NOTE ADULT - PROBLEM SELECTOR PLAN 1
cr stable, electrolytes acceptable. Tolerating PO.    encourage po intake  supplement Phos as needed  trend bmp, off IVF  persistent fever to be managed by primary and Infectious disease specialist team

## 2018-12-17 NOTE — PROGRESS NOTE ADULT - ASSESSMENT
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    #  fever / leukocytosis  low grade fever now leukocytosis uptrending  CRP elevated 11.96  c diff was negative from ostomy  rectal culture grow enterococcus faecalis and avium  will check MRI abd with contrast to evaluate fistula  dw dtr who is in agreement if MRI shows abnormality with fistula she would be agreeable to gyn eval  cont zosyn per ID  urine and blood cultures NTD  straight cath UA neg, urine cx neg  CT head showed sphenoid sinusitis but still febrile on zosyn  CT A/P splenic lesion, renal cyst, splenic US shows stable cyst 1.6 x 1.9 x 1.6 cm  TTE no thrombus  if remain febrile, may need to consider HUONG, but at this point not the best candidate for this test so would like to avoid any unnecessary invasive procedures  endometrial fluid on pelvic sono - known rectovaginal fistula - appreciate gyn recs, patient and dtr refused exam at this time    # LUIS / Dehydration  2/2 excessive ostomy output, creat improved, appreciate renal recs, strict i/o    # dysphagia  speech and swallow eval - pt refused MBS, ENT sp laryngoscopy showing pooling of secretions and nml vocal cords  dysphagia diet    # anemia  stool occult neg  despite red drainage from rectum, h/h stable  no further workup indicated  ferrous sulfate    # HTN HLD  BP stable off meds, at times borderline hypotensive      DVT PPX   PT OOB to chair    dispo: pending MRI abd

## 2018-12-18 LAB
ANION GAP SERPL CALC-SCNC: 12 MMOL/L — SIGNIFICANT CHANGE UP (ref 5–17)
BUN SERPL-MCNC: 14 MG/DL — SIGNIFICANT CHANGE UP (ref 7–23)
CALCIUM SERPL-MCNC: 9 MG/DL — SIGNIFICANT CHANGE UP (ref 8.4–10.5)
CHLORIDE SERPL-SCNC: 101 MMOL/L — SIGNIFICANT CHANGE UP (ref 96–108)
CO2 SERPL-SCNC: 24 MMOL/L — SIGNIFICANT CHANGE UP (ref 22–31)
CREAT SERPL-MCNC: 1.02 MG/DL — SIGNIFICANT CHANGE UP (ref 0.5–1.3)
GLUCOSE SERPL-MCNC: 153 MG/DL — HIGH (ref 70–99)
HCT VFR BLD CALC: 27.4 % — LOW (ref 34.5–45)
HGB BLD-MCNC: 8.9 G/DL — LOW (ref 11.5–15.5)
MCHC RBC-ENTMCNC: 29.7 PG — SIGNIFICANT CHANGE UP (ref 27–34)
MCHC RBC-ENTMCNC: 32.5 GM/DL — SIGNIFICANT CHANGE UP (ref 32–36)
MCV RBC AUTO: 91.3 FL — SIGNIFICANT CHANGE UP (ref 80–100)
PLATELET # BLD AUTO: 438 K/UL — HIGH (ref 150–400)
POTASSIUM SERPL-MCNC: 4.1 MMOL/L — SIGNIFICANT CHANGE UP (ref 3.5–5.3)
POTASSIUM SERPL-SCNC: 4.1 MMOL/L — SIGNIFICANT CHANGE UP (ref 3.5–5.3)
RBC # BLD: 3 M/UL — LOW (ref 3.8–5.2)
RBC # FLD: 14.5 % — SIGNIFICANT CHANGE UP (ref 10.3–14.5)
SODIUM SERPL-SCNC: 137 MMOL/L — SIGNIFICANT CHANGE UP (ref 135–145)
WBC # BLD: 12.64 K/UL — HIGH (ref 3.8–10.5)
WBC # FLD AUTO: 12.64 K/UL — HIGH (ref 3.8–10.5)

## 2018-12-18 PROCEDURE — 72197 MRI PELVIS W/O & W/DYE: CPT | Mod: 26

## 2018-12-18 PROCEDURE — 71045 X-RAY EXAM CHEST 1 VIEW: CPT | Mod: 26

## 2018-12-18 PROCEDURE — 99232 SBSQ HOSP IP/OBS MODERATE 35: CPT

## 2018-12-18 RX ORDER — IPRATROPIUM/ALBUTEROL SULFATE 18-103MCG
3 AEROSOL WITH ADAPTER (GRAM) INHALATION ONCE
Qty: 0 | Refills: 0 | Status: COMPLETED | OUTPATIENT
Start: 2018-12-18 | End: 2018-12-18

## 2018-12-18 RX ADMIN — Medication 325 MILLIGRAM(S): at 17:56

## 2018-12-18 RX ADMIN — PIPERACILLIN AND TAZOBACTAM 25 GRAM(S): 4; .5 INJECTION, POWDER, LYOPHILIZED, FOR SOLUTION INTRAVENOUS at 00:23

## 2018-12-18 RX ADMIN — Medication 3 MILLILITER(S): at 09:07

## 2018-12-18 RX ADMIN — Medication 81 MILLIGRAM(S): at 17:56

## 2018-12-18 RX ADMIN — HEPARIN SODIUM 5000 UNIT(S): 5000 INJECTION INTRAVENOUS; SUBCUTANEOUS at 05:37

## 2018-12-18 RX ADMIN — Medication 1 TABLET(S): at 17:56

## 2018-12-18 RX ADMIN — PIPERACILLIN AND TAZOBACTAM 25 GRAM(S): 4; .5 INJECTION, POWDER, LYOPHILIZED, FOR SOLUTION INTRAVENOUS at 17:56

## 2018-12-18 RX ADMIN — HEPARIN SODIUM 5000 UNIT(S): 5000 INJECTION INTRAVENOUS; SUBCUTANEOUS at 21:15

## 2018-12-18 RX ADMIN — Medication 50 MICROGRAM(S): at 05:37

## 2018-12-18 RX ADMIN — PIPERACILLIN AND TAZOBACTAM 25 GRAM(S): 4; .5 INJECTION, POWDER, LYOPHILIZED, FOR SOLUTION INTRAVENOUS at 08:38

## 2018-12-18 RX ADMIN — ONDANSETRON 4 MILLIGRAM(S): 8 TABLET, FILM COATED ORAL at 22:11

## 2018-12-18 RX ADMIN — PANTOPRAZOLE SODIUM 40 MILLIGRAM(S): 20 TABLET, DELAYED RELEASE ORAL at 17:56

## 2018-12-18 NOTE — PROGRESS NOTE ADULT - ASSESSMENT
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  GYN eval    [phone]

## 2018-12-18 NOTE — PROGRESS NOTE ADULT - SUBJECTIVE AND OBJECTIVE BOX
Patient is a 85y old  Female who presents with a chief complaint of dehydration and LUIS (18 Dec 2018 09:27)      SUBJECTIVE / OVERNIGHT EVENTS:    Patient seen and examined.   afebrile. denies cp sob nvd cough abd pain. with rectal drainage but normal per dtr.  dyspnea this morning improved with chest PT and neb    Vital Signs Last 24 Hrs  T(C): 37.1 (18 Dec 2018 05:31), Max: 37.6 (17 Dec 2018 20:36)  T(F): 98.7 (18 Dec 2018 05:31), Max: 99.6 (17 Dec 2018 20:36)  HR: 98 (18 Dec 2018 09:08) (94 - 107)  BP: 133/81 (18 Dec 2018 09:08) (104/63 - 136/81)  BP(mean): --  RR: 18 (18 Dec 2018 09:08) (17 - 18)  SpO2: 92% (18 Dec 2018 09:08) (92% - 96%)  I&O's Summary    17 Dec 2018 07:01  -  18 Dec 2018 07:00  --------------------------------------------------------  IN: 1540 mL / OUT: 400 mL / NET: 1140 mL        PE:  GENERAL: NAD, AAOx3, obese  HEAD:  Atraumatic, Normocephalic  EYES: EOMI, PERRLA, conjunctiva and sclera clear  NECK: Supple, No JVD  CHEST/LUNG: CTABL, No wheeze  HEART: Regular rate and rhythm; + murmur  ABDOMEN: Soft, Nontender, Nondistended; ileostomy, Bowel sounds present  EXTREMITIES:  2+ Peripheral Pulses, No clubbing, cyanosis, or edema  SKIN: No rashes or lesions  NEURO: No focal deficits    LABS:                        8.9    12.64 )-----------( 438      ( 18 Dec 2018 09:34 )             27.4     12-18    137  |  101  |  14  ----------------------------<  153<H>  4.1   |  24  |  1.02    Ca    9.0      18 Dec 2018 07:39        CAPILLARY BLOOD GLUCOSE                RADIOLOGY & ADDITIONAL TESTS:    Imaging Personally Reviewed:  [x] YES  [ ] NO    Consultant(s) Notes Reviewed:  [x] YES  [ ] NO    MEDICATIONS  (STANDING):  aspirin enteric coated 81 milliGRAM(s) Oral daily  ferrous    sulfate 325 milliGRAM(s) Oral daily  heparin  Injectable 5000 Unit(s) SubCutaneous every 8 hours  levothyroxine 50 MICROGram(s) Oral daily  multivitamin 1 Tablet(s) Oral daily  pantoprazole    Tablet 40 milliGRAM(s) Oral daily  piperacillin/tazobactam IVPB. 3.375 Gram(s) IV Intermittent every 8 hours    MEDICATIONS  (PRN):  acetaminophen   Tablet .. 650 milliGRAM(s) Oral every 6 hours PRN Temp greater or equal to 38.5C (101.3F), Mild Pain (1 - 3)  aluminum hydroxide/magnesium hydroxide/simethicone Suspension 30 milliLiter(s) Oral every 4 hours PRN Dyspepsia  nystatin Powder 1 Application(s) Topical two times a day PRN for rash  ondansetron Injectable 4 milliGRAM(s) IV Push every 6 hours PRN Nausea and/or Vomiting  prochlorperazine   Injectable 5 milliGRAM(s) IV Push every 8 hours PRN nausea      Care Discussed with Consultants/Other Providers [x] YES  [ ] NO    HEALTH ISSUES - PROBLEM Dx:  Hoarseness: Hoarseness  Leukocytosis: Leukocytosis  Advance care planning: Advance care planning  Prophylactic measure: Prophylactic measure  Hyponatremia: Hyponatremia  Depression: Depression  Essential hypertension: Essential hypertension  Hypothyroidism: Hypothyroidism  Acute cystitis without hematuria: Acute cystitis without hematuria  LUIS (acute kidney injury): LUIS (acute kidney injury)

## 2018-12-18 NOTE — PROGRESS NOTE ADULT - ASSESSMENT
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    #  fever / leukocytosis  fevers now resolved and leukocytosis downtrending   CRP elevated 11.96  rectal culture grow enterococcus faecalis and avium  check MRI abd with contrast to evaluate fistula ro abscess  dw dtr who is in agreement if MRI shows abnormality with fistula she would be agreeable to gyn eval  cont zosyn per ID  urine and blood cultures NTD, can repeat BC if febrile  CT head showed sphenoid sinusitis  CT A/P splenic lesion, renal cyst, splenic US shows stable cyst 1.6 x 1.9 x 1.6 cm  TTE no thrombus  endometrial fluid on pelvic sono - known rectovaginal fistula - appreciate gyn recs, patient and dtr refused exam at this time but agreeable if MRI abnormal    # LUIS / Dehydration  2/2 excessive ostomy output, creat improved, appreciate renal recs, strict i/o    # dysphagia  speech and swallow eval - pt refused MBS, ENT sp laryngoscopy showing pooling of secretions and nml vocal cords  dysphagia diet    # anemia  stool occult neg  despite red drainage from rectum, h/h stable  no further workup indicated  ferrous sulfate    # HTN HLD  BP stable off meds, at times borderline hypotensive      DVT PPX   PT OOB to chair    dispo: pending MRI abd

## 2018-12-18 NOTE — PROGRESS NOTE ADULT - PROBLEM SELECTOR PLAN 1
cr stable, electrolytes acceptable. Tolerating PO.    encourage po intake  supplement Phos as needed  trend bmp, off IVF  encourage po intake

## 2018-12-18 NOTE — PROGRESS NOTE ADULT - SUBJECTIVE AND OBJECTIVE BOX
Patient seen and examined  no complaints    iodine (Other; Anaphylaxis)  shellfish (Anaphylaxis)  sulfa drugs (Other)    Hospital Medications:   MEDICATIONS  (STANDING):  aspirin enteric coated 81 milliGRAM(s) Oral daily  ferrous    sulfate 325 milliGRAM(s) Oral daily  heparin  Injectable 5000 Unit(s) SubCutaneous every 8 hours  levothyroxine 50 MICROGram(s) Oral daily  multivitamin 1 Tablet(s) Oral daily  pantoprazole    Tablet 40 milliGRAM(s) Oral daily  piperacillin/tazobactam IVPB. 3.375 Gram(s) IV Intermittent every 8 hours        VITALS:  T(F): 98.7 (18 @ 05:31), Max: 99.6 (18 @ 20:36)  HR: 98 (18 @ 09:08)  BP: 133/81 (18 @ 09:08)  RR: 18 (18 @ 09:08)  SpO2: 92% (18 @ 09:08)  Wt(kg): --     @ 07:01  -   @ 07:00  --------------------------------------------------------  IN: 1540 mL / OUT: 400 mL / NET: 1140 mL    Physical Exam :-  Constitutional: NAD  Neck: Supple.  Respiratory: Bilateral equal breath sounds,  Cardiovascular: S1, S2 normal,  Gastrointestinal: Bowel Sounds present, soft, non tender.  Extremities: No edema  Neurological: Alert and Oriented x 3, no focal deficits  Psychiatric: Normal mood, normal affect  LABS:      137  |  101  |  14  ----------------------------<  153<H>  4.1   |  24  |  1.02    Ca    9.0      18 Dec 2018 07:39      Creatinine Trend: 1.02 <--, 1.07 <--, 1.05 <--, 1.08 <--, 1.13 <--, 1.08 <--                        8.9    12.64 )-----------( 438      ( 18 Dec 2018 09:34 )             27.4     Urine Studies:  Urinalysis Basic - ( 12 Dec 2018 17:35 )    Color: Light Yellow / Appearance: Clear / S.015 / pH:   Gluc:  / Ketone: Negative  / Bili: Negative / Urobili: Negative   Blood:  / Protein: Trace / Nitrite: Negative   Leuk Esterase: Small / RBC: 5 /hpf / WBC 8 /hpf   Sq Epi:  / Non Sq Epi: 1 /hpf / Bacteria: Negative        RADIOLOGY & ADDITIONAL STUDIES:

## 2018-12-18 NOTE — PROGRESS NOTE ADULT - ATTENDING COMMENTS
plan of care dw patient and dtr at bedside  dw NP      Dr. Velasquez will take over care tomorrow 12/19/18.  Please contact with any questions or concerns [phone].

## 2018-12-18 NOTE — PROGRESS NOTE ADULT - SUBJECTIVE AND OBJECTIVE BOX
No further rectal bleeding.    Vital Signs Last 24 Hrs  T(C): 37.1 (18 Dec 2018 05:31), Max: 37.6 (17 Dec 2018 20:36)  T(F): 98.7 (18 Dec 2018 05:31), Max: 99.6 (17 Dec 2018 20:36)  HR: 98 (18 Dec 2018 09:08) (94 - 107)  BP: 133/81 (18 Dec 2018 09:08) (103/63 - 136/81)  BP(mean): --  RR: 18 (18 Dec 2018 09:08) (17 - 18)  SpO2: 92% (18 Dec 2018 09:08) (92% - 96%)    PHYSICAL EXAM:    Constitutional: NAD, well-developed  Neck: No LAD, supple  Respiratory: CTA and P  Cardiovascular: S1 and S2, RRR, no M  Gastrointestinal: BS+, soft, NT/ND, neg HSM,  Extremities: No peripheral edema, neg clubing, cyanosis  Vascular: 2+ peripheral pulses  Neurological: A/O x 3, no focal deficits  Psychiatric: Normal mood, normal affect  Skin: No rashes    MEDICATIONS  (STANDING):  aspirin enteric coated 81 milliGRAM(s) Oral daily  ferrous    sulfate 325 milliGRAM(s) Oral daily  heparin  Injectable 5000 Unit(s) SubCutaneous every 8 hours  levothyroxine 50 MICROGram(s) Oral daily  multivitamin 1 Tablet(s) Oral daily  pantoprazole    Tablet 40 milliGRAM(s) Oral daily  piperacillin/tazobactam IVPB. 3.375 Gram(s) IV Intermittent every 8 hours    MEDICATIONS  (PRN):  acetaminophen   Tablet .. 650 milliGRAM(s) Oral every 6 hours PRN Temp greater or equal to 38.5C (101.3F), Mild Pain (1 - 3)  aluminum hydroxide/magnesium hydroxide/simethicone Suspension 30 milliLiter(s) Oral every 4 hours PRN Dyspepsia  nystatin Powder 1 Application(s) Topical two times a day PRN for rash  ondansetron Injectable 4 milliGRAM(s) IV Push every 6 hours PRN Nausea and/or Vomiting  prochlorperazine   Injectable 5 milliGRAM(s) IV Push every 8 hours PRN nausea      Allergies    iodine (Other; Anaphylaxis)  shellfish (Anaphylaxis)  sulfa drugs (Other)    Intolerances          LABS:                        8.9    15.25 )-----------( 430      ( 17 Dec 2018 07:50 )             26.8     12-18    137  |  101  |  14  ----------------------------<  153<H>  4.1   |  24  |  1.02    Ca    9.0      18 Dec 2018 07:39        Lactate, Blood: 1.2 mmol/L (12-17-18 @ 07:04)      RADIOLOGY & ADDITIONAL TESTS:

## 2018-12-18 NOTE — PROGRESS NOTE ADULT - SUBJECTIVE AND OBJECTIVE BOX
Patient is a 85y old  Female who presents with a chief complaint of dehydration and LUIS (18 Dec 2018 13:26)    Being followed by ID for fevers    Pt was in MRI during multiple attempts to evaluate   will see in morning  reviewed event s with NP      Antimicrobials:    piperacillin/tazobactam IVPB. 3.375 Gram(s) IV Intermittent every 8 hours      Vital Signs Last 24 Hrs  T(C): 37.1 (12-18-18 @ 05:31), Max: 37.6 (12-17-18 @ 20:36)  T(F): 98.7 (12-18-18 @ 05:31), Max: 99.6 (12-17-18 @ 20:36)  HR: 98 (12-18-18 @ 09:08) (96 - 107)  BP: 133/81 (12-18-18 @ 09:08) (114/76 - 136/81)  BP(mean): --  RR: 18 (12-18-18 @ 09:08) (17 - 18)  SpO2: 92% (12-18-18 @ 09:08) (92% - 95%)      Lab Data:                          8.9    12.64 )-----------( 438      ( 18 Dec 2018 09:34 )             27.4       12-18    137  |  101  |  14  ----------------------------<  153<H>  4.1   |  24  |  1.02    Ca    9.0      18 Dec 2018 07:39          .Blood Blood-Peripheral  12-15-18   No growth to date.  --  --      .Abscess Rectal drainage  12-14-18   Few Enterococcus faecalis  Few Enterococcus avium  Moderate Streptococcus anginosus "Susceptibilities not performed"  Moderate Prevotella bivia "Susceptibilities not performed"  --  Enterococcus faecalis  Enterococcus avium      .Urine Catheterized  12-12-18   No growth  --  --      .Blood Blood-Peripheral  12-11-18   No growth at 5 days.  --  --

## 2018-12-18 NOTE — PROGRESS NOTE ADULT - ASSESSMENT
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 Also, on admission, complained of low energy, fatigue for about a week.  The intermittent L flank pain as well during the PT session only. She currently has L flank pain was 8/10 - non radiating, moving exacerbated it. She had increased urinary urgency 4 days PTA.  Was placed on cipro by family member and her urinary complaints improved.  Drainage from rectum - unclear.  Sinusitis.  Worsening leukocytosis and continued fever.  Unclear source.  Possibly related to fistula.  R/u Cdiff of the stump    Suggest:  - continue zosyn  - GI to see  - contrast study or MRI to evaluate area of the fistula - will defer to GI  - if possible, send Cdiff of rectal discharge- reviewed with NP who will attempt to send    above d/w NP on the floor

## 2018-12-19 LAB
ANION GAP SERPL CALC-SCNC: 13 MMOL/L — SIGNIFICANT CHANGE UP (ref 5–17)
ANION GAP SERPL CALC-SCNC: 15 MMOL/L — SIGNIFICANT CHANGE UP (ref 5–17)
BASOPHILS # BLD AUTO: 0 K/UL — SIGNIFICANT CHANGE UP (ref 0–0.2)
BASOPHILS NFR BLD AUTO: 0 % — SIGNIFICANT CHANGE UP (ref 0–2)
BUN SERPL-MCNC: 17 MG/DL — SIGNIFICANT CHANGE UP (ref 7–23)
BUN SERPL-MCNC: 17 MG/DL — SIGNIFICANT CHANGE UP (ref 7–23)
CALCIUM SERPL-MCNC: 8.8 MG/DL — SIGNIFICANT CHANGE UP (ref 8.4–10.5)
CALCIUM SERPL-MCNC: 8.9 MG/DL — SIGNIFICANT CHANGE UP (ref 8.4–10.5)
CHLORIDE SERPL-SCNC: 100 MMOL/L — SIGNIFICANT CHANGE UP (ref 96–108)
CHLORIDE SERPL-SCNC: 102 MMOL/L — SIGNIFICANT CHANGE UP (ref 96–108)
CK MB CFR SERPL CALC: 1.4 NG/ML — SIGNIFICANT CHANGE UP (ref 0–3.8)
CK SERPL-CCNC: 16 U/L — LOW (ref 25–170)
CO2 SERPL-SCNC: 22 MMOL/L — SIGNIFICANT CHANGE UP (ref 22–31)
CO2 SERPL-SCNC: 23 MMOL/L — SIGNIFICANT CHANGE UP (ref 22–31)
CREAT SERPL-MCNC: 1.11 MG/DL — SIGNIFICANT CHANGE UP (ref 0.5–1.3)
CREAT SERPL-MCNC: 1.12 MG/DL — SIGNIFICANT CHANGE UP (ref 0.5–1.3)
EOSINOPHIL # BLD AUTO: 0.13 K/UL — SIGNIFICANT CHANGE UP (ref 0–0.5)
EOSINOPHIL NFR BLD AUTO: 1 % — SIGNIFICANT CHANGE UP (ref 0–6)
GLUCOSE SERPL-MCNC: 157 MG/DL — HIGH (ref 70–99)
GLUCOSE SERPL-MCNC: 241 MG/DL — HIGH (ref 70–99)
HCT VFR BLD CALC: 26 % — LOW (ref 34.5–45)
HGB BLD-MCNC: 8.6 G/DL — LOW (ref 11.5–15.5)
LYMPHOCYTES # BLD AUTO: 1.52 K/UL — SIGNIFICANT CHANGE UP (ref 1–3.3)
LYMPHOCYTES # BLD AUTO: 12 % — LOW (ref 13–44)
MACROCYTES BLD QL: SLIGHT — SIGNIFICANT CHANGE UP
MAGNESIUM SERPL-MCNC: 1.6 MG/DL — SIGNIFICANT CHANGE UP (ref 1.6–2.6)
MANUAL SMEAR VERIFICATION: SIGNIFICANT CHANGE UP
MCHC RBC-ENTMCNC: 29.4 PG — SIGNIFICANT CHANGE UP (ref 27–34)
MCHC RBC-ENTMCNC: 33.1 GM/DL — SIGNIFICANT CHANGE UP (ref 32–36)
MCV RBC AUTO: 88.7 FL — SIGNIFICANT CHANGE UP (ref 80–100)
METAMYELOCYTES # FLD: 1 % — HIGH (ref 0–0)
MONOCYTES # BLD AUTO: 0.63 K/UL — SIGNIFICANT CHANGE UP (ref 0–0.9)
MONOCYTES NFR BLD AUTO: 5 % — SIGNIFICANT CHANGE UP (ref 2–14)
MYELOCYTES NFR BLD: 2 % — HIGH (ref 0–0)
NEUTROPHILS # BLD AUTO: 9.99 K/UL — HIGH (ref 1.8–7.4)
NEUTROPHILS NFR BLD AUTO: 79 % — HIGH (ref 43–77)
NRBC # BLD: 0 /100 — SIGNIFICANT CHANGE UP (ref 0–0)
PHOSPHATE SERPL-MCNC: 2.2 MG/DL — LOW (ref 2.5–4.5)
PLAT MORPH BLD: NORMAL — SIGNIFICANT CHANGE UP
PLATELET # BLD AUTO: 390 K/UL — SIGNIFICANT CHANGE UP (ref 150–400)
POTASSIUM SERPL-MCNC: 3.9 MMOL/L — SIGNIFICANT CHANGE UP (ref 3.5–5.3)
POTASSIUM SERPL-MCNC: 4.1 MMOL/L — SIGNIFICANT CHANGE UP (ref 3.5–5.3)
POTASSIUM SERPL-SCNC: 3.9 MMOL/L — SIGNIFICANT CHANGE UP (ref 3.5–5.3)
POTASSIUM SERPL-SCNC: 4.1 MMOL/L — SIGNIFICANT CHANGE UP (ref 3.5–5.3)
RBC # BLD: 2.93 M/UL — LOW (ref 3.8–5.2)
RBC # FLD: 14.8 % — HIGH (ref 10.3–14.5)
RBC BLD AUTO: ABNORMAL
SODIUM SERPL-SCNC: 137 MMOL/L — SIGNIFICANT CHANGE UP (ref 135–145)
SODIUM SERPL-SCNC: 138 MMOL/L — SIGNIFICANT CHANGE UP (ref 135–145)
TROPONIN T, HIGH SENSITIVITY RESULT: 27 NG/L — SIGNIFICANT CHANGE UP (ref 0–51)
WBC # BLD: 12.29 K/UL — HIGH (ref 3.8–10.5)
WBC # FLD AUTO: 12.29 K/UL — HIGH (ref 3.8–10.5)

## 2018-12-19 PROCEDURE — 99233 SBSQ HOSP IP/OBS HIGH 50: CPT

## 2018-12-19 PROCEDURE — 93010 ELECTROCARDIOGRAM REPORT: CPT

## 2018-12-19 RX ORDER — SODIUM,POTASSIUM PHOSPHATES 278-250MG
1 POWDER IN PACKET (EA) ORAL ONCE
Qty: 0 | Refills: 0 | Status: COMPLETED | OUTPATIENT
Start: 2018-12-19 | End: 2018-12-19

## 2018-12-19 RX ORDER — MAGNESIUM SULFATE 500 MG/ML
1 VIAL (ML) INJECTION ONCE
Qty: 0 | Refills: 0 | Status: COMPLETED | OUTPATIENT
Start: 2018-12-19 | End: 2018-12-19

## 2018-12-19 RX ADMIN — HEPARIN SODIUM 5000 UNIT(S): 5000 INJECTION INTRAVENOUS; SUBCUTANEOUS at 05:57

## 2018-12-19 RX ADMIN — PIPERACILLIN AND TAZOBACTAM 25 GRAM(S): 4; .5 INJECTION, POWDER, LYOPHILIZED, FOR SOLUTION INTRAVENOUS at 09:49

## 2018-12-19 RX ADMIN — Medication 5 MILLIGRAM(S): at 20:10

## 2018-12-19 RX ADMIN — Medication 1 TABLET(S): at 13:33

## 2018-12-19 RX ADMIN — PANTOPRAZOLE SODIUM 40 MILLIGRAM(S): 20 TABLET, DELAYED RELEASE ORAL at 13:32

## 2018-12-19 RX ADMIN — Medication 1 PACKET(S): at 23:12

## 2018-12-19 RX ADMIN — HEPARIN SODIUM 5000 UNIT(S): 5000 INJECTION INTRAVENOUS; SUBCUTANEOUS at 13:33

## 2018-12-19 RX ADMIN — Medication 50 MICROGRAM(S): at 05:57

## 2018-12-19 RX ADMIN — Medication 81 MILLIGRAM(S): at 13:33

## 2018-12-19 RX ADMIN — PIPERACILLIN AND TAZOBACTAM 25 GRAM(S): 4; .5 INJECTION, POWDER, LYOPHILIZED, FOR SOLUTION INTRAVENOUS at 00:53

## 2018-12-19 RX ADMIN — PIPERACILLIN AND TAZOBACTAM 25 GRAM(S): 4; .5 INJECTION, POWDER, LYOPHILIZED, FOR SOLUTION INTRAVENOUS at 18:15

## 2018-12-19 RX ADMIN — Medication 100 GRAM(S): at 23:02

## 2018-12-19 RX ADMIN — Medication 325 MILLIGRAM(S): at 13:33

## 2018-12-19 RX ADMIN — HEPARIN SODIUM 5000 UNIT(S): 5000 INJECTION INTRAVENOUS; SUBCUTANEOUS at 21:58

## 2018-12-19 NOTE — PROGRESS NOTE ADULT - SUBJECTIVE AND OBJECTIVE BOX
Patient is a 85y old  Female who presents with a chief complaint of dehydration and LUIS (19 Dec 2018 22:04)      INTERVAL HPI/OVERNIGHT EVENTS: noted feels well, nontoxic      Vital Signs Last 24 Hrs  T(C): 36.4 (19 Dec 2018 19:27), Max: 38.1 (19 Dec 2018 00:25)  T(F): 97.5 (19 Dec 2018 19:27), Max: 100.5 (19 Dec 2018 00:25)  HR: 104 (19 Dec 2018 19:27) (96 - 104)  BP: 121/78 (19 Dec 2018 19:27) (109/69 - 121/78)  BP(mean): --  RR: 18 (19 Dec 2018 19:27) (18 - 18)  SpO2: 96% (19 Dec 2018 19:27) (95% - 96%)    acetaminophen   Tablet .. 650 milliGRAM(s) Oral every 6 hours PRN  aluminum hydroxide/magnesium hydroxide/simethicone Suspension 30 milliLiter(s) Oral every 4 hours PRN  aspirin enteric coated 81 milliGRAM(s) Oral daily  ferrous    sulfate 325 milliGRAM(s) Oral daily  heparin  Injectable 5000 Unit(s) SubCutaneous every 8 hours  levothyroxine 50 MICROGram(s) Oral daily  magnesium sulfate  IVPB 1 Gram(s) IV Intermittent once  multivitamin 1 Tablet(s) Oral daily  nystatin Powder 1 Application(s) Topical two times a day PRN  ondansetron Injectable 4 milliGRAM(s) IV Push every 6 hours PRN  pantoprazole    Tablet 40 milliGRAM(s) Oral daily  piperacillin/tazobactam IVPB. 3.375 Gram(s) IV Intermittent every 8 hours  potassium phosphate / sodium phosphate powder 1 Packet(s) Oral once  prochlorperazine   Injectable 5 milliGRAM(s) IV Push every 8 hours PRN      PHYSICAL EXAM:  GENERAL: NAD,   EYES: conjunctiva and sclera clear  ENMT: Moist mucous membranes  NECK: Supple, No JVD, Normal thyroid  NERVOUS SYSTEM:  Alert & Oriented X3,   CHEST/LUNG: Clear to auscultation bilaterally; No rales, rhonchi, wheezing, or rubs  HEART: Regular rate and rhythm; No murmurs, rubs, or gallops  ABDOMEN: Soft, Nontender, Nondistended; Bowel sounds present  EXTREMITIES:  2+ Peripheral Pulses, No clubbing, cyanosis, or edema  LYMPH: No lymphadenopathy noted  SKIN: No rashes or lesions    Consultant(s) Notes Reviewed:  [x ] YES  [ ] NO  Care Discussed with Consultants/Other Providers [ x] YES  [ ] NO    LABS:                        8.6    12.29 )-----------( 390      ( 19 Dec 2018 08:10 )             26.0     12-19    137  |  100  |  17  ----------------------------<  241<H>  3.9   |  22  |  1.12    Ca    8.9      19 Dec 2018 20:35  Phos  2.2     12-19  Mg     1.6     12-19          CAPILLARY BLOOD GLUCOSE                RADIOLOGY & ADDITIONAL TESTS:< from: MR Pelvis w/wo IV Cont (12.18.18 @ 17:22) >  Large defect in the upper posterior vaginal wall and anterior rectal wall   with fistulous connection between the rectum and vagina, as described     < end of copied text >      Imaging Personally Reviewed:  [x ] YES  [ ] NO

## 2018-12-19 NOTE — PROGRESS NOTE ADULT - SUBJECTIVE AND OBJECTIVE BOX
Patient seen and examined  no complaints    iodine (Other; Anaphylaxis)  shellfish (Anaphylaxis)  sulfa drugs (Other)    Hospital Medications:   MEDICATIONS  (STANDING):  aspirin enteric coated 81 milliGRAM(s) Oral daily  ferrous    sulfate 325 milliGRAM(s) Oral daily  heparin  Injectable 5000 Unit(s) SubCutaneous every 8 hours  levothyroxine 50 MICROGram(s) Oral daily  multivitamin 1 Tablet(s) Oral daily  pantoprazole    Tablet 40 milliGRAM(s) Oral daily  piperacillin/tazobactam IVPB. 3.375 Gram(s) IV Intermittent every 8 hours      VITALS:  T(F): 99.1 (18 @ 11:06), Max: 100.5 (18 @ 00:25)  HR: 96 (18 @ 11:06)  BP: 109/69 (18 @ 11:06)  RR: 18 (18 @ 11:06)  SpO2: 95% (18 @ 11:06)  Wt(kg): --     @ 07:01  -   @ 07:00  --------------------------------------------------------  IN: 440 mL / OUT: 700 mL / NET: -260 mL        Physical Exam :-  Constitutional: NAD  Neck: Supple.  Respiratory: Bilateral equal breath sounds,  Cardiovascular: S1, S2 normal,  Gastrointestinal: Bowel Sounds present, soft, non tender.  Extremities: No edema  Neurological: Alert and Oriented x 3, no focal deficits  Psychiatric: Normal mood, normal affect    LABS:      138  |  102  |  17  ----------------------------<  157<H>  4.1   |  23  |  1.11    Ca    8.8      19 Dec 2018 06:58      Creatinine Trend: 1.11 <--, 1.02 <--, 1.07 <--, 1.05 <--, 1.08 <--, 1.13 <--                        8.6    12.29 )-----------( 390      ( 19 Dec 2018 08:10 )             26.0     Urine Studies:  Urinalysis Basic - ( 12 Dec 2018 17:35 )    Color: Light Yellow / Appearance: Clear / S.015 / pH:   Gluc:  / Ketone: Negative  / Bili: Negative / Urobili: Negative   Blood:  / Protein: Trace / Nitrite: Negative   Leuk Esterase: Small / RBC: 5 /hpf / WBC 8 /hpf   Sq Epi:  / Non Sq Epi: 1 /hpf / Bacteria: Negative        RADIOLOGY & ADDITIONAL STUDIES:

## 2018-12-19 NOTE — PROGRESS NOTE ADULT - ASSESSMENT
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    No active GI bleeding  MRI Pelvis shows large rectovaginal fistula  Further management as per CRS and GYN. Will sign off. Pls call w questions    [phone]

## 2018-12-19 NOTE — CONSULT NOTE ADULT - ASSESSMENT
ASSESSMENT:  85F w/ HTN s/p subtotal colectomy with loop ileostomy brought up in 2007, and ex lap, SHASHANK, and repair of rectovaginal fistula with Dr. Doherty in 2009 at [facility]. Patient admitted with sepsis of unknown origin (improved on abx) and LUIS, now with MRI demonstrating large rectovaginal fistula.

## 2018-12-19 NOTE — PROVIDER CONTACT NOTE (OTHER) - REASON
BP 97/59
Patient refusing straight cath
Pt c/o CP
Temp 101.5
elevated lactate level
mews score
mod amt bright red mucoid blood via rectum
temp 101.2
Elevated temp, elevated HR

## 2018-12-19 NOTE — PROGRESS NOTE ADULT - PROBLEM SELECTOR PLAN 1
cr stable, electrolytes acceptable. Tolerating PO.    encourage po intake  supplement Phos as needed  trend bmp, off IVF  will follow prn

## 2018-12-19 NOTE — PROVIDER CONTACT NOTE (OTHER) - BACKGROUND
84yo female with LUIS, dehydration, acute cystitis
84 yo female with ARF
86 yo female with acute cystitis
86 yo female with acute cystitis
Ileostomy
LUIS
Patient admitted with acute renal failure, spiked fever today
febrile
febrile

## 2018-12-19 NOTE — PROVIDER CONTACT NOTE (OTHER) - NAME OF MD/NP/PA/DO NOTIFIED:
Austyn Caballero NP
India Carranza
India Carranza NP
India Carranza NP
Krystal Quiñones NP
MONI Solorzano
Ms Austyn MONTENEGRO
Swapna Alfonso NP
Ms. Austyn MONTENEGRO

## 2018-12-19 NOTE — CONSULT NOTE ADULT - CONSULT REQUESTED DATE/TIME
12-Dec-2018 13:58
19-Dec-2018 15:34
05-Dec-2018 10:59
07-Dec-2018 08:48
11-Dec-2018 18:14
13-Dec-2018 10:36
19-Dec-2018 15:34

## 2018-12-19 NOTE — PROVIDER CONTACT NOTE (OTHER) - ACTION/TREATMENT ORDERED:
Blood work to be drawn, CXR UA
Awaiting results
EKG ordered, Pt now c/o indigestion.
Fluid bolus as ordered
IV fluids as ordered
No orders at this time
Tylenol given
cold packs applies
stat CBC

## 2018-12-19 NOTE — CHART NOTE - NSCHARTNOTEFT_GEN_A_CORE
CC Indigestion     Notified by RN that patient complained of chest pain. As per patient she stated that she feels as if she has indigestion and pointed to epigastric region, just below sternum and could not describe sensation however patient is complained of palpitations. Per daughter at bedside, patient use to take Protonix at home. Pt states the sensation is similar to what she felt in the past. Daughter states that her mother has been anxious since having the discussion about DNR today. Denies chest pain, sob, dyspnea, fever, chills, ha, visual disturbances, n/v    FAMILY HISTORY:  No pertinent family history    Constitutional: No fever, fatigue or weight loss.  Skin: No rash.  Eyes: No recent vision problems or eye pain.  ENT: No congestion, ear pain, or sore throat.  Endocrine: No thyroid problems.  Cardiovascular: Palpation. No chest pain   Respiratory: No cough, shortness of breath, congestion, or wheezing.  Gastrointestinal: No abdominal pain, nausea, vomiting, or diarrhea.  Genitourinary: No dysuria.  Musculoskeletal: No joint swelling.  Neurologic: No headache.    Vital Signs Last 24 Hrs  T(C): 36.4 (19 Dec 2018 19:27), Max: 38.1 (19 Dec 2018 00:25)  T(F): 97.5 (19 Dec 2018 19:27), Max: 100.5 (19 Dec 2018 00:25)  HR: 104 (19 Dec 2018 19:27) (96 - 104)  BP: 121/78 (19 Dec 2018 19:27) (109/69 - 121/78)  BP(mean): --  RR: 18 (19 Dec 2018 19:27) (18 - 18)  SpO2: 96% (19 Dec 2018 19:27) (95% - 96%)    Neuro: Awake alert and times 3 no focal deficits  HEENT: perrla, no jvd, no masses to head of neck, full rom to neck  cards: s1 s2 tachycardic regular rhythm   Resp: lungs clear throughout lung fields no accessory muscle use  GI bs active all quadrant ileostomy draining viable  extremities no peripheral edema from +2 pulse to all 4 ext's  skin intact    12-19    137  |  100  |  17  ----------------------------<  241<H>  3.9   |  22  |  1.12    Ca    8.9      19 Dec 2018 20:35  Phos  2.2     12-19  Mg     1.6     12-19    Troponin T, High Sensitivity (12.19.18 @ 20:35)    Troponin T, High Sensitivity Result: 27: Rapid upward or downward changes in high-sensitivity troponin levels  suggest acute myocardial injury. Renal impairment may cause sustained  troponin elevations.      MEDICATIONS  (STANDING):  aspirin enteric coated 81 milliGRAM(s) Oral daily  ferrous    sulfate 325 milliGRAM(s) Oral daily  heparin  Injectable 5000 Unit(s) SubCutaneous every 8 hours  levothyroxine 50 MICROGram(s) Oral daily  magnesium sulfate  IVPB 1 Gram(s) IV Intermittent once  multivitamin 1 Tablet(s) Oral daily  pantoprazole    Tablet 40 milliGRAM(s) Oral daily  piperacillin/tazobactam IVPB. 3.375 Gram(s) IV Intermittent every 8 hours  potassium phosphate / sodium phosphate powder 1 Packet(s) Oral once    MEDICATIONS  (PRN):  acetaminophen   Tablet .. 650 milliGRAM(s) Oral every 6 hours PRN Temp greater or equal to 38.5C (101.3F), Mild Pain (1 - 3)  aluminum hydroxide/magnesium hydroxide/simethicone Suspension 30 milliLiter(s) Oral every 4 hours PRN Dyspepsia  nystatin Powder 1 Application(s) Topical two times a day PRN for rash  ondansetron Injectable 4 milliGRAM(s) IV Push every 6 hours PRN Nausea and/or Vomiting  prochlorperazine   Injectable 5 milliGRAM(s) IV Push every 8 hours PRN nausea    PAST MEDICAL & SURGICAL HISTORY:  Fistula, perirectal  Hiatal hernia  Hypothyroidism  Hypertension  S/P foot joint surgery: right ( 10 years ago )  S/P arthroscopy of right knee  S/P ileostomy: colectomy ( 2005 )  S/P cholecystectomy: at age 20 ,s    86 yo female with PMHx HTN, hypothyroidism, 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 (04 Dec 2018 16:16) Admitted with LUIS and cystis found to have found to have recto-vaginal fistula on 12/18 now with complaints of indigestion and palpitations    Consider anxiety as patient chose to become a DNR, Less likely ACS, consider electrolytes imbalance    Plan (palpitations and indigestion)  EKG sinus tachy 122 w/ premature supraventricular complexes  cardiac enzymes (negative)  electrolytes  k phos powder times 1 phos 2.2 (palpitations)  magnesium 1g  c/w Maalox for indigestion  c/w with meds    Will continue to monitor, will endorse to primary day team, attending to follow.    Krystal Quiñones NP  [facility] [id number]

## 2018-12-19 NOTE — CONSULT NOTE ADULT - SUBJECTIVE AND OBJECTIVE BOX
Colorectal Surgery Consultation Note  =====================================================  HPI:  85 y.o female with PMHx of HTN, 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 Per daughter at bedside pt lives alone in a house with an aid, who noticed that she was not her self for the last week. She seemed to have low energy and was fatigued intermittently for a week. The PT that comes to the house reported that the pt was have intermittent L flank pain as well during the PT session only. She currently has L flank pain, 8/10, non radiating, moving exacerbates it. She denies fever, chills, cough, abdominal pain, and any changes in the ileostomy output. Per daughter (Pia) started complaining of increased urinary urgency 4 days ago, so her Daughter Sandra (NP) prescribed her cipro. subsequently her urinary complaints improved. Pt denies any dysuria, increased urinary frequency. Per dtr pt has had decreased po intake, has not been drinking enough water. the family is concerned that the patient has depression, which has lead to the decreased appetite.       As per family at bedside, pt was seen by PCP 1 week ago, and was noted to have mild elevation of BUN/Cr    In the ED pt noted to have LUIS with leukocytosis (04 Dec 2018 16:16)    Patient's chart examined. Patient s/p subtotal colectomy with loop ileostomy brought up in 2007, and ex lap, SHASHANK, and repair of rectovaginal fistula with Dr. Doherty in 2009 at [facility]. Patient seen and examined at bedside. Patient states that she feels a little better since admission but still feels feverish. She reports that her flank pain on admission has completely resolved. She states that yesterday, she passed a large quantity of thick material per rectum (nursing described it as Pepto-Bismol colored. She denies chest pain, shortness of breath, nausea, vomiting, or increased ostomy output. Colorectal surgery is consulted to evaluate persistent rectovaginal fistula for possible surgical intervention.     Allergies: iodine (Other; Anaphylaxis)  shellfish (Anaphylaxis)  sulfa drugs (Other)    PAST MEDICAL & SURGICAL HISTORY:  Fistula, perirectal  Hiatal hernia  Hypothyroidism  Hypertension  S/P foot joint surgery: right ( 10 years ago )  S/P arthroscopy of right knee  S/P ileostomy: colectomy ( 2005 )  S/P cholecystectomy: at age 20 ,s    FAMILY HISTORY:  No pertinent family history    ADVANCE DIRECTIVES: Presumed Full Code      REVIEW OF SYSTEMS:    General: Non-Contributory  Skin/Breast: Non-Contributory  Ophthalmologic: Non-Contributory  ENMT: Non-Contributory  Respiratory and Thorax: Non-Contributory  Cardiovascular: Non-Contributory  Gastrointestinal: Non-Contributory  Genitourinary: Non-Contributory  Musculoskeletal: Non-Contributory  Neurological: Non-Contributory  Psychiatric: Non-Contributory  Hematology/Lymphatics: Non-Contributory  Endocrine: Non-Contributory  Allergic/Immunologic: Non-Contributory    HOME MEDICATIONS:    Home Medications:  aspirin 81 mg oral tablet: 1 tab(s) orally once a day (04 Dec 2018 16:42)  cephalexin 250 mg oral capsule: 1 cap(s) orally once a day (04 Dec 2018 16:42)  diazePAM 5 mg oral tablet: 0.5 to 1 tab(s) orally once a day (in the evening), As Needed- sleep (04 Dec 2018 16:42)  nadolol 40 mg oral tablet: 1 tab(s) orally once a day (04 Dec 2018 16:42)  nystatin 100,000 units/g topical powder: Apply topically to affected area 2 times a day, As Needed (04 Dec 2018 16:42)  Synthroid 50 mcg (0.05 mg) oral tablet: 1 tab(s) orally once a day (04 Dec 2018 16:42)    CURRENT MEDICATIONS:   --------------------------------------------------------------------------------------  Neurologic Medications  acetaminophen   Tablet .. 650 milliGRAM(s) Oral every 6 hours PRN Temp greater or equal to 38.5C (101.3F), Mild Pain (1 - 3)  ondansetron Injectable 4 milliGRAM(s) IV Push every 6 hours PRN Nausea and/or Vomiting  prochlorperazine   Injectable 5 milliGRAM(s) IV Push every 8 hours PRN nausea    Respiratory Medications    Cardiovascular Medications    Gastrointestinal Medications  aluminum hydroxide/magnesium hydroxide/simethicone Suspension 30 milliLiter(s) Oral every 4 hours PRN Dyspepsia  ferrous    sulfate 325 milliGRAM(s) Oral daily  multivitamin 1 Tablet(s) Oral daily  pantoprazole    Tablet 40 milliGRAM(s) Oral daily    Genitourinary Medications    Hematologic/Oncologic Medications  aspirin enteric coated 81 milliGRAM(s) Oral daily  heparin  Injectable 5000 Unit(s) SubCutaneous every 8 hours    Antimicrobial/Immunologic Medications  piperacillin/tazobactam IVPB. 3.375 Gram(s) IV Intermittent every 8 hours    Endocrine/Metabolic Medications  levothyroxine 50 MICROGram(s) Oral daily    Topical/Other Medications  nystatin Powder 1 Application(s) Topical two times a day PRN for rash    --------------------------------------------------------------------------------------    VITAL SIGNS, INS/OUTS (last 24 hours):  --------------------------------------------------------------------------------------  Vital Signs Last 24 Hrs  T(C): 37.3 (19 Dec 2018 11:06), Max: 38.1 (19 Dec 2018 00:25)  T(F): 99.1 (19 Dec 2018 11:06), Max: 100.5 (19 Dec 2018 00:25)  HR: 96 (19 Dec 2018 11:06) (93 - 99)  BP: 109/69 (19 Dec 2018 11:06) (109/69 - 120/73)  BP(mean): --  RR: 18 (19 Dec 2018 11:06) (18 - 18)  SpO2: 95% (19 Dec 2018 11:06) (94% - 95%)  --------------------------------------------------------------------------------------    EXAM  General: NAD, resting comfortably  Resp: unlabored breathing on RA  CV: HDS, rrr  Abd: soft, obese, nontender, nondistended; ostomy in place w/ thick stool in bag, well healed midline incisions    LABS  --------------------------------------------------------------------------------------  CBC (12-19 @ 08:10)                          8.6<L>                   12.29<H>  )--------------(  390        --    % Neuts, --    % Lymphs, ANC: --                              26.0<L>  CBC (12-18 @ 09:34)                          8.9<L>                   12.64<H>  )--------------(  438<H>     79.0<H>% Neuts, 12.0<L>% Lymphs, ANC: 9.99<H>                          27.4<L>    BMP (12-19 @ 06:58)       138     |  102     |  17    			Ca++ --      Ca 8.8          ---------------------------------( 157<H>		Mg --           4.1     |  23      |  1.11  			Ph --      BMP (12-18 @ 07:39)       137     |  101     |  14    			Ca++ --      Ca 9.0          ---------------------------------( 153<H>		Mg --           4.1     |  24      |  1.02  			Ph --        -> .Blood Blood-Peripheral Culture (12-15 @ 00:45)     NG    NG    No growth to date.    -> .Abscess Rectal drainage Culture (12-14 @ 06:29)     NG    Enterococcus faecalis  Enterococcus avium    Few Enterococcus faecalis  Few Enterococcus avium  Moderate Streptococcus anginosus "Susceptibilities not performed"  Moderate Prevotella bivia "Susceptibilities not performed"    -> .Urine Catheterized Culture (12-12 @ 19:41)     NG    NG    No growth    -> .Blood Blood-Peripheral Culture (12-11 @ 21:52)     NG    NG    No growth at 5 days.    -> .Stool Feces Culture (12-11 @ 01:52)     NG    NG    GI PCR Results: NOT detected  *******Please Note:*******  GI panel PCR evaluates for:  Campylobacter, Plesiomonas shigelloides, Salmonella,  Vibrio, Yersinia enterocolitica, Enteroaggregative  Escherichia coli (EAEC), Enteropathogenic E.coli (EPEC),  Enterotoxigenic E. coli (ETEC) lt/st, Shiga-like  toxin-producing E. coli (STEC) stx1/stx2,  Shigella/ Enteroinvasive E. coli (EIEC), Cryptosporidium,  Cyclospora cayetanensis, Entamoeba histolytica,  Giardia lamblia, Adenovirus F 40/41, Astrovirus,  Norovirus GI/GII, Rotavirus A, Sapovirus      --------------------------------------------------------------------------------------    OTHER LABS    IMAGING RESULTS  < from: MR Pelvis w/wo IV Cont (12.18.18 @ 17:22) >  Moderate artifact from left hip arthroplasty.    UTERUS: There is a fistulous connection between the rectum and vagina   through a large defect in the upper posterior vaginal wall and anterior   rectal wall. The defect measures approximately 2.6 x 2.9 cm (transverse   by craniocaudal). Anteriorly the vaginal fornix is markedly distended   with heterogeneous material measuring at least 8 x 5 cm. This has some   mass effect on the bladder and this large patulous defect extends below   the pubococcygeal line at rest. No perirectal or periuterine abscess or   collection. There are scattered small uterine fibroids.  ENDOMETRIUM: Thickened at 8 mm.   JUNCTIONAL ZONE: Within normal limits.    RIGHT OVARY: Not visualized.  LEFT OVARY: Not visualized.  ADNEXA: Within normal limits.    BLADDER: Within normal limits.    LYMPH NODES: No pelvic lymphadenopathy.    VISUALIZED PORTIONS:    ABDOMINAL ORGANS: Within normal limits.  PERITONEUM: No ascites.  VESSELS: Within normal limits.  ABDOMINAL WALL: Right lower quadrant parastomal hernia containing fat and   bowel unchanged.  BONES: Degenerative changes the lumbar spine. Probable hemangioma in L5   vertebral body.    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    PRESTON BARNETT M.D., RADIOLOGY RESIDENT  This document has been electronically signed.  JOLIE BISHOP M.D., ATTENDING RADIOLOGIST  This document has been electronically signed. Dec 19 2018 10:48AM        < end of copied text >

## 2018-12-19 NOTE — PROVIDER CONTACT NOTE (OTHER) - RECOMMENDATIONS
As per NP, stat blood cxs, lactate, CXR
Continue plan of care
Followed by GI
Labs drawn, CXR completed - NP aware
NP notified, ordered EKG
NP to order IV fluids
Tylenol as ordered
Will order fluid bolus
ice packs

## 2018-12-19 NOTE — PROGRESS NOTE ADULT - SUBJECTIVE AND OBJECTIVE BOX
No further bleeding per rectum    Vital Signs Last 24 Hrs  T(C): 36.4 (19 Dec 2018 19:27), Max: 38.1 (19 Dec 2018 00:25)  T(F): 97.5 (19 Dec 2018 19:27), Max: 100.5 (19 Dec 2018 00:25)  HR: 104 (19 Dec 2018 19:27) (96 - 104)  BP: 121/78 (19 Dec 2018 19:27) (109/69 - 121/78)  BP(mean): --  RR: 18 (19 Dec 2018 19:27) (18 - 18)  SpO2: 96% (19 Dec 2018 19:27) (95% - 96%)    PHYSICAL EXAM:    Constitutional: NAD, well-developed  Neck: No LAD, supple  Respiratory: CTA and P  Cardiovascular: S1 and S2, RRR, no M  Gastrointestinal: BS+, soft, NT/ND, neg HSM,  Extremities: No peripheral edema, neg clubing, cyanosis  Vascular: 2+ peripheral pulses  Neurological: A/O x 3, no focal deficits  Psychiatric: Normal mood, normal affect  Skin: No rashes    MEDICATIONS  (STANDING):  aspirin enteric coated 81 milliGRAM(s) Oral daily  ferrous    sulfate 325 milliGRAM(s) Oral daily  heparin  Injectable 5000 Unit(s) SubCutaneous every 8 hours  levothyroxine 50 MICROGram(s) Oral daily  magnesium sulfate  IVPB 1 Gram(s) IV Intermittent once  multivitamin 1 Tablet(s) Oral daily  pantoprazole    Tablet 40 milliGRAM(s) Oral daily  piperacillin/tazobactam IVPB. 3.375 Gram(s) IV Intermittent every 8 hours  potassium phosphate / sodium phosphate powder 1 Packet(s) Oral once    MEDICATIONS  (PRN):  acetaminophen   Tablet .. 650 milliGRAM(s) Oral every 6 hours PRN Temp greater or equal to 38.5C (101.3F), Mild Pain (1 - 3)  aluminum hydroxide/magnesium hydroxide/simethicone Suspension 30 milliLiter(s) Oral every 4 hours PRN Dyspepsia  nystatin Powder 1 Application(s) Topical two times a day PRN for rash  ondansetron Injectable 4 milliGRAM(s) IV Push every 6 hours PRN Nausea and/or Vomiting  prochlorperazine   Injectable 5 milliGRAM(s) IV Push every 8 hours PRN nausea      Allergies    iodine (Other; Anaphylaxis)  shellfish (Anaphylaxis)  sulfa drugs (Other)    Intolerances          LABS:                        8.6    12.29 )-----------( 390      ( 19 Dec 2018 08:10 )             26.0                         8.9    12.64 )-----------( 438      ( 18 Dec 2018 09:34 )             27.4                         8.9    15.25 )-----------( 430      ( 17 Dec 2018 07:50 )             26.8     12-19    137  |  100  |  17  ----------------------------<  241<H>  3.9   |  22  |  1.12    Ca    8.9      19 Dec 2018 20:35  Phos  2.2     12-19  Mg     1.6     12-19                RADIOLOGY & ADDITIONAL TESTS:

## 2018-12-19 NOTE — CHART NOTE - NSCHARTNOTEFT_GEN_A_CORE
Nutrition Follow Up Note  Patient seen for: Malnutrition follow up  Subjective: patient eating     Chart reviewed events noted:   85F admitted with sepsis of unknown origin. PMH  HTN s/p subtotal colectomy with loop ileostomy in , and ex lap, SHASHANK, and repair of rectovaginal fistula with Dr. Doherty in  at [facility] now with concern of new rectovaginal fistula causing sepsis.    Hospital course: MRI demonstrating large rectovaginal fistula, continue with abx. Urology consult noted for  cystoscopy to evaluate cystitis and rule out rectovesical fistula  followed by urology    Source: Medical record    Diet : Dysphagia 1 nectar thickened liquids low Fiber  plus ensure 3x daily    Patient reports: "nothing helping to get better". patient states the ensure supplement  "does not help to get her better"   Patient dislikes puree diet, menu asistance provided   PO intake : 50% of meals     Source for PO intake: staff, patient       Daily Weight in k.9 (12-19)   Weight Change    Pertinent Medications: MEDICATIONS  (STANDING):  aspirin enteric coated 81 milliGRAM(s) Oral daily  ferrous    sulfate 325 milliGRAM(s) Oral daily  heparin  Injectable 5000 Unit(s) SubCutaneous every 8 hours  levothyroxine 50 MICROGram(s) Oral daily  multivitamin 1 Tablet(s) Oral daily  pantoprazole    Tablet 40 milliGRAM(s) Oral daily  piperacillin/tazobactam IVPB. 3.375 Gram(s) IV Intermittent every 8 hours    MEDICATIONS  (PRN):  acetaminophen   Tablet .. 650 milliGRAM(s) Oral every 6 hours PRN Temp greater or equal to 38.5C (101.3F), Mild Pain (1 - 3)  aluminum hydroxide/magnesium hydroxide/simethicone Suspension 30 milliLiter(s) Oral every 4 hours PRN Dyspepsia  nystatin Powder 1 Application(s) Topical two times a day PRN for rash  ondansetron Injectable 4 milliGRAM(s) IV Push every 6 hours PRN Nausea and/or Vomiting  prochlorperazine   Injectable 5 milliGRAM(s) IV Push every 8 hours PRN nausea    Pertinent Labs:  @ 06:58: Na 138, BUN 17, Cr 1.11, <H>, K+ 4.1, Phos --, Mg --, Alk Phos --, ALT/SGPT --, AST/SGOT --, HbA1c --      Skin per nursing documentation:   Edema:    Estimated Needs:   [ ] no change since previous assessment  [ ] recalculated:     Previous Nutrition Diagnosis: Moderate malnutrition related to decreased PO intake as reported by patient's   Nutrition Diagnosis is: addressed, ongoing    New Nutrition Diagnosis: NA    Recommend  1)  ensure 3x/day  2. Monitor/encourage PO intake.   3. monitor weight  Monitoring and Evaluation:     Continue to monitor Nutritional intake, Tolerance to diet prescription, weights, labs, skin integrity    RD remains available upon request and will follow up per protocol

## 2018-12-19 NOTE — CONSULT NOTE ADULT - CONSULT REASON
Second opinion
rectovaginal fistula
Anemia
LUIS and hyponatremia
Upper airway eval
fever, leukocytosis
rectovaginal fistula

## 2018-12-19 NOTE — PROVIDER CONTACT NOTE (OTHER) - SITUATION
BP 97/59
Mod amt bright red mucoid via rectum
Procedure explained to patient, patient refusing straight cath
Pt c/o CP
Temp 101.2
Temp down to 100.3 F
stat labs with elevated lactate level
temp 101.5
Elevated temp and HR

## 2018-12-19 NOTE — PROVIDER CONTACT NOTE (OTHER) - DATE AND TIME:
06-Dec-2018 20:00
14-Dec-2018 22:00
15-Dec-2018 00:00
15-Dec-2018 11:26
15-Dec-2018 14:45
15-Dec-2018 15:50
16-Dec-2018 14:00
19-Dec-2018 19:33
14-Dec-2018 20:33

## 2018-12-19 NOTE — CONSULT NOTE ADULT - PROVIDER SPECIALTY LIST ADULT
Colorectal Surgery
ENT
Gastroenterology
Infectious Disease
Infectious Disease
Nephrology
Colorectal Surgery

## 2018-12-19 NOTE — PROGRESS NOTE ADULT - ASSESSMENT
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    #  fever / leukocytosis  fevers now resolved and leukocytosis downtrending   CRP elevated 11.96  rectal culture grow enterococcus faecalis and avium  check MRI abd with contrast confirms large rectovaginal fistula as above  will obtain gyn eval  cont zosyn per ID, GI fu noted, CRS cs  urine and blood cultures NTD, can repeat BC if febrile  CT head showed sphenoid sinusitis  CT A/P splenic lesion, renal cyst, splenic US shows stable cyst 1.6 x 1.9 x 1.6 cm  TTE no thrombus  endometrial fluid on pelvic sono - known rectovaginal fistula - appreciate gyn recs, patient and dtr refused exam at this time but agreeable if MRI abnormal      # LUIS / Dehydration  2/2 excessive ostomy output, creat improved, appreciate renal recs, strict i/o    # dysphagia  speech and swallow eval - pt refused MBS, ENT sp laryngoscopy showing pooling of secretions and nml vocal cords  dysphagia diet    # anemia  stool occult neg  despite red drainage from rectum, h/h stable  no further workup indicated  ferrous sulfate    # HTN HLD  BP stable off meds, at times borderline hypotensive      DVT PPX   PT OOB to chair

## 2018-12-19 NOTE — PROGRESS NOTE ADULT - ASSESSMENT
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 Also, on admission, complained of low energy, fatigue for about a week.  The intermittent L flank pain as well during the PT session only. She currently has L flank pain was 8/10 - non radiating, moving exacerbated it. She had increased urinary urgency 4 days PTA.  Was placed on cipro by family member and her urinary complaints improved.  Drainage from rectum - unclear.  Sinusitis.  Worsening leukocytosis and continued fever.  Unclear source.  Possibly related to fistula.  R/u Cdiff of the stump    Suggest:  - continue zosyn  - GI to see  - contrast study or MRI to evaluate area of the fistula -  results noted above   - if possible, send Cdiff of rectal discharge  - Suggest GI and colorectal surgery input

## 2018-12-19 NOTE — PROVIDER CONTACT NOTE (OTHER) - ASSESSMENT
Pt without distress, c/o indigestion, given IVP of compazine
BP 97/59
C/o CP, indigestion, no SOB, no pain in arms, lungs CTA, VS = 121/78/ 97.5F, 104. 18, 96% O2 sat RA.
Ileostomy intact, brown stool  Passed mod amt bright red mucoid via rectum
No distress noted.  Sleeping comfortably
Patient aox4
Temp 101.2  , shaking
Temp 101.5
elevated lactate level, VS indicate improved temp to 99.1F, HR =88

## 2018-12-19 NOTE — PROGRESS NOTE ADULT - SUBJECTIVE AND OBJECTIVE BOX
Patient is a 85y old  Female who presents with a chief complaint of dehydration and LUIS (18 Dec 2018 17:45)    Being followed by ID for fevers        Interval history:  pt denies back pain  pt had SOB yesterday that responded to inhaler  breathing fine now  occasional cough  No other acute events        PAST MEDICAL & SURGICAL HISTORY:  Fistula, perirectal  Hiatal hernia  Hypothyroidism  Hypertension  S/P foot joint surgery: right ( 10 years ago )  S/P arthroscopy of right knee  S/P ileostomy: colectomy ( 2005 )  S/P cholecystectomy: at age 20 ,s    Allergies    iodine (Other; Anaphylaxis)  shellfish (Anaphylaxis)  sulfa drugs (Other)    Intolerances      Antimicrobials:    piperacillin/tazobactam IVPB. 3.375 Gram(s) IV Intermittent every 8 hours    MEDICATIONS  (STANDING):  aspirin enteric coated 81 milliGRAM(s) Oral daily  ferrous    sulfate 325 milliGRAM(s) Oral daily  heparin  Injectable 5000 Unit(s) SubCutaneous every 8 hours  levothyroxine 50 MICROGram(s) Oral daily  multivitamin 1 Tablet(s) Oral daily  pantoprazole    Tablet 40 milliGRAM(s) Oral daily  piperacillin/tazobactam IVPB. 3.375 Gram(s) IV Intermittent every 8 hours      Vital Signs Last 24 Hrs  T(C): 37.3 (12-19-18 @ 11:06), Max: 38.1 (12-19-18 @ 00:25)  T(F): 99.1 (12-19-18 @ 11:06), Max: 100.5 (12-19-18 @ 00:25)  HR: 96 (12-19-18 @ 11:06) (92 - 99)  BP: 109/69 (12-19-18 @ 11:06) (109/69 - 121/76)  BP(mean): --  RR: 18 (12-19-18 @ 11:06) (18 - 18)  SpO2: 95% (12-19-18 @ 11:06) (93% - 95%)    Physical Exam:    Constitutional well preserved,comfortable,pleasant    HEENT PERRLA EOMI,No pallor or icterus    No oral exudate or erythema    Neck supple no JVD or LN    Chest Good AE,CTA    CVS RRR S1 S2 WNl     Abd soft BS normal No tenderness     no active rectal drainage today    Ext No cyanosis clubbing or edema    IV site no erythema tenderness or discharge    Joints no swelling or LOM    CNS AAO X 3 no focal    Lab Data:                          8.6    12.29 )-----------( 390      ( 19 Dec 2018 08:10 )             26.0       12-19    138  |  102  |  17  ----------------------------<  157<H>  4.1   |  23  |  1.11    Ca    8.8      19 Dec 2018 06:58            .Blood Blood-Peripheral  12-15-18   No growth to date.  --  --      .Abscess Rectal drainage  12-14-18   Few Enterococcus faecalis  Few Enterococcus avium  Moderate Streptococcus anginosus "Susceptibilities not performed"  Moderate Prevotella bivia "Susceptibilities not performed"  --  Enterococcus faecalis  Enterococcus avium      .Urine Catheterized  12-12-18   No growth  --  --    < from: MR Pelvis w/wo IV Cont (12.18.18 @ 17:22) >  EXAM:  MR PELVIS WAW IC                            PROCEDURE DATE:  12/18/2018            INTERPRETATION:  CLINICAL INFORMATION: Pelvic pain with new rectal   drainage; evaluate for recurrent rectovaginal fistula    COMPARISON: CT abdomen and pelvis dated 12/4/2018. CT abdomen and pelvis   10/1/2010. MR pelvis 4/9/2010.    PROCEDURE:   MRI of the pelvis was performed with and without intravenous contrast.  10 cc of Gadavist was administered intravenously without complication.  60 cc of vaginal gel was inserted without complication.      FINDINGS:    Moderate artifact from left hip arthroplasty.    UTERUS: There is a fistulous connection between the rectum and vagina   through a large defect in the upper posterior vaginal wall and anterior   rectal wall. The defect measures approximately 2.6 x 2.9 cm (transverse   by craniocaudal). Anteriorly the vaginal fornix is markedly distended   with heterogeneous material measuring at least 8 x 5 cm. This has some   mass effect on the bladder and this large patulous defect extends below   the pubococcygeal line at rest. No perirectal or periuterine abscess or   collection. There are scattered small uterine fibroids.  ENDOMETRIUM: Thickened at 8 mm.   JUNCTIONAL ZONE: Within normal limits.    RIGHT OVARY: Not visualized.  LEFT OVARY: Not visualized.  ADNEXA: Within normal limits.    BLADDER: Within normal limits.    LYMPH NODES: No pelvic lymphadenopathy.    VISUALIZED PORTIONS:    ABDOMINAL ORGANS: Within normal limits.  PERITONEUM: No ascites.  VESSELS: Within normal limits.  ABDOMINAL WALL: Right lower quadrant parastomal hernia containing fat and   bowel unchanged.  BONES: Degenerative changes the lumbar spine. Probable hemangioma in L5   vertebral body.    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    < end of copied text >    < from: US Spleen (12.13.18 @ 10:53) >  IMPRESSION:     Splenic cyst, unchanged since 12/4/2018.    < end of copied text >      < from: Xray Chest 1 View- PORTABLE-Routine (12.18.18 @ 09:20) >  IMPRESSION:    Minimal left basilar atelectasis.    Small peripheral right lower lung nodule as seen on recent CT chest    < end of copied text >      WBC Count: 12.29 (12-19-18 @ 08:10)  WBC Count: 12.64 (12-18-18 @ 09:34)  WBC Count: 15.25 (12-17-18 @ 07:50)  WBC Count: 13.7 (12-16-18 @ 15:17)  WBC Count: 11.54 (12-16-18 @ 08:17)  WBC Count: 9.74 (12-15-18 @ 09:18)  WBC Count: 9.7 (12-14-18 @ 11:46)  WBC Count: 11.30 (12-13-18 @ 07:30)

## 2018-12-19 NOTE — CONSULT NOTE ADULT - ASSESSMENT
ASSESSMENT:  85F w/ HTN s/p subtotal colectomy with loop ileostomy brought up in 2007, and ex lap, SHASHANK, and repair of rectovaginal fistula with Dr. Doherty in 2009 at [facility]. Patient admitted with sepsis of unknown origin (improved on abx) and LUIS, now with MRI demonstrating large rectovaginal fistula. ASSESSMENT:  85F w/ HTN s/p subtotal colectomy with loop ileostomy brought up in 2007, and ex lap, SHASHANK, and repair of rectovaginal fistula with Dr. Doherty in 2009 at [facility] now with concern of new rectovaginal fistula causing sepsis.     - Patient admitted with sepsis of unknown origin (improved on abx) and LUIS, now with MRI demonstrating large rectovaginal fistula.   -     Discussed with colorectal surgery fellow Dr. Hoffman.    Flower Verdugo PGY-2  Surgery Pager [phone] ASSESSMENT:  85F w/ HTN s/p subtotal colectomy with loop ileostomy brought up in 2007, and ex lap, SHASHANK, and repair of rectovaginal fistula with Dr. Doherty in 2009 at [facility] now with concern of new rectovaginal fistula causing sepsis.     - Patient admitted with sepsis of unknown origin (improved on abx) and LUIS, now with MRI demonstrating large rectovaginal fistula.   - Recommend continue with abx  - Recommend urology consult for cystoscopy to evaluate cystitis and rule out rectovesical fistula    Discussed with colorectal surgery fellow Dr. Hoffman and attending Dr. Joseph Crenshaw [facility] PGY-2  Surgery Pager [phone] ASSESSMENT:  85F w/ HTN s/p subtotal colectomy with loop ileostomy brought up in 2007, and ex lap, SHASHANK, and repair of rectovaginal fistula with Dr. Doherty in 2009 at [facility] now with concern of new rectovaginal fistula causing sepsis.     - Patient admitted with sepsis of unknown origin (improved on abx) and LUIS, now with MRI demonstrating large rectovaginal fistula.   - Recommend continue with abx  - Recommend urology consult for cystoscopy to evaluate cystitis and rule out rectovesical fistula  - No acute surgical intervention at this time    Discussed with colorectal surgery fellow Dr. Hoffman and attending Dr. Joseph Crenshaw [facility] PGY-2  Surgery Pager [phone]

## 2018-12-19 NOTE — CONSULT NOTE ADULT - SUBJECTIVE AND OBJECTIVE BOX
Colorectal Surgery Consultation Note  =====================================================  HPI:  85 y.o female with PMHx of HTN, 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 Per daughter at bedside pt lives alone in a house with an aid, who noticed that she was not her self for the last week. She seemed to have low energy and was fatigued intermittently for a week. The PT that comes to the house reported that the pt was have intermittent L flank pain as well during the PT session only. She currently has L flank pain, 8/10, non radiating, moving exacerbates it. She denies fever, chills, cough, abdominal pain, and any changes in the ileostomy output. Per daughter (Pia) started complaining of increased urinary urgency 4 days ago, so her Daughter Sandra (NP) prescribed her cipro. subsequently her urinary complaints improved. Pt denies any dysuria, increased urinary frequency. Per dtr pt has had decreased po intake, has not been drinking enough water. the family is concerned that the patient has depression, which has lead to the decreased appetite.       As per family at bedside, pt was seen by PCP 1 week ago, and was noted to have mild elevation of BUN/Cr    In the ED pt noted to have LUIS with leukocytosis (04 Dec 2018 16:16)    Patient's chart examined. Patient s/p subtotal colectomy with loop ileostomy brought up in 2007, and ex lap, SHASHANK, and repair of rectovaginal fistula with Dr. Doherty in 2009 at [facility]. Patient seen and examined at bedside. Patient states that she feels a little better since admission but still feels feverish. She reports that her flank pain on admission has completely resolved.     Allergies: iodine (Other; Anaphylaxis)  shellfish (Anaphylaxis)  sulfa drugs (Other)    PAST MEDICAL & SURGICAL HISTORY:  Fistula, perirectal  Hiatal hernia  Hypothyroidism  Hypertension  S/P foot joint surgery: right ( 10 years ago )  S/P arthroscopy of right knee  S/P ileostomy: colectomy ( 2005 )  S/P cholecystectomy: at age 20 ,s    FAMILY HISTORY:  No pertinent family history      SOCIAL HISTORY:  ***    ADVANCE DIRECTIVES: Presumed Full Code  ***    REVIEW OF SYSTEMS:  ***  General: Non-Contributory  Skin/Breast: Non-Contributory  Ophthalmologic: Non-Contributory  ENMT: Non-Contributory  Respiratory and Thorax: Non-Contributory  Cardiovascular: Non-Contributory  Gastrointestinal: Non-Contributory  Genitourinary: Non-Contributory  Musculoskeletal: Non-Contributory  Neurological: Non-Contributory  Psychiatric: Non-Contributory  Hematology/Lymphatics: Non-Contributory  Endocrine: Non-Contributory  Allergic/Immunologic: Non-Contributory    HOME MEDICATIONS:  ***    CURRENT MEDICATIONS:   --------------------------------------------------------------------------------------  Neurologic Medications  acetaminophen   Tablet .. 650 milliGRAM(s) Oral every 6 hours PRN Temp greater or equal to 38.5C (101.3F), Mild Pain (1 - 3)  ondansetron Injectable 4 milliGRAM(s) IV Push every 6 hours PRN Nausea and/or Vomiting  prochlorperazine   Injectable 5 milliGRAM(s) IV Push every 8 hours PRN nausea    Respiratory Medications    Cardiovascular Medications    Gastrointestinal Medications  aluminum hydroxide/magnesium hydroxide/simethicone Suspension 30 milliLiter(s) Oral every 4 hours PRN Dyspepsia  ferrous    sulfate 325 milliGRAM(s) Oral daily  multivitamin 1 Tablet(s) Oral daily  pantoprazole    Tablet 40 milliGRAM(s) Oral daily    Genitourinary Medications    Hematologic/Oncologic Medications  aspirin enteric coated 81 milliGRAM(s) Oral daily  heparin  Injectable 5000 Unit(s) SubCutaneous every 8 hours    Antimicrobial/Immunologic Medications  piperacillin/tazobactam IVPB. 3.375 Gram(s) IV Intermittent every 8 hours    Endocrine/Metabolic Medications  levothyroxine 50 MICROGram(s) Oral daily    Topical/Other Medications  nystatin Powder 1 Application(s) Topical two times a day PRN for rash    --------------------------------------------------------------------------------------    VITAL SIGNS, INS/OUTS (last 24 hours):  --------------------------------------------------------------------------------------  ((Insert SICU Vitals / Is+Os here)) ***  --------------------------------------------------------------------------------------    EXAM  NEUROLOGY  RASS:   	GCS:    Exam: Normal, NAD, alert, oriented x 3, no focal deficits. ***    HEENT  Exam: Normocephalic, atraumatic.  EOMI ***    RESPIRATORY  Exam: Lungs clear to auscultation, Normal expansion/effort.  ***  Mechanical Ventilation:     CARDIOVASCULAR  Exam: S1, S2.  Regular rate and rhythm.  Peripheral edema  ***    GI/NUTRITION  Exam: Abdomen soft, Non-tender, Non-distended.  ***  Wound:   ***  Current Diet:  NPO ***    VASCULAR  Exam: Extremities warm, pink, well-perfused.  ***    MUSCULOSKELETAL  Exam: All extremities moving spontaneously without limitations.  ***    SKIN:  Exam: Good skin turgor, no skin breakdown.  ***    METABOLIC/FLUIDS/ELECTROLYTES  ferrous    sulfate 325 milliGRAM(s) Oral daily  multivitamin 1 Tablet(s) Oral daily      HEMATOLOGIC  [x] DVT Prophylaxis: aspirin enteric coated 81 milliGRAM(s) Oral daily  heparin  Injectable 5000 Unit(s) SubCutaneous every 8 hours    Transfusions:	[] PRBC	[] Platelets		[] FFP	[] Cryoprecipitate    INFECTIOUS DISEASE  Antimicrobials/Immunologic Medications:  piperacillin/tazobactam IVPB. 3.375 Gram(s) IV Intermittent every 8 hours    Day #  	of    ***    Tubes/Lines/Drains  ***  [x] Peripheral IV  [] Central Venous Line     	[] R	[] L	[] IJ	[] Fem	[] SC	Date Placed:   [] Arterial Line		[] R	[] L	[] Fem	[] Rad	[] Ax	Date Placed:   [] PICC:         	[] Midline		[] Mediport  [] Urinary Catheter		Date Placed:     LABS  --------------------------------------------------------------------------------------  ((Insert SICU Labs here))***  --------------------------------------------------------------------------------------    OTHER LABS    IMAGING RESULTS  Echo:   CT:   Xray:     ASSESSMENT:  85y Female ***    PLAN:  ***  Neurologic:   Respiratory:   Cardiovascular:   Gastrointestinal/Nutrition:   Renal/Genitourinary:   Hematologic:   Infectious Disease:   Tubes/Lines/Drains:   Endocrine:   Disposition:     --------------------------------------------------------------------------------------    Critical Care Diagnoses: Colorectal Surgery Consultation Note  =====================================================  HPI:  85 y.o female with PMHx of HTN, 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 Per daughter at bedside pt lives alone in a house with an aid, who noticed that she was not her self for the last week. She seemed to have low energy and was fatigued intermittently for a week. The PT that comes to the house reported that the pt was have intermittent L flank pain as well during the PT session only. She currently has L flank pain, 8/10, non radiating, moving exacerbates it. She denies fever, chills, cough, abdominal pain, and any changes in the ileostomy output. Per daughter (Pia) started complaining of increased urinary urgency 4 days ago, so her Daughter Sandra (NP) prescribed her cipro. subsequently her urinary complaints improved. Pt denies any dysuria, increased urinary frequency. Per dtr pt has had decreased po intake, has not been drinking enough water. the family is concerned that the patient has depression, which has lead to the decreased appetite.       As per family at bedside, pt was seen by PCP 1 week ago, and was noted to have mild elevation of BUN/Cr    In the ED pt noted to have LUIS with leukocytosis (04 Dec 2018 16:16)    Patient's chart examined. Patient s/p subtotal colectomy with loop ileostomy brought up in 2007, and ex lap, SHASHANK, and repair of rectovaginal fistula with Dr. Doherty in 2009 at [facility]. Patient seen and examined at bedside. Patient states that she feels a little better since admission but still feels feverish. She reports that her flank pain on admission has completely resolved. She states that yesterday, she passed a large quantity of thick material per rectum (nursing described it as Pepto-Bismol colored. She denies chest pain, shortness of breath, nausea, vomiting, or increased ostomy output. Colorectal surgery is consulted to evaluate persistent rectovaginal fistula for possible surgical intervention.     Allergies: iodine (Other; Anaphylaxis)  shellfish (Anaphylaxis)  sulfa drugs (Other)    PAST MEDICAL & SURGICAL HISTORY:  Fistula, perirectal  Hiatal hernia  Hypothyroidism  Hypertension  S/P foot joint surgery: right ( 10 years ago )  S/P arthroscopy of right knee  S/P ileostomy: colectomy ( 2005 )  S/P cholecystectomy: at age 20 ,s    FAMILY HISTORY:  No pertinent family history    ADVANCE DIRECTIVES: Presumed Full Code      REVIEW OF SYSTEMS:    General: Non-Contributory  Skin/Breast: Non-Contributory  Ophthalmologic: Non-Contributory  ENMT: Non-Contributory  Respiratory and Thorax: Non-Contributory  Cardiovascular: Non-Contributory  Gastrointestinal: Non-Contributory  Genitourinary: Non-Contributory  Musculoskeletal: Non-Contributory  Neurological: Non-Contributory  Psychiatric: Non-Contributory  Hematology/Lymphatics: Non-Contributory  Endocrine: Non-Contributory  Allergic/Immunologic: Non-Contributory    HOME MEDICATIONS:    Home Medications:  aspirin 81 mg oral tablet: 1 tab(s) orally once a day (04 Dec 2018 16:42)  cephalexin 250 mg oral capsule: 1 cap(s) orally once a day (04 Dec 2018 16:42)  diazePAM 5 mg oral tablet: 0.5 to 1 tab(s) orally once a day (in the evening), As Needed- sleep (04 Dec 2018 16:42)  nadolol 40 mg oral tablet: 1 tab(s) orally once a day (04 Dec 2018 16:42)  nystatin 100,000 units/g topical powder: Apply topically to affected area 2 times a day, As Needed (04 Dec 2018 16:42)  Synthroid 50 mcg (0.05 mg) oral tablet: 1 tab(s) orally once a day (04 Dec 2018 16:42)    CURRENT MEDICATIONS:   --------------------------------------------------------------------------------------  Neurologic Medications  acetaminophen   Tablet .. 650 milliGRAM(s) Oral every 6 hours PRN Temp greater or equal to 38.5C (101.3F), Mild Pain (1 - 3)  ondansetron Injectable 4 milliGRAM(s) IV Push every 6 hours PRN Nausea and/or Vomiting  prochlorperazine   Injectable 5 milliGRAM(s) IV Push every 8 hours PRN nausea    Respiratory Medications    Cardiovascular Medications    Gastrointestinal Medications  aluminum hydroxide/magnesium hydroxide/simethicone Suspension 30 milliLiter(s) Oral every 4 hours PRN Dyspepsia  ferrous    sulfate 325 milliGRAM(s) Oral daily  multivitamin 1 Tablet(s) Oral daily  pantoprazole    Tablet 40 milliGRAM(s) Oral daily    Genitourinary Medications    Hematologic/Oncologic Medications  aspirin enteric coated 81 milliGRAM(s) Oral daily  heparin  Injectable 5000 Unit(s) SubCutaneous every 8 hours    Antimicrobial/Immunologic Medications  piperacillin/tazobactam IVPB. 3.375 Gram(s) IV Intermittent every 8 hours    Endocrine/Metabolic Medications  levothyroxine 50 MICROGram(s) Oral daily    Topical/Other Medications  nystatin Powder 1 Application(s) Topical two times a day PRN for rash    --------------------------------------------------------------------------------------    VITAL SIGNS, INS/OUTS (last 24 hours):  --------------------------------------------------------------------------------------  Vital Signs Last 24 Hrs  T(C): 37.3 (19 Dec 2018 11:06), Max: 38.1 (19 Dec 2018 00:25)  T(F): 99.1 (19 Dec 2018 11:06), Max: 100.5 (19 Dec 2018 00:25)  HR: 96 (19 Dec 2018 11:06) (93 - 99)  BP: 109/69 (19 Dec 2018 11:06) (109/69 - 120/73)  BP(mean): --  RR: 18 (19 Dec 2018 11:06) (18 - 18)  SpO2: 95% (19 Dec 2018 11:06) (94% - 95%)  --------------------------------------------------------------------------------------    EXAM  General: NAD, resting comfortably  Resp: unlabored breathing on RA  CV: HDS, rrr  Abd: soft, obese, nontender, nondistended; ostomy in place w/ thick stool in bag, well healed midline incisions    LABS  --------------------------------------------------------------------------------------  CBC (12-19 @ 08:10)                          8.6<L>                   12.29<H>  )--------------(  390        --    % Neuts, --    % Lymphs, ANC: --                              26.0<L>  CBC (12-18 @ 09:34)                          8.9<L>                   12.64<H>  )--------------(  438<H>     79.0<H>% Neuts, 12.0<L>% Lymphs, ANC: 9.99<H>                          27.4<L>    BMP (12-19 @ 06:58)       138     |  102     |  17    			Ca++ --      Ca 8.8          ---------------------------------( 157<H>		Mg --           4.1     |  23      |  1.11  			Ph --      BMP (12-18 @ 07:39)       137     |  101     |  14    			Ca++ --      Ca 9.0          ---------------------------------( 153<H>		Mg --           4.1     |  24      |  1.02  			Ph --        -> .Blood Blood-Peripheral Culture (12-15 @ 00:45)     NG    NG    No growth to date.    -> .Abscess Rectal drainage Culture (12-14 @ 06:29)     NG    Enterococcus faecalis  Enterococcus avium    Few Enterococcus faecalis  Few Enterococcus avium  Moderate Streptococcus anginosus "Susceptibilities not performed"  Moderate Prevotella bivia "Susceptibilities not performed"    -> .Urine Catheterized Culture (12-12 @ 19:41)     NG    NG    No growth    -> .Blood Blood-Peripheral Culture (12-11 @ 21:52)     NG    NG    No growth at 5 days.    -> .Stool Feces Culture (12-11 @ 01:52)     NG    NG    GI PCR Results: NOT detected  *******Please Note:*******  GI panel PCR evaluates for:  Campylobacter, Plesiomonas shigelloides, Salmonella,  Vibrio, Yersinia enterocolitica, Enteroaggregative  Escherichia coli (EAEC), Enteropathogenic E.coli (EPEC),  Enterotoxigenic E. coli (ETEC) lt/st, Shiga-like  toxin-producing E. coli (STEC) stx1/stx2,  Shigella/ Enteroinvasive E. coli (EIEC), Cryptosporidium,  Cyclospora cayetanensis, Entamoeba histolytica,  Giardia lamblia, Adenovirus F 40/41, Astrovirus,  Norovirus GI/GII, Rotavirus A, Sapovirus      --------------------------------------------------------------------------------------    OTHER LABS    IMAGING RESULTS  < from: MR Pelvis w/wo IV Cont (12.18.18 @ 17:22) >  Moderate artifact from left hip arthroplasty.    UTERUS: There is a fistulous connection between the rectum and vagina   through a large defect in the upper posterior vaginal wall and anterior   rectal wall. The defect measures approximately 2.6 x 2.9 cm (transverse   by craniocaudal). Anteriorly the vaginal fornix is markedly distended   with heterogeneous material measuring at least 8 x 5 cm. This has some   mass effect on the bladder and this large patulous defect extends below   the pubococcygeal line at rest. No perirectal or periuterine abscess or   collection. There are scattered small uterine fibroids.  ENDOMETRIUM: Thickened at 8 mm.   JUNCTIONAL ZONE: Within normal limits.    RIGHT OVARY: Not visualized.  LEFT OVARY: Not visualized.  ADNEXA: Within normal limits.    BLADDER: Within normal limits.    LYMPH NODES: No pelvic lymphadenopathy.    VISUALIZED PORTIONS:    ABDOMINAL ORGANS: Within normal limits.  PERITONEUM: No ascites.  VESSELS: Within normal limits.  ABDOMINAL WALL: Right lower quadrant parastomal hernia containing fat and   bowel unchanged.  BONES: Degenerative changes the lumbar spine. Probable hemangioma in L5   vertebral body.    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    PRESTON BARNETT M.D., RADIOLOGY RESIDENT  This document has been electronically signed.  JOLIE BISHOP M.D., ATTENDING RADIOLOGIST  This document has been electronically signed. Dec 19 2018 10:48AM        < end of copied text >

## 2018-12-20 VITALS
OXYGEN SATURATION: 93 % | TEMPERATURE: 97 F | HEART RATE: 100 BPM | RESPIRATION RATE: 18 BRPM | DIASTOLIC BLOOD PRESSURE: 80 MMHG | SYSTOLIC BLOOD PRESSURE: 121 MMHG

## 2018-12-20 LAB
CULTURE RESULTS: SIGNIFICANT CHANGE UP
CULTURE RESULTS: SIGNIFICANT CHANGE UP
SPECIMEN SOURCE: SIGNIFICANT CHANGE UP
SPECIMEN SOURCE: SIGNIFICANT CHANGE UP

## 2018-12-20 PROCEDURE — 74176 CT ABD & PELVIS W/O CONTRAST: CPT

## 2018-12-20 PROCEDURE — 82570 ASSAY OF URINE CREATININE: CPT

## 2018-12-20 PROCEDURE — 93306 TTE W/DOPPLER COMPLETE: CPT

## 2018-12-20 PROCEDURE — 71045 X-RAY EXAM CHEST 1 VIEW: CPT

## 2018-12-20 PROCEDURE — 82272 OCCULT BLD FECES 1-3 TESTS: CPT

## 2018-12-20 PROCEDURE — 76856 US EXAM PELVIC COMPLETE: CPT

## 2018-12-20 PROCEDURE — 83930 ASSAY OF BLOOD OSMOLALITY: CPT

## 2018-12-20 PROCEDURE — 72197 MRI PELVIS W/O & W/DYE: CPT

## 2018-12-20 PROCEDURE — 84443 ASSAY THYROID STIM HORMONE: CPT

## 2018-12-20 PROCEDURE — 87040 BLOOD CULTURE FOR BACTERIA: CPT

## 2018-12-20 PROCEDURE — 84484 ASSAY OF TROPONIN QUANT: CPT

## 2018-12-20 PROCEDURE — 87581 M.PNEUMON DNA AMP PROBE: CPT

## 2018-12-20 PROCEDURE — 84156 ASSAY OF PROTEIN URINE: CPT

## 2018-12-20 PROCEDURE — 82330 ASSAY OF CALCIUM: CPT

## 2018-12-20 PROCEDURE — 80048 BASIC METABOLIC PNL TOTAL CA: CPT

## 2018-12-20 PROCEDURE — 87205 SMEAR GRAM STAIN: CPT

## 2018-12-20 PROCEDURE — 99232 SBSQ HOSP IP/OBS MODERATE 35: CPT

## 2018-12-20 PROCEDURE — 87798 DETECT AGENT NOS DNA AMP: CPT

## 2018-12-20 PROCEDURE — 97530 THERAPEUTIC ACTIVITIES: CPT

## 2018-12-20 PROCEDURE — 84300 ASSAY OF URINE SODIUM: CPT

## 2018-12-20 PROCEDURE — 76770 US EXAM ABDO BACK WALL COMP: CPT

## 2018-12-20 PROCEDURE — 87633 RESP VIRUS 12-25 TARGETS: CPT

## 2018-12-20 PROCEDURE — 85610 PROTHROMBIN TIME: CPT

## 2018-12-20 PROCEDURE — 83735 ASSAY OF MAGNESIUM: CPT

## 2018-12-20 PROCEDURE — 97110 THERAPEUTIC EXERCISES: CPT

## 2018-12-20 PROCEDURE — 71250 CT THORAX DX C-: CPT

## 2018-12-20 PROCEDURE — 80053 COMPREHEN METABOLIC PANEL: CPT

## 2018-12-20 PROCEDURE — 84480 ASSAY TRIIODOTHYRONINE (T3): CPT

## 2018-12-20 PROCEDURE — 76705 ECHO EXAM OF ABDOMEN: CPT

## 2018-12-20 PROCEDURE — 82435 ASSAY OF BLOOD CHLORIDE: CPT

## 2018-12-20 PROCEDURE — 87075 CULTR BACTERIA EXCEPT BLOOD: CPT

## 2018-12-20 PROCEDURE — 82803 BLOOD GASES ANY COMBINATION: CPT

## 2018-12-20 PROCEDURE — 82728 ASSAY OF FERRITIN: CPT

## 2018-12-20 PROCEDURE — 85730 THROMBOPLASTIN TIME PARTIAL: CPT

## 2018-12-20 PROCEDURE — 94640 AIRWAY INHALATION TREATMENT: CPT

## 2018-12-20 PROCEDURE — 85652 RBC SED RATE AUTOMATED: CPT

## 2018-12-20 PROCEDURE — 87070 CULTURE OTHR SPECIMN AEROBIC: CPT

## 2018-12-20 PROCEDURE — 82553 CREATINE MB FRACTION: CPT

## 2018-12-20 PROCEDURE — 87186 SC STD MICRODIL/AGAR DIL: CPT

## 2018-12-20 PROCEDURE — 82607 VITAMIN B-12: CPT

## 2018-12-20 PROCEDURE — 85014 HEMATOCRIT: CPT

## 2018-12-20 PROCEDURE — 92610 EVALUATE SWALLOWING FUNCTION: CPT

## 2018-12-20 PROCEDURE — 83935 ASSAY OF URINE OSMOLALITY: CPT

## 2018-12-20 PROCEDURE — A9585: CPT

## 2018-12-20 PROCEDURE — 84100 ASSAY OF PHOSPHORUS: CPT

## 2018-12-20 PROCEDURE — 96366 THER/PROPH/DIAG IV INF ADDON: CPT

## 2018-12-20 PROCEDURE — 86140 C-REACTIVE PROTEIN: CPT

## 2018-12-20 PROCEDURE — 82550 ASSAY OF CK (CPK): CPT

## 2018-12-20 PROCEDURE — 87324 CLOSTRIDIUM AG IA: CPT

## 2018-12-20 PROCEDURE — 83605 ASSAY OF LACTIC ACID: CPT

## 2018-12-20 PROCEDURE — 70450 CT HEAD/BRAIN W/O DYE: CPT

## 2018-12-20 PROCEDURE — 84295 ASSAY OF SERUM SODIUM: CPT

## 2018-12-20 PROCEDURE — 96365 THER/PROPH/DIAG IV INF INIT: CPT

## 2018-12-20 PROCEDURE — 82746 ASSAY OF FOLIC ACID SERUM: CPT

## 2018-12-20 PROCEDURE — 93005 ELECTROCARDIOGRAM TRACING: CPT | Mod: 76

## 2018-12-20 PROCEDURE — 84436 ASSAY OF TOTAL THYROXINE: CPT

## 2018-12-20 PROCEDURE — 83540 ASSAY OF IRON: CPT

## 2018-12-20 PROCEDURE — 97163 PT EVAL HIGH COMPLEX 45 MIN: CPT

## 2018-12-20 PROCEDURE — 87449 NOS EACH ORGANISM AG IA: CPT

## 2018-12-20 PROCEDURE — 85027 COMPLETE CBC AUTOMATED: CPT

## 2018-12-20 PROCEDURE — 82947 ASSAY GLUCOSE BLOOD QUANT: CPT

## 2018-12-20 PROCEDURE — 87507 IADNA-DNA/RNA PROBE TQ 12-25: CPT

## 2018-12-20 PROCEDURE — 81001 URINALYSIS AUTO W/SCOPE: CPT

## 2018-12-20 PROCEDURE — 87486 CHLMYD PNEUM DNA AMP PROBE: CPT

## 2018-12-20 PROCEDURE — 81003 URINALYSIS AUTO W/O SCOPE: CPT

## 2018-12-20 PROCEDURE — 84132 ASSAY OF SERUM POTASSIUM: CPT

## 2018-12-20 PROCEDURE — 83550 IRON BINDING TEST: CPT

## 2018-12-20 PROCEDURE — 87086 URINE CULTURE/COLONY COUNT: CPT

## 2018-12-20 PROCEDURE — 99285 EMERGENCY DEPT VISIT HI MDM: CPT | Mod: 25

## 2018-12-20 PROCEDURE — 97116 GAIT TRAINING THERAPY: CPT

## 2018-12-20 RX ORDER — ASPIRIN/CALCIUM CARB/MAGNESIUM 324 MG
1 TABLET ORAL
Qty: 0 | Refills: 0 | COMMUNITY

## 2018-12-20 RX ORDER — PANTOPRAZOLE SODIUM 20 MG/1
1 TABLET, DELAYED RELEASE ORAL
Qty: 0 | Refills: 0 | DISCHARGE
Start: 2018-12-20

## 2018-12-20 RX ORDER — ASPIRIN/CALCIUM CARB/MAGNESIUM 324 MG
1 TABLET ORAL
Qty: 0 | Refills: 0 | DISCHARGE
Start: 2018-12-20

## 2018-12-20 RX ORDER — FERROUS SULFATE 325(65) MG
1 TABLET ORAL
Qty: 30 | Refills: 0
Start: 2018-12-20 | End: 2019-01-18

## 2018-12-20 RX ORDER — CEPHALEXIN 500 MG
1 CAPSULE ORAL
Qty: 0 | Refills: 0 | COMMUNITY

## 2018-12-20 RX ORDER — CEPHALEXIN 500 MG
1 CAPSULE ORAL
Qty: 0 | Refills: 0 | COMMUNITY
Start: 2018-12-20

## 2018-12-20 RX ADMIN — PANTOPRAZOLE SODIUM 40 MILLIGRAM(S): 20 TABLET, DELAYED RELEASE ORAL at 12:18

## 2018-12-20 RX ADMIN — Medication 50 MICROGRAM(S): at 05:37

## 2018-12-20 RX ADMIN — Medication 81 MILLIGRAM(S): at 14:44

## 2018-12-20 RX ADMIN — PIPERACILLIN AND TAZOBACTAM 25 GRAM(S): 4; .5 INJECTION, POWDER, LYOPHILIZED, FOR SOLUTION INTRAVENOUS at 09:59

## 2018-12-20 RX ADMIN — HEPARIN SODIUM 5000 UNIT(S): 5000 INJECTION INTRAVENOUS; SUBCUTANEOUS at 05:37

## 2018-12-20 RX ADMIN — Medication 1 TABLET(S): at 12:18

## 2018-12-20 RX ADMIN — PIPERACILLIN AND TAZOBACTAM 25 GRAM(S): 4; .5 INJECTION, POWDER, LYOPHILIZED, FOR SOLUTION INTRAVENOUS at 01:15

## 2018-12-20 RX ADMIN — HEPARIN SODIUM 5000 UNIT(S): 5000 INJECTION INTRAVENOUS; SUBCUTANEOUS at 14:44

## 2018-12-20 RX ADMIN — Medication 325 MILLIGRAM(S): at 12:18

## 2018-12-20 NOTE — PROGRESS NOTE ADULT - PROVIDER SPECIALTY LIST ADULT
Colorectal Surgery
Gastroenterology
Infectious Disease
Internal Medicine
Nephrology
Infectious Disease
Internal Medicine

## 2018-12-20 NOTE — PROGRESS NOTE ADULT - ASSESSMENT
ASSESSMENT:  85F w/ HTN s/p subtotal colectomy with loop ileostomy brought up in 2007, and ex lap, SHASHANK, and repair of rectovaginal fistula with Dr. Doherty in 2009 at [facility] now with concern of new rectovaginal fistula causing sepsis.     - Patient admitted with sepsis of unknown origin (improved on abx) and LUIS, now with MRI demonstrating large rectovaginal fistula.   - Recommend continue with antibiotics  - Will follow up urology consult for cystoscopy to evaluate cystitis and rule out rectovesical fistula  - No acute surgical intervention at this time    Discussed with colorectal surgery fellow Dr. Hoffman and attending Dr. Ruiz.     Flower Verdugo PGY-2  Surgery Pager [phone]

## 2018-12-20 NOTE — PROGRESS NOTE ADULT - REASON FOR ADMISSION
dehydration and LUIS

## 2018-12-20 NOTE — PROGRESS NOTE ADULT - SUBJECTIVE AND OBJECTIVE BOX
Patient is a 85y old  Female who presents with a chief complaint of dehydration and LUIS (20 Dec 2018 07:17)      INTERVAL HPI/OVERNIGHT EVENTS: noted, feels well, appears nontoxic looking, deneis any new symptoms  per staff-pt continues to have rectal discharge      Vital Signs Last 24 Hrs  T(C): 36.3 (20 Dec 2018 12:51), Max: 36.9 (20 Dec 2018 01:04)  T(F): 97.4 (20 Dec 2018 12:51), Max: 98.4 (20 Dec 2018 01:04)  HR: 100 (20 Dec 2018 12:51) (94 - 109)  BP: 121/80 (20 Dec 2018 12:51) (115/73 - 124/69)  BP(mean): --  RR: 18 (20 Dec 2018 12:51) (18 - 18)  SpO2: 93% (20 Dec 2018 12:51) (93% - 96%)    acetaminophen   Tablet .. 650 milliGRAM(s) Oral every 6 hours PRN  aluminum hydroxide/magnesium hydroxide/simethicone Suspension 30 milliLiter(s) Oral every 4 hours PRN  aspirin enteric coated 81 milliGRAM(s) Oral daily  ferrous    sulfate 325 milliGRAM(s) Oral daily  heparin  Injectable 5000 Unit(s) SubCutaneous every 8 hours  levothyroxine 50 MICROGram(s) Oral daily  multivitamin 1 Tablet(s) Oral daily  nystatin Powder 1 Application(s) Topical two times a day PRN  ondansetron Injectable 4 milliGRAM(s) IV Push every 6 hours PRN  pantoprazole    Tablet 40 milliGRAM(s) Oral daily  piperacillin/tazobactam IVPB. 3.375 Gram(s) IV Intermittent every 8 hours  prochlorperazine   Injectable 5 milliGRAM(s) IV Push every 8 hours PRN      PHYSICAL EXAM:  GENERAL: NAD,   EYES: conjunctiva and sclera clear  ENMT: Moist mucous membranes  NECK: Supple, No JVD, Normal thyroid  NERVOUS SYSTEM:  Alert & Oriented X3,   CHEST/LUNG: Clear to auscultation bilaterally; No rales, rhonchi, wheezing, or rubs  HEART: Regular rate and rhythm; No murmurs, rubs, or gallops  ABDOMEN: Soft, Nontender, Nondistended; Bowel sounds present  EXTREMITIES:  2+ Peripheral Pulses, No clubbing, cyanosis, or edema  LYMPH: No lymphadenopathy noted  SKIN: No rashes or lesions    Consultant(s) Notes Reviewed:  [x ] YES  [ ] NO  Care Discussed with Consultants/Other Providers [ x] YES  [ ] NO    LABS:                        8.6    12.29 )-----------( 390      ( 19 Dec 2018 08:10 )             26.0     12-19    137  |  100  |  17  ----------------------------<  241<H>  3.9   |  22  |  1.12    Ca    8.9      19 Dec 2018 20:35  Phos  2.2     12-19  Mg     1.6     12-19          CAPILLARY BLOOD GLUCOSE                RADIOLOGY & ADDITIONAL TESTS:    Imaging Personally Reviewed:  [x ] YES  [ ] NO

## 2018-12-20 NOTE — PROGRESS NOTE ADULT - ASSESSMENT
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    #  fever / leukocytosis  fevers now resolved and leukocytosis downtrending   CRP elevated 11.96  rectal culture grow enterococcus faecalis and avium  check MRI abd with contrast confirms large rectovaginal fistula   No intervention per gyn and GI and CRS  cont zosyn per ID D#9,   urine and blood cultures NTD,  d/w dgtr prefers furthur w/u as outpt and wanted pt to be dced to rehab today  dgtr who is an NP understands fully the dc plan of care  d/w Dr Cummings- its reasonable to dc pt at this point, pt afebrile >24-48 hrs and, wbc downtrending and pt has convincing outpt fu plan  will dc zosyn upon dc to rehab        # LUIS / Dehydration- resolved  2/2 excessive ostomy output, creat improved, appreciate renal recs, strict i/o    # dysphagia  speech and swallow eval - pt refused MBS, ENT sp laryngoscopy showing pooling of secretions and nml vocal cords  dysphagia diet    # anemia  stool occult neg  despite red drainage from rectum, h/h stable  no further workup indicated  ferrous sulfate    # HTN HLD  BP stable off meds, at times borderline hypotensive      DVT PPX   PT OOB to chair

## 2018-12-20 NOTE — PROGRESS NOTE ADULT - ASSESSMENT
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 Also, on admission, complained of low energy, fatigue for about a week.  The intermittent L flank pain as well during the PT session only. She currently has L flank pain was 8/10 - non radiating, moving exacerbated it. She had increased urinary urgency 4 days PTA.  Was placed on cipro by family member and her urinary complaints improved.  Drainage from rectum - unclear.  Sinusitis.  Worsening leukocytosis and continued fever.  Unclear source.  Possibly related to fistula.  R/u Cdiff of the stump    Pt has been on antibiotics since 12/12- day # 9  Patient's daughter wants no further workup and insists on patient discharge   pt currently afebrile and WBC down.  daughter is aware of risks of no further evaluation, etc..  pt's sinus sxs have improved  still with rectal discharge    discussed with Dr Velasquez and with NP

## 2018-12-20 NOTE — PROGRESS NOTE ADULT - SUBJECTIVE AND OBJECTIVE BOX
Patient is a 85y old  Female who presents with a chief complaint of dehydration and LUIS (20 Dec 2018 13:09)    Being followed by ID for help in management        Interval history:  pt's daughter insists on discharge- according to our  NP she told her that she is a NP and will sign her mother out if we don't discharge her  pt with pink rectal discharge again today  colorectal note reviewed  No cough,SOB,CP  No N/V  No abd pain  No urinary complaints  No HA  No joint or limb pain  No other complaints    PAST MEDICAL & SURGICAL HISTORY:  Fistula, perirectal  Hiatal hernia  Hypothyroidism  Hypertension  S/P foot joint surgery: right ( 10 years ago )  S/P arthroscopy of right knee  S/P ileostomy: colectomy ( 2005 )  S/P cholecystectomy: at age 20 ,s    Allergies    iodine (Other; Anaphylaxis)  shellfish (Anaphylaxis)  sulfa drugs (Other)    Intolerances      Antimicrobials:    piperacillin/tazobactam IVPB. 3.375 Gram(s) IV Intermittent every 8 hours    MEDICATIONS  (STANDING):  aspirin enteric coated 81 milliGRAM(s) Oral daily  ferrous    sulfate 325 milliGRAM(s) Oral daily  heparin  Injectable 5000 Unit(s) SubCutaneous every 8 hours  levothyroxine 50 MICROGram(s) Oral daily  multivitamin 1 Tablet(s) Oral daily  pantoprazole    Tablet 40 milliGRAM(s) Oral daily  piperacillin/tazobactam IVPB. 3.375 Gram(s) IV Intermittent every 8 hours      Vital Signs Last 24 Hrs  T(C): 36.3 (12-20-18 @ 12:51), Max: 36.9 (12-20-18 @ 01:04)  T(F): 97.4 (12-20-18 @ 12:51), Max: 98.4 (12-20-18 @ 01:04)  HR: 100 (12-20-18 @ 12:51) (94 - 109)  BP: 121/80 (12-20-18 @ 12:51) (115/73 - 124/69)  BP(mean): --  RR: 18 (12-20-18 @ 12:51) (18 - 18)  SpO2: 93% (12-20-18 @ 12:51) (93% - 96%)    Physical Exam:    Constitutional well preserved,comfortable,pleasant    HEENT PERRLA EOMI,No pallor or icterus    No oral exudate or erythema    Neck supple no JVD or LN    Chest Good AE,CTA    CVS RRR S1 S2 WNl     Abd soft BS normal No tenderness  ostomy    Ext No cyanosis clubbing or edema    IV site no erythema tenderness or discharge    Joints no swelling or LOM    CNS AAO X 3 no focal    Lab Data:                          8.6    12.29 )-----------( 390      ( 19 Dec 2018 08:10 )             26.0       12-19    137  |  100  |  17  ----------------------------<  241<H>  3.9   |  22  |  1.12    Ca    8.9      19 Dec 2018 20:35  Phos  2.2     12-19  Mg     1.6     12-19            .Blood Blood-Peripheral  12-15-18   No growth at 5 days.  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 CT abdomen and pelvis   10/1/2010. MR pelvis 4/9/2010.    PROCEDURE:   MRI of the pelvis was performed with and without intravenous contrast.  10 cc of Gadavist was administered intravenously without complication.  60 cc of vaginal gel was inserted without complication.      FINDINGS:    Moderate artifact from left hip arthroplasty.    UTERUS: There is a fistulous connection between the rectum and vagina   through a large defect in the upper posterior vaginal wall and anterior   rectal wall. The defect measures approximately 2.6 x 2.9 cm (transverse   by craniocaudal). Anteriorly the vaginal fornix is markedly distended   with heterogeneous material measuring at least 8 x 5 cm. This has some   mass effect on the bladder and this large patulous defect extends below   the pubococcygeal line at rest. No perirectal or periuterine abscess or   collection. There are scattered small uterine fibroids.  ENDOMETRIUM: Thickened at 8 mm.   JUNCTIONAL ZONE: Within normal limits.    RIGHT OVARY: Not visualized.  LEFT OVARY: Not visualized.  ADNEXA: Within normal limits.    BLADDER: Within normal limits.    LYMPH NODES: No pelvic lymphadenopathy.    VISUALIZED PORTIONS:    ABDOMINAL ORGANS: Within normal limits.  PERITONEUM: No ascites.  VESSELS: Within normal limits.  ABDOMINAL WALL: Right lower quadrant parastomal hernia containing fat and   bowel unchanged.  BONES: Degenerative changes the lumbar spine. Probable hemangioma in L5   vertebral body.    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        PRESTON BARNETT M.D., RADIOLOGY RESIDENT  This document has been electronically signed.  JOLIE BISHOP M.D., ATTENDING RADIOLOGIST  This document has been electronically signed. Dec 19 2018 10:48AM    < end of copied text >      WBC Count: 12.29 (12-19-18 @ 08:10)  WBC Count: 12.64 (12-18-18 @ 09:34)  WBC Count: 15.25 (12-17-18 @ 07:50)  WBC Count: 13.7 (12-16-18 @ 15:17)  WBC Count: 11.54 (12-16-18 @ 08:17)  WBC Count: 9.74 (12-15-18 @ 09:18)  WBC Count: 9.7 (12-14-18 @ 11:46)

## 2018-12-20 NOTE — PROGRESS NOTE ADULT - SUBJECTIVE AND OBJECTIVE BOX
[facility] Red Team Daily Progress Note    SUBJECTIVE:   Patient seen and examined at bedside. No acute events overnight. Patient reports that she has no abdominal pain or any discharge per rectum or vagina x24 hr. She denies any fevers, chills, nausea, vomiting, change in ostomy output.     OBJECTIVE:   Vital Signs Last 24 Hrs  T(C): 36.6 (20 Dec 2018 05:35), Max: 37.3 (19 Dec 2018 11:06)  T(F): 97.8 (20 Dec 2018 05:35), Max: 99.1 (19 Dec 2018 11:06)  HR: 94 (20 Dec 2018 05:35) (94 - 109)  BP: 115/73 (20 Dec 2018 05:35) (109/69 - 121/78)  BP(mean): --  RR: 18 (20 Dec 2018 05:35) (18 - 18)  SpO2: 96% (20 Dec 2018 05:35) (94% - 96%)    PHYSICAL EXAM:  General: NAD, resting comfortably  Resp: unlabored breathing on RA  CV: HDS, rrr  Abd: soft, obese, nontender, nondistended; ostomy in place w/ thick stool in bag, well healed midline incisions  Extr: wwp    IMAGING RESULTS  < from: MR Pelvis w/wo IV Cont (12.18.18 @ 17:22) >  Moderate artifact from left hip arthroplasty.    UTERUS: There is a fistulous connection between the rectum and vagina   through a large defect in the upper posterior vaginal wall and anterior   rectal wall. The defect measures approximately 2.6 x 2.9 cm (transverse   by craniocaudal). Anteriorly the vaginal fornix is markedly distended   with heterogeneous material measuring at least 8 x 5 cm. This has some   mass effect on the bladder and this large patulous defect extends below   the pubococcygeal line at rest. No perirectal or periuterine abscess or   collection. There are scattered small uterine fibroids.  ENDOMETRIUM: Thickened at 8 mm.   JUNCTIONAL ZONE: Within normal limits.    RIGHT OVARY: Not visualized.  LEFT OVARY: Not visualized.  ADNEXA: Within normal limits.    BLADDER: Within normal limits.    LYMPH NODES: No pelvic lymphadenopathy.    VISUALIZED PORTIONS:    ABDOMINAL ORGANS: Within normal limits.  PERITONEUM: No ascites.  VESSELS: Within normal limits.  ABDOMINAL WALL: Right lower quadrant parastomal hernia containing fat and   bowel unchanged.  BONES: Degenerative changes the lumbar spine. Probable hemangioma in L5   vertebral body.    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    PRESTON BARNETT M.D., RADIOLOGY RESIDENT  This document has been electronically signed.  JOLIE BISHOP M.D., ATTENDING RADIOLOGIST  This document has been electronically signed. Dec 19 2018 10:48AM        < end of copied text >    CBC (12-19 @ 08:10)                          8.6<L>                   12.29<H>  )--------------(  390        --    % Neuts, --    % Lymphs, ANC: --                              26.0<L>    BMP (12-19 @ 20:35)       137     |  100     |  17    			Ca++ --      Ca 8.9          ---------------------------------( 241<H>		Mg 1.6          3.9     |  22      |  1.12  			Ph 2.2<L>  BMP (12-19 @ 06:58)       138     |  102     |  17    			Ca++ --      Ca 8.8          ---------------------------------( 157<H>		Mg --           4.1     |  23      |  1.11  			Ph --            Cardiac Markers (12-19 @ 20:35)     Trop: -- -- / CKMB: -- / CK: 16          -> .Blood Blood-Peripheral Culture (12-15 @ 00:45)     NG    NG    No growth at 5 days.    -> .Abscess Rectal drainage Culture (12-14 @ 06:29)     NG    Enterococcus faecalis  Enterococcus avium    Few Enterococcus faecalis  Few Enterococcus avium  Moderate Streptococcus anginosus "Susceptibilities not performed"  Moderate Prevotella bivia "Susceptibilities not performed"    -> .Urine Catheterized Culture (12-12 @ 19:41)     NG    NG    No growth    -> .Blood Blood-Peripheral Culture (12-11 @ 21:52)     NG    NG    No growth at 5 days.    -> .Stool Feces Culture (12-11 @ 01:52)     NG    NG    GI PCR Results: NOT detected

## 2019-01-08 ENCOUNTER — APPOINTMENT (OUTPATIENT)
Dept: ULTRASOUND IMAGING | Facility: HOSPITAL | Age: 84
End: 2019-01-08

## 2019-02-02 ENCOUNTER — EMERGENCY (EMERGENCY)
Facility: HOSPITAL | Age: 84
LOS: 1 days | Discharge: HOME | End: 2019-02-02
Attending: EMERGENCY MEDICINE
Payer: MEDICARE

## 2019-02-02 VITALS
TEMPERATURE: 99 F | DIASTOLIC BLOOD PRESSURE: 82 MMHG | RESPIRATION RATE: 18 BRPM | OXYGEN SATURATION: 98 % | WEIGHT: 179.9 LBS | SYSTOLIC BLOOD PRESSURE: 141 MMHG | HEIGHT: 63 IN | HEART RATE: 70 BPM

## 2019-02-02 PROCEDURE — 73630 X-RAY EXAM OF FOOT: CPT | Mod: 26,LT

## 2019-02-02 PROCEDURE — 73630 X-RAY EXAM OF FOOT: CPT

## 2019-02-02 PROCEDURE — 99283 EMERGENCY DEPT VISIT LOW MDM: CPT

## 2019-02-02 NOTE — ED PROVIDER NOTE - NSFOLLOWUPINSTRUCTIONS_ED_ALL_ED_FT
Rest, drink plenty of fluids.  Advance activity as tolerated.  Continue all previously prescribed medications as directed.  Follow up with your primary care physician in 48-72 hours- bring copies of your results.  Return to the ER for worsening or persistent symptoms, and/or ANY NEW OR CONCERNING SYMPTOMS. If you have issues obtaining follow up, please call: [phone] to obtain a doctor or specialist who takes your insurance in your area.

## 2019-02-02 NOTE — ED PROVIDER NOTE - ATTENDING CONTRIBUTION TO CARE
ATTENDING MD:  I, Phuc Dickerson, personally have seen and examined this patient.  I have discussed all aspects of care with the resident physician. Resident note reviewed and agree on plan of care and except where noted.  See HPI, PE, and MDM for details.

## 2019-02-02 NOTE — ED PROVIDER NOTE - PHYSICAL EXAMINATION
Ruby Morejon DO.:   GENERAL: Patient awake alert NAD.  HEENT: NC/AT, Moist mucous membranes, PERRL, EOMI.  LUNGS: CTAB, no wheezes or crackles.   CARDIAC: RRR, no m/r/g.    ABDOMEN: Ileostomy present, Soft, NT, ND, No rebound, guarding.  EXT: No edema. No calf tenderness. Pulses positive equal, with doppler.  MSK: No spinal tenderness, no pain with movement, no deformities.  NEURO: A&Ox3. Poor ambulation at baseline. Decreased sensation over left plantar foot. 2x2cm ulceration on left medial aspect of base of hallux, some redness/erythema no active discharge. Healing heel ulcers b/l.  SKIN: Warm and dry. No rash.  PSYCH: Normal affect. Ruby Morejon, .:   GENERAL: Patient awake alert NAD.  HEENT: NC/AT, Moist mucous membranes, PERRL, EOMI.  LUNGS: CTAB, no wheezes or crackles.   CARDIAC: RRR, no m/r/g.    ABDOMEN: Ileostomy present, Soft, NT, ND, No rebound, guarding.  EXT: No edema. No calf tenderness. Pulses positive equal, with doppler.  MSK: No spinal tenderness, no pain with movement, no deformities.  NEURO: A&Ox3. Poor ambulation at baseline. Decreased sensation over left plantar foot. 2x2cm ulceration on left medial aspect of base of hallux, some redness/erythema no active discharge. Healing heel ulcers b/l.  SKIN: Warm and dry. No rash.  PSYCH: Normal affect.    ATTENDING MD Tuan:   well appearing, NAD, Aox4. bilatearl feet with 2+ pulses. discolored heels bilatearlly with pressure ulcers nonblanching, no major skin breakdown. L-halluc with 2x2 superficial skin ulceration to dermis with pin hole with slight powdery discharge, no serous, sanguinous or purulent discharge. probes into superficial tissue no bone, no devitalized skin no malodor. no surrounding erythema or tenderness

## 2019-02-02 NOTE — ED PROVIDER NOTE - OBJECTIVE STATEMENT
85F h/o hypothyroid, HTN presents with worsening left foot ulcer, ntoed by 85F h/o hypothyroid, HTN, ileostomy (CDiff complications), neuropathy [facility]. Went to PMD today and sent to ER for eval of osteo. Patient has healing pressure ulcer on bases of both heels. Denies fevers, chills, N/V/D. Patient at baseline walks with walker at baseline. 85F h/o hypothyroid, HTN, ileostomy (CDiff complications), neuropathy [facility] member. Ulcers have been there for weeks. Went to PMD today and sent to ER for eval of osteo. Patient has healing pressure ulcer on bases of both heels. Denies fevers, chills, N/V/D. Patient at baseline walks with walker at baseline.

## 2019-02-02 NOTE — ED PROVIDER NOTE - NS ED ROS FT
CONST: no fevers, no chills  EYES: no pain, no vision changes  ENT: no sore throat, no ear pain, no change in hearing  CV: no chest pain, no leg swelling  RESP: no shortness of breath, no cough  ABD: no abdominal pain, no nausea, no vomiting, no diarrhea  : no dysuria, no flank pain, no hematuria  MSK: no back pain, no extremity pain  NEURO: no headache or additional neurologic complaints  HEME: no easy bleeding  SKIN:  no rash, ulcers on feet b/l.

## 2019-02-02 NOTE — ED PROVIDER NOTE - MEDICAL DECISION MAKING DETAILS
85F p/w ulcer, concern fo osteo. Will get xrays. No systemic symptoms, likely f/u with podiatry outpat if neg xray. 85F p/w ulcer, concern fo osteo. Will get xrays. No systemic symptoms, likely f/u with podiatry outpat if neg xray.    ATTENDING MD Tuan: chronic ulcer over bunion, appears c/w pressure ulcer, does not appear infected, XR negative no infectious sypmtoms, will DC for f/u with podiatry and wound care.

## 2019-02-04 ENCOUNTER — OUTPATIENT (OUTPATIENT)
Dept: OUTPATIENT SERVICES | Facility: HOSPITAL | Age: 84
LOS: 1 days | Discharge: HOME | End: 2019-02-04
Payer: MEDICARE

## 2019-02-04 ENCOUNTER — RESULT REVIEW (OUTPATIENT)
Age: 84
End: 2019-02-04

## 2019-02-04 DIAGNOSIS — L89.899 PRESSURE ULCER OF OTHER SITE, UNSPECIFIED STAGE: ICD-10-CM

## 2019-02-04 LAB
BASOPHILS # BLD AUTO: 0.08 K/UL — SIGNIFICANT CHANGE UP (ref 0–0.2)
BASOPHILS NFR BLD AUTO: 0.8 % — SIGNIFICANT CHANGE UP (ref 0–2)
EOSINOPHIL # BLD AUTO: 0.28 K/UL — SIGNIFICANT CHANGE UP (ref 0–0.5)
EOSINOPHIL NFR BLD AUTO: 2.8 % — SIGNIFICANT CHANGE UP (ref 0–6)
ERYTHROCYTE [SEDIMENTATION RATE] IN BLOOD: 57 MM/HR — HIGH (ref 0–20)
HBA1C BLD-MCNC: 5.5 % — SIGNIFICANT CHANGE UP (ref 4–5.6)
HCT VFR BLD CALC: 31.4 % — LOW (ref 34.5–45)
HGB BLD-MCNC: 10.2 G/DL — LOW (ref 11.5–15.5)
IMM GRANULOCYTES NFR BLD AUTO: 0.4 % — SIGNIFICANT CHANGE UP (ref 0–1.5)
LYMPHOCYTES # BLD AUTO: 2.85 K/UL — SIGNIFICANT CHANGE UP (ref 1–3.3)
LYMPHOCYTES # BLD AUTO: 28.7 % — SIGNIFICANT CHANGE UP (ref 13–44)
MCHC RBC-ENTMCNC: 30.2 PG — SIGNIFICANT CHANGE UP (ref 27–34)
MCHC RBC-ENTMCNC: 32.5 GM/DL — SIGNIFICANT CHANGE UP (ref 32–36)
MCV RBC AUTO: 92.9 FL — SIGNIFICANT CHANGE UP (ref 80–100)
MONOCYTES # BLD AUTO: 0.6 K/UL — SIGNIFICANT CHANGE UP (ref 0–0.9)
MONOCYTES NFR BLD AUTO: 6 % — SIGNIFICANT CHANGE UP (ref 2–14)
NEUTROPHILS # BLD AUTO: 6.08 K/UL — SIGNIFICANT CHANGE UP (ref 1.8–7.4)
NEUTROPHILS NFR BLD AUTO: 61.3 % — SIGNIFICANT CHANGE UP (ref 43–77)
NRBC # BLD: 0 /100 WBCS — SIGNIFICANT CHANGE UP (ref 0–0)
PLATELET # BLD AUTO: 359 K/UL — SIGNIFICANT CHANGE UP (ref 150–400)
RBC # BLD: 3.38 M/UL — LOW (ref 3.8–5.2)
RBC # BLD: 3.38 M/UL — LOW (ref 3.8–5.2)
RBC # FLD: 15.1 % — HIGH (ref 10.3–14.5)
RETICS #: 81.8 K/UL — SIGNIFICANT CHANGE UP (ref 25–125)
RETICS/RBC NFR: 2.4 % — SIGNIFICANT CHANGE UP (ref 0.5–2.5)
WBC # BLD: 9.93 K/UL — SIGNIFICANT CHANGE UP (ref 3.8–10.5)
WBC # FLD AUTO: 9.93 K/UL — SIGNIFICANT CHANGE UP (ref 3.8–10.5)

## 2019-02-04 PROCEDURE — 88304 TISSUE EXAM BY PATHOLOGIST: CPT | Mod: 26

## 2019-02-05 LAB
CRP SERPL-MCNC: 3.39 MG/DL — HIGH (ref 0–0.4)
GRAM STN FLD: SIGNIFICANT CHANGE UP
PREALB SERPL-MCNC: 13 MG/DL — LOW (ref 20–40)
SPECIMEN SOURCE: SIGNIFICANT CHANGE UP
SURGICAL PATHOLOGY STUDY: SIGNIFICANT CHANGE UP

## 2019-02-06 DIAGNOSIS — Z96.651 PRESENCE OF RIGHT ARTIFICIAL KNEE JOINT: ICD-10-CM

## 2019-02-06 DIAGNOSIS — R26.9 UNSPECIFIED ABNORMALITIES OF GAIT AND MOBILITY: ICD-10-CM

## 2019-02-06 DIAGNOSIS — Z96.641 PRESENCE OF RIGHT ARTIFICIAL HIP JOINT: ICD-10-CM

## 2019-02-06 DIAGNOSIS — L02.612 CUTANEOUS ABSCESS OF LEFT FOOT: ICD-10-CM

## 2019-02-06 DIAGNOSIS — Z66 DO NOT RESUSCITATE: ICD-10-CM

## 2019-02-06 DIAGNOSIS — L89.610 PRESSURE ULCER OF RIGHT HEEL, UNSTAGEABLE: ICD-10-CM

## 2019-02-06 DIAGNOSIS — Z99.89 DEPENDENCE ON OTHER ENABLING MACHINES AND DEVICES: ICD-10-CM

## 2019-02-06 DIAGNOSIS — G60.9 HEREDITARY AND IDIOPATHIC NEUROPATHY, UNSPECIFIED: ICD-10-CM

## 2019-02-06 DIAGNOSIS — Z83.3 FAMILY HISTORY OF DIABETES MELLITUS: ICD-10-CM

## 2019-02-06 DIAGNOSIS — H26.9 UNSPECIFIED CATARACT: ICD-10-CM

## 2019-02-06 DIAGNOSIS — L89.623 PRESSURE ULCER OF LEFT HEEL, STAGE 3: ICD-10-CM

## 2019-02-06 DIAGNOSIS — Z91.81 HISTORY OF FALLING: ICD-10-CM

## 2019-02-06 DIAGNOSIS — H26.8 OTHER SPECIFIED CATARACT: ICD-10-CM

## 2019-02-06 DIAGNOSIS — R05 COUGH: ICD-10-CM

## 2019-02-06 DIAGNOSIS — M62.81 MUSCLE WEAKNESS (GENERALIZED): ICD-10-CM

## 2019-02-06 DIAGNOSIS — Z74.1 NEED FOR ASSISTANCE WITH PERSONAL CARE: ICD-10-CM

## 2019-02-06 DIAGNOSIS — E78.5 HYPERLIPIDEMIA, UNSPECIFIED: ICD-10-CM

## 2019-02-06 DIAGNOSIS — Z79.899 OTHER LONG TERM (CURRENT) DRUG THERAPY: ICD-10-CM

## 2019-02-06 DIAGNOSIS — Z87.19 PERSONAL HISTORY OF OTHER DISEASES OF THE DIGESTIVE SYSTEM: ICD-10-CM

## 2019-02-06 DIAGNOSIS — M10.09 IDIOPATHIC GOUT, MULTIPLE SITES: ICD-10-CM

## 2019-02-06 DIAGNOSIS — E03.9 HYPOTHYROIDISM, UNSPECIFIED: ICD-10-CM

## 2019-02-06 DIAGNOSIS — Z91.013 ALLERGY TO SEAFOOD: ICD-10-CM

## 2019-02-06 DIAGNOSIS — Z83.49 FAMILY HISTORY OF OTHER ENDOCRINE, NUTRITIONAL AND METABOLIC DISEASES: ICD-10-CM

## 2019-02-06 DIAGNOSIS — Z79.82 LONG TERM (CURRENT) USE OF ASPIRIN: ICD-10-CM

## 2019-02-06 DIAGNOSIS — Z88.2 ALLERGY STATUS TO SULFONAMIDES: ICD-10-CM

## 2019-02-06 DIAGNOSIS — R53.83 OTHER FATIGUE: ICD-10-CM

## 2019-02-06 DIAGNOSIS — Z97.3 PRESENCE OF SPECTACLES AND CONTACT LENSES: ICD-10-CM

## 2019-02-06 DIAGNOSIS — Z82.49 FAMILY HISTORY OF ISCHEMIC HEART DISEASE AND OTHER DISEASES OF THE CIRCULATORY SYSTEM: ICD-10-CM

## 2019-02-06 DIAGNOSIS — I48.91 UNSPECIFIED ATRIAL FIBRILLATION: ICD-10-CM

## 2019-02-06 DIAGNOSIS — Z80.9 FAMILY HISTORY OF MALIGNANT NEOPLASM, UNSPECIFIED: ICD-10-CM

## 2019-02-06 DIAGNOSIS — R68.89 OTHER GENERAL SYMPTOMS AND SIGNS: ICD-10-CM

## 2019-02-06 DIAGNOSIS — D64.9 ANEMIA, UNSPECIFIED: ICD-10-CM

## 2019-02-06 DIAGNOSIS — Z79.890 HORMONE REPLACEMENT THERAPY: ICD-10-CM

## 2019-02-06 LAB
-  AMOXICILLIN/CLAVULANIC ACID: SIGNIFICANT CHANGE UP
-  AMPICILLIN/SULBACTAM: SIGNIFICANT CHANGE UP
-  AMPICILLIN: SIGNIFICANT CHANGE UP
-  CEFAZOLIN: SIGNIFICANT CHANGE UP
-  CEFTRIAXONE: SIGNIFICANT CHANGE UP
-  CIPROFLOXACIN: SIGNIFICANT CHANGE UP
-  CLINDAMYCIN: SIGNIFICANT CHANGE UP
-  DAPTOMYCIN: SIGNIFICANT CHANGE UP
-  ERYTHROMYCIN: SIGNIFICANT CHANGE UP
-  GENTAMICIN: SIGNIFICANT CHANGE UP
-  LEVOFLOXACIN: SIGNIFICANT CHANGE UP
-  LINEZOLID: SIGNIFICANT CHANGE UP
-  MEROPENEM: SIGNIFICANT CHANGE UP
-  MOXIFLOXACIN(AEROBIC): SIGNIFICANT CHANGE UP
-  OXACILLIN: SIGNIFICANT CHANGE UP
-  PENICILLIN: SIGNIFICANT CHANGE UP
-  RIFAMPIN: SIGNIFICANT CHANGE UP
-  TETRACYCLINE: SIGNIFICANT CHANGE UP
-  TRIMETHOPRIM/SULFAMETHOXAZOLE: SIGNIFICANT CHANGE UP
-  VANCOMYCIN: SIGNIFICANT CHANGE UP
METHOD TYPE: SIGNIFICANT CHANGE UP

## 2019-02-08 ENCOUNTER — APPOINTMENT (OUTPATIENT)
Dept: MRI IMAGING | Facility: HOSPITAL | Age: 84
End: 2019-02-08

## 2019-02-09 LAB
CULTURE RESULTS: SIGNIFICANT CHANGE UP
GRAM STN FLD: SIGNIFICANT CHANGE UP
ORGANISM # SPEC MICROSCOPIC CNT: SIGNIFICANT CHANGE UP
ORGANISM # SPEC MICROSCOPIC CNT: SIGNIFICANT CHANGE UP
SPECIMEN SOURCE: SIGNIFICANT CHANGE UP

## 2019-02-11 ENCOUNTER — RESULT REVIEW (OUTPATIENT)
Age: 84
End: 2019-02-11

## 2019-02-11 ENCOUNTER — OUTPATIENT (OUTPATIENT)
Dept: OUTPATIENT SERVICES | Facility: HOSPITAL | Age: 84
LOS: 1 days | Discharge: HOME | End: 2019-02-11
Payer: MEDICARE

## 2019-02-11 ENCOUNTER — EMERGENCY (EMERGENCY)
Facility: HOSPITAL | Age: 84
LOS: 1 days | Discharge: HOME | End: 2019-02-11
Attending: EMERGENCY MEDICINE
Payer: MEDICARE

## 2019-02-11 VITALS
HEART RATE: 66 BPM | RESPIRATION RATE: 17 BRPM | TEMPERATURE: 98 F | DIASTOLIC BLOOD PRESSURE: 80 MMHG | WEIGHT: 175.05 LBS | OXYGEN SATURATION: 97 % | SYSTOLIC BLOOD PRESSURE: 168 MMHG

## 2019-02-11 VITALS
TEMPERATURE: 98 F | RESPIRATION RATE: 16 BRPM | DIASTOLIC BLOOD PRESSURE: 73 MMHG | OXYGEN SATURATION: 97 % | SYSTOLIC BLOOD PRESSURE: 132 MMHG | HEART RATE: 60 BPM

## 2019-02-11 DIAGNOSIS — L89.90 PRESSURE ULCER OF UNSPECIFIED SITE, UNSPECIFIED STAGE: ICD-10-CM

## 2019-02-11 LAB
ALBUMIN SERPL ELPH-MCNC: 3.7 G/DL — SIGNIFICANT CHANGE UP (ref 3.3–5)
ALP SERPL-CCNC: 62 U/L — SIGNIFICANT CHANGE UP (ref 40–120)
ALT FLD-CCNC: 12 U/L — SIGNIFICANT CHANGE UP (ref 10–45)
ANION GAP SERPL CALC-SCNC: 11 MMOL/L — SIGNIFICANT CHANGE UP (ref 5–17)
APTT BLD: 32.3 SEC — SIGNIFICANT CHANGE UP (ref 27.5–36.3)
AST SERPL-CCNC: 16 U/L — SIGNIFICANT CHANGE UP (ref 10–40)
BASOPHILS # BLD AUTO: 0.1 K/UL — SIGNIFICANT CHANGE UP (ref 0–0.2)
BASOPHILS NFR BLD AUTO: 0.6 % — SIGNIFICANT CHANGE UP (ref 0–2)
BILIRUB SERPL-MCNC: 0.2 MG/DL — SIGNIFICANT CHANGE UP (ref 0.2–1.2)
BLD GP AB SCN SERPL QL: NEGATIVE — SIGNIFICANT CHANGE UP
BUN SERPL-MCNC: 10 MG/DL — SIGNIFICANT CHANGE UP (ref 7–23)
CALCIUM SERPL-MCNC: 9.7 MG/DL — SIGNIFICANT CHANGE UP (ref 8.4–10.5)
CHLORIDE SERPL-SCNC: 99 MMOL/L — SIGNIFICANT CHANGE UP (ref 96–108)
CK MB CFR SERPL CALC: 1.4 NG/ML — SIGNIFICANT CHANGE UP (ref 0–3.8)
CK SERPL-CCNC: 19 U/L — LOW (ref 25–170)
CO2 SERPL-SCNC: 29 MMOL/L — SIGNIFICANT CHANGE UP (ref 22–31)
CREAT SERPL-MCNC: 0.79 MG/DL — SIGNIFICANT CHANGE UP (ref 0.5–1.3)
EOSINOPHIL # BLD AUTO: 0.2 K/UL — SIGNIFICANT CHANGE UP (ref 0–0.5)
EOSINOPHIL NFR BLD AUTO: 2.4 % — SIGNIFICANT CHANGE UP (ref 0–6)
GLUCOSE SERPL-MCNC: 157 MG/DL — HIGH (ref 70–99)
HCT VFR BLD CALC: 32.4 % — LOW (ref 34.5–45)
HGB BLD-MCNC: 11.2 G/DL — LOW (ref 11.5–15.5)
INR BLD: 1.1 RATIO — SIGNIFICANT CHANGE UP (ref 0.88–1.16)
LYMPHOCYTES # BLD AUTO: 2.9 K/UL — SIGNIFICANT CHANGE UP (ref 1–3.3)
LYMPHOCYTES # BLD AUTO: 31.9 % — SIGNIFICANT CHANGE UP (ref 13–44)
MCHC RBC-ENTMCNC: 31.4 PG — SIGNIFICANT CHANGE UP (ref 27–34)
MCHC RBC-ENTMCNC: 34.5 GM/DL — SIGNIFICANT CHANGE UP (ref 32–36)
MCV RBC AUTO: 91 FL — SIGNIFICANT CHANGE UP (ref 80–100)
MONOCYTES # BLD AUTO: 0.6 K/UL — SIGNIFICANT CHANGE UP (ref 0–0.9)
MONOCYTES NFR BLD AUTO: 6.2 % — SIGNIFICANT CHANGE UP (ref 2–14)
NEUTROPHILS # BLD AUTO: 5.3 K/UL — SIGNIFICANT CHANGE UP (ref 1.8–7.4)
NEUTROPHILS NFR BLD AUTO: 58.8 % — SIGNIFICANT CHANGE UP (ref 43–77)
PLATELET # BLD AUTO: 291 K/UL — SIGNIFICANT CHANGE UP (ref 150–400)
POTASSIUM SERPL-MCNC: 3.2 MMOL/L — LOW (ref 3.5–5.3)
POTASSIUM SERPL-SCNC: 3.2 MMOL/L — LOW (ref 3.5–5.3)
PROT SERPL-MCNC: 7.4 G/DL — SIGNIFICANT CHANGE UP (ref 6–8.3)
PROTHROM AB SERPL-ACNC: 12.6 SEC — SIGNIFICANT CHANGE UP (ref 10–12.9)
RBC # BLD: 3.56 M/UL — LOW (ref 3.8–5.2)
RBC # FLD: 14.7 % — HIGH (ref 10.3–14.5)
RH IG SCN BLD-IMP: POSITIVE — SIGNIFICANT CHANGE UP
SODIUM SERPL-SCNC: 139 MMOL/L — SIGNIFICANT CHANGE UP (ref 135–145)
TROPONIN T, HIGH SENSITIVITY RESULT: 23 NG/L — SIGNIFICANT CHANGE UP (ref 0–51)
WBC # BLD: 9 K/UL — SIGNIFICANT CHANGE UP (ref 3.8–10.5)
WBC # FLD AUTO: 9 K/UL — SIGNIFICANT CHANGE UP (ref 3.8–10.5)

## 2019-02-11 PROCEDURE — 86850 RBC ANTIBODY SCREEN: CPT

## 2019-02-11 PROCEDURE — 70450 CT HEAD/BRAIN W/O DYE: CPT | Mod: 26

## 2019-02-11 PROCEDURE — 71045 X-RAY EXAM CHEST 1 VIEW: CPT | Mod: 26

## 2019-02-11 PROCEDURE — 70498 CT ANGIOGRAPHY NECK: CPT | Mod: 26

## 2019-02-11 PROCEDURE — 70450 CT HEAD/BRAIN W/O DYE: CPT

## 2019-02-11 PROCEDURE — 70496 CT ANGIOGRAPHY HEAD: CPT

## 2019-02-11 PROCEDURE — 71045 X-RAY EXAM CHEST 1 VIEW: CPT

## 2019-02-11 PROCEDURE — 85027 COMPLETE CBC AUTOMATED: CPT

## 2019-02-11 PROCEDURE — 99285 EMERGENCY DEPT VISIT HI MDM: CPT | Mod: 25

## 2019-02-11 PROCEDURE — 88304 TISSUE EXAM BY PATHOLOGIST: CPT | Mod: 26

## 2019-02-11 PROCEDURE — 85730 THROMBOPLASTIN TIME PARTIAL: CPT

## 2019-02-11 PROCEDURE — 99284 EMERGENCY DEPT VISIT MOD MDM: CPT | Mod: 25

## 2019-02-11 PROCEDURE — 93010 ELECTROCARDIOGRAM REPORT: CPT

## 2019-02-11 PROCEDURE — 70498 CT ANGIOGRAPHY NECK: CPT

## 2019-02-11 PROCEDURE — 82553 CREATINE MB FRACTION: CPT

## 2019-02-11 PROCEDURE — 82550 ASSAY OF CK (CPK): CPT

## 2019-02-11 PROCEDURE — 86900 BLOOD TYPING SEROLOGIC ABO: CPT

## 2019-02-11 PROCEDURE — 86901 BLOOD TYPING SEROLOGIC RH(D): CPT

## 2019-02-11 PROCEDURE — 82962 GLUCOSE BLOOD TEST: CPT

## 2019-02-11 PROCEDURE — 84484 ASSAY OF TROPONIN QUANT: CPT

## 2019-02-11 PROCEDURE — 80053 COMPREHEN METABOLIC PANEL: CPT

## 2019-02-11 PROCEDURE — 70496 CT ANGIOGRAPHY HEAD: CPT | Mod: 26

## 2019-02-11 PROCEDURE — 85610 PROTHROMBIN TIME: CPT

## 2019-02-11 PROCEDURE — 93005 ELECTROCARDIOGRAM TRACING: CPT

## 2019-02-11 RX ORDER — POTASSIUM CHLORIDE 20 MEQ
40 PACKET (EA) ORAL ONCE
Qty: 0 | Refills: 0 | Status: COMPLETED | OUTPATIENT
Start: 2019-02-11 | End: 2019-02-11

## 2019-02-11 RX ADMIN — Medication 40 MILLIEQUIVALENT(S): at 13:54

## 2019-02-11 NOTE — ED PROVIDER NOTE - PROGRESS NOTE DETAILS
Attending MD Beard: Denies chest pain and shortness of breath at present.  Trop 23-->24.  Stable for discharge. Follow up instructions given, importance of follow up emphasized, return to ED parameters reviewed and patient verbalized understanding.  All questions answered, all concerns addressed. Resident: Taj Snowden – Pt was re-evaluated at bedside, VSS, feeling better overall. Results were discussed with patient as well as return precautions and follow up plan with PCP and/or specialist. Time was taken to answer any questions that the patient had before providing them with discharge paperwork.

## 2019-02-11 NOTE — ED PROVIDER NOTE - NSFOLLOWUPINSTRUCTIONS_ED_ALL_ED_FT
Your symptoms may have been due to a TIA, neurology felt that you can continue your workup as an outpatient. We gave you the contact information to call the office tomorrow. If you have any worsening of symptoms or new concerning symptoms please come right back to the emergency room.

## 2019-02-11 NOTE — ED ADULT NURSE REASSESSMENT NOTE - NS ED NURSE REASSESS COMMENT FT1
Pt awaiting trop level results. Pt aware of plan of care. As per MD, patient to be discharged if trop returns negative.

## 2019-02-11 NOTE — CONSULT NOTE ADULT - SUBJECTIVE AND OBJECTIVE BOX
Neurology Consult Note    Name  GEORGE ROSE    HPI:    Patient is an 85 Year old Female h/o hypothyroid, HTN, ileostomy (CDiff complications), neuropathy presents complaining of L lower face numbness that started 3 days ago, went away and came back today. Pt states that she felt like her vision was blurry and her face was tingling, told her daughter who is RN and brought her to ED. The restart of symptoms was 10Am today. She denies nausea, vomiting, diarrhea, fever. She says her vision is mildly blurry and her face is still numb but improving at this time.  Code stroke called NIHSS 0, MRS 2, tPA not administered as patient's symptoms mild and improving.  She denies any weakness or headache.      Subjective:    Review of Systems:  Constitutional:        Eyes, Ears, Mouth, Throat:   Respiratory:                            Cardiovascular:   Gastrointestinal:                                     Genitourinary:   Musculoskeletal:                                    Dermatologic:   Neurological: as per above                                                                 Psychiatric:   Endocrine:              Hematologic/Lymphatic:     MEDICATIONS  (STANDING):    MEDICATIONS  (PRN):      Allergies    iodine (Other; Anaphylaxis)  shellfish (Anaphylaxis)  sulfa drugs (Other)    Intolerances      PAST MEDICAL & SURGICAL HISTORY:  Fistula, perirectal  Hiatal hernia  Hypothyroidism  Hypertension  S/P foot joint surgery: right ( 10 years ago )  S/P arthroscopy of right knee  S/P ileostomy: colectomy ( 2005 )  S/P cholecystectomy: at age 20 ,s    FH:  SH:    Objective:   Vital Signs Last 24 Hrs  T(C): 36.5 (11 Feb 2019 11:44), Max: 36.5 (11 Feb 2019 11:44)  T(F): 97.7 (11 Feb 2019 11:44), Max: 97.7 (11 Feb 2019 11:44)  HR: 66 (11 Feb 2019 11:44) (66 - 66)  BP: 168/80 (11 Feb 2019 11:44) (168/80 - 168/80)  BP(mean): --  RR: 17 (11 Feb 2019 11:44) (17 - 17)  SpO2: 97% (11 Feb 2019 11:44) (97% - 97%)    General Exam:   General appearance: No acute distress                   Neurological Exam:  Mental Status: Orientated to self, date and place.  Attention intact.  No dysarthria, aphasia or neglect.  Knowledge intact.  Registration intact.  Short and long term memory grossly intact.      Cranial Nerves:  PERRL, EOMI, VFF, no nystagmus or diplopia.  CN V1-3 intact to light touch and pinprick.  No facial asymmetry.  Hearing intact to finger rub bilaterally.  Tongue, uvula and palate midline.  Sternocleidomastoid and Trapezius intact bilaterally.    Motor:   Tone: normal.                  Strength:   Patient has baseline LE weakness  [] Upper extremity                      Delt       Bicep    Tricep                                                  R         5/5        5/5        5/5       5/5                                               L          5/5        5/5        5/5       5/5  [] Lower extremity                       HF          KE          KF        DF         PF                                               R        4-/5        5/5        5/5       5/5       5/5                                               L         4-/5        5/5       5/5       5/5        5/5  Pronator drift: none                 Dysmetria: None to finger-nose-finger     Sensation: grossly intact to light touch    Deep Tendon Reflexes: 1+ bilateral biceps, triceps, brachioradialis, knee and ankle      Other:                        11.2   9.0   )-----------( 291      ( 11 Feb 2019 12:31 )             32.4     02-11    139  |  99  |  10  ----------------------------<  157<H>  3.2<L>   |  29  |  0.79    Ca    9.7      11 Feb 2019 12:31    TPro  7.4  /  Alb  3.7  /  TBili  0.2  /  DBili  x   /  AST  16  /  ALT  12  /  AlkPhos  62  02-11    LIVER FUNCTIONS - ( 11 Feb 2019 12:31 )  Alb: 3.7 g/dL / Pro: 7.4 g/dL / ALK PHOS: 62 U/L / ALT: 12 U/L / AST: 16 U/L / GGT: x               Radiology    < from: CT Head No Cont (02.11.19 @ 12:41) >  Impression:    No acute hemorrhage, mass effect or extra-axial collection. Stable   sphenoid sinus disease.    < end of copied text >

## 2019-02-11 NOTE — ED ADULT NURSE REASSESSMENT NOTE - NS ED NURSE REASSESS COMMENT FT1
Pt reports an allergy to shellfish but denies any past IV contrast or reaction to iodine. MD at bedside ok to give IV contrast. Pt monitored by RN at CT.

## 2019-02-11 NOTE — ED PROVIDER NOTE - SHIFT CHANGE DETAILS
pending jonathan hobbs and final neuro recs pending delata trop and final neuro recs    Attending MD Beard: 85F with L face numbness, visual disturbance, ?speech was a code stroke, pending repeat trop at 15:30

## 2019-02-11 NOTE — ED ADULT NURSE REASSESSMENT NOTE - NS ED NURSE REASSESS COMMENT FT1
Pt reports slight itchiness in throat. No redness noted in throat. Airway patent. Breath sounds normal. O2 stat at 100. Safety Pt reports slight itchiness in throat. No redness noted in throat. Airway patent. Breath sounds normal. O2 stat at 100. Safety and comfort measures provided. Family at bedside. Call bell within reach.

## 2019-02-11 NOTE — ED ADULT NURSE REASSESSMENT NOTE - NS ED NURSE REASSESS COMMENT FT1
Pt A&Ox3 upon neuro reassessment. Equal strength and sensation in all extremities. Pupils 4mm PERRL. Safety and comfort measures provided. Documented in neuro flow sheet.

## 2019-02-11 NOTE — ED PROVIDER NOTE - OBJECTIVE STATEMENT
85 Y F h/o hypothyroid, HTN, ileostomy (CDiff complications), neuropathy presents complaining of L lower face numbness that started 3 days ago, went away and came back today. Pt states that she felt like her vision was blurry and her face was tingling, told her daughter who is RN and brought her to ED. The restart of symptoms was 10Am today. She denies nausea, vomiting, diarrhea, fever. She says her vision is mildly blurry and her face is still numb. Pt denies any weakness and her daughter denies any changes in appearance such as facial droop.

## 2019-02-11 NOTE — ED PROVIDER NOTE - PHYSICAL EXAMINATION
Neuro: CN2-12 grossly intact, A&Ox4, MS +5/5 in UE and LE BL,  gross sensation intact in UE and LE BL

## 2019-02-11 NOTE — ED PROVIDER NOTE - MEDICAL DECISION MAKING DETAILS
vivien -pt with l facial numbness visual disturbance and l upper arm numbness 3days ago transients lasting less than 1 hr assoc w diaphoresis -- this am felt the facial numbness agin < 3 hrs ago -- ? stuttering symptoms asymetrical face no definative drop ( at nml as per daughter) - nonfocal non lateralizing neuro exam - call stoke code - based on timing , ck ekg and trop 2/2 diaphoresi r/o acs -- and reeval

## 2019-02-11 NOTE — ED ADULT NURSE NOTE - OBJECTIVE STATEMENT
86 y/o Female presenting to the ED in a wheelchair, A&Ox3, complaining of blurriness in the left eye and facial numbness. Pt reports having these symptoms on Saturday and they resolved, and the symptoms started again this morning @ 1030. Slight mouth drooping noted. Pupils 3mm PERRL. Equal strength and sensation. Code stroke initiated @ 1219. Pt taken to CT by 1230. Neuro flow sheet started and placed in chart. 84 y/o Female presenting to the ED in a wheelchair, A&Ox3, complaining of blurriness in the left eye and facial numbness. Pt reports having these symptoms on Saturday and they resolved, and the symptoms started again this morning @ 1030. Slight mouth drooping noted. Pupils 3mm PERRL. Equal strength and sensation. Code stroke initiated @ 1219. Pt taken to CT by 1230. Neuro flow sheet started and placed in chart. Pt hx of HTN, Hyperlipidemia, hypothyroidism, total collectomy. Pt has ileostomy bag. Bag changed upon arrival by daughter. Skin around ileostomy warm and dry. Lung sounds clear. Heart sounds WNL. Abdomen soft, nontender, bowel sounds present in all four quadrants. Skin warm dry and pink, wounds on both feet, pt daughter reports they are pressure ulcers from laying in bed too long, both covered with bandages. Pt denies trouble swallowing, chest pain, dizziness, headache, shortness of breath. Pulses felt bilaterally. No edema noted. Cap refill <2. Safety and comfort measures provided. Pt at CT scan. Family at bedside. 86 y/o Female presenting to the ED in a wheelchair, A&Ox3, complaining of blurriness in the left eye and facial numbness. Pt reports having these symptoms on Saturday and they resolved, and the symptoms started again this morning @ 1030. Slight mouth drooping noted. Pupils 3mm PERRL. Equal strength and sensation. Code stroke initiated @ 1219. Pt taken to CT by 1230. Neuro flow sheet started and placed in chart. EKG preformed. Finger stick done. Pt hx of HTN, Hyperlipidemia, hypothyroidism, total collectomy. Pt has ileostomy bag. Bag changed upon arrival by daughter. Skin around ileostomy warm and dry. Lung sounds clear. Heart sounds WNL. Abdomen soft, nontender, bowel sounds present in all four quadrants. Skin warm dry and pink, wounds on both feet, pt daughter reports they are pressure ulcers from laying in bed too long, both covered with bandages. Pt denies trouble swallowing, chest pain, dizziness, headache, shortness of breath. Pulses felt bilaterally. No edema noted. Cap refill <2. Safety and comfort measures provided. Pt at CT scan. Family at bedside. 84 y/o Female presenting to the ED in a wheelchair, A&Ox3, complaining of blurriness in the left eye and facial numbness. Pt reports having these symptoms on Saturday and they resolved, and the symptoms started again this morning @ 1030. Slight left sided facial droop noted. Pupils 3mm PERRL. Equal strength and sensation in all extremities. Code stroke initiated @ 1219. Pt taken to CT by 1230. Neuro flow sheet started and placed in chart. EKG preformed. Finger stick done. Pt hx of HTN, Hyperlipidemia, hypothyroidism, total collectomy. Pt has ileostomy bag. Bag changed upon arrival by daughter. Skin around ileostomy warm and dry. Lung sounds clear. Heart sounds WNL. Abdomen soft, nontender, bowel sounds present in all four quadrants. Skin warm dry and pink, pressure ulcers noted bilaterally to the feet covered with bandages. Family requests bandages not be removed at this time. Pt denies trouble swallowing, chest pain, dizziness, headache, shortness of breath. Pulses felt bilaterally. No edema noted. Cap refill <2. Safety and comfort measures provided. Pt at CT scan. Family at bedside. 84 y/o Female presenting to the ED in a wheelchair, A&Ox3, complaining of blurriness in the left eye and facial numbness. Pt reports having these symptoms on Saturday and they resolved, and the symptoms started again this morning @ 1030. Slight left sided facial droop noted. Pupils 3mm PERRL. Equal strength and sensation in all extremities. Code stroke initiated @ 1219. Pt taken to CT by 1230. Neuro flow sheet started and placed in chart. EKG preformed. Finger stick done. Dysphagia done and charted in sunrise. Pt hx of HTN, Hyperlipidemia, hypothyroidism, total collectomy. Pt has ileostomy bag. Bag changed upon arrival by daughter. Skin around ileostomy warm and dry. Lung sounds clear. Heart sounds WNL. Abdomen soft, nontender, bowel sounds present in all four quadrants. Skin warm dry and pink, pressure ulcers noted bilaterally to the feet covered with bandages. Family requests bandages not be removed at this time. Pt denies trouble swallowing, chest pain, dizziness, headache, shortness of breath. Pulses felt bilaterally. No edema noted. Cap refill <2. Safety and comfort measures provided. Pt at CT scan. Family at bedside. 84 y/o Female presenting to the ED in a wheelchair, A&Ox3, complaining of blurriness in the left eye and facial numbness. Pt reports having these symptoms on Saturday and they resolved, and the symptoms started again this morning @ 1030. Slight left sided facial droop noted. Pupils 3mm PERRL. Equal strength and sensation in all extremities. Code stroke initiated @ 1219. Pt taken to CT by 1230. Neuro flow sheet started and placed in chart. EKG preformed. Finger stick done. Dysphagia done and charted in sunrise. Pt hx of HTN, Hyperlipidemia, hypothyroidism, total collectomy, gout. Pt has ileostomy bag. Bag changed upon arrival by daughter. Skin around ileostomy warm and dry. Lung sounds clear. Heart sounds WNL. Abdomen soft, nontender, bowel sounds present in all four quadrants. Skin warm dry and pink, pressure ulcers noted bilaterally to the feet covered with bandages. Family requests bandages not be removed at this time. Pt denies trouble swallowing, chest pain, dizziness, headache, shortness of breath. Pulses felt bilaterally. No edema noted. Cap refill <2. Safety and comfort measures provided. Pt at CT scan. Family at bedside.

## 2019-02-11 NOTE — ED PROVIDER NOTE - CARE PROVIDER_API CALL
Joby Castillo (DO)  Neurology; Vascular Neurology  [address] [facility] [address]  [place] [address]  Phone: [phone]  Fax: [phone]  Follow Up Time: 7-10 Days

## 2019-02-11 NOTE — ED ADULT NURSE REASSESSMENT NOTE - NS ED NURSE REASSESS COMMENT FT1
pt placed on bed pan. Pt aware of need for second troponin at 1530. Safety and comfort measures provided. Family at bedside.

## 2019-02-11 NOTE — ED ADULT NURSE REASSESSMENT NOTE - NS ED NURSE REASSESS COMMENT FT1
Neuro reassessment completed and documented on neuro flow sheet. Pupils 4mm PERRL. Equal strength and sensation in all extremities. Pt A&Ox3. Safety and comfort measures provided. Family at bedside. Pt aware of plan of care.

## 2019-02-11 NOTE — CONSULT NOTE ADULT - ASSESSMENT
85 Year old Female h/o hypothyroid, HTN, ileostomy (CDiff complications), neuropathy presents complaining of L lower face numbness that started 3 days ago, which resolved and started again today.  Neurological exam non-focal.  CTH negative.      Plan:  - f/u CTA head/neck, if no abnormalities, patient may followup with Dr Castillo vascular neurology [phone] 85 Year old Female h/o hypothyroid, HTN, ileostomy (CDiff complications), neuropathy presents complaining of L lower face numbness that started 3 days ago, which resolved and started again today.  Neurological exam non-focal.  CTH negative.  CTA negative.      Plan:  - no further inpatient neurological workup indicated  - patient may followup with Dr Castillo vascular neurology for further management [phone]

## 2019-02-12 LAB — SURGICAL PATHOLOGY STUDY: SIGNIFICANT CHANGE UP

## 2019-02-15 DIAGNOSIS — Z79.899 OTHER LONG TERM (CURRENT) DRUG THERAPY: ICD-10-CM

## 2019-02-15 DIAGNOSIS — B95.7 OTHER STAPHYLOCOCCUS AS THE CAUSE OF DISEASES CLASSIFIED ELSEWHERE: ICD-10-CM

## 2019-02-15 DIAGNOSIS — Z91.81 HISTORY OF FALLING: ICD-10-CM

## 2019-02-15 DIAGNOSIS — Z88.2 ALLERGY STATUS TO SULFONAMIDES: ICD-10-CM

## 2019-02-15 DIAGNOSIS — L89.894 PRESSURE ULCER OF OTHER SITE, STAGE 4: ICD-10-CM

## 2019-02-15 DIAGNOSIS — Z74.1 NEED FOR ASSISTANCE WITH PERSONAL CARE: ICD-10-CM

## 2019-02-15 DIAGNOSIS — G60.9 HEREDITARY AND IDIOPATHIC NEUROPATHY, UNSPECIFIED: ICD-10-CM

## 2019-02-15 DIAGNOSIS — Z79.890 HORMONE REPLACEMENT THERAPY: ICD-10-CM

## 2019-02-15 DIAGNOSIS — L89.623 PRESSURE ULCER OF LEFT HEEL, STAGE 3: ICD-10-CM

## 2019-02-15 DIAGNOSIS — I48.91 UNSPECIFIED ATRIAL FIBRILLATION: ICD-10-CM

## 2019-02-15 DIAGNOSIS — M10.09 IDIOPATHIC GOUT, MULTIPLE SITES: ICD-10-CM

## 2019-02-15 DIAGNOSIS — R26.9 UNSPECIFIED ABNORMALITIES OF GAIT AND MOBILITY: ICD-10-CM

## 2019-02-15 DIAGNOSIS — L02.612 CUTANEOUS ABSCESS OF LEFT FOOT: ICD-10-CM

## 2019-02-15 DIAGNOSIS — B95.4 OTHER STREPTOCOCCUS AS THE CAUSE OF DISEASES CLASSIFIED ELSEWHERE: ICD-10-CM

## 2019-02-15 DIAGNOSIS — D64.9 ANEMIA, UNSPECIFIED: ICD-10-CM

## 2019-02-15 DIAGNOSIS — Z66 DO NOT RESUSCITATE: ICD-10-CM

## 2019-02-15 DIAGNOSIS — J32.9 CHRONIC SINUSITIS, UNSPECIFIED: ICD-10-CM

## 2019-02-15 DIAGNOSIS — Z79.82 LONG TERM (CURRENT) USE OF ASPIRIN: ICD-10-CM

## 2019-02-15 DIAGNOSIS — H26.9 UNSPECIFIED CATARACT: ICD-10-CM

## 2019-02-15 DIAGNOSIS — Z91.013 ALLERGY TO SEAFOOD: ICD-10-CM

## 2019-02-19 ENCOUNTER — APPOINTMENT (OUTPATIENT)
Dept: MRI IMAGING | Facility: HOSPITAL | Age: 84
End: 2019-02-19

## 2019-02-19 ENCOUNTER — OUTPATIENT (OUTPATIENT)
Dept: OUTPATIENT SERVICES | Facility: HOSPITAL | Age: 84
LOS: 1 days | Discharge: HOME | End: 2019-02-19
Payer: MEDICARE

## 2019-02-19 DIAGNOSIS — M21.372 FOOT DROP, LEFT FOOT: ICD-10-CM

## 2019-02-19 PROCEDURE — 73718 MRI LOWER EXTREMITY W/O DYE: CPT | Mod: 26,LT

## 2019-02-25 ENCOUNTER — OUTPATIENT (OUTPATIENT)
Dept: OUTPATIENT SERVICES | Facility: HOSPITAL | Age: 84
LOS: 1 days | Discharge: HOME | End: 2019-02-25

## 2019-02-25 ENCOUNTER — INPATIENT (INPATIENT)
Facility: HOSPITAL | Age: 84
LOS: 8 days | Discharge: NURSING, REHAB OR LONG-TERM CARE | End: 2019-03-06
Attending: INTERNAL MEDICINE | Admitting: INTERNAL MEDICINE
Payer: MEDICARE

## 2019-02-25 VITALS
TEMPERATURE: 98 F | HEIGHT: 63 IN | SYSTOLIC BLOOD PRESSURE: 151 MMHG | HEART RATE: 66 BPM | DIASTOLIC BLOOD PRESSURE: 71 MMHG | RESPIRATION RATE: 18 BRPM | OXYGEN SATURATION: 99 % | WEIGHT: 173.94 LBS

## 2019-02-25 DIAGNOSIS — L89.90 PRESSURE ULCER OF UNSPECIFIED SITE, UNSPECIFIED STAGE: ICD-10-CM

## 2019-02-25 LAB
ALBUMIN SERPL ELPH-MCNC: 3.6 G/DL — SIGNIFICANT CHANGE UP (ref 3.3–5)
ALP SERPL-CCNC: 66 U/L — SIGNIFICANT CHANGE UP (ref 40–120)
ALT FLD-CCNC: 21 U/L — SIGNIFICANT CHANGE UP (ref 12–78)
ANION GAP SERPL CALC-SCNC: 7 MMOL/L — SIGNIFICANT CHANGE UP (ref 5–17)
APTT BLD: 30.2 SEC — SIGNIFICANT CHANGE UP (ref 27.5–36.3)
AST SERPL-CCNC: 23 U/L — SIGNIFICANT CHANGE UP (ref 15–37)
BASOPHILS # BLD AUTO: 0.05 K/UL — SIGNIFICANT CHANGE UP (ref 0–0.2)
BASOPHILS NFR BLD AUTO: 0.6 % — SIGNIFICANT CHANGE UP (ref 0–2)
BILIRUB SERPL-MCNC: 0.4 MG/DL — SIGNIFICANT CHANGE UP (ref 0.2–1.2)
BUN SERPL-MCNC: 17 MG/DL — SIGNIFICANT CHANGE UP (ref 7–23)
CALCIUM SERPL-MCNC: 9.3 MG/DL — SIGNIFICANT CHANGE UP (ref 8.5–10.1)
CHLORIDE SERPL-SCNC: 102 MMOL/L — SIGNIFICANT CHANGE UP (ref 96–108)
CO2 SERPL-SCNC: 29 MMOL/L — SIGNIFICANT CHANGE UP (ref 22–31)
CREAT SERPL-MCNC: 1.01 MG/DL — SIGNIFICANT CHANGE UP (ref 0.5–1.3)
EOSINOPHIL # BLD AUTO: 0.2 K/UL — SIGNIFICANT CHANGE UP (ref 0–0.5)
EOSINOPHIL NFR BLD AUTO: 2.3 % — SIGNIFICANT CHANGE UP (ref 0–6)
ERYTHROCYTE [SEDIMENTATION RATE] IN BLOOD: 14 MM/HR — SIGNIFICANT CHANGE UP (ref 0–20)
GLUCOSE SERPL-MCNC: 113 MG/DL — HIGH (ref 70–99)
HCT VFR BLD CALC: 39.3 % — SIGNIFICANT CHANGE UP (ref 34.5–45)
HGB BLD-MCNC: 12.5 G/DL — SIGNIFICANT CHANGE UP (ref 11.5–15.5)
IMM GRANULOCYTES NFR BLD AUTO: 0.3 % — SIGNIFICANT CHANGE UP (ref 0–1.5)
INR BLD: 1.03 RATIO — SIGNIFICANT CHANGE UP (ref 0.88–1.16)
LACTATE SERPL-SCNC: 0.6 MMOL/L — LOW (ref 0.7–2)
LYMPHOCYTES # BLD AUTO: 3.47 K/UL — HIGH (ref 1–3.3)
LYMPHOCYTES # BLD AUTO: 39.3 % — SIGNIFICANT CHANGE UP (ref 13–44)
MCHC RBC-ENTMCNC: 30 PG — SIGNIFICANT CHANGE UP (ref 27–34)
MCHC RBC-ENTMCNC: 31.8 GM/DL — LOW (ref 32–36)
MCV RBC AUTO: 94.5 FL — SIGNIFICANT CHANGE UP (ref 80–100)
MONOCYTES # BLD AUTO: 0.79 K/UL — SIGNIFICANT CHANGE UP (ref 0–0.9)
MONOCYTES NFR BLD AUTO: 8.9 % — SIGNIFICANT CHANGE UP (ref 2–14)
NEUTROPHILS # BLD AUTO: 4.29 K/UL — SIGNIFICANT CHANGE UP (ref 1.8–7.4)
NEUTROPHILS NFR BLD AUTO: 48.6 % — SIGNIFICANT CHANGE UP (ref 43–77)
NRBC # BLD: 0 /100 WBCS — SIGNIFICANT CHANGE UP (ref 0–0)
PLATELET # BLD AUTO: 257 K/UL — SIGNIFICANT CHANGE UP (ref 150–400)
POTASSIUM SERPL-MCNC: 3.9 MMOL/L — SIGNIFICANT CHANGE UP (ref 3.5–5.3)
POTASSIUM SERPL-SCNC: 3.9 MMOL/L — SIGNIFICANT CHANGE UP (ref 3.5–5.3)
PROT SERPL-MCNC: 8.4 GM/DL — HIGH (ref 6–8.3)
PROTHROM AB SERPL-ACNC: 11.6 SEC — SIGNIFICANT CHANGE UP (ref 10–12.9)
RBC # BLD: 4.16 M/UL — SIGNIFICANT CHANGE UP (ref 3.8–5.2)
RBC # FLD: 15.2 % — HIGH (ref 10.3–14.5)
SODIUM SERPL-SCNC: 138 MMOL/L — SIGNIFICANT CHANGE UP (ref 135–145)
WBC # BLD: 8.83 K/UL — SIGNIFICANT CHANGE UP (ref 3.8–10.5)
WBC # FLD AUTO: 8.83 K/UL — SIGNIFICANT CHANGE UP (ref 3.8–10.5)

## 2019-02-25 PROCEDURE — 99223 1ST HOSP IP/OBS HIGH 75: CPT

## 2019-02-25 PROCEDURE — 73630 X-RAY EXAM OF FOOT: CPT | Mod: 26,LT,77

## 2019-02-25 PROCEDURE — 71045 X-RAY EXAM CHEST 1 VIEW: CPT | Mod: 26

## 2019-02-25 PROCEDURE — 99285 EMERGENCY DEPT VISIT HI MDM: CPT

## 2019-02-25 PROCEDURE — 73630 X-RAY EXAM OF FOOT: CPT | Mod: 26,RT

## 2019-02-25 RX ORDER — VANCOMYCIN HCL 1 G
1000 VIAL (EA) INTRAVENOUS ONCE
Qty: 0 | Refills: 0 | Status: COMPLETED | OUTPATIENT
Start: 2019-02-25 | End: 2019-02-25

## 2019-02-25 RX ORDER — PIPERACILLIN AND TAZOBACTAM 4; .5 G/20ML; G/20ML
3.38 INJECTION, POWDER, LYOPHILIZED, FOR SOLUTION INTRAVENOUS ONCE
Qty: 0 | Refills: 0 | Status: COMPLETED | OUTPATIENT
Start: 2019-02-25 | End: 2019-02-25

## 2019-02-25 RX ORDER — PROCHLORPERAZINE MALEATE 5 MG
1 TABLET ORAL
Qty: 0 | Refills: 0 | COMMUNITY

## 2019-02-25 RX ORDER — TRAMADOL HYDROCHLORIDE 50 MG/1
50 TABLET ORAL ONCE
Qty: 0 | Refills: 0 | Status: DISCONTINUED | OUTPATIENT
Start: 2019-02-25 | End: 2019-02-25

## 2019-02-25 RX ADMIN — TRAMADOL HYDROCHLORIDE 50 MILLIGRAM(S): 50 TABLET ORAL at 22:14

## 2019-02-25 RX ADMIN — PIPERACILLIN AND TAZOBACTAM 3.38 GRAM(S): 4; .5 INJECTION, POWDER, LYOPHILIZED, FOR SOLUTION INTRAVENOUS at 23:59

## 2019-02-25 RX ADMIN — Medication 250 MILLIGRAM(S): at 22:15

## 2019-02-25 RX ADMIN — TRAMADOL HYDROCHLORIDE 50 MILLIGRAM(S): 50 TABLET ORAL at 23:59

## 2019-02-25 RX ADMIN — PIPERACILLIN AND TAZOBACTAM 200 GRAM(S): 4; .5 INJECTION, POWDER, LYOPHILIZED, FOR SOLUTION INTRAVENOUS at 20:40

## 2019-02-25 NOTE — ED ADULT NURSE NOTE - ED STAT RN HANDOFF DETAILS
Report received from RN at this time. Assessment available on [facility]. will continue to monitor

## 2019-02-25 NOTE — CONSULT NOTE ADULT - ASSESSMENT
86 y/o female pt with left foot medial 1st MPJ ulcer to bn  - pt seen and evaluated  - pt sent in for admission by Dr. Beasley  - pt had outpatient MRI that show osteo of the left 1st MPJ  - plan for left foot 1st ray amputation wednesday   - ordered ANGEL/PVR  - rec ESR, CRP, CBC  - rec ID consult Dr. Moore   - podiatry will follow 86 y/o female pt with left foot medial 1st MPJ ulcer to bn  - pt seen and evaluated  - pt sent in for admission by Dr. Beasley  - pt had outpatient MRI that show osteo of the left 1st MPJ  - plan for left foot 1st ray amputation wednesday   - ordered ANGEL/PVR  - rec ESR, CRP, CBC  - rec right foot xrays  - podiatry will follow

## 2019-02-25 NOTE — ED PROVIDER NOTE - CLINICAL SUMMARY MEDICAL DECISION MAKING FREE TEXT BOX
Patient sent to ER for left foot osteomyelitis, OR wednesday, podiatry present in ER.  VSS.   Patient is to be admitted to the hospital and the case was discussed with the admitting physician.  Any changes in plan, additional imaging/labs, and further work up will be at the discretion of the admitting physician.

## 2019-02-25 NOTE — H&P ADULT - NSHPPHYSICALEXAM_GEN_ALL_CORE
GENERAL: NAD, well-groomed, well-developed  HEAD:  Atraumatic, Normocephalic  EYES: EOMI, PERRLA, conjunctiva and sclera clear  ENMT: No tonsillar erythema, exudates, or enlargement; Moist mucous membranes, No lesions  NECK: Supple, No JVD, Normal thyroid  NERVOUS SYSTEM:  Alert & Oriented X3, Good concentration  CHEST/LUNG: Clear to ascultation bilaterally; No rales, rhonchi, wheezing, or rubs  HEART: Regular rate and rhythm; No murmurs, rubs, or gallops  ABDOMEN: Soft, Nontender, Nondistended; Bowel sounds present  EXTREMITIES: no edema or cyanosis  SKIN: ulceration on the medical aspect of the left 1st MPJ down to bone with periwound cellulitis, no drainage, no malodor, no streaking, no fluctuance.  eschars at the posterior aspect of the heel bilaterally with no signs of infection, small wounds partial thickness on right foot 1st and 2nd toes, no signs of infection.  PSYCH: normal affect and behavior

## 2019-02-25 NOTE — CONSULT NOTE ADULT - ATTENDING COMMENTS
[facility] that has OM of the hallux and metatrsal.  We had discussed different treatment options with patient iv antibiotics and ray amp  Igor returns to hospital with intention of ray Amp Scheduled for wednesday  ID consult with Dr RICHTER

## 2019-02-25 NOTE — H&P ADULT - PROBLEM SELECTOR PLAN 1
Podiatry consult appreciated  Analgesia PRN  Plan is for OR Wed, 2/27.  Continue Vancomycin  ID consult

## 2019-02-25 NOTE — H&P ADULT - NSHPLABSRESULTS_GEN_ALL_CORE
Vital Signs Last 24 Hrs  T(C): 36.7 (26 Feb 2019 01:00), Max: 36.8 (25 Feb 2019 16:37)  T(F): 98 (26 Feb 2019 01:00), Max: 98.2 (25 Feb 2019 16:37)  HR: 56 (26 Feb 2019 01:00) (55 - 66)  BP: 139/65 (26 Feb 2019 01:00) (139/65 - 152/73)  BP(mean): --  RR: 17 (26 Feb 2019 01:00) (16 - 18)  SpO2: 97% (26 Feb 2019 01:00) (97% - 99%)      LABS:                        12.5   8.83  )-----------( 257      ( 25 Feb 2019 20:39 )             39.3     02-25    138  |  102  |  17  ----------------------------<  113<H>  3.9   |  29  |  1.01    Ca    9.3      25 Feb 2019 20:38    TPro  8.4<H>  /  Alb  3.6  /  TBili  0.4  /  DBili  x   /  AST  23  /  ALT  21  /  AlkPhos  66  02-25    PT/INR - ( 25 Feb 2019 20:38 )   PT: 11.6 sec;   INR: 1.03 ratio         PTT - ( 25 Feb 2019 20:38 )  PTT:30.2 sec

## 2019-02-25 NOTE — ED PROVIDER NOTE - OBJECTIVE STATEMENT
Pertinent PMH/PSH/FHx/SHx and Review of Systems contained within:  Patient presents to the ED for left foot osteomyelitis.  Patient sent from Dr. Figueredo's office, podiatry for admission of confirmed osteo to left 1st MTP, started weeks ago during patient admission for rehab, patient started with gout and subsequently developed wound.  Denies fevers, no cellulitis, or infection symptoms to upper leg.     Relevant PMHx/SHx/SOCHx/FAMH:  Gout, iron deficiency, GERD, ostomy, hypothyroidism  Patient denies EtOH/tobacco/illicit substance use.    ROS: No fever/chills, No headache/photophobia/eye pain/changes in vision, No ear pain/sore throat/dysphagia, No chest pain/palpitations, no SOB/cough/wheeze/stridor, No abdominal pain, No N/V/D/melena, no dysuria/frequency/discharge, No neck/back pain, no rash, no changes in neurological status/function. Pertinent PMH/PSH/FHx/SHx and Review of Systems contained within:  Patient presents to the ED for left foot osteomyelitis.  Patient sent from Dr. Figueredo's office, podiatry for admission of confirmed osteo to left 1st MTP, started weeks ago during patient admission for rehab, patient started with gout and subsequently developed wound.  Completed clindamycin course o/p.  Denies fevers, no cellulitis, or infection symptoms to upper leg.     Relevant PMHx/SHx/SOCHx/FAMH:  Gout, iron deficiency, GERD, ostomy, hypothyroidism  Patient denies EtOH/tobacco/illicit substance use.    ROS: No fever/chills, No headache/photophobia/eye pain/changes in vision, No ear pain/sore throat/dysphagia, No chest pain/palpitations, no SOB/cough/wheeze/stridor, No abdominal pain, No N/V/D/melena, no dysuria/frequency/discharge, No neck/back pain, no rash, no changes in neurological status/function.

## 2019-02-25 NOTE — H&P ADULT - HISTORY OF PRESENT ILLNESS
Pt admitted on behalf of podiatry for OM, in progress. 85 year old woman with PMH HTN, gout, and hypothyroidism presents to ED sent in by podiatrist for OM confirmed as outpatient.  Pt states that the left 1st MPJ wound started a few weeks ago while at rehab. She states that she had gout in the great toe joint and subsequently developed an ulcer in the area. She states that she has been following up with Dr. Beasley as outpatient. She has had an MRI that showed that she has osteo of the left foot 1st metatarsal. A wound culture was taken as outpatient as well. Pt denies any f/n/v/c/sob.  Plan is for OR Wednesday 2/27/19.

## 2019-02-25 NOTE — ED ADULT NURSE NOTE - NSIMPLEMENTINTERV_GEN_ALL_ED
Implemented All Fall Risk Interventions:  [place] to call system. Call bell, personal items and telephone within reach. Instruct patient to call for assistance. Room bathroom lighting operational. Non-slip footwear when patient is off stretcher. Physically safe environment: no spills, clutter or unnecessary equipment. Stretcher in lowest position, wheels locked, appropriate side rails in place. Provide visual cue, wrist band, yellow gown, etc. Monitor gait and stability. Monitor for mental status changes and reorient to person, place, and time. Review medications for side effects contributing to fall risk. Reinforce activity limits and safety measures with patient and family.

## 2019-02-25 NOTE — ED ADULT NURSE NOTE - OBJECTIVE STATEMENT
sent by dr Beasley to be admitted for left foot surgery. history of osteomyelitis. pt complaining of swelling, pain and open wound to right foot. Pt a&o x3, pt has open wound from . Pt pain when walking, wheelchair at baseline. Pt c/o of left greater tow pain d./t gout worsening at night 10/10. Wound, black escar unstageable on right heel, left heel black eschar. sent by dr Beasley to be admitted for left foot surgery. history of osteomyelitis. pt complaining of swelling, pain and open wound to right foot. Pt a&o x3, pt has open wound from . Pt pain when walking, wheelchair at baseline. Pt c/o of left greater tow pain d./t gout worsening at night 10/10. Wound, black escar unstageable on right heel, left heel black eschar. ostomy intact right LQ abdomen

## 2019-02-25 NOTE — ED ADULT TRIAGE NOTE - CHIEF COMPLAINT QUOTE
sent by dr Beasley to be admitted for left foot surgery. history of osteomyelitis. pt complaining of swelling, pain and open wound to right foot.

## 2019-02-25 NOTE — ED PROVIDER NOTE - PHYSICAL EXAMINATION
Gen: Alert, NAD, well appearing  Head: NC, AT, EOMI, normal lids/conjunctiva  ENT: normal hearing, patent oropharynx without erythema/exudate, uvula midline  Neck: +supple, no tenderness/meningismus/JVD, +Trachea midline  Pulm: Bilateral BS, normal resp effort, no wheeze/stridor/retractions  CV: RRR, no M/R/G, +dist pulses  Abd: soft, NT/ND, +BS, no palpable masses, ostomy in place draining  Mskel: no edema/erythema/cyanosis, +left medial wound adjacent to 1st MTP tunneling to bone, no foul smell or purulent drainage  Skin: no rash, warm/dry  Neuro: AAOx3, no apparent sensory/motor deficits, coordination intact

## 2019-02-25 NOTE — H&P ADULT - ASSESSMENT
85 year old woman with PMH HTN and hypothyroidism presents to ED sent in by podiatrist for OM confirmed as outpatient.  Patient will require admission for at least 2 midnights as detailed below:    IMPROVE VTE Individual Risk Assessment          RISK                                                          Points    [  ] Previous VTE                                                3    [  ] Thrombophilia                                             2    [  ] Lower limb paralysis                                    2        (unable to hold up >15 seconds)      [  ] Current Cancer                                             2         (within 6 months)    [  x] Immobilization > 24 hrs                              1    [  ] ICU/CCU stay > 24 hours                            1    [  x] Age > 60                                                    1    IMPROVE VTE Score ___2______

## 2019-02-26 ENCOUNTER — TRANSCRIPTION ENCOUNTER (OUTPATIENT)
Age: 84
End: 2019-02-26

## 2019-02-26 DIAGNOSIS — M86.9 OSTEOMYELITIS, UNSPECIFIED: ICD-10-CM

## 2019-02-26 DIAGNOSIS — I10 ESSENTIAL (PRIMARY) HYPERTENSION: ICD-10-CM

## 2019-02-26 DIAGNOSIS — M1A.09X0 IDIOPATHIC CHRONIC GOUT, MULTIPLE SITES, WITHOUT TOPHUS (TOPHI): ICD-10-CM

## 2019-02-26 DIAGNOSIS — E03.9 HYPOTHYROIDISM, UNSPECIFIED: ICD-10-CM

## 2019-02-26 DIAGNOSIS — Z22.322 CARRIER OR SUSPECTED CARRIER OF METHICILLIN RESISTANT STAPHYLOCOCCUS AUREUS: ICD-10-CM

## 2019-02-26 DIAGNOSIS — Z29.9 ENCOUNTER FOR PROPHYLACTIC MEASURES, UNSPECIFIED: ICD-10-CM

## 2019-02-26 LAB
ANION GAP SERPL CALC-SCNC: 8 MMOL/L — SIGNIFICANT CHANGE UP (ref 5–17)
BASOPHILS # BLD AUTO: 0.05 K/UL — SIGNIFICANT CHANGE UP (ref 0–0.2)
BASOPHILS NFR BLD AUTO: 0.8 % — SIGNIFICANT CHANGE UP (ref 0–2)
BUN SERPL-MCNC: 15 MG/DL — SIGNIFICANT CHANGE UP (ref 7–23)
CALCIUM SERPL-MCNC: 8.8 MG/DL — SIGNIFICANT CHANGE UP (ref 8.5–10.1)
CHLORIDE SERPL-SCNC: 105 MMOL/L — SIGNIFICANT CHANGE UP (ref 96–108)
CO2 SERPL-SCNC: 27 MMOL/L — SIGNIFICANT CHANGE UP (ref 22–31)
CREAT SERPL-MCNC: 0.89 MG/DL — SIGNIFICANT CHANGE UP (ref 0.5–1.3)
CRP SERPL-MCNC: 0.2 MG/DL — SIGNIFICANT CHANGE UP (ref 0–0.4)
EOSINOPHIL # BLD AUTO: 0.26 K/UL — SIGNIFICANT CHANGE UP (ref 0–0.5)
EOSINOPHIL NFR BLD AUTO: 4 % — SIGNIFICANT CHANGE UP (ref 0–6)
GLUCOSE SERPL-MCNC: 114 MG/DL — HIGH (ref 70–99)
HCT VFR BLD CALC: 37 % — SIGNIFICANT CHANGE UP (ref 34.5–45)
HGB BLD-MCNC: 11.9 G/DL — SIGNIFICANT CHANGE UP (ref 11.5–15.5)
IMM GRANULOCYTES NFR BLD AUTO: 0.3 % — SIGNIFICANT CHANGE UP (ref 0–1.5)
LYMPHOCYTES # BLD AUTO: 2.09 K/UL — SIGNIFICANT CHANGE UP (ref 1–3.3)
LYMPHOCYTES # BLD AUTO: 32.3 % — SIGNIFICANT CHANGE UP (ref 13–44)
MCHC RBC-ENTMCNC: 30.1 PG — SIGNIFICANT CHANGE UP (ref 27–34)
MCHC RBC-ENTMCNC: 32.2 GM/DL — SIGNIFICANT CHANGE UP (ref 32–36)
MCV RBC AUTO: 93.4 FL — SIGNIFICANT CHANGE UP (ref 80–100)
MONOCYTES # BLD AUTO: 0.6 K/UL — SIGNIFICANT CHANGE UP (ref 0–0.9)
MONOCYTES NFR BLD AUTO: 9.3 % — SIGNIFICANT CHANGE UP (ref 2–14)
NEUTROPHILS # BLD AUTO: 3.46 K/UL — SIGNIFICANT CHANGE UP (ref 1.8–7.4)
NEUTROPHILS NFR BLD AUTO: 53.3 % — SIGNIFICANT CHANGE UP (ref 43–77)
NRBC # BLD: 0 /100 WBCS — SIGNIFICANT CHANGE UP (ref 0–0)
PLATELET # BLD AUTO: 196 K/UL — SIGNIFICANT CHANGE UP (ref 150–400)
POTASSIUM SERPL-MCNC: 3.8 MMOL/L — SIGNIFICANT CHANGE UP (ref 3.5–5.3)
POTASSIUM SERPL-SCNC: 3.8 MMOL/L — SIGNIFICANT CHANGE UP (ref 3.5–5.3)
RBC # BLD: 3.96 M/UL — SIGNIFICANT CHANGE UP (ref 3.8–5.2)
RBC # FLD: 15 % — HIGH (ref 10.3–14.5)
SODIUM SERPL-SCNC: 140 MMOL/L — SIGNIFICANT CHANGE UP (ref 135–145)
WBC # BLD: 6.48 K/UL — SIGNIFICANT CHANGE UP (ref 3.8–10.5)
WBC # FLD AUTO: 6.48 K/UL — SIGNIFICANT CHANGE UP (ref 3.8–10.5)

## 2019-02-26 PROCEDURE — 99233 SBSQ HOSP IP/OBS HIGH 50: CPT

## 2019-02-26 PROCEDURE — 99223 1ST HOSP IP/OBS HIGH 75: CPT

## 2019-02-26 RX ORDER — IBUPROFEN 200 MG
600 TABLET ORAL EVERY 6 HOURS
Qty: 0 | Refills: 0 | Status: DISCONTINUED | OUTPATIENT
Start: 2019-02-26 | End: 2019-02-27

## 2019-02-26 RX ORDER — VANCOMYCIN HCL 1 G
1000 VIAL (EA) INTRAVENOUS EVERY 12 HOURS
Qty: 0 | Refills: 0 | Status: DISCONTINUED | OUTPATIENT
Start: 2019-02-26 | End: 2019-02-26

## 2019-02-26 RX ORDER — DIAZEPAM 5 MG
0.5 TABLET ORAL
Qty: 0 | Refills: 0 | COMMUNITY

## 2019-02-26 RX ORDER — LEVOTHYROXINE SODIUM 125 MCG
50 TABLET ORAL DAILY
Qty: 0 | Refills: 0 | Status: DISCONTINUED | OUTPATIENT
Start: 2019-02-26 | End: 2019-02-27

## 2019-02-26 RX ORDER — FERROUS SULFATE 325(65) MG
325 TABLET ORAL DAILY
Qty: 0 | Refills: 0 | Status: DISCONTINUED | OUTPATIENT
Start: 2019-02-26 | End: 2019-02-27

## 2019-02-26 RX ORDER — ALLOPURINOL 300 MG
100 TABLET ORAL
Qty: 0 | Refills: 0 | Status: DISCONTINUED | OUTPATIENT
Start: 2019-02-26 | End: 2019-02-27

## 2019-02-26 RX ORDER — OXYCODONE AND ACETAMINOPHEN 5; 325 MG/1; MG/1
1 TABLET ORAL EVERY 6 HOURS
Qty: 0 | Refills: 0 | Status: DISCONTINUED | OUTPATIENT
Start: 2019-02-26 | End: 2019-02-27

## 2019-02-26 RX ORDER — VANCOMYCIN HCL 1 G
750 VIAL (EA) INTRAVENOUS EVERY 12 HOURS
Qty: 0 | Refills: 0 | Status: DISCONTINUED | OUTPATIENT
Start: 2019-02-26 | End: 2019-02-27

## 2019-02-26 RX ORDER — METOPROLOL TARTRATE 50 MG
25 TABLET ORAL DAILY
Qty: 0 | Refills: 0 | Status: DISCONTINUED | OUTPATIENT
Start: 2019-02-26 | End: 2019-02-27

## 2019-02-26 RX ORDER — COLCHICINE 0.6 MG
0.6 TABLET ORAL DAILY
Qty: 0 | Refills: 0 | Status: DISCONTINUED | OUTPATIENT
Start: 2019-02-26 | End: 2019-02-27

## 2019-02-26 RX ORDER — ACETAMINOPHEN 500 MG
650 TABLET ORAL EVERY 6 HOURS
Qty: 0 | Refills: 0 | Status: DISCONTINUED | OUTPATIENT
Start: 2019-02-26 | End: 2019-02-27

## 2019-02-26 RX ORDER — SODIUM CHLORIDE 9 MG/ML
1000 INJECTION INTRAMUSCULAR; INTRAVENOUS; SUBCUTANEOUS
Qty: 0 | Refills: 0 | Status: DISCONTINUED | OUTPATIENT
Start: 2019-02-26 | End: 2019-03-01

## 2019-02-26 RX ORDER — PANTOPRAZOLE SODIUM 20 MG/1
40 TABLET, DELAYED RELEASE ORAL
Qty: 0 | Refills: 0 | Status: DISCONTINUED | OUTPATIENT
Start: 2019-02-26 | End: 2019-02-27

## 2019-02-26 RX ADMIN — Medication 100 MILLIGRAM(S): at 17:30

## 2019-02-26 RX ADMIN — Medication 250 MILLIGRAM(S): at 21:20

## 2019-02-26 RX ADMIN — Medication 50 MICROGRAM(S): at 05:16

## 2019-02-26 RX ADMIN — Medication 100 MILLIGRAM(S): at 05:16

## 2019-02-26 RX ADMIN — Medication 600 MILLIGRAM(S): at 23:28

## 2019-02-26 RX ADMIN — Medication 0.6 MILLIGRAM(S): at 12:46

## 2019-02-26 RX ADMIN — Medication 25 MILLIGRAM(S): at 05:16

## 2019-02-26 RX ADMIN — Medication 250 MILLIGRAM(S): at 09:21

## 2019-02-26 RX ADMIN — PANTOPRAZOLE SODIUM 40 MILLIGRAM(S): 20 TABLET, DELAYED RELEASE ORAL at 09:21

## 2019-02-26 RX ADMIN — Medication 1 TABLET(S): at 12:46

## 2019-02-26 RX ADMIN — SODIUM CHLORIDE 80 MILLILITER(S): 9 INJECTION INTRAMUSCULAR; INTRAVENOUS; SUBCUTANEOUS at 05:21

## 2019-02-26 RX ADMIN — Medication 325 MILLIGRAM(S): at 12:45

## 2019-02-26 RX ADMIN — SODIUM CHLORIDE 80 MILLILITER(S): 9 INJECTION INTRAMUSCULAR; INTRAVENOUS; SUBCUTANEOUS at 21:20

## 2019-02-26 NOTE — PROGRESS NOTE ADULT - PROBLEM SELECTOR PLAN 1
Podiatry consult appreciated  Analgesia PRN  Plan is for OR Wed, 2/27.  Continue Vancomycin  ID consult done Podiatry consult appreciated  Analgesia PRN  Plan is for OR Wed, 2/27. pt is low risk  for planned procedure. no further work up necessary   Continue Vancomycin  ID consult done

## 2019-02-26 NOTE — PROGRESS NOTE ADULT - SUBJECTIVE AND OBJECTIVE BOX
Patient is a 85y old  Female who presents with a chief complaint of OM, will need surgery with Dr. Beasley (26 Feb 2019 15:43)       INTERVAL HPI/OVERNIGHT EVENTS:  Patient seen and evaluated at bedside.  Pt is resting comfortable in NAD. Denies N/V/F/C.      Allergies    iodine (Other; Anaphylaxis)  shellfish (Anaphylaxis)  sulfa drugs (Other)    Intolerances        Vital Signs Last 24 Hrs  T(C): 36.6 (26 Feb 2019 11:47), Max: 36.7 (26 Feb 2019 01:00)  T(F): 97.8 (26 Feb 2019 11:47), Max: 98 (26 Feb 2019 01:00)  HR: 55 (26 Feb 2019 11:47) (55 - 60)  BP: 144/67 (26 Feb 2019 11:47) (139/65 - 155/79)  BP(mean): --  RR: 18 (26 Feb 2019 11:47) (16 - 18)  SpO2: 99% (26 Feb 2019 11:47) (97% - 99%)    LABS:                        11.9   6.48  )-----------( 196      ( 26 Feb 2019 08:14 )             37.0     02-26    140  |  105  |  15  ----------------------------<  114<H>  3.8   |  27  |  0.89    Ca    8.8      26 Feb 2019 08:14    TPro  8.4<H>  /  Alb  3.6  /  TBili  0.4  /  DBili  x   /  AST  23  /  ALT  21  /  AlkPhos  66  02-25    PT/INR - ( 25 Feb 2019 20:38 )   PT: 11.6 sec;   INR: 1.03 ratio         PTT - ( 25 Feb 2019 20:38 )  PTT:30.2 sec    CAPILLARY BLOOD GLUCOSE          Lower Extremity Physical Exam:  Vasular: DP/PT non palpable, B/L, CFT <3 seconds B/L, Temperature gradient WNL, B/L.   Neuro: Epicritic sensation intact to the level of toes, B/L.  Musculoskeletal/Ortho: pain with palpation of left 1st MPJ ulcer  Skin: ulceration on the medical aspect of the left 1st MPJ down to bone with periwound cellulitis, no drainage, no malodor, no streaking, no fluctuance.  eschars at the posterior aspect of the heel bilaterally with no signs of infection, small wounds partial thickness on right foot 1st and 2nd toes, no signs of infection.

## 2019-02-26 NOTE — CONSULT NOTE ADULT - SUBJECTIVE AND OBJECTIVE BOX
Patient is a 85y old  Female who presents with a chief complaint of left foot ulcer    HPI: Pt states that the left 1st MPJ wound started a few weeks ago while at rehab. She states that she had gout in the great toe joint and subsequently developed an ulcer in the area. She states that she has been following up with Dr. Beasley as outpatient. She has had an MRI that showed that she has osteo of the left foot 1st metatarsal. A wound culture was taken as outpatient as well. Pt denies any f/n/v/c/sob.      PAST MEDICAL & SURGICAL HISTORY:  Fistula, perirectal  Hiatal hernia  Hypothyroidism  Hypertension  S/P foot joint surgery: right ( 10 years ago )  S/P arthroscopy of right knee  S/P ileostomy: colectomy ( 2005 )  S/P cholecystectomy: at age 20 ,s      MEDICATIONS  (STANDING):  piperacillin/tazobactam IVPB. 3.375 Gram(s) IV Intermittent once  vancomycin  IVPB 1000 milliGRAM(s) IV Intermittent once    MEDICATIONS  (PRN):      Allergies    iodine (Other; Anaphylaxis)  shellfish (Anaphylaxis)  sulfa drugs (Other)    Intolerances        VITALS:    Vital Signs Last 24 Hrs  T(C): 36.8 (25 Feb 2019 16:37), Max: 36.8 (25 Feb 2019 16:37)  T(F): 98.2 (25 Feb 2019 16:37), Max: 98.2 (25 Feb 2019 16:37)  HR: 66 (25 Feb 2019 16:37) (66 - 66)  BP: 151/71 (25 Feb 2019 16:37) (151/71 - 151/71)  BP(mean): --  RR: 18 (25 Feb 2019 16:37) (18 - 18)  SpO2: 99% (25 Feb 2019 16:37) (99% - 99%)    LABS:                CAPILLARY BLOOD GLUCOSE              LOWER EXTREMITY PHYSICAL EXAM:    Vasular: DP/PT non palpable, B/L, CFT <3 seconds B/L, Temperature gradient WNL, B/L.   Neuro: Epicritic sensation intact to the level of toes, B/L.  Musculoskeletal/Ortho: pain with palpation of left 1st MPJ ulcer  Skin: ulceration on the medical aspect of the left 1st MPJ down to bone with periwound cellulitis, no drainage, no malodor, no streaking, no fluctuance.  eschars at the posterior aspect of the heel bilaterally with no signs of infection, small wounds partial thickness on right foot 1st and 2nd toes, no signs of infection.    RADIOLOGY & ADDITIONAL STUDIES:  < from: MR Foot No Cont, Left (02.19.19 @ 11:12) >  EXAM:  MR FOOT LT                            PROCEDURE DATE:  02/19/2019          INTERPRETATION:  CLINICAL INDICATION: Wound along the first metatarsal   phalangeal joint. Concern for underlying osteomyelitis.    Multiplanar Multisequence MR of the left foot from the level of the toes   to the level of the talonavicular joint.     Prior Studies: Correlation is made with prior regressive February 2, 2019.    FINDINGS:    There is subcutaneous edema noted about the first toe and medial aspects   of the forefoot consistent with cellulitis. Mild cellulitis is also noted   along the dorsal lateral subcutaneous tissues. There is cutaneous   irregularity noted along the medial aspect of the first metatarsal head.   Subjacent to this area there isdiffuse osseous edema throughout the   first metatarsal head extending to the distal shaft. There is   corresponding mild decreased T1 signal. Findings are consistent with   acute osteomyelitis. Similar marrow signal alteration is also noted   withinthe plantar base of the first proximal phalanx and within the   tibial sesamoid, also consistent with acute osteomyelitis. The fibular   sesamoid demonstrates mild intraosseous edema without decreased T1 marrow   signal. This may be related to reactive edema or early osteomyelitis. No   definite fluid collection is noted to suggest abscess.    There is moderate hallux valgus with severe first metatarsophalangeal and   hallux sesamoid joint arthrosis. There is lateral deviation of the second   through fifth toes at the metatarsophalangeal joints. There is dorsal   spurring throughout the midfoot articulations. There is mild   talonavicular joint arthrosis. There is mild second tarsometatarsal joint   arthrosis.    Visualized tendinous structures are intact without tenosynovitis or   tearing. Lisfranc ligament is preserved.    There is diffuse fatty infiltration and mild edema throughout the plantar   muscles likely related to denervation related changes.    Impression:    Acute osteomyelitis involving the first metatarsal from the level of the   head to the level of the distal shaft. Osteomyelitis is also noted along   the plantar aspect of the base of the first proximal phalanx and within   the tibial sesamoid. Mild reactive edema versus early osteomyelitis   within the fibular sesamoid. Overlying cellulitis is noted about the   first toe medially and dorsal lateral aspect of the foot.                  IVAN PEREA M.D., ATTENDING RADIOLOGIST  This document has been electronically signed. Feb 22 2019  9:09AM              < end of copied text >
Infectious Diseases - Attending at Dr. Florian    HPI:  85 year old woman with PMH HTN, gout, and hypothyroidism presents to ED sent in by podiatrist for OM confirmed as outpatient.  Pt states that the left 1st MPJ wound started a few weeks ago while at rehab. She states that she had gout in the great toe joint and subsequently developed an ulcer in the area. She states that she has been following up with Dr. Beasley as outpatient. She has had an MRI that showed that she has osteo of the left foot 1st metatarsal. A wound culture was taken as outpatient as well. Pt denies any f/n/v/c/sob.  Plan is for OR Wednesday 2/27/19. (25 Feb 2019 21:58)      PAST MEDICAL & SURGICAL HISTORY:  Fistula, perirectal  Hiatal hernia  Hypothyroidism  Hypertension  S/P foot joint surgery: right ( 10 years ago )  S/P arthroscopy of right knee  S/P ileostomy: colectomy ( 2005 )  S/P cholecystectomy: at age 20 s      Allergies    iodine (Other; Anaphylaxis)  shellfish (Anaphylaxis)  sulfa drugs (Other)    Intolerances        FAMILY HISTORY:  No pertinent family history      SOCIAL HISTORY: non smoker    REVIEW OF SYSTEMS:    Constitutional: No fever, weight loss or fatigue  Eyes: No eye pain, visual disturbances, or discharge  ENT:  No difficulty hearing, tinnitus, vertigo; No sinus or throat pain  Neck: No pain or stiffness  Respiratory: No cough, wheezing, chills or hemoptysis  Cardiovascular: No chest pain, palpitations, shortness of breath, dizziness or leg swelling  Gastrointestinal: No abdominal or epigastric pain. No nausea, vomiting or hematemesis; No diarrhea or constipation. No melena or hematochezia.  Genitourinary: No dysuria, frequency, hematuria or incontinence  Neurological: No headaches, memory loss, loss of strength, numbness or tremors  Skin: left great toe ulcer+      MEDICATIONS  (STANDING):  allopurinol 100 milliGRAM(s) Oral two times a day  colchicine 0.6 milliGRAM(s) Oral daily  ferrous    sulfate 325 milliGRAM(s) Oral daily  levothyroxine 50 MICROGram(s) Oral daily  metoprolol succinate ER 25 milliGRAM(s) Oral daily  multivitamin 1 Tablet(s) Oral daily  pantoprazole    Tablet 40 milliGRAM(s) Oral before breakfast  sodium chloride 0.9%. 1000 milliLiter(s) (80 mL/Hr) IV Continuous <Continuous>  vancomycin  IVPB 750 milliGRAM(s) IV Intermittent every 12 hours    MEDICATIONS  (PRN):  acetaminophen   Tablet .. 650 milliGRAM(s) Oral every 6 hours PRN Temp greater or equal to 38C (100.4F), Mild Pain (1 - 3)  ibuprofen  Tablet. 600 milliGRAM(s) Oral every 6 hours PRN Moderate Pain (4 - 6)  oxyCODONE    5 mG/acetaminophen 325 mG 1 Tablet(s) Oral every 6 hours PRN Severe Pain (7 - 10)      Vital Signs Last 24 Hrs  T(C): 36.6 (26 Feb 2019 11:47), Max: 36.8 (25 Feb 2019 16:37)  T(F): 97.8 (26 Feb 2019 11:47), Max: 98.2 (25 Feb 2019 16:37)  HR: 55 (26 Feb 2019 11:47) (55 - 66)  BP: 144/67 (26 Feb 2019 11:47) (139/65 - 155/79)  BP(mean): --  RR: 18 (26 Feb 2019 11:47) (16 - 18)  SpO2: 99% (26 Feb 2019 11:47) (97% - 99%)    PHYSICAL EXAM:    Constitutional: NAD, well-groomed, well-developed  HEENT: PERRLA, EOMI, Normal Hearing, MMM  Neck: No LAD, No JVD  Back: Normal spine flexure, No CVA tenderness  Respiratory: CTAB/L  Cardiovascular: S1 and S2, RRR, no M/G/R  Gastrointestinal: BS+, soft, NT/ND  Extremities: No peripheral edema  Vascular: 2+ peripheral pulses  Neurological: A/O x 3, no focal deficits  Skin: left toe wound on the medial side of hallux+      LABS:     ESR 19  CRP 0.2                          11.9   6.48  )-----------( 196      ( 26 Feb 2019 08:14 )             37.0     02-26    140  |  105  |  15  ----------------------------<  114<H>  3.8   |  27  |  0.89    Ca    8.8      26 Feb 2019 08:14    TPro  8.4<H>  /  Alb  3.6  /  TBili  0.4  /  DBili  x   /  AST  23  /  ALT  21  /  AlkPhos  66  02-25    PT/INR - ( 25 Feb 2019 20:38 )   PT: 11.6 sec;   INR: 1.03 ratio         PTT - ( 25 Feb 2019 20:38 )  PTT:30.2 sec      MICROBIOLOGY:      RECENT CULTURES:        RADIOLOGY & ADDITIONAL STUDIES:    Impression:    Acute osteomyelitis involving the first metatarsal from the level of the   head to the level of the distal shaft. Osteomyelitis is also noted along   the plantar aspect of the base of the first proximal phalanx and within   the tibial sesamoid. Mild reactive edema versus early osteomyelitis   within the fibular sesamoid. Overlying cellulitis is noted about the   first toe medially and dorsal lateral aspect of the foot.                  IVAN PEREA M.D., ATTENDING RADIOLOGIST  This document has been electronically signed. Feb 22 2019  9:09AM

## 2019-02-26 NOTE — PROGRESS NOTE ADULT - ASSESSMENT
86 y/o female pt with left foot medial 1st MPJ ulcer to bone  - pt seen and evaluated  - discussed with patient all treatment options including long term IV antibiotics vs amputation, pt understood all options and elected to go forward with partial first ray amputation  - all benefits, risks, and possible complications of surgery discussed, pt consented  - pt scheduled for left foot 1st ray amputation tomorrow Wednesday at 4pm  - please document medical clearance for OR  - ID consult appreciated  - seen w/ attending

## 2019-02-26 NOTE — PROGRESS NOTE ADULT - SUBJECTIVE AND OBJECTIVE BOX
Patient is a 85y old  Female who presents with a chief complaint of OM, will need surgery with Dr. Beasley (25 Feb 2019 21:58)      INTERVAL HPI/OVERNIGHT EVENTS: no fever    MEDICATIONS  (STANDING):  allopurinol 100 milliGRAM(s) Oral two times a day  colchicine 0.6 milliGRAM(s) Oral daily  ferrous    sulfate 325 milliGRAM(s) Oral daily  levothyroxine 50 MICROGram(s) Oral daily  metoprolol succinate ER 25 milliGRAM(s) Oral daily  multivitamin 1 Tablet(s) Oral daily  pantoprazole    Tablet 40 milliGRAM(s) Oral before breakfast  sodium chloride 0.9%. 1000 milliLiter(s) (80 mL/Hr) IV Continuous <Continuous>  vancomycin  IVPB 750 milliGRAM(s) IV Intermittent every 12 hours    MEDICATIONS  (PRN):  acetaminophen   Tablet .. 650 milliGRAM(s) Oral every 6 hours PRN Temp greater or equal to 38C (100.4F), Mild Pain (1 - 3)  oxyCODONE    5 mG/acetaminophen 325 mG 1 Tablet(s) Oral every 6 hours PRN Severe Pain (7 - 10)      Allergies    iodine (Other; Anaphylaxis)  shellfish (Anaphylaxis)  sulfa drugs (Other)    Intolerances          Vital Signs Last 24 Hrs  T(C): 36.6 (26 Feb 2019 11:47), Max: 36.8 (25 Feb 2019 16:37)  T(F): 97.8 (26 Feb 2019 11:47), Max: 98.2 (25 Feb 2019 16:37)  HR: 55 (26 Feb 2019 11:47) (55 - 66)  BP: 144/67 (26 Feb 2019 11:47) (139/65 - 155/79)  BP(mean): --  RR: 18 (26 Feb 2019 11:47) (16 - 18)  SpO2: 99% (26 Feb 2019 11:47) (97% - 99%)    PHYSICAL EXAM:  GENERAL: NAD, well-groomed, well-developed  HEAD:  Atraumatic, Normocephalic  EYES: EOMI, PERRLA, conjunctiva and sclera clear  ENMT: No tonsillar erythema, exudates, or enlargement; Moist mucous membranes, Good dentition, No lesions  NECK: Supple, No JVD, Normal thyroid  NERVOUS SYSTEM:  Alert & Oriented X3, Good concentration; Motor Strength 5/5 B/L upper and lower extremities; DTRs 2+ intact and symmetric  CHEST/LUNG: Clear to percussion bilaterally; No rales, rhonchi, wheezing, or rubs  HEART: Regular rate and rhythm; No murmurs, rubs, or gallops  ABDOMEN: Soft, Nontender, Nondistended; Bowel sounds present  EXTREMITIES:  2+ Peripheral Pulses, No clubbing, cyanosis, or edema  LYMPH: No lymphadenopathy noted  SKIN: No rashes or lesions    LABS:                        11.9   6.48  )-----------( 196      ( 26 Feb 2019 08:14 )             37.0     02-26    140  |  105  |  15  ----------------------------<  114<H>  3.8   |  27  |  0.89    Ca    8.8      26 Feb 2019 08:14    TPro  8.4<H>  /  Alb  3.6  /  TBili  0.4  /  DBili  x   /  AST  23  /  ALT  21  /  AlkPhos  66  02-25    PT/INR - ( 25 Feb 2019 20:38 )   PT: 11.6 sec;   INR: 1.03 ratio         PTT - ( 25 Feb 2019 20:38 )  PTT:30.2 sec    CAPILLARY BLOOD GLUCOSE          RADIOLOGY & ADDITIONAL TESTS:    Imaging Personally Reviewed:  [ x] YES  [ ] NO    Consultant(s) Notes Reviewed:  [x ] YES  [ ] NO    Care Discussed with Consultants/Other Providers [ ] YES  [ ] NO

## 2019-02-26 NOTE — PROGRESS NOTE ADULT - ASSESSMENT
85 year old woman with PMH HTN and hypothyroidism presents to ED sent in by podiatrist for OM confirmed as outpatient.

## 2019-02-26 NOTE — CONSULT NOTE ADULT - PROBLEM SELECTOR PROBLEM 1
3 days.  · Swelling or bruising on head that gets worse  · Bulging soft spot on top of head in a baby  · Pain doesn’t get better, or gets worse. Babies may show pain as crying or fussing that can’t be soothed.  · Eyes that look black from very-large pupils  · One pupil is larger or smaller than the other  · Vacant stare  · Clear or bloody fluid coming from ear or nose  · Neck pain or stiffness  · Headache  · Clumsiness or shaking  · Confusion  · Abnormal behavior  · Dizziness that doesn’t go away  · Sleepiness or trouble waking from sleep  · Trouble speaking  · Trouble walking or using arms or legs  · Seizures  · Vomiting  © 0954-5416 [employer]. [address] [place] [address], [place] [address]. All rights reserved. This information is not intended as a substitute for professional medical care. Always follow your healthcare professional's instructions.          Physical Assault  You have been examined today due to an assault. Someone attacked and tried to harm you.  Following a trauma like an assault, it is normal to feel many strong emotions. These may include shock, embarrassment, fear, and sadness. They may also include blame, guilt, shame, and anger. For a while, you may not be able to think clearly. It can take time to get back to the point where you feel safe again. Crisis support and counseling can help.  Many states require your healthcare provider to call local police after treating a victim of a violent crime. This does not mean that you have to press charges or go to trial. Talk to your healthcare provider about your options.  You may be able to get a refund of medical costs or losses related to the assault. Ask your local police or victim's advocate for details.  Home care  · Upset, stress, or shock may prevent you from noticing any pain or injury you have. If you have any new symptoms, call your healthcare provider.  · Follow your healthcare provider's advice about the care of any injuries  [employer].org  [phone] [employer]  [phone])  When to seek medical advice  Call your healthcare provider if you have any new symptoms such as these:  · Headache  · Neck, back, abdomen, arm or leg pain  · Repeated vomiting  · Dizziness  · Increasing pain, redness, swelling, or oozing of a wound  Call 911  Call 911 right away if you have:  · Trouble breathing or increasing chest pain  · Fainting  · Excessive sleepiness (very hard time staying awake)  · Confusion, behavior or speech changes, memory loss  · Blurred or double vision  © 8266-2546 [employer]. [address] [place] [address]. All rights reserved. This information is not intended as a substitute for professional medical care. Always follow your healthcare professional's instructions.         Osteomyelitis of left foot, unspecified type

## 2019-02-26 NOTE — CONSULT NOTE ADULT - PROBLEM SELECTOR RECOMMENDATION 9
left 1st MT OM   cont vanco for now  recent c/s MRSA, strept   plan for surgery Ray resection of toe tomorrow

## 2019-02-26 NOTE — PROGRESS NOTE ADULT - ATTENDING COMMENTS
discussed all risk benefits and alternatives for Surgery  Discussed the new Dx of MRSA  Spoke to daughter on the phone  To OR tomorrow for first Ray Amp

## 2019-02-27 ENCOUNTER — RESULT REVIEW (OUTPATIENT)
Age: 84
End: 2019-02-27

## 2019-02-27 LAB
ANION GAP SERPL CALC-SCNC: 6 MMOL/L — SIGNIFICANT CHANGE UP (ref 5–17)
APTT BLD: 30.4 SEC — SIGNIFICANT CHANGE UP (ref 28.5–37)
BUN SERPL-MCNC: 12 MG/DL — SIGNIFICANT CHANGE UP (ref 7–23)
CALCIUM SERPL-MCNC: 8.5 MG/DL — SIGNIFICANT CHANGE UP (ref 8.5–10.1)
CHLORIDE SERPL-SCNC: 106 MMOL/L — SIGNIFICANT CHANGE UP (ref 96–108)
CO2 SERPL-SCNC: 27 MMOL/L — SIGNIFICANT CHANGE UP (ref 22–31)
CREAT SERPL-MCNC: 0.74 MG/DL — SIGNIFICANT CHANGE UP (ref 0.5–1.3)
GLUCOSE SERPL-MCNC: 99 MG/DL — SIGNIFICANT CHANGE UP (ref 70–99)
HCT VFR BLD CALC: 32.3 % — LOW (ref 34.5–45)
HGB BLD-MCNC: 10.6 G/DL — LOW (ref 11.5–15.5)
INR BLD: 1.11 RATIO — SIGNIFICANT CHANGE UP (ref 0.88–1.16)
MCHC RBC-ENTMCNC: 30.2 PG — SIGNIFICANT CHANGE UP (ref 27–34)
MCHC RBC-ENTMCNC: 32.8 GM/DL — SIGNIFICANT CHANGE UP (ref 32–36)
MCV RBC AUTO: 92 FL — SIGNIFICANT CHANGE UP (ref 80–100)
NRBC # BLD: 0 /100 WBCS — SIGNIFICANT CHANGE UP (ref 0–0)
PLATELET # BLD AUTO: 185 K/UL — SIGNIFICANT CHANGE UP (ref 150–400)
POTASSIUM SERPL-MCNC: 3.4 MMOL/L — LOW (ref 3.5–5.3)
POTASSIUM SERPL-SCNC: 3.4 MMOL/L — LOW (ref 3.5–5.3)
PROTHROM AB SERPL-ACNC: 12.5 SEC — SIGNIFICANT CHANGE UP (ref 10–12.9)
RBC # BLD: 3.51 M/UL — LOW (ref 3.8–5.2)
RBC # FLD: 15 % — HIGH (ref 10.3–14.5)
SODIUM SERPL-SCNC: 139 MMOL/L — SIGNIFICANT CHANGE UP (ref 135–145)
WBC # BLD: 6.82 K/UL — SIGNIFICANT CHANGE UP (ref 3.8–10.5)
WBC # FLD AUTO: 6.82 K/UL — SIGNIFICANT CHANGE UP (ref 3.8–10.5)

## 2019-02-27 PROCEDURE — 88304 TISSUE EXAM BY PATHOLOGIST: CPT | Mod: 26

## 2019-02-27 PROCEDURE — 99232 SBSQ HOSP IP/OBS MODERATE 35: CPT

## 2019-02-27 PROCEDURE — 99233 SBSQ HOSP IP/OBS HIGH 50: CPT

## 2019-02-27 PROCEDURE — 73630 X-RAY EXAM OF FOOT: CPT | Mod: 26,LT

## 2019-02-27 PROCEDURE — 88307 TISSUE EXAM BY PATHOLOGIST: CPT | Mod: 26

## 2019-02-27 PROCEDURE — 88311 DECALCIFY TISSUE: CPT | Mod: 26

## 2019-02-27 RX ORDER — LEVOTHYROXINE SODIUM 125 MCG
50 TABLET ORAL DAILY
Qty: 0 | Refills: 0 | Status: DISCONTINUED | OUTPATIENT
Start: 2019-02-27 | End: 2019-02-27

## 2019-02-27 RX ORDER — HYDRALAZINE HCL 50 MG
5 TABLET ORAL ONCE
Qty: 0 | Refills: 0 | Status: COMPLETED | OUTPATIENT
Start: 2019-02-27 | End: 2019-02-27

## 2019-02-27 RX ORDER — OXYCODONE AND ACETAMINOPHEN 5; 325 MG/1; MG/1
1 TABLET ORAL EVERY 4 HOURS
Qty: 0 | Refills: 0 | Status: DISCONTINUED | OUTPATIENT
Start: 2019-02-27 | End: 2019-03-05

## 2019-02-27 RX ORDER — MORPHINE SULFATE 50 MG/1
2 CAPSULE, EXTENDED RELEASE ORAL EVERY 4 HOURS
Qty: 0 | Refills: 0 | Status: DISCONTINUED | OUTPATIENT
Start: 2019-02-27 | End: 2019-02-28

## 2019-02-27 RX ORDER — PANTOPRAZOLE SODIUM 20 MG/1
40 TABLET, DELAYED RELEASE ORAL
Qty: 0 | Refills: 0 | Status: DISCONTINUED | OUTPATIENT
Start: 2019-02-27 | End: 2019-02-27

## 2019-02-27 RX ORDER — ACETAMINOPHEN 500 MG
650 TABLET ORAL EVERY 6 HOURS
Qty: 0 | Refills: 0 | Status: DISCONTINUED | OUTPATIENT
Start: 2019-02-27 | End: 2019-03-04

## 2019-02-27 RX ORDER — PANTOPRAZOLE SODIUM 20 MG/1
40 TABLET, DELAYED RELEASE ORAL
Qty: 0 | Refills: 0 | Status: DISCONTINUED | OUTPATIENT
Start: 2019-02-27 | End: 2019-03-06

## 2019-02-27 RX ORDER — METOPROLOL TARTRATE 50 MG
25 TABLET ORAL DAILY
Qty: 0 | Refills: 0 | Status: DISCONTINUED | OUTPATIENT
Start: 2019-02-27 | End: 2019-02-28

## 2019-02-27 RX ORDER — SODIUM CHLORIDE 9 MG/ML
1000 INJECTION INTRAMUSCULAR; INTRAVENOUS; SUBCUTANEOUS
Qty: 0 | Refills: 0 | Status: DISCONTINUED | OUTPATIENT
Start: 2019-02-27 | End: 2019-03-01

## 2019-02-27 RX ORDER — METOPROLOL TARTRATE 50 MG
25 TABLET ORAL DAILY
Qty: 0 | Refills: 0 | Status: DISCONTINUED | OUTPATIENT
Start: 2019-02-27 | End: 2019-03-05

## 2019-02-27 RX ORDER — COLCHICINE 0.6 MG
0.6 TABLET ORAL DAILY
Qty: 0 | Refills: 0 | Status: DISCONTINUED | OUTPATIENT
Start: 2019-02-27 | End: 2019-03-04

## 2019-02-27 RX ORDER — VANCOMYCIN HCL 1 G
750 VIAL (EA) INTRAVENOUS EVERY 12 HOURS
Qty: 0 | Refills: 0 | Status: DISCONTINUED | OUTPATIENT
Start: 2019-02-27 | End: 2019-03-04

## 2019-02-27 RX ORDER — FENTANYL CITRATE 50 UG/ML
50 INJECTION INTRAVENOUS
Qty: 0 | Refills: 0 | Status: DISCONTINUED | OUTPATIENT
Start: 2019-02-27 | End: 2019-02-27

## 2019-02-27 RX ORDER — POTASSIUM CHLORIDE 20 MEQ
40 PACKET (EA) ORAL ONCE
Qty: 0 | Refills: 0 | Status: COMPLETED | OUTPATIENT
Start: 2019-02-27 | End: 2019-02-27

## 2019-02-27 RX ORDER — ALLOPURINOL 300 MG
100 TABLET ORAL
Qty: 0 | Refills: 0 | Status: DISCONTINUED | OUTPATIENT
Start: 2019-02-27 | End: 2019-03-06

## 2019-02-27 RX ORDER — SODIUM CHLORIDE 9 MG/ML
1000 INJECTION, SOLUTION INTRAVENOUS
Qty: 0 | Refills: 0 | Status: DISCONTINUED | OUTPATIENT
Start: 2019-02-27 | End: 2019-02-27

## 2019-02-27 RX ORDER — ACETAMINOPHEN 500 MG
650 TABLET ORAL EVERY 6 HOURS
Qty: 0 | Refills: 0 | Status: DISCONTINUED | OUTPATIENT
Start: 2019-02-27 | End: 2019-03-06

## 2019-02-27 RX ORDER — LEVOTHYROXINE SODIUM 125 MCG
50 TABLET ORAL DAILY
Qty: 0 | Refills: 0 | Status: DISCONTINUED | OUTPATIENT
Start: 2019-02-27 | End: 2019-03-06

## 2019-02-27 RX ORDER — FENTANYL CITRATE 50 UG/ML
25 INJECTION INTRAVENOUS
Qty: 0 | Refills: 0 | Status: DISCONTINUED | OUTPATIENT
Start: 2019-02-27 | End: 2019-02-27

## 2019-02-27 RX ORDER — FERROUS SULFATE 325(65) MG
325 TABLET ORAL DAILY
Qty: 0 | Refills: 0 | Status: DISCONTINUED | OUTPATIENT
Start: 2019-02-27 | End: 2019-03-06

## 2019-02-27 RX ADMIN — PANTOPRAZOLE SODIUM 40 MILLIGRAM(S): 20 TABLET, DELAYED RELEASE ORAL at 08:34

## 2019-02-27 RX ADMIN — Medication 600 MILLIGRAM(S): at 18:51

## 2019-02-27 RX ADMIN — Medication 600 MILLIGRAM(S): at 14:37

## 2019-02-27 RX ADMIN — SODIUM CHLORIDE 75 MILLILITER(S): 9 INJECTION, SOLUTION INTRAVENOUS at 21:00

## 2019-02-27 RX ADMIN — SODIUM CHLORIDE 50 MILLILITER(S): 9 INJECTION INTRAMUSCULAR; INTRAVENOUS; SUBCUTANEOUS at 23:37

## 2019-02-27 RX ADMIN — Medication 100 MILLIGRAM(S): at 05:40

## 2019-02-27 RX ADMIN — Medication 40 MILLIEQUIVALENT(S): at 08:33

## 2019-02-27 RX ADMIN — Medication 250 MILLIGRAM(S): at 10:36

## 2019-02-27 RX ADMIN — Medication 1 TABLET(S): at 12:11

## 2019-02-27 RX ADMIN — Medication 50 MICROGRAM(S): at 05:40

## 2019-02-27 RX ADMIN — Medication 325 MILLIGRAM(S): at 12:11

## 2019-02-27 RX ADMIN — Medication 25 MILLIGRAM(S): at 05:40

## 2019-02-27 RX ADMIN — Medication 0.6 MILLIGRAM(S): at 12:11

## 2019-02-27 RX ADMIN — Medication 5 MILLIGRAM(S): at 12:25

## 2019-02-27 NOTE — PROGRESS NOTE ADULT - SUBJECTIVE AND OBJECTIVE BOX
Patient is a 85y old  Female who presents with a chief complaint of OM, will need surgery with Dr. Beasley (26 Feb 2019 16:36)      INTERVAL HPI/OVERNIGHT EVENTS:   Pt is scheduled for left foot first toe amputation with Dr. Beasley at 4 pm. Patient is aware of procedure and is NPO since midnight.    MEDICATIONS  (STANDING):  allopurinol 100 milliGRAM(s) Oral two times a day  colchicine 0.6 milliGRAM(s) Oral daily  ferrous    sulfate 325 milliGRAM(s) Oral daily  levothyroxine 50 MICROGram(s) Oral daily  metoprolol succinate ER 25 milliGRAM(s) Oral daily  multivitamin 1 Tablet(s) Oral daily  pantoprazole    Tablet 40 milliGRAM(s) Oral before breakfast  sodium chloride 0.9%. 1000 milliLiter(s) (80 mL/Hr) IV Continuous <Continuous>  vancomycin  IVPB 750 milliGRAM(s) IV Intermittent every 12 hours    MEDICATIONS  (PRN):  acetaminophen   Tablet .. 650 milliGRAM(s) Oral every 6 hours PRN Temp greater or equal to 38C (100.4F), Mild Pain (1 - 3)  ibuprofen  Tablet. 600 milliGRAM(s) Oral every 6 hours PRN Moderate Pain (4 - 6)  oxyCODONE    5 mG/acetaminophen 325 mG 1 Tablet(s) Oral every 6 hours PRN Severe Pain (7 - 10)      Allergies    iodine (Other; Anaphylaxis)  shellfish (Anaphylaxis)  sulfa drugs (Other)    Intolerances        Vital Signs Last 24 Hrs  T(C): 36.1 (27 Feb 2019 05:28), Max: 36.7 (26 Feb 2019 17:40)  T(F): 97 (27 Feb 2019 05:28), Max: 98 (26 Feb 2019 17:40)  HR: 61 (27 Feb 2019 05:28) (55 - 63)  BP: 149/65 (27 Feb 2019 05:28) (127/75 - 150/72)  BP(mean): --  RR: 18 (27 Feb 2019 05:28) (18 - 18)  SpO2: 98% (27 Feb 2019 05:28) (96% - 99%)    LABS:                        10.6   6.82  )-----------( 185      ( 27 Feb 2019 04:46 )             32.3     02-27    139  |  106  |  12  ----------------------------<  99  3.4<L>   |  27  |  0.74    Ca    8.5      27 Feb 2019 04:46    TPro  8.4<H>  /  Alb  3.6  /  TBili  0.4  /  DBili  x   /  AST  23  /  ALT  21  /  AlkPhos  66  02-25    PT/INR - ( 27 Feb 2019 04:46 )   PT: 12.5 sec;   INR: 1.11 ratio         PTT - ( 27 Feb 2019 04:46 )  PTT:30.4 sec    CAPILLARY BLOOD GLUCOSE          RADIOLOGY & ADDITIONAL TESTS:    Plan:   To OR today at 4 pm with Dr. Beasley for left foot 1st ray amputation.   CXR on sunrise.  EKG on sunrise.  Medical and documented in chart.  Consent signed and in chart.  Procedure was explained to patient in detail. All alternatives, risks and complications were discussed. All questions answered.

## 2019-02-27 NOTE — PROGRESS NOTE ADULT - ASSESSMENT
85 year old woman with PMH HTN and hypothyroidism presents to ED sent in by podiatrist for OM confirmed as outpatient.    Pt is scheduled for OR today. Denies any chest pain, sob, or orthopnea. pt is low risk  for planned procedure. no further work up necessary

## 2019-02-27 NOTE — PROGRESS NOTE ADULT - PROBLEM SELECTOR PLAN 2
elevated today and pt states she is very anxious about the procedure. will give a dose of hydralazine   Continue BB  DASH diet

## 2019-02-27 NOTE — BRIEF OPERATIVE NOTE - PROCEDURE
<<-----Click on this checkbox to enter Procedure Amputation of first metatarsal  02/27/2019    Active  VALERIOAF

## 2019-02-27 NOTE — PROGRESS NOTE ADULT - SUBJECTIVE AND OBJECTIVE BOX
Patient is a 85y old  Female who presents with a chief complaint of OM, will need surgery with Dr. Beasley (27 Feb 2019 12:01)      INTERVAL HPI / OVERNIGHT EVENTS: no new c/o    MEDICATIONS  (STANDING):  allopurinol 100 milliGRAM(s) Oral two times a day  colchicine 0.6 milliGRAM(s) Oral daily  ferrous    sulfate 325 milliGRAM(s) Oral daily  levothyroxine 50 MICROGram(s) Oral daily  metoprolol succinate ER 25 milliGRAM(s) Oral daily  multivitamin 1 Tablet(s) Oral daily  pantoprazole    Tablet 40 milliGRAM(s) Oral before breakfast  sodium chloride 0.9%. 1000 milliLiter(s) (80 mL/Hr) IV Continuous <Continuous>  vancomycin  IVPB 750 milliGRAM(s) IV Intermittent every 12 hours    MEDICATIONS  (PRN):  acetaminophen   Tablet .. 650 milliGRAM(s) Oral every 6 hours PRN Temp greater or equal to 38C (100.4F), Mild Pain (1 - 3)  ibuprofen  Tablet. 600 milliGRAM(s) Oral every 6 hours PRN Moderate Pain (4 - 6)  oxyCODONE    5 mG/acetaminophen 325 mG 1 Tablet(s) Oral every 6 hours PRN Severe Pain (7 - 10)      Vital Signs Last 24 Hrs  T(C): 36.8 (27 Feb 2019 09:30), Max: 36.8 (27 Feb 2019 09:30)  T(F): 98.3 (27 Feb 2019 09:30), Max: 98.3 (27 Feb 2019 09:30)  HR: 60 (27 Feb 2019 09:30) (60 - 63)  BP: 168/75 (27 Feb 2019 12:26) (127/75 - 180/81)  BP(mean): --  RR: 18 (27 Feb 2019 09:30) (18 - 18)  SpO2: 95% (27 Feb 2019 09:30) (95% - 98%)    Review of systems:  General : no fever /chills, fatigue  CVS : no chest pain, palpitations  Lungs : no shortness of breath, cough  GI : no abdominal pain, vomiting, diarrhea   : no dysuria ,hematuria        PHYSICAL EXAM:  General :NAD  Constitutional:  well-groomed, well-developed  Respiratory: CTAB/L  Cardiovascular: S1 and S2, RRR, no M/G/R  Gastrointestinal: BS+, soft, NT/ND  Extremities/skin: left great toe ulcer +      LABS:                        10.6   6.82  )-----------( 185      ( 27 Feb 2019 04:46 )             32.3     02-27    139  |  106  |  12  ----------------------------<  99  3.4<L>   |  27  |  0.74    Ca    8.5      27 Feb 2019 04:46    TPro  8.4<H>  /  Alb  3.6  /  TBili  0.4  /  DBili  x   /  AST  23  /  ALT  21  /  AlkPhos  66  02-25          MICROBIOLOGY:  RECENT CULTURES:  02-26 .Blood XXXX XXXX   No growth to date.          RADIOLOGY & ADDITIONAL STUDIES:

## 2019-02-28 DIAGNOSIS — E87.6 HYPOKALEMIA: ICD-10-CM

## 2019-02-28 LAB
ANION GAP SERPL CALC-SCNC: 9 MMOL/L — SIGNIFICANT CHANGE UP (ref 5–17)
BUN SERPL-MCNC: 8 MG/DL — SIGNIFICANT CHANGE UP (ref 7–23)
CALCIUM SERPL-MCNC: 8.4 MG/DL — LOW (ref 8.5–10.1)
CHLORIDE SERPL-SCNC: 109 MMOL/L — HIGH (ref 96–108)
CO2 SERPL-SCNC: 24 MMOL/L — SIGNIFICANT CHANGE UP (ref 22–31)
CREAT SERPL-MCNC: 0.67 MG/DL — SIGNIFICANT CHANGE UP (ref 0.5–1.3)
GLUCOSE SERPL-MCNC: 90 MG/DL — SIGNIFICANT CHANGE UP (ref 70–99)
GRAM STN FLD: SIGNIFICANT CHANGE UP
HCT VFR BLD CALC: 30.3 % — LOW (ref 34.5–45)
HGB BLD-MCNC: 9.9 G/DL — LOW (ref 11.5–15.5)
MAGNESIUM SERPL-MCNC: 1.6 MG/DL — SIGNIFICANT CHANGE UP (ref 1.6–2.6)
MCHC RBC-ENTMCNC: 30 PG — SIGNIFICANT CHANGE UP (ref 27–34)
MCHC RBC-ENTMCNC: 32.7 GM/DL — SIGNIFICANT CHANGE UP (ref 32–36)
MCV RBC AUTO: 91.8 FL — SIGNIFICANT CHANGE UP (ref 80–100)
NRBC # BLD: 0 /100 WBCS — SIGNIFICANT CHANGE UP (ref 0–0)
PLATELET # BLD AUTO: 166 K/UL — SIGNIFICANT CHANGE UP (ref 150–400)
POTASSIUM SERPL-MCNC: 3.4 MMOL/L — LOW (ref 3.5–5.3)
POTASSIUM SERPL-SCNC: 3.4 MMOL/L — LOW (ref 3.5–5.3)
RBC # BLD: 3.3 M/UL — LOW (ref 3.8–5.2)
RBC # FLD: 14.9 % — HIGH (ref 10.3–14.5)
SODIUM SERPL-SCNC: 142 MMOL/L — SIGNIFICANT CHANGE UP (ref 135–145)
SPECIMEN SOURCE: SIGNIFICANT CHANGE UP
VANCOMYCIN TROUGH SERPL-MCNC: 14.3 UG/ML — SIGNIFICANT CHANGE UP (ref 10–20)
WBC # BLD: 7.98 K/UL — SIGNIFICANT CHANGE UP (ref 3.8–10.5)
WBC # FLD AUTO: 7.98 K/UL — SIGNIFICANT CHANGE UP (ref 3.8–10.5)

## 2019-02-28 PROCEDURE — 73630 X-RAY EXAM OF FOOT: CPT | Mod: 26,LT

## 2019-02-28 PROCEDURE — 73620 X-RAY EXAM OF FOOT: CPT | Mod: 26,LT

## 2019-02-28 PROCEDURE — 93923 UPR/LXTR ART STDY 3+ LVLS: CPT | Mod: 26

## 2019-02-28 PROCEDURE — 99232 SBSQ HOSP IP/OBS MODERATE 35: CPT

## 2019-02-28 RX ORDER — POTASSIUM CHLORIDE 20 MEQ
40 PACKET (EA) ORAL EVERY 4 HOURS
Qty: 0 | Refills: 0 | Status: COMPLETED | OUTPATIENT
Start: 2019-02-28 | End: 2019-02-28

## 2019-02-28 RX ORDER — COLLAGENASE CLOSTRIDIUM HIST. 250 UNIT/G
1 OINTMENT (GRAM) TOPICAL DAILY
Qty: 0 | Refills: 0 | Status: DISCONTINUED | OUTPATIENT
Start: 2019-02-28 | End: 2019-03-06

## 2019-02-28 RX ADMIN — Medication 100 MILLIGRAM(S): at 17:49

## 2019-02-28 RX ADMIN — Medication 0.6 MILLIGRAM(S): at 12:42

## 2019-02-28 RX ADMIN — Medication 325 MILLIGRAM(S): at 12:42

## 2019-02-28 RX ADMIN — PANTOPRAZOLE SODIUM 40 MILLIGRAM(S): 20 TABLET, DELAYED RELEASE ORAL at 09:34

## 2019-02-28 RX ADMIN — Medication 1 TABLET(S): at 12:42

## 2019-02-28 RX ADMIN — OXYCODONE AND ACETAMINOPHEN 1 TABLET(S): 5; 325 TABLET ORAL at 17:50

## 2019-02-28 RX ADMIN — MORPHINE SULFATE 2 MILLIGRAM(S): 50 CAPSULE, EXTENDED RELEASE ORAL at 00:44

## 2019-02-28 RX ADMIN — Medication 250 MILLIGRAM(S): at 13:14

## 2019-02-28 RX ADMIN — Medication 250 MILLIGRAM(S): at 01:59

## 2019-02-28 RX ADMIN — Medication 40 MILLIEQUIVALENT(S): at 12:42

## 2019-02-28 RX ADMIN — MORPHINE SULFATE 2 MILLIGRAM(S): 50 CAPSULE, EXTENDED RELEASE ORAL at 00:29

## 2019-02-28 RX ADMIN — Medication 1 APPLICATION(S): at 22:24

## 2019-02-28 RX ADMIN — OXYCODONE AND ACETAMINOPHEN 1 TABLET(S): 5; 325 TABLET ORAL at 18:50

## 2019-02-28 RX ADMIN — Medication 100 MILLIGRAM(S): at 06:05

## 2019-02-28 RX ADMIN — Medication 50 MICROGRAM(S): at 06:03

## 2019-02-28 RX ADMIN — Medication 250 MILLIGRAM(S): at 22:48

## 2019-02-28 RX ADMIN — Medication 40 MILLIEQUIVALENT(S): at 16:19

## 2019-02-28 RX ADMIN — Medication 25 MILLIGRAM(S): at 06:03

## 2019-02-28 NOTE — PROGRESS NOTE ADULT - ASSESSMENT
84 y/o female s/p left foot partial 1st ray amp  - pt seen and evaluated  - no signs of infection present, skin well coapted sutures intact, drain intact  - will follow up OR data  - z flows in bed at all times  - santyl to heal eschars daily  - d/w attending

## 2019-02-28 NOTE — PROGRESS NOTE ADULT - SUBJECTIVE AND OBJECTIVE BOX
Patient is a 85y old  Female who presents with a chief complaint of OM, will need surgery with Dr. Beasley (27 Feb 2019 13:29)       INTERVAL HPI/OVERNIGHT EVENTS:  Patient seen and evaluated at bedside.  Pt is resting comfortable in NAD. Denies N/V/F/C.    Allergies    iodine (Other; Anaphylaxis)  shellfish (Anaphylaxis)  sulfa drugs (Other)    Intolerances        Vital Signs Last 24 Hrs  T(C): 36.7 (28 Feb 2019 10:07), Max: 36.9 (28 Feb 2019 05:35)  T(F): 98 (28 Feb 2019 10:07), Max: 98.4 (28 Feb 2019 05:35)  HR: 71 (28 Feb 2019 10:07) (55 - 81)  BP: 132/76 (28 Feb 2019 10:07) (123/69 - 183/82)  BP(mean): --  RR: 18 (28 Feb 2019 10:07) (15 - 18)  SpO2: 96% (28 Feb 2019 10:07) (96% - 100%)    LABS:                        9.9    7.98  )-----------( 166      ( 28 Feb 2019 07:45 )             30.3     02-28    142  |  109<H>  |  8   ----------------------------<  90  3.4<L>   |  24  |  0.67    Ca    8.4<L>      28 Feb 2019 07:45  Mg     1.6     02-28      PT/INR - ( 27 Feb 2019 04:46 )   PT: 12.5 sec;   INR: 1.11 ratio         PTT - ( 27 Feb 2019 04:46 )  PTT:30.4 sec    CAPILLARY BLOOD GLUCOSE          Lower Extremity Physical Exam:  s/p left foot partial 1st ray amputation - closed, no signs of infection present, skin well coapted sutures intact, drain intact  eschars at the posterior aspect of the heel bilaterally with no signs of infection

## 2019-02-28 NOTE — PROGRESS NOTE ADULT - ASSESSMENT
85 year old woman with PMH HTN and hypothyroidism presents to ED sent in by podiatrist for OM confirmed as outpatient.    Pt is now s/p POD 1 for 1st ray amputation awaiting bone cx to determine length of abx.   Likely dc on Monday to [facility] vs home

## 2019-02-28 NOTE — PROGRESS NOTE ADULT - SUBJECTIVE AND OBJECTIVE BOX
Patient is a 85y old  Female who presents with a chief complaint of OM, will need surgery with Dr. Beasley (25 Feb 2019 21:58)      INTERVAL HPI/OVERNIGHT EVENTS: none. doing well post op. pain controlled     MEDICATIONS  (STANDING):  allopurinol 100 milliGRAM(s) Oral two times a day  colchicine 0.6 milliGRAM(s) Oral daily  collagenase Ointment 1 Application(s) Topical daily  ferrous    sulfate 325 milliGRAM(s) Oral daily  levothyroxine 50 MICROGram(s) Oral daily  metoprolol succinate ER 25 milliGRAM(s) Oral daily  multivitamin 1 Tablet(s) Oral daily  pantoprazole    Tablet 40 milliGRAM(s) Oral before breakfast  potassium chloride   Powder 40 milliEquivalent(s) Oral every 4 hours  sodium chloride 0.9%. 1000 milliLiter(s) (50 mL/Hr) IV Continuous <Continuous>  sodium chloride 0.9%. 1000 milliLiter(s) (80 mL/Hr) IV Continuous <Continuous>  vancomycin  IVPB 750 milliGRAM(s) IV Intermittent every 12 hours    MEDICATIONS  (PRN):  acetaminophen   Tablet .. 650 milliGRAM(s) Oral every 6 hours PRN Temp greater or equal to 38C (100.4F), Mild Pain (1 - 3)  acetaminophen   Tablet .. 650 milliGRAM(s) Oral every 6 hours PRN Temp greater or equal to 38C (100.4F), Mild Pain (1 - 3)  morphine  - Injectable 2 milliGRAM(s) IV Push every 4 hours PRN Severe Pain (7 - 10)  oxyCODONE    5 mG/acetaminophen 325 mG 1 Tablet(s) Oral every 4 hours PRN Moderate Pain (4 - 6)        Allergies    iodine (Other; Anaphylaxis)  shellfish (Anaphylaxis)  sulfa drugs (Other)    Intolerances          Vital Signs Last 24 Hrs  T(C): 36.7 (28 Feb 2019 10:07), Max: 36.9 (28 Feb 2019 05:35)  T(F): 98 (28 Feb 2019 10:07), Max: 98.4 (28 Feb 2019 05:35)  HR: 71 (28 Feb 2019 10:07) (55 - 81)  BP: 132/76 (28 Feb 2019 10:07) (123/69 - 183/82)  BP(mean): --  RR: 18 (28 Feb 2019 10:07) (15 - 18)  SpO2: 96% (28 Feb 2019 10:07) (96% - 100%)    PHYSICAL EXAM:  GENERAL: NAD, well-groomed, well-developed  HEAD:  Atraumatic, Normocephalic  EYES: EOMI, PERRLA, conjunctiva and sclera clear  ENMT: No tonsillar erythema, exudates, or enlargement; Moist mucous membranes, Good dentition, No lesions  NECK: Supple, No JVD, Normal thyroid  NERVOUS SYSTEM:  Alert & Oriented X3, Good concentration; Motor Strength 5/5 B/L upper and lower extremities; DTRs 2+ intact and symmetric  CHEST/LUNG: Clear to percussion bilaterally; No rales, rhonchi, wheezing, or rubs  HEART: Regular rate and rhythm; No murmurs, rubs, or gallops  ABDOMEN: Soft, Nontender, Nondistended; Bowel sounds present  EXTREMITIES:  2+ Peripheral Pulses, No clubbing, cyanosis, or edema  LYMPH: No lymphadenopathy noted  SKIN: No rashes or lesions    LABS:                                            9.9    7.98  )-----------( 166      ( 28 Feb 2019 07:45 )             30.3     02-28    142  |  109<H>  |  8   ----------------------------<  90  3.4<L>   |  24  |  0.67    Ca    8.4<L>      28 Feb 2019 07:45  Mg     1.6     02-28      PT/INR - ( 27 Feb 2019 04:46 )   PT: 12.5 sec;   INR: 1.11 ratio         PTT - ( 27 Feb 2019 04:46 )  PTT:30.4 sec          RADIOLOGY & ADDITIONAL TESTS:    Imaging Personally Reviewed:  [ x] YES  [ ] NO    Consultant(s) Notes Reviewed:  [x ] YES  [ ] NO    Care Discussed with Consultants/Other Providers [ ] YES  [ ] NO

## 2019-03-01 LAB
ANION GAP SERPL CALC-SCNC: 10 MMOL/L — SIGNIFICANT CHANGE UP (ref 5–17)
BUN SERPL-MCNC: 9 MG/DL — SIGNIFICANT CHANGE UP (ref 7–23)
CALCIUM SERPL-MCNC: 8.6 MG/DL — SIGNIFICANT CHANGE UP (ref 8.5–10.1)
CHLORIDE SERPL-SCNC: 103 MMOL/L — SIGNIFICANT CHANGE UP (ref 96–108)
CO2 SERPL-SCNC: 24 MMOL/L — SIGNIFICANT CHANGE UP (ref 22–31)
CREAT SERPL-MCNC: 0.68 MG/DL — SIGNIFICANT CHANGE UP (ref 0.5–1.3)
GLUCOSE SERPL-MCNC: 128 MG/DL — HIGH (ref 70–99)
HCT VFR BLD CALC: 30.2 % — LOW (ref 34.5–45)
HGB BLD-MCNC: 9.9 G/DL — LOW (ref 11.5–15.5)
MCHC RBC-ENTMCNC: 29.7 PG — SIGNIFICANT CHANGE UP (ref 27–34)
MCHC RBC-ENTMCNC: 32.8 GM/DL — SIGNIFICANT CHANGE UP (ref 32–36)
MCV RBC AUTO: 90.7 FL — SIGNIFICANT CHANGE UP (ref 80–100)
NRBC # BLD: 0 /100 WBCS — SIGNIFICANT CHANGE UP (ref 0–0)
PLATELET # BLD AUTO: 164 K/UL — SIGNIFICANT CHANGE UP (ref 150–400)
POTASSIUM SERPL-MCNC: 3.7 MMOL/L — SIGNIFICANT CHANGE UP (ref 3.5–5.3)
POTASSIUM SERPL-SCNC: 3.7 MMOL/L — SIGNIFICANT CHANGE UP (ref 3.5–5.3)
RBC # BLD: 3.33 M/UL — LOW (ref 3.8–5.2)
RBC # FLD: 14.8 % — HIGH (ref 10.3–14.5)
SODIUM SERPL-SCNC: 137 MMOL/L — SIGNIFICANT CHANGE UP (ref 135–145)
WBC # BLD: 10.61 K/UL — HIGH (ref 3.8–10.5)
WBC # FLD AUTO: 10.61 K/UL — HIGH (ref 3.8–10.5)

## 2019-03-01 PROCEDURE — 99233 SBSQ HOSP IP/OBS HIGH 50: CPT

## 2019-03-01 PROCEDURE — 99232 SBSQ HOSP IP/OBS MODERATE 35: CPT

## 2019-03-01 RX ORDER — IBUPROFEN 200 MG
600 TABLET ORAL EVERY 6 HOURS
Qty: 0 | Refills: 0 | Status: DISCONTINUED | OUTPATIENT
Start: 2019-03-01 | End: 2019-03-04

## 2019-03-01 RX ADMIN — Medication 100 MILLIGRAM(S): at 05:57

## 2019-03-01 RX ADMIN — Medication 1 TABLET(S): at 12:22

## 2019-03-01 RX ADMIN — Medication 1 APPLICATION(S): at 12:15

## 2019-03-01 RX ADMIN — OXYCODONE AND ACETAMINOPHEN 1 TABLET(S): 5; 325 TABLET ORAL at 09:33

## 2019-03-01 RX ADMIN — Medication 250 MILLIGRAM(S): at 13:20

## 2019-03-01 RX ADMIN — Medication 0.6 MILLIGRAM(S): at 12:22

## 2019-03-01 RX ADMIN — OXYCODONE AND ACETAMINOPHEN 1 TABLET(S): 5; 325 TABLET ORAL at 10:15

## 2019-03-01 RX ADMIN — Medication 100 MILLIGRAM(S): at 17:51

## 2019-03-01 RX ADMIN — PANTOPRAZOLE SODIUM 40 MILLIGRAM(S): 20 TABLET, DELAYED RELEASE ORAL at 08:46

## 2019-03-01 RX ADMIN — Medication 25 MILLIGRAM(S): at 05:57

## 2019-03-01 RX ADMIN — Medication 325 MILLIGRAM(S): at 12:22

## 2019-03-01 RX ADMIN — Medication 50 MICROGRAM(S): at 05:57

## 2019-03-01 NOTE — PROGRESS NOTE ADULT - ASSESSMENT
86 y/o female s/p left foot partial 1st ray amp (DOS 2/27/19  - Pt seen and evaluated, POD #2  - Sutures intact, skin well-coapted, no signs of infection  - No hematoma, drain pulled  - Prelim OR cultures negative  - Offload heels w/ Z flow boots in bed at all times  - Santyl to heal eschars daily - placed wound care orders for R heel  - Continue IV abx  - Discussed w/ attending

## 2019-03-01 NOTE — PHYSICAL THERAPY INITIAL EVALUATION ADULT - IMPAIRED TRANSFERS: BED/CHAIR, REHAB EVAL
impaired postural control/decreased strength/pain/impaired balance/impaired coordination/decreased flexibility

## 2019-03-01 NOTE — PROGRESS NOTE ADULT - ATTENDING COMMENTS
Surgical site stable Heel ulcer stable and cont to off load and apply collagenase  COnt IV vanco to treat MRSA until monday

## 2019-03-01 NOTE — PROGRESS NOTE ADULT - SUBJECTIVE AND OBJECTIVE BOX
Podiatry pager #: [phone] ([place])/ [phone] ([facility])    Patient is a 85y old  Female who presents with a chief complaint of OM, will need surgery with Dr. Beasley (28 Feb 2019 12:55)       INTERVAL HPI/OVERNIGHT EVENTS:  Patient seen and evaluated at bedside.  Pt is resting comfortable in NAD. Denies N/V/F/C.     Allergies    iodine (Other; Anaphylaxis)  shellfish (Anaphylaxis)  sulfa drugs (Other)    Intolerances        Vital Signs Last 24 Hrs  T(C): 37.1 (01 Mar 2019 05:31), Max: 37.1 (01 Mar 2019 05:31)  T(F): 98.7 (01 Mar 2019 05:31), Max: 98.7 (01 Mar 2019 05:31)  HR: 100 (01 Mar 2019 08:44) (82 - 100)  BP: 133/87 (01 Mar 2019 08:44) (99/60 - 173/69)  BP(mean): --  RR: 18 (01 Mar 2019 08:44) (17 - 18)  SpO2: 97% (01 Mar 2019 08:44) (96% - 98%)    LABS:                        9.9    10.61 )-----------( 164      ( 01 Mar 2019 11:51 )             30.2     03-01    137  |  103  |  9   ----------------------------<  128<H>  3.7   |  24  |  0.68    Ca    8.6      01 Mar 2019 11:51  Mg     1.6     02-28          CAPILLARY BLOOD GLUCOSE          Lower Extremity Physical Exam:  s/p left foot partial 1st ray amputation - closed, no signs of infection present, skin well coapted sutures intact, drain intact  eschars at the posterior aspect of the heel bilaterally with no signs of infection

## 2019-03-01 NOTE — PHYSICAL THERAPY INITIAL EVALUATION ADULT - GENERAL OBSERVATIONS, REHAB EVAL
t was seen in supine c colostomy bag and L foot dressing intact and external cath donned, alert and Ox4. Daughter present. Pt and daughter were educated NWB to LLE prior to P.T. intervention and c verbal understanding.

## 2019-03-01 NOTE — PHYSICAL THERAPY INITIAL EVALUATION ADULT - TRANSFER SAFETY CONCERNS NOTED: BED/CHAIR, REHAB EVAL
inability to maintain weight-bearing restrictions w/o assist/decreased weight-shifting ability/decreased balance during turns/decreased sequencing ability

## 2019-03-01 NOTE — PHYSICAL THERAPY INITIAL EVALUATION ADULT - IMPAIRED TRANSFERS: SIT/STAND, REHAB EVAL
impaired postural control/decreased strength/impaired balance/impaired coordination/decreased flexibility/pain

## 2019-03-01 NOTE — PHYSICAL THERAPY INITIAL EVALUATION ADULT - MANUAL MUSCLE TESTING RESULTS, REHAB EVAL
grossly 3+/5 throughout except L ankle 3/5 ; No resistance was applied to L ankle due to s/p surgery.

## 2019-03-01 NOTE — PROGRESS NOTE ADULT - SUBJECTIVE AND OBJECTIVE BOX
Patient is a 85y old  Female who presents with a chief complaint of OM, will need surgery with Dr. Beasley (01 Mar 2019 13:52)      INTERVAL HPI / OVERNIGHT EVENTS:    MEDICATIONS  (STANDING):  allopurinol 100 milliGRAM(s) Oral two times a day  colchicine 0.6 milliGRAM(s) Oral daily  collagenase Ointment 1 Application(s) Topical daily  ferrous    sulfate 325 milliGRAM(s) Oral daily  levothyroxine 50 MICROGram(s) Oral daily  metoprolol succinate ER 25 milliGRAM(s) Oral daily  multivitamin 1 Tablet(s) Oral daily  pantoprazole    Tablet 40 milliGRAM(s) Oral before breakfast  vancomycin  IVPB 750 milliGRAM(s) IV Intermittent every 12 hours    MEDICATIONS  (PRN):  acetaminophen   Tablet .. 650 milliGRAM(s) Oral every 6 hours PRN Temp greater or equal to 38C (100.4F), Mild Pain (1 - 3)  acetaminophen   Tablet .. 650 milliGRAM(s) Oral every 6 hours PRN Temp greater or equal to 38C (100.4F), Mild Pain (1 - 3)  ibuprofen  Tablet. 600 milliGRAM(s) Oral every 6 hours PRN Moderate Pain (4 - 6)  morphine  - Injectable 2 milliGRAM(s) IV Push every 4 hours PRN Severe Pain (7 - 10)  oxyCODONE    5 mG/acetaminophen 325 mG 1 Tablet(s) Oral every 4 hours PRN Moderate Pain (4 - 6)      Vital Signs Last 24 Hrs  T(C): 36.4 (01 Mar 2019 13:05), Max: 37.1 (01 Mar 2019 05:31)  T(F): 97.6 (01 Mar 2019 13:05), Max: 98.7 (01 Mar 2019 05:31)  HR: 89 (01 Mar 2019 13:05) (82 - 100)  BP: 120/72 (01 Mar 2019 13:05) (99/60 - 173/69)  BP(mean): --  RR: 17 (01 Mar 2019 13:05) (17 - 18)  SpO2: 97% (01 Mar 2019 13:05) (96% - 98%)    Review of systems:  General : no fever /chills,fatigue  CVS : no chest pain, palpitations  Lungs : no shortness of breath, cough  GI : no abdominal pain,vomiting, diarrhea   : no dysuria,hematuria        PHYSICAL EXAM:  General :NAD  Constitutional:  well-groomed, well-developed  Respiratory: CTAB/L  Cardiovascular: S1 and S2, RRR, no M/G/R  Gastrointestinal: BS+, soft, NT/ND  Extremities: No peripheral edema  Vascular: 2+ peripheral pulses  Skin: No rashes      LABS:                        9.9    10.61 )-----------( 164      ( 01 Mar 2019 11:51 )             30.2     03-01    137  |  103  |  9   ----------------------------<  128<H>  3.7   |  24  |  0.68    Ca    8.6      01 Mar 2019 11:51  Mg     1.6     02-28            MICROBIOLOGY:  RECENT CULTURES:  02-28 .Tissue left foot bone clear margin first ray c/s XXXX   No polymorphonuclear cells seen per low power field  No organisms seen per oil power field   No growth    02-26 .Blood XXXX XXXX   No growth to date.          RADIOLOGY & ADDITIONAL STUDIES: Patient is a 85y old  Female who presents with a chief complaint of OM, will need surgery with Dr. Beasley (01 Mar 2019 13:52)      INTERVAL HPI / OVERNIGHT EVENTS: POD 2 ,c/o pain in foot    MEDICATIONS  (STANDING):  allopurinol 100 milliGRAM(s) Oral two times a day  colchicine 0.6 milliGRAM(s) Oral daily  collagenase Ointment 1 Application(s) Topical daily  ferrous    sulfate 325 milliGRAM(s) Oral daily  levothyroxine 50 MICROGram(s) Oral daily  metoprolol succinate ER 25 milliGRAM(s) Oral daily  multivitamin 1 Tablet(s) Oral daily  pantoprazole    Tablet 40 milliGRAM(s) Oral before breakfast  vancomycin  IVPB 750 milliGRAM(s) IV Intermittent every 12 hours    MEDICATIONS  (PRN):  acetaminophen   Tablet .. 650 milliGRAM(s) Oral every 6 hours PRN Temp greater or equal to 38C (100.4F), Mild Pain (1 - 3)  acetaminophen   Tablet .. 650 milliGRAM(s) Oral every 6 hours PRN Temp greater or equal to 38C (100.4F), Mild Pain (1 - 3)  ibuprofen  Tablet. 600 milliGRAM(s) Oral every 6 hours PRN Moderate Pain (4 - 6)  morphine  - Injectable 2 milliGRAM(s) IV Push every 4 hours PRN Severe Pain (7 - 10)  oxyCODONE    5 mG/acetaminophen 325 mG 1 Tablet(s) Oral every 4 hours PRN Moderate Pain (4 - 6)      Vital Signs Last 24 Hrs  T(C): 36.4 (01 Mar 2019 13:05), Max: 37.1 (01 Mar 2019 05:31)  T(F): 97.6 (01 Mar 2019 13:05), Max: 98.7 (01 Mar 2019 05:31)  HR: 89 (01 Mar 2019 13:05) (82 - 100)  BP: 120/72 (01 Mar 2019 13:05) (99/60 - 173/69)  BP(mean): --  RR: 17 (01 Mar 2019 13:05) (17 - 18)  SpO2: 97% (01 Mar 2019 13:05) (96% - 98%)    Review of systems:  General : no fever /chills,fatigue  CVS : no chest pain, palpitations  Lungs : no shortness of breath, cough  GI : no abdominal pain,vomiting, diarrhea   : no dysuria,hematuria        PHYSICAL EXAM:  General :NAD  Constitutional:  well-groomed, well-developed  Respiratory: CTAB/L  Cardiovascular: S1 and S2, RRR, no M/G/R  Gastrointestinal: BS+, soft, NT/ND  Extremities: No peripheral edema  Vascular: 2+ peripheral pulses  Skin: foot s/p ist toe resection      LABS:                        9.9    10.61 )-----------( 164      ( 01 Mar 2019 11:51 )             30.2     03-01    137  |  103  |  9   ----------------------------<  128<H>  3.7   |  24  |  0.68    Ca    8.6      01 Mar 2019 11:51  Mg     1.6     02-28            MICROBIOLOGY:  RECENT CULTURES:  02-28 .Tissue left foot bone clear margin first ray c/s XXXX   No polymorphonuclear cells seen per low power field  No organisms seen per oil power field   No growth    02-26 .Blood XXXX XXXX   No growth to date.          RADIOLOGY & ADDITIONAL STUDIES:

## 2019-03-01 NOTE — PHYSICAL THERAPY INITIAL EVALUATION ADULT - IMPAIRMENTS FOUND, PT EVAL
gait, locomotion, and balance/aerobic capacity/endurance/ergonomics and body mechanics/posture/muscle strength

## 2019-03-01 NOTE — PHYSICAL THERAPY INITIAL EVALUATION ADULT - CRITERIA FOR SKILLED THERAPEUTIC INTERVENTIONS
anticipated discharge recommendation/functional limitations in following categories/risk reduction/prevention/rehab potential/predicted duration of therapy intervention/impairments found

## 2019-03-01 NOTE — PROGRESS NOTE ADULT - ASSESSMENT
85 year old woman with PMH HTN and hypothyroidism presents to ED sent in by podiatrist for OM confirmed as outpatient.    Pt is now s/p POD 2 for 1st ray amputation awaiting final bone cx.  remains afebrile   Likely dc on Monday to [place]

## 2019-03-01 NOTE — PROGRESS NOTE ADULT - SUBJECTIVE AND OBJECTIVE BOX
Patient is a 85y old  Female who presents with a chief complaint of OM, will need surgery with Dr. Beasley (25 Feb 2019 21:58)      INTERVAL HPI/OVERNIGHT EVENTS: none. pain controlled     MEDICATIONS  (STANDING):  allopurinol 100 milliGRAM(s) Oral two times a day  colchicine 0.6 milliGRAM(s) Oral daily  collagenase Ointment 1 Application(s) Topical daily  ferrous    sulfate 325 milliGRAM(s) Oral daily  levothyroxine 50 MICROGram(s) Oral daily  metoprolol succinate ER 25 milliGRAM(s) Oral daily  multivitamin 1 Tablet(s) Oral daily  pantoprazole    Tablet 40 milliGRAM(s) Oral before breakfast  vancomycin  IVPB 750 milliGRAM(s) IV Intermittent every 12 hours    MEDICATIONS  (PRN):  acetaminophen   Tablet .. 650 milliGRAM(s) Oral every 6 hours PRN Temp greater or equal to 38C (100.4F), Mild Pain (1 - 3)  acetaminophen   Tablet .. 650 milliGRAM(s) Oral every 6 hours PRN Temp greater or equal to 38C (100.4F), Mild Pain (1 - 3)  ibuprofen  Tablet. 600 milliGRAM(s) Oral every 6 hours PRN Moderate Pain (4 - 6)  morphine  - Injectable 2 milliGRAM(s) IV Push every 4 hours PRN Severe Pain (7 - 10)  oxyCODONE    5 mG/acetaminophen 325 mG 1 Tablet(s) Oral every 4 hours PRN Moderate Pain (4 - 6)          Allergies    iodine (Other; Anaphylaxis)  shellfish (Anaphylaxis)  sulfa drugs (Other)    Intolerances      Vital Signs Last 24 Hrs  T(C): 37.1 (01 Mar 2019 05:31), Max: 37.1 (01 Mar 2019 05:31)  T(F): 98.7 (01 Mar 2019 05:31), Max: 98.7 (01 Mar 2019 05:31)  HR: 100 (01 Mar 2019 08:44) (82 - 100)  BP: 133/87 (01 Mar 2019 08:44) (99/60 - 173/69)  BP(mean): --  RR: 18 (01 Mar 2019 08:44) (17 - 18)  SpO2: 97% (01 Mar 2019 08:44) (96% - 98%)      PHYSICAL EXAM:  GENERAL: NAD, well-groomed, well-developed  HEAD:  Atraumatic, Normocephalic  EYES: EOMI, PERRLA, conjunctiva and sclera clear  ENMT: No tonsillar erythema, exudates, or enlargement; Moist mucous membranes, Good dentition, No lesions  NECK: Supple, No JVD, Normal thyroid  NERVOUS SYSTEM:  Alert & Oriented X3, Good concentration; Motor Strength 5/5 B/L upper and lower extremities; DTRs 2+ intact and symmetric  CHEST/LUNG: Clear to percussion bilaterally; No rales, rhonchi, wheezing, or rubs  HEART: Regular rate and rhythm; No murmurs, rubs, or gallops  ABDOMEN: Soft, Nontender, Nondistended; Bowel sounds present    LABS:                                      9.9    10.61 )-----------( 164      ( 01 Mar 2019 11:51 )             30.2     03-01    137  |  103  |  9   ----------------------------<  128<H>  3.7   |  24  |  0.68    Ca    8.6      01 Mar 2019 11:51  Mg     1.6     02-28                    RADIOLOGY & ADDITIONAL TESTS:    Imaging Personally Reviewed:  [ x] YES  [ ] NO    Consultant(s) Notes Reviewed:  [x ] YES  [ ] NO    Care Discussed with Consultants/Other Providers [ ] YES  [ ] NO

## 2019-03-01 NOTE — PHYSICAL THERAPY INITIAL EVALUATION ADULT - TRANSFER SAFETY CONCERNS NOTED: SIT/STAND, REHAB EVAL
decreased sequencing ability/inability to maintain weight-bearing restrictions w/o assist/decreased weight-shifting ability/decreased balance during turns

## 2019-03-01 NOTE — PHYSICAL THERAPY INITIAL EVALUATION ADULT - ADDITIONAL COMMENTS
Pt has 2 steps c rail to get into house and no stairs to negotiate at home. Pt ambulated c a [place] c cg from home health aide prior to admission.

## 2019-03-02 LAB
ANION GAP SERPL CALC-SCNC: 9 MMOL/L — SIGNIFICANT CHANGE UP (ref 5–17)
BASOPHILS # BLD AUTO: 0.02 K/UL — SIGNIFICANT CHANGE UP (ref 0–0.2)
BASOPHILS NFR BLD AUTO: 0.2 % — SIGNIFICANT CHANGE UP (ref 0–2)
BUN SERPL-MCNC: 9 MG/DL — SIGNIFICANT CHANGE UP (ref 7–23)
CALCIUM SERPL-MCNC: 9.1 MG/DL — SIGNIFICANT CHANGE UP (ref 8.5–10.1)
CHLORIDE SERPL-SCNC: 105 MMOL/L — SIGNIFICANT CHANGE UP (ref 96–108)
CO2 SERPL-SCNC: 25 MMOL/L — SIGNIFICANT CHANGE UP (ref 22–31)
CREAT SERPL-MCNC: 0.79 MG/DL — SIGNIFICANT CHANGE UP (ref 0.5–1.3)
EOSINOPHIL # BLD AUTO: 0.13 K/UL — SIGNIFICANT CHANGE UP (ref 0–0.5)
EOSINOPHIL NFR BLD AUTO: 1.5 % — SIGNIFICANT CHANGE UP (ref 0–6)
GLUCOSE SERPL-MCNC: 116 MG/DL — HIGH (ref 70–99)
HCT VFR BLD CALC: 32.1 % — LOW (ref 34.5–45)
HGB BLD-MCNC: 10.3 G/DL — LOW (ref 11.5–15.5)
IMM GRANULOCYTES NFR BLD AUTO: 0.4 % — SIGNIFICANT CHANGE UP (ref 0–1.5)
LYMPHOCYTES # BLD AUTO: 2.29 K/UL — SIGNIFICANT CHANGE UP (ref 1–3.3)
LYMPHOCYTES # BLD AUTO: 26.8 % — SIGNIFICANT CHANGE UP (ref 13–44)
MCHC RBC-ENTMCNC: 29.7 PG — SIGNIFICANT CHANGE UP (ref 27–34)
MCHC RBC-ENTMCNC: 32.1 GM/DL — SIGNIFICANT CHANGE UP (ref 32–36)
MCV RBC AUTO: 92.5 FL — SIGNIFICANT CHANGE UP (ref 80–100)
MONOCYTES # BLD AUTO: 0.81 K/UL — SIGNIFICANT CHANGE UP (ref 0–0.9)
MONOCYTES NFR BLD AUTO: 9.5 % — SIGNIFICANT CHANGE UP (ref 2–14)
NEUTROPHILS # BLD AUTO: 5.28 K/UL — SIGNIFICANT CHANGE UP (ref 1.8–7.4)
NEUTROPHILS NFR BLD AUTO: 61.6 % — SIGNIFICANT CHANGE UP (ref 43–77)
NRBC # BLD: 0 /100 WBCS — SIGNIFICANT CHANGE UP (ref 0–0)
PLATELET # BLD AUTO: 166 K/UL — SIGNIFICANT CHANGE UP (ref 150–400)
POTASSIUM SERPL-MCNC: 3.4 MMOL/L — LOW (ref 3.5–5.3)
POTASSIUM SERPL-SCNC: 3.4 MMOL/L — LOW (ref 3.5–5.3)
RBC # BLD: 3.47 M/UL — LOW (ref 3.8–5.2)
RBC # FLD: 15 % — HIGH (ref 10.3–14.5)
SODIUM SERPL-SCNC: 139 MMOL/L — SIGNIFICANT CHANGE UP (ref 135–145)
WBC # BLD: 8.56 K/UL — SIGNIFICANT CHANGE UP (ref 3.8–10.5)
WBC # FLD AUTO: 8.56 K/UL — SIGNIFICANT CHANGE UP (ref 3.8–10.5)

## 2019-03-02 PROCEDURE — 99232 SBSQ HOSP IP/OBS MODERATE 35: CPT

## 2019-03-02 RX ORDER — POTASSIUM CHLORIDE 20 MEQ
40 PACKET (EA) ORAL ONCE
Qty: 0 | Refills: 0 | Status: COMPLETED | OUTPATIENT
Start: 2019-03-02 | End: 2019-03-02

## 2019-03-02 RX ADMIN — Medication 100 MILLIGRAM(S): at 17:38

## 2019-03-02 RX ADMIN — Medication 250 MILLIGRAM(S): at 01:43

## 2019-03-02 RX ADMIN — Medication 250 MILLIGRAM(S): at 14:12

## 2019-03-02 RX ADMIN — Medication 1 APPLICATION(S): at 11:52

## 2019-03-02 RX ADMIN — Medication 25 MILLIGRAM(S): at 05:53

## 2019-03-02 RX ADMIN — Medication 600 MILLIGRAM(S): at 15:18

## 2019-03-02 RX ADMIN — Medication 100 MILLIGRAM(S): at 05:53

## 2019-03-02 RX ADMIN — Medication 50 MICROGRAM(S): at 05:53

## 2019-03-02 RX ADMIN — Medication 40 MILLIEQUIVALENT(S): at 11:52

## 2019-03-02 RX ADMIN — Medication 1 TABLET(S): at 11:52

## 2019-03-02 RX ADMIN — Medication 600 MILLIGRAM(S): at 16:00

## 2019-03-02 RX ADMIN — Medication 325 MILLIGRAM(S): at 11:52

## 2019-03-02 RX ADMIN — PANTOPRAZOLE SODIUM 40 MILLIGRAM(S): 20 TABLET, DELAYED RELEASE ORAL at 05:53

## 2019-03-02 RX ADMIN — Medication 0.6 MILLIGRAM(S): at 11:52

## 2019-03-02 NOTE — PROGRESS NOTE ADULT - ASSESSMENT
86 y/o female s/p left foot partial 1st ray amp (DOS 2/27/19  - Pt seen and evaluated, POD #3  - Sutures intact, skin well-coapted, no signs of infection  - No hematoma, drain pulled  - Some bleeding from incisiion site, but no hematoma  - Offload heels w/ Z flow boots in bed at all times  - L foot dressed w/ DSD to incision site and santyl to posterior heel  - Continue IV abx  - Discussed w/ attending

## 2019-03-02 NOTE — PROGRESS NOTE ADULT - SUBJECTIVE AND OBJECTIVE BOX
Patient is a 85y old  Female who presents with a chief complaint of OM, will need surgery with Dr. Beasley (25 Feb 2019 21:58)      INTERVAL HPI/OVERNIGHT EVENTS: none. pain controlled     MEDICATIONS  (STANDING):  allopurinol 100 milliGRAM(s) Oral two times a day  colchicine 0.6 milliGRAM(s) Oral daily  collagenase Ointment 1 Application(s) Topical daily  ferrous    sulfate 325 milliGRAM(s) Oral daily  levothyroxine 50 MICROGram(s) Oral daily  metoprolol succinate ER 25 milliGRAM(s) Oral daily  multivitamin 1 Tablet(s) Oral daily  pantoprazole    Tablet 40 milliGRAM(s) Oral before breakfast  vancomycin  IVPB 750 milliGRAM(s) IV Intermittent every 12 hours    MEDICATIONS  (PRN):  acetaminophen   Tablet .. 650 milliGRAM(s) Oral every 6 hours PRN Temp greater or equal to 38C (100.4F), Mild Pain (1 - 3)  acetaminophen   Tablet .. 650 milliGRAM(s) Oral every 6 hours PRN Temp greater or equal to 38C (100.4F), Mild Pain (1 - 3)  ibuprofen  Tablet. 600 milliGRAM(s) Oral every 6 hours PRN Moderate Pain (4 - 6)  morphine  - Injectable 2 milliGRAM(s) IV Push every 4 hours PRN Severe Pain (7 - 10)  oxyCODONE    5 mG/acetaminophen 325 mG 1 Tablet(s) Oral every 4 hours PRN Moderate Pain (4 - 6)          Allergies    iodine (Other; Anaphylaxis)  shellfish (Anaphylaxis)  sulfa drugs (Other)    Intolerances      Vital Signs Last 24 Hrs  T(C): 36.8 (02 Mar 2019 05:17), Max: 37.4 (01 Mar 2019 23:07)  T(F): 98.2 (02 Mar 2019 05:17), Max: 99.4 (01 Mar 2019 23:07)  HR: 89 (02 Mar 2019 05:17) (80 - 94)  BP: 127/70 (02 Mar 2019 05:17) (120/72 - 138/81)  BP(mean): --  RR: 18 (02 Mar 2019 05:17) (17 - 18)  SpO2: 97% (02 Mar 2019 05:17) (94% - 97%)    PHYSICAL EXAM:  GENERAL: NAD, well-groomed, well-developed  HEAD:  Atraumatic, Normocephalic  EYES: EOMI, PERRLA, conjunctiva and sclera clear  ENMT: No tonsillar erythema, exudates, or enlargement; Moist mucous membranes, Good dentition, No lesions  NECK: Supple, No JVD, Normal thyroid  NERVOUS SYSTEM:  Alert & Oriented X3, Good concentration; Motor Strength 5/5 B/L upper and lower extremities; DTRs 2+ intact and symmetric  CHEST/LUNG: Clear to percussion bilaterally; No rales, rhonchi, wheezing, or rubs  HEART: Regular rate and rhythm; No murmurs, rubs, or gallops  ABDOMEN: Soft, Nontender, Nondistended; Bowel sounds present    LABS:                                        10.3   8.56  )-----------( 166      ( 02 Mar 2019 08:04 )             32.1     03-02    139  |  105  |  9   ----------------------------<  116<H>  3.4<L>   |  25  |  0.79    Ca    9.1      02 Mar 2019 08:04                        RADIOLOGY & ADDITIONAL TESTS:    Imaging Personally Reviewed:  [ x] YES  [ ] NO    Consultant(s) Notes Reviewed:  [x ] YES  [ ] NO    Care Discussed with Consultants/Other Providers [ ] YES  [ ] NO

## 2019-03-02 NOTE — PROGRESS NOTE ADULT - SUBJECTIVE AND OBJECTIVE BOX
Patient is a 85y old  Female who presents with a chief complaint of OM, will need surgery with Dr. Beasley (02 Mar 2019 10:52)       INTERVAL HPI/OVERNIGHT EVENTS:  Patient seen and evaluated at bedside.  Pt is resting comfortable in NAD. Denies N/V/F/C.      Allergies    iodine (Other; Anaphylaxis)  shellfish (Anaphylaxis)  sulfa drugs (Other)    Intolerances        Vital Signs Last 24 Hrs  T(C): 37 (02 Mar 2019 12:02), Max: 37.4 (01 Mar 2019 23:07)  T(F): 98.6 (02 Mar 2019 12:02), Max: 99.4 (01 Mar 2019 23:07)  HR: 93 (02 Mar 2019 12:02) (80 - 94)  BP: 114/64 (02 Mar 2019 12:02) (114/64 - 138/81)  BP(mean): --  RR: 18 (02 Mar 2019 12:02) (17 - 18)  SpO2: 97% (02 Mar 2019 12:02) (94% - 97%)    LABS:                        10.3   8.56  )-----------( 166      ( 02 Mar 2019 08:04 )             32.1     03-02    139  |  105  |  9   ----------------------------<  116<H>  3.4<L>   |  25  |  0.79    Ca    9.1      02 Mar 2019 08:04          CAPILLARY BLOOD GLUCOSE          Lower Extremity Physical Exam:  s/p left foot partial 1st ray amputation - closed, no signs of infection present, skin well coapted sutures intact, drain intact  eschars at the posterior aspect of the heel bilaterally with no signs of infection

## 2019-03-02 NOTE — PROGRESS NOTE ADULT - ASSESSMENT
85 year old woman with PMH HTN and hypothyroidism presents to ED sent in by podiatrist for OM confirmed as outpatient.    Pt is now s/p POD 3  for 1st ray amputation. bone cx ngtd .  remains afebrile   Likely dc on Monday to [place]

## 2019-03-03 PROCEDURE — 99232 SBSQ HOSP IP/OBS MODERATE 35: CPT

## 2019-03-03 RX ADMIN — PANTOPRAZOLE SODIUM 40 MILLIGRAM(S): 20 TABLET, DELAYED RELEASE ORAL at 08:27

## 2019-03-03 RX ADMIN — Medication 250 MILLIGRAM(S): at 01:50

## 2019-03-03 RX ADMIN — Medication 1 APPLICATION(S): at 12:36

## 2019-03-03 RX ADMIN — Medication 325 MILLIGRAM(S): at 12:36

## 2019-03-03 RX ADMIN — Medication 250 MILLIGRAM(S): at 14:40

## 2019-03-03 RX ADMIN — Medication 1 TABLET(S): at 12:36

## 2019-03-03 RX ADMIN — Medication 50 MICROGRAM(S): at 06:05

## 2019-03-03 RX ADMIN — Medication 0.6 MILLIGRAM(S): at 12:36

## 2019-03-03 RX ADMIN — Medication 25 MILLIGRAM(S): at 06:04

## 2019-03-03 RX ADMIN — Medication 100 MILLIGRAM(S): at 06:03

## 2019-03-03 RX ADMIN — Medication 100 MILLIGRAM(S): at 18:28

## 2019-03-03 NOTE — PROGRESS NOTE ADULT - SUBJECTIVE AND OBJECTIVE BOX
Patient is a 85y old  Female who presents with a chief complaint of OM, will need surgery with Dr. Beasley (02 Mar 2019 12:35)       INTERVAL HPI/OVERNIGHT EVENTS:  Patient seen and evaluated at bedside.  Pt is resting comfortable in NAD. Denies N/V/F/C.      Allergies    iodine (Other; Anaphylaxis)  shellfish (Anaphylaxis)  sulfa drugs (Other)    Intolerances        Vital Signs Last 24 Hrs  T(C): 37.1 (03 Mar 2019 05:27), Max: 37.1 (02 Mar 2019 17:25)  T(F): 98.8 (03 Mar 2019 05:27), Max: 98.8 (02 Mar 2019 17:25)  HR: 82 (03 Mar 2019 05:27) (80 - 84)  BP: 131/84 (03 Mar 2019 05:27) (128/68 - 140/66)  BP(mean): --  RR: 18 (03 Mar 2019 05:27) (18 - 18)  SpO2: 98% (03 Mar 2019 05:27) (95% - 98%)    LABS:                        10.3   8.56  )-----------( 166      ( 02 Mar 2019 08:04 )             32.1     03-02    139  |  105  |  9   ----------------------------<  116<H>  3.4<L>   |  25  |  0.79    Ca    9.1      02 Mar 2019 08:04          CAPILLARY BLOOD GLUCOSE          Lower Extremity Physical Exam:  s/p left foot partial 1st ray amputation - closed, no signs of infection present, skin well coapted sutures intact, drain intact  eschars at the posterior aspect of the heel bilaterally with no signs of infection    RADIOLOGY & ADDITIONAL TESTS:

## 2019-03-03 NOTE — PROGRESS NOTE ADULT - ASSESSMENT
85 year old woman with PMH HTN and hypothyroidism presents to ED sent in by podiatrist for OM confirmed as outpatient.    Pt is now s/p POD 4  for 1st ray amputation. bone cx ngtd .  remains afebrile. Will f/u tomorrow with ID and podiatry regarding antibiotics   Likely dc on Monday to [facility]. family wishing for [facility]     For discharge  Change bilateral dressings daily. right foot to have santyl applied to heel with DSD. Left foot DSD to incision site, with santyl to left heel and covered w/ DSD.   Pt can weight bear as tolerated to left foot w/ post op shoe  Follow up w/ Dr. Beasley at the wound care center, call [phone] to schedule an appt

## 2019-03-03 NOTE — PROGRESS NOTE ADULT - SUBJECTIVE AND OBJECTIVE BOX
Patient is a 85y old  Female who presents with a chief complaint of OM, will need surgery with Dr. Beasley (25 Feb 2019 21:58)      INTERVAL HPI/OVERNIGHT EVENTS: none. pain controlled     MEDICATIONS  (STANDING):  allopurinol 100 milliGRAM(s) Oral two times a day  colchicine 0.6 milliGRAM(s) Oral daily  collagenase Ointment 1 Application(s) Topical daily  ferrous    sulfate 325 milliGRAM(s) Oral daily  levothyroxine 50 MICROGram(s) Oral daily  metoprolol succinate ER 25 milliGRAM(s) Oral daily  multivitamin 1 Tablet(s) Oral daily  pantoprazole    Tablet 40 milliGRAM(s) Oral before breakfast  vancomycin  IVPB 750 milliGRAM(s) IV Intermittent every 12 hours    MEDICATIONS  (PRN):  acetaminophen   Tablet .. 650 milliGRAM(s) Oral every 6 hours PRN Temp greater or equal to 38C (100.4F), Mild Pain (1 - 3)  acetaminophen   Tablet .. 650 milliGRAM(s) Oral every 6 hours PRN Temp greater or equal to 38C (100.4F), Mild Pain (1 - 3)  ibuprofen  Tablet. 600 milliGRAM(s) Oral every 6 hours PRN Moderate Pain (4 - 6)  morphine  - Injectable 2 milliGRAM(s) IV Push every 4 hours PRN Severe Pain (7 - 10)  oxyCODONE    5 mG/acetaminophen 325 mG 1 Tablet(s) Oral every 4 hours PRN Moderate Pain (4 - 6)            Allergies    iodine (Other; Anaphylaxis)  shellfish (Anaphylaxis)  sulfa drugs (Other)    Intolerances      Vital Signs Last 24 Hrs  T(C): 37.1 (03 Mar 2019 05:27), Max: 37.1 (02 Mar 2019 17:25)  T(F): 98.8 (03 Mar 2019 05:27), Max: 98.8 (02 Mar 2019 17:25)  HR: 82 (03 Mar 2019 05:27) (80 - 84)  BP: 131/84 (03 Mar 2019 05:27) (128/68 - 140/66)  BP(mean): --  RR: 18 (03 Mar 2019 05:27) (18 - 18)  SpO2: 98% (03 Mar 2019 05:27) (95% - 98%)    PHYSICAL EXAM:  GENERAL: NAD, well-groomed, well-developed  HEAD:  Atraumatic, Normocephalic  EYES: EOMI, PERRLA, conjunctiva and sclera clear  ENMT: No tonsillar erythema, exudates, or enlargement; Moist mucous membranes, Good dentition, No lesions  NECK: Supple, No JVD, Normal thyroid  NERVOUS SYSTEM:  Alert & Oriented X3, Good concentration; Motor Strength 5/5 B/L upper and lower extremities; DTRs 2+ intact and symmetric  CHEST/LUNG: Clear to percussion bilaterally; No rales, rhonchi, wheezing, or rubs  HEART: Regular rate and rhythm; No murmurs, rubs, or gallops  ABDOMEN: Soft, Nontender, Nondistended; Bowel sounds present    LABS:                                        10.3   8.56  )-----------( 166      ( 02 Mar 2019 08:04 )             32.1     03-02    139  |  105  |  9   ----------------------------<  116<H>  3.4<L>   |  25  |  0.79    Ca    9.1      02 Mar 2019 08:04                        RADIOLOGY & ADDITIONAL TESTS:    Imaging Personally Reviewed:  [ x] YES  [ ] NO    Consultant(s) Notes Reviewed:  [x ] YES  [ ] NO    Care Discussed with Consultants/Other Providers [ ] YES  [ ] NO

## 2019-03-03 NOTE — PROGRESS NOTE ADULT - NSHPATTENDINGPLANDISCUSS_GEN_ALL_CORE
ID
Patient ID and daughter and primary
pt, daughters

## 2019-03-03 NOTE — PROGRESS NOTE ADULT - ASSESSMENT
84 y/o female s/p left foot partial 1st ray amp (DOS 2/27/19  - Pt seen and evaluated, POD #4  - Sutures intact, skin well-coapted, no signs of infection  - Minimal bleeding today  - Offload heels w/ Z flow boots in bed at all times  - L foot dressed w/ DSD to incision site and santyl to posterior heel  - Please continue daily right foot dressing changes  - Pt can weight bear to the left heel w/ Darco post op shoe as tolerated  - Likely d/c to rehab tomorrow  - Continue IV abx  - Discussed w/ attending 86 y/o female s/p left foot partial 1st ray amp (DOS 2/27/19  - Pt seen and evaluated, POD #4  - Sutures intact, skin well-coapted, no signs of infection  - Minimal bleeding today  - Offload heels w/ Z flow boots in bed at all times  - L foot dressed w/ DSD to incision site and santyl to posterior heel  - Please continue daily right foot dressing changes  - Pt can weight bear to the left heel w/ Darco post op shoe as tolerated  - Likely d/c to rehab tomorrow  - Continue IV abx  - Discussed w/ attending    For discharge  Change bilateral dressings daily. right foot to have santyl applied to heel with DSD. Left foot DSD to incision site, with santyl to left heel and covered w/ DSD.   Pt can weight bear as tolerated to left foot w/ post op shoe  Follow up w/ Dr. Beasley at the wound care center, call [phone] to schedule an appt

## 2019-03-04 ENCOUNTER — TRANSCRIPTION ENCOUNTER (OUTPATIENT)
Age: 84
End: 2019-03-04

## 2019-03-04 DIAGNOSIS — N17.9 ACUTE KIDNEY FAILURE, UNSPECIFIED: ICD-10-CM

## 2019-03-04 DIAGNOSIS — L97.411 NON-PRESSURE CHRONIC ULCER OF RIGHT HEEL AND MIDFOOT LIMITED TO BREAKDOWN OF SKIN: ICD-10-CM

## 2019-03-04 LAB
ANION GAP SERPL CALC-SCNC: 7 MMOL/L — SIGNIFICANT CHANGE UP (ref 5–17)
BUN SERPL-MCNC: 25 MG/DL — HIGH (ref 7–23)
CALCIUM SERPL-MCNC: 8.9 MG/DL — SIGNIFICANT CHANGE UP (ref 8.5–10.1)
CHLORIDE SERPL-SCNC: 105 MMOL/L — SIGNIFICANT CHANGE UP (ref 96–108)
CO2 SERPL-SCNC: 27 MMOL/L — SIGNIFICANT CHANGE UP (ref 22–31)
CREAT SERPL-MCNC: 1.79 MG/DL — HIGH (ref 0.5–1.3)
GLUCOSE SERPL-MCNC: 110 MG/DL — HIGH (ref 70–99)
HCT VFR BLD CALC: 30 % — LOW (ref 34.5–45)
HGB BLD-MCNC: 9.6 G/DL — LOW (ref 11.5–15.5)
MCHC RBC-ENTMCNC: 29.6 PG — SIGNIFICANT CHANGE UP (ref 27–34)
MCHC RBC-ENTMCNC: 32 GM/DL — SIGNIFICANT CHANGE UP (ref 32–36)
MCV RBC AUTO: 92.6 FL — SIGNIFICANT CHANGE UP (ref 80–100)
NRBC # BLD: 0 /100 WBCS — SIGNIFICANT CHANGE UP (ref 0–0)
PLATELET # BLD AUTO: 206 K/UL — SIGNIFICANT CHANGE UP (ref 150–400)
POTASSIUM SERPL-MCNC: 4.1 MMOL/L — SIGNIFICANT CHANGE UP (ref 3.5–5.3)
POTASSIUM SERPL-SCNC: 4.1 MMOL/L — SIGNIFICANT CHANGE UP (ref 3.5–5.3)
RBC # BLD: 3.24 M/UL — LOW (ref 3.8–5.2)
RBC # FLD: 14.7 % — HIGH (ref 10.3–14.5)
SODIUM SERPL-SCNC: 139 MMOL/L — SIGNIFICANT CHANGE UP (ref 135–145)
SURGICAL PATHOLOGY STUDY: SIGNIFICANT CHANGE UP
VANCOMYCIN FLD-MCNC: 27.7 UG/ML
WBC # BLD: 7.02 K/UL — SIGNIFICANT CHANGE UP (ref 3.8–10.5)
WBC # FLD AUTO: 7.02 K/UL — SIGNIFICANT CHANGE UP (ref 3.8–10.5)

## 2019-03-04 PROCEDURE — 99233 SBSQ HOSP IP/OBS HIGH 50: CPT

## 2019-03-04 RX ORDER — HEPARIN SODIUM 5000 [USP'U]/ML
5000 INJECTION INTRAVENOUS; SUBCUTANEOUS EVERY 12 HOURS
Qty: 0 | Refills: 0 | Status: DISCONTINUED | OUTPATIENT
Start: 2019-03-04 | End: 2019-03-06

## 2019-03-04 RX ORDER — SODIUM CHLORIDE 9 MG/ML
1000 INJECTION, SOLUTION INTRAVENOUS
Qty: 0 | Refills: 0 | Status: DISCONTINUED | OUTPATIENT
Start: 2019-03-04 | End: 2019-03-05

## 2019-03-04 RX ORDER — SODIUM CHLORIDE 9 MG/ML
1000 INJECTION INTRAMUSCULAR; INTRAVENOUS; SUBCUTANEOUS ONCE
Qty: 0 | Refills: 0 | Status: COMPLETED | OUTPATIENT
Start: 2019-03-04 | End: 2019-03-04

## 2019-03-04 RX ADMIN — Medication 1 APPLICATION(S): at 18:14

## 2019-03-04 RX ADMIN — Medication 250 MILLIGRAM(S): at 01:08

## 2019-03-04 RX ADMIN — Medication 1 TABLET(S): at 12:31

## 2019-03-04 RX ADMIN — Medication 100 MILLIGRAM(S): at 18:14

## 2019-03-04 RX ADMIN — SODIUM CHLORIDE 1000 MILLILITER(S): 9 INJECTION INTRAMUSCULAR; INTRAVENOUS; SUBCUTANEOUS at 12:31

## 2019-03-04 RX ADMIN — Medication 50 MICROGRAM(S): at 06:12

## 2019-03-04 RX ADMIN — PANTOPRAZOLE SODIUM 40 MILLIGRAM(S): 20 TABLET, DELAYED RELEASE ORAL at 06:14

## 2019-03-04 RX ADMIN — Medication 100 MILLIGRAM(S): at 06:13

## 2019-03-04 RX ADMIN — SODIUM CHLORIDE 55 MILLILITER(S): 9 INJECTION, SOLUTION INTRAVENOUS at 21:33

## 2019-03-04 RX ADMIN — HEPARIN SODIUM 5000 UNIT(S): 5000 INJECTION INTRAVENOUS; SUBCUTANEOUS at 18:14

## 2019-03-04 RX ADMIN — Medication 325 MILLIGRAM(S): at 12:31

## 2019-03-04 RX ADMIN — OXYCODONE AND ACETAMINOPHEN 1 TABLET(S): 5; 325 TABLET ORAL at 22:20

## 2019-03-04 RX ADMIN — OXYCODONE AND ACETAMINOPHEN 1 TABLET(S): 5; 325 TABLET ORAL at 21:31

## 2019-03-04 RX ADMIN — Medication 25 MILLIGRAM(S): at 06:14

## 2019-03-04 NOTE — PROGRESS NOTE ADULT - PROBLEM SELECTOR PLAN 1
Bone cx NGTD  Analgesia PRN  Vancomycin for   , discussed with ID likely sec to Colchicine and vanc, give 1L IVF, follow BMP   patient is not asking for ibuprofen, will d/c

## 2019-03-04 NOTE — PROGRESS NOTE ADULT - SUBJECTIVE AND OBJECTIVE BOX
Patient is a 85y old  Female who presents with a chief complaint of OM, will need surgery with Dr. Beasley (04 Mar 2019 11:20)      INTERVAL HPI / OVERNIGHT EVENTS: foot pain    MEDICATIONS  (STANDING):  allopurinol 100 milliGRAM(s) Oral two times a day  collagenase Ointment 1 Application(s) Topical daily  ferrous    sulfate 325 milliGRAM(s) Oral daily  levothyroxine 50 MICROGram(s) Oral daily  metoprolol succinate ER 25 milliGRAM(s) Oral daily  multivitamin 1 Tablet(s) Oral daily  pantoprazole    Tablet 40 milliGRAM(s) Oral before breakfast    MEDICATIONS  (PRN):  acetaminophen   Tablet .. 650 milliGRAM(s) Oral every 6 hours PRN Temp greater or equal to 38C (100.4F), Mild Pain (1 - 3)  acetaminophen   Tablet .. 650 milliGRAM(s) Oral every 6 hours PRN Temp greater or equal to 38C (100.4F), Mild Pain (1 - 3)  morphine  - Injectable 2 milliGRAM(s) IV Push every 4 hours PRN Severe Pain (7 - 10)  oxyCODONE    5 mG/acetaminophen 325 mG 1 Tablet(s) Oral every 4 hours PRN Moderate Pain (4 - 6)      Vital Signs Last 24 Hrs  T(C): 37.2 (04 Mar 2019 11:19), Max: 37.2 (04 Mar 2019 11:19)  T(F): 99 (04 Mar 2019 11:19), Max: 99 (04 Mar 2019 11:19)  HR: 77 (04 Mar 2019 05:10) (77 - 83)  BP: 145/78 (04 Mar 2019 11:19) (118/57 - 145/78)  BP(mean): --  RR: 20 (04 Mar 2019 11:19) (18 - 20)  SpO2: 97% (04 Mar 2019 11:19) (96% - 98%)    Review of systems:  General : no fever /chills, fatigue  CVS : no chest pain, palpitations  Lungs : no shortness of breath, cough  GI : no abdominal pain, vomiting, diarrhea   : no dysuria, hematuria        PHYSICAL EXAM:  General :NAD  Constitutional:  well-groomed, well-developed  Respiratory: CTAB/L  Cardiovascular: S1 and S2, RRR, no M/G/R  Gastrointestinal: BS+, soft, NT/ND  Extremities: No peripheral edema  Vascular: 2+ peripheral pulses  Skin: left foot s/p great toe resection (amputation site clean )      LABS:                        9.6    7.02  )-----------( 206      ( 04 Mar 2019 07:53 )             30.0     03-04    139  |  105  |  25<H>  ----------------------------<  110<H>  4.1   |  27  |  1.79<H>    Ca    8.9      04 Mar 2019 07:53            MICROBIOLOGY:  RECENT CULTURES:  02-28 .Tissue left foot bone clear margin first ray c/s XXXX   No polymorphonuclear cells seen per low power field  No organisms seen per oil power field   No growth    02-26 .Blood XXXX XXXX   No growth at 5 days.          RADIOLOGY & ADDITIONAL STUDIES:

## 2019-03-04 NOTE — PROGRESS NOTE ADULT - ASSESSMENT
85 year old woman with PMH HTN and hypothyroidism presents to ED sent in by podiatrist for OM confirmed as outpatient.    s/p  for 1st ray amputation--DOS 2/27/19. Remains afebrile. Will f/u tomorrow with ID and podiatry regarding antibiotics     For discharge  Change bilateral dressings daily. right foot to have santyl applied to heel with DSD. Left foot DSD to incision site, with santyl to left heel and covered w/ DSD.   Pt can weight bear as tolerated to left foot w/ post op shoe  Follow up w/ Dr. Beasley at the wound care center, call [phone] to schedule an appt 85 year old woman with PMH HTN and hypothyroidism presents to ED sent in by podiatrist for OM confirmed as outpatient.    s/p  for Left 1st ray amputation--DOS 2/27/19. Remains afebrile. Will f/u tomorrow with ID and podiatry regarding antibiotics     For discharge  Change bilateral dressings daily. right foot to have santyl applied to heel with DSD. Left foot DSD to incision site, with santyl to left heel and covered w/ DSD.   Pt can weight bear as tolerated to left heel w/ post-op Darco shoe  Follow up w/ Dr. Beasley at the wound care center, call [phone] to schedule an appt    Dispo: [facility] complete. NGUYEN

## 2019-03-04 NOTE — PROGRESS NOTE ADULT - PROBLEM SELECTOR PLAN 6
heparin SQ-dvt ppx  General precautions eschars at the posterior aspect of the heel bilaterally with no signs of infection  podiatry following for wound care

## 2019-03-04 NOTE — PROGRESS NOTE ADULT - PROBLEM SELECTOR PLAN 1
s/p great toe resection   margin culture negative  d/c vanco (level up as well )  Day 5 post op   monitor levels and BMP

## 2019-03-04 NOTE — PROGRESS NOTE ADULT - ASSESSMENT
86 y/o female s/p left foot partial 1st ray amp (DOS 2/27/19  - Pt seen and evaluated, POD #5  - Sutures intact, skin well-coapted, no signs of infection  - Minimal bleeding today  - Offload heels w/ Z flow boots in bed at all times  - L foot dressed w/ DSD to incision site and santyl to posterior heel  - Please continue daily right foot dressing changes  - Pt can weight bear to the left heel w/ Darco post op shoe as tolerated  - Likely d/c to rehab today  - Continue IV abx  - Appreciate ID recs  - Seen w/ attending

## 2019-03-04 NOTE — DIETITIAN INITIAL EVALUATION ADULT. - PERTINENT MEDS FT
MEDICATIONS  (STANDING):  allopurinol 100 milliGRAM(s) Oral two times a day  colchicine 0.6 milliGRAM(s) Oral daily  collagenase Ointment 1 Application(s) Topical daily  ferrous    sulfate 325 milliGRAM(s) Oral daily  levothyroxine 50 MICROGram(s) Oral daily  metoprolol succinate ER 25 milliGRAM(s) Oral daily  multivitamin 1 Tablet(s) Oral daily  pantoprazole    Tablet 40 milliGRAM(s) Oral before breakfast  vancomycin  IVPB 750 milliGRAM(s) IV Intermittent every 12 hours    MEDICATIONS  (PRN):  acetaminophen   Tablet .. 650 milliGRAM(s) Oral every 6 hours PRN Temp greater or equal to 38C (100.4F), Mild Pain (1 - 3)  acetaminophen   Tablet .. 650 milliGRAM(s) Oral every 6 hours PRN Temp greater or equal to 38C (100.4F), Mild Pain (1 - 3)  ibuprofen  Tablet. 600 milliGRAM(s) Oral every 6 hours PRN Moderate Pain (4 - 6)  morphine  - Injectable 2 milliGRAM(s) IV Push every 4 hours PRN Severe Pain (7 - 10)  oxyCODONE    5 mG/acetaminophen 325 mG 1 Tablet(s) Oral every 4 hours PRN Moderate Pain (4 - 6)

## 2019-03-04 NOTE — DIETITIAN INITIAL EVALUATION ADULT. - PERTINENT LABORATORY DATA
03-02 Na139 mmol/L Glu 116 mg/dL<H> K+ 3.4 mmol/L<L> Cr  0.79 mg/dL BUN 9 mg/dL 02-25 Alb 3.6 g/dL 02-04 PAB 13 mg/dL<L> 02-04 [id number] 5.5 %02-25 ALT 21 U/L AST 23 U/L Alkaline Phosphatase 66 U/L

## 2019-03-04 NOTE — PROGRESS NOTE ADULT - PROBLEM SELECTOR PLAN 2
likely sec to Colchicine, give 1L IVF, follow BMP   patient is not asking for ibuprofen, will d/c Bone cx NGTD  Analgesia PRN  D/c Vancomycin today  , discussed with ID  podiatry following s/p  for Left 1st ray amputation--DOS 2/27/19. POD #5  repeat Bone cx NGTD  initial bone Cx -MRSA  Analgesia PRN  D/c Vancomycin today, discussed with ID  podiatry following

## 2019-03-04 NOTE — DISCHARGE NOTE PROVIDER - NSDCCPCAREPLAN_GEN_ALL_CORE_FT
PRINCIPAL DISCHARGE DIAGNOSIS  Problem: Osteomyelitis  Assessment and Plan of Treatment: Please apply santyl to bilateral heel wounds. Apply dry sterile dressing to incision site. Dress with 4x4 gauze and rafael. Change daily.  weight bear as tolerated in surgical shoe Left foot.  follow up with Dr. Beasley at [facility]. Call [phone] to schedule appointment. PRINCIPAL DISCHARGE DIAGNOSIS  Problem: Osteomyelitis  Assessment and Plan of Treatment: Please apply santyl to bilateral heel wounds. Apply dry sterile dressing to incision site. Dress with 4x4 gauze and rafael. Change daily.  weight bear as tolerated in surgical shoe Left foot.  follow up with Dr. Beasley at [facility]. Call [phone] to schedule appointment.

## 2019-03-04 NOTE — PROGRESS NOTE ADULT - SUBJECTIVE AND OBJECTIVE BOX
Patient is a 85y old  Female who presents with a chief complaint of OM, will need surgery with Dr. Beasley (03 Mar 2019 12:55)       INTERVAL HPI/OVERNIGHT EVENTS:  Patient seen and evaluated at bedside.  Pt is resting comfortable in NAD. Denies N/V/F/C.      Allergies    iodine (Other; Anaphylaxis)  shellfish (Anaphylaxis)  sulfa drugs (Other)    Intolerances        Vital Signs Last 24 Hrs  T(C): 36.7 (04 Mar 2019 05:10), Max: 37.2 (03 Mar 2019 13:03)  T(F): 98 (04 Mar 2019 05:10), Max: 98.9 (03 Mar 2019 13:03)  HR: 77 (04 Mar 2019 05:10) (75 - 83)  BP: 118/67 (04 Mar 2019 05:10) (118/57 - 136/73)  BP(mean): --  RR: 18 (04 Mar 2019 05:10) (17 - 18)  SpO2: 98% (04 Mar 2019 05:10) (96% - 98%)    LABS:                        9.6    7.02  )-----------( 206      ( 04 Mar 2019 07:53 )             30.0     03-04    139  |  105  |  25<H>  ----------------------------<  110<H>  4.1   |  27  |  1.79<H>    Ca    8.9      04 Mar 2019 07:53          CAPILLARY BLOOD GLUCOSE          Lower Extremity Physical Exam:  s/p left foot partial 1st ray amputation - closed, no signs of infection present, skin well coapted sutures intact, drain intact  eschars at the posterior aspect of the heel bilaterally with no signs of infection    RADIOLOGY & ADDITIONAL TESTS:

## 2019-03-04 NOTE — DISCHARGE NOTE PROVIDER - CARE PROVIDER_API CALL
Lyla Beasley (DPM)  Podiatric Medicine and Surgery  [address] [place] [address]  Phone: [phone]  Fax: [phone]  Follow Up Time:

## 2019-03-04 NOTE — PROGRESS NOTE ADULT - SUBJECTIVE AND OBJECTIVE BOX
Patient is a 85y old  Female who presents with a chief complaint of OM, will need surgery with Dr. Beasley (25 Feb 2019 21:58)      INTERVAL HPI/OVERNIGHT EVENTS:  Patient seen and examined bedside.   No overnight events.       MEDICATIONS  (STANDING):  allopurinol 100 milliGRAM(s) Oral two times a day  colchicine 0.6 milliGRAM(s) Oral daily  collagenase Ointment 1 Application(s) Topical daily  ferrous    sulfate 325 milliGRAM(s) Oral daily  levothyroxine 50 MICROGram(s) Oral daily  metoprolol succinate ER 25 milliGRAM(s) Oral daily  multivitamin 1 Tablet(s) Oral daily  pantoprazole    Tablet 40 milliGRAM(s) Oral before breakfast  vancomycin  IVPB 750 milliGRAM(s) IV Intermittent every 12 hours    MEDICATIONS  (PRN):  acetaminophen   Tablet .. 650 milliGRAM(s) Oral every 6 hours PRN Temp greater or equal to 38C (100.4F), Mild Pain (1 - 3)  acetaminophen   Tablet .. 650 milliGRAM(s) Oral every 6 hours PRN Temp greater or equal to 38C (100.4F), Mild Pain (1 - 3)  ibuprofen  Tablet. 600 milliGRAM(s) Oral every 6 hours PRN Moderate Pain (4 - 6)  morphine  - Injectable 2 milliGRAM(s) IV Push every 4 hours PRN Severe Pain (7 - 10)  oxyCODONE    5 mG/acetaminophen 325 mG 1 Tablet(s) Oral every 4 hours PRN Moderate Pain (4 - 6)    Allergies    iodine (Other; Anaphylaxis)  shellfish (Anaphylaxis)  sulfa drugs (Other)    Vital Signs Last 24 Hrs  T(C): 37.2 (04 Mar 2019 11:19), Max: 37.2 (03 Mar 2019 13:03)  T(F): 99 (04 Mar 2019 11:19), Max: 99 (04 Mar 2019 11:19)  HR: 77 (04 Mar 2019 05:10) (75 - 83)  BP: 145/78 (04 Mar 2019 11:19) (118/57 - 145/78)  BP(mean): --  RR: 20 (04 Mar 2019 11:19) (17 - 20)  SpO2: 97% (04 Mar 2019 11:19) (96% - 98%)      PHYSICAL EXAM:  GENERAL: NAD, well-groomed, well-developed  HEAD:  Atraumatic, Normocephalic  EYES: EOMI, PERRLA, conjunctiva and sclera clear  ENMT: No tonsillar erythema, exudates, or enlargement; Moist mucous membranes, Good dentition, No lesions  NECK: Supple, No JVD, Normal thyroid  NERVOUS SYSTEM:  Alert & Oriented X3, Good concentration; Motor Strength 5/5 B/L upper and lower extremities; DTRs 2+ intact and symmetric  CHEST/LUNG: Clear to percussion bilaterally; No rales, rhonchi, wheezing, or rubs  HEART: Regular rate and rhythm; No murmurs, rubs, or gallops  ABDOMEN: Soft, Nontender, Nondistended; Bowel sounds present    Labs and imaging reviewed.                                    9.6    7.02  )-----------( 206      ( 04 Mar 2019 07:53 )             30.0   03-04    139  |  105  |  25<H>  ----------------------------<  110<H>  4.1   |  27  |  1.79<H>    Ca    8.9      04 Mar 2019 07:53      RADIOLOGY & ADDITIONAL TESTS:    Imaging Personally Reviewed:  [ x] YES  [ ] NO    Consultant(s) Notes Reviewed:  [x ] YES  [ ] NO    Care Discussed with Consultants/Other Providers [ x] YES  [ ] NO    Care discussed in detail with patient.  All questions and concerns addressed. Patient is a 85y old  Female who presents with a chief complaint of OM, will need surgery with Dr. Beasley (25 Feb 2019 21:58)      INTERVAL HPI/OVERNIGHT EVENTS:  Patient seen and examined bedside.   No overnight events.   complaining of mild pain in the left post-op area.   Denies fever, chills, N/V, HA, dizziness, CP, palpitations, SOB, abdominal pain, dysuria, constipation, diarrhea.     PAST MEDICAL & SURGICAL HISTORY:  Fistula, perirectal  Hiatal hernia  Hypothyroidism  Hypertension  S/P foot joint surgery: right ( 10 years ago )  S/P arthroscopy of right knee  S/P ileostomy: colectomy ( 2005 )  S/P cholecystectomy: at age 20 ,s      MEDICATIONS  (STANDING):  allopurinol 100 milliGRAM(s) Oral two times a day  colchicine 0.6 milliGRAM(s) Oral daily  collagenase Ointment 1 Application(s) Topical daily  ferrous    sulfate 325 milliGRAM(s) Oral daily  levothyroxine 50 MICROGram(s) Oral daily  metoprolol succinate ER 25 milliGRAM(s) Oral daily  multivitamin 1 Tablet(s) Oral daily  pantoprazole    Tablet 40 milliGRAM(s) Oral before breakfast  vancomycin  IVPB 750 milliGRAM(s) IV Intermittent every 12 hours    MEDICATIONS  (PRN):  acetaminophen   Tablet .. 650 milliGRAM(s) Oral every 6 hours PRN Temp greater or equal to 38C (100.4F), Mild Pain (1 - 3)  acetaminophen   Tablet .. 650 milliGRAM(s) Oral every 6 hours PRN Temp greater or equal to 38C (100.4F), Mild Pain (1 - 3)  ibuprofen  Tablet. 600 milliGRAM(s) Oral every 6 hours PRN Moderate Pain (4 - 6)  morphine  - Injectable 2 milliGRAM(s) IV Push every 4 hours PRN Severe Pain (7 - 10)  oxyCODONE    5 mG/acetaminophen 325 mG 1 Tablet(s) Oral every 4 hours PRN Moderate Pain (4 - 6)    Allergies:  iodine (Other; Anaphylaxis)  shellfish (Anaphylaxis)  sulfa drugs (Other)    Vital Signs Last 24 Hrs  T(C): 37.2 (04 Mar 2019 11:19), Max: 37.2 (03 Mar 2019 13:03)  T(F): 99 (04 Mar 2019 11:19), Max: 99 (04 Mar 2019 11:19)  HR: 77 (04 Mar 2019 05:10) (75 - 83)  BP: 145/78 (04 Mar 2019 11:19) (118/57 - 145/78)  BP(mean): --  RR: 20 (04 Mar 2019 11:19) (17 - 20)  SpO2: 97% (04 Mar 2019 11:19) (96% - 98%)      PHYSICAL EXAM:  GENERAL: NAD, well-groomed, well-developed  HEAD:  Atraumatic, Normocephalic  EYES: EOMI, PERRLA, conjunctiva and sclera clear  ENMT: No tonsillar erythema, exudates, or enlargement; Moist mucous membranes   NECK: Supple, No JVD  NERVOUS SYSTEM:  Alert & Oriented X3, Good concentration; moving all extremities   CHEST/LUNG: Clear to percussion bilaterally; No rales, rhonchi, wheezing, or rubs b/l   HEART: Regular rate and rhythm; No murmurs, rubs, or gallops  ABDOMEN: Soft, Nontender, Nondistended; Bowel sounds present  EXTREMITIES: 2+ peripheral pulses b/l. No clubbing, cyanosis, edema b/l. Left foot wrap c/d/i. Right heel wrap c/d/i.     Labs and imaging reviewed.                                    9.6    7.02  )-----------( 206      ( 04 Mar 2019 07:53 )             30.0   03-04    139  |  105  |  25<H>  ----------------------------<  110<H>  4.1   |  27  |  1.79<H>    Ca    8.9      04 Mar 2019 07:53      RADIOLOGY & ADDITIONAL TESTS:    Imaging Personally Reviewed:  [ x] YES  [ ] NO    Consultant(s) Notes Reviewed:  [x ] YES  [ ] NO    Care Discussed with Consultants/Other Providers [ x] YES  [ ] NO    Care discussed in detail with patient.  All questions and concerns addressed.

## 2019-03-05 LAB
ANION GAP SERPL CALC-SCNC: 7 MMOL/L — SIGNIFICANT CHANGE UP (ref 5–17)
ANION GAP SERPL CALC-SCNC: 9 MMOL/L — SIGNIFICANT CHANGE UP (ref 5–17)
BUN SERPL-MCNC: 31 MG/DL — HIGH (ref 7–23)
BUN SERPL-MCNC: 31 MG/DL — HIGH (ref 7–23)
CALCIUM SERPL-MCNC: 8.6 MG/DL — SIGNIFICANT CHANGE UP (ref 8.5–10.1)
CALCIUM SERPL-MCNC: 9 MG/DL — SIGNIFICANT CHANGE UP (ref 8.5–10.1)
CHLORIDE SERPL-SCNC: 103 MMOL/L — SIGNIFICANT CHANGE UP (ref 96–108)
CHLORIDE SERPL-SCNC: 107 MMOL/L — SIGNIFICANT CHANGE UP (ref 96–108)
CO2 SERPL-SCNC: 23 MMOL/L — SIGNIFICANT CHANGE UP (ref 22–31)
CO2 SERPL-SCNC: 26 MMOL/L — SIGNIFICANT CHANGE UP (ref 22–31)
CREAT SERPL-MCNC: 1.92 MG/DL — HIGH (ref 0.5–1.3)
CREAT SERPL-MCNC: 2 MG/DL — HIGH (ref 0.5–1.3)
CULTURE RESULTS: SIGNIFICANT CHANGE UP
GLUCOSE SERPL-MCNC: 102 MG/DL — HIGH (ref 70–99)
GLUCOSE SERPL-MCNC: 106 MG/DL — HIGH (ref 70–99)
GRAM STN FLD: SIGNIFICANT CHANGE UP
HCT VFR BLD CALC: 31.1 % — LOW (ref 34.5–45)
HGB BLD-MCNC: 9.8 G/DL — LOW (ref 11.5–15.5)
MAGNESIUM SERPL-MCNC: 1.7 MG/DL — SIGNIFICANT CHANGE UP (ref 1.6–2.6)
MCHC RBC-ENTMCNC: 29.3 PG — SIGNIFICANT CHANGE UP (ref 27–34)
MCHC RBC-ENTMCNC: 31.5 GM/DL — LOW (ref 32–36)
MCV RBC AUTO: 92.8 FL — SIGNIFICANT CHANGE UP (ref 80–100)
NRBC # BLD: 0 /100 WBCS — SIGNIFICANT CHANGE UP (ref 0–0)
PHOSPHATE SERPL-MCNC: 4.4 MG/DL — SIGNIFICANT CHANGE UP (ref 2.5–4.5)
PLATELET # BLD AUTO: 200 K/UL — SIGNIFICANT CHANGE UP (ref 150–400)
POTASSIUM SERPL-MCNC: 3.6 MMOL/L — SIGNIFICANT CHANGE UP (ref 3.5–5.3)
POTASSIUM SERPL-MCNC: 4.1 MMOL/L — SIGNIFICANT CHANGE UP (ref 3.5–5.3)
POTASSIUM SERPL-SCNC: 3.6 MMOL/L — SIGNIFICANT CHANGE UP (ref 3.5–5.3)
POTASSIUM SERPL-SCNC: 4.1 MMOL/L — SIGNIFICANT CHANGE UP (ref 3.5–5.3)
RBC # BLD: 3.35 M/UL — LOW (ref 3.8–5.2)
RBC # FLD: 14.4 % — SIGNIFICANT CHANGE UP (ref 10.3–14.5)
SODIUM SERPL-SCNC: 135 MMOL/L — SIGNIFICANT CHANGE UP (ref 135–145)
SODIUM SERPL-SCNC: 140 MMOL/L — SIGNIFICANT CHANGE UP (ref 135–145)
SPECIMEN SOURCE: SIGNIFICANT CHANGE UP
VANCOMYCIN FLD-MCNC: 20.2 UG/ML — SIGNIFICANT CHANGE UP
WBC # BLD: 6.64 K/UL — SIGNIFICANT CHANGE UP (ref 3.8–10.5)
WBC # FLD AUTO: 6.64 K/UL — SIGNIFICANT CHANGE UP (ref 3.8–10.5)

## 2019-03-05 PROCEDURE — 99233 SBSQ HOSP IP/OBS HIGH 50: CPT

## 2019-03-05 RX ORDER — SODIUM CHLORIDE 9 MG/ML
1000 INJECTION, SOLUTION INTRAVENOUS
Qty: 0 | Refills: 0 | Status: DISCONTINUED | OUTPATIENT
Start: 2019-03-05 | End: 2019-03-06

## 2019-03-05 RX ORDER — CARVEDILOL PHOSPHATE 80 MG/1
12.5 CAPSULE, EXTENDED RELEASE ORAL EVERY 12 HOURS
Qty: 0 | Refills: 0 | Status: DISCONTINUED | OUTPATIENT
Start: 2019-03-05 | End: 2019-03-06

## 2019-03-05 RX ADMIN — PANTOPRAZOLE SODIUM 40 MILLIGRAM(S): 20 TABLET, DELAYED RELEASE ORAL at 08:35

## 2019-03-05 RX ADMIN — HEPARIN SODIUM 5000 UNIT(S): 5000 INJECTION INTRAVENOUS; SUBCUTANEOUS at 18:20

## 2019-03-05 RX ADMIN — Medication 50 MICROGRAM(S): at 06:13

## 2019-03-05 RX ADMIN — Medication 1 APPLICATION(S): at 12:41

## 2019-03-05 RX ADMIN — OXYCODONE AND ACETAMINOPHEN 1 TABLET(S): 5; 325 TABLET ORAL at 19:19

## 2019-03-05 RX ADMIN — SODIUM CHLORIDE 55 MILLILITER(S): 9 INJECTION, SOLUTION INTRAVENOUS at 06:11

## 2019-03-05 RX ADMIN — Medication 100 MILLIGRAM(S): at 06:12

## 2019-03-05 RX ADMIN — Medication 25 MILLIGRAM(S): at 06:13

## 2019-03-05 RX ADMIN — Medication 100 MILLIGRAM(S): at 18:20

## 2019-03-05 RX ADMIN — Medication 1 TABLET(S): at 12:41

## 2019-03-05 RX ADMIN — Medication 325 MILLIGRAM(S): at 12:41

## 2019-03-05 RX ADMIN — OXYCODONE AND ACETAMINOPHEN 1 TABLET(S): 5; 325 TABLET ORAL at 18:19

## 2019-03-05 RX ADMIN — HEPARIN SODIUM 5000 UNIT(S): 5000 INJECTION INTRAVENOUS; SUBCUTANEOUS at 06:13

## 2019-03-05 NOTE — PROGRESS NOTE ADULT - PROBLEM SELECTOR PROBLEM 4
Chronic gout of multiple sites, unspecified cause
LUIS (acute kidney injury)
Acquired hypothyroidism
Acquired hypothyroidism
Chronic gout of multiple sites, unspecified cause

## 2019-03-05 NOTE — PROGRESS NOTE ADULT - PROBLEM SELECTOR PROBLEM 5
Hypokalemia
Hypokalemia
Chronic gout of multiple sites, unspecified cause
Chronic gout of multiple sites, unspecified cause
Hypokalemia
Hypokalemia
Preventive measure
Preventive measure

## 2019-03-05 NOTE — PROGRESS NOTE ADULT - ASSESSMENT
85 year old woman with PMH HTN and hypothyroidism presents to ED sent in by podiatrist for OM confirmed as outpatient.    s/p  for Left 1st ray amputation--DOS 2/27/19. Remains afebrile. Will f/u tomorrow with ID and podiatry regarding antibiotics     For discharge  Change bilateral dressings daily. right foot to have santyl applied to heel with DSD. Left foot DSD to incision site, with santyl to left heel and covered w/ DSD.   Pt can weight bear as tolerated to left heel w/ post-op Darco shoe  Follow up w/ Dr. Beasley at the wound care center, call [phone] to schedule an appt    Dispo: for linn Gross

## 2019-03-05 NOTE — PROGRESS NOTE ADULT - PROBLEM SELECTOR PLAN 3
Continue Synthroid
stable   Continue BB  DASH diet
stable   Continue BB  DASH diet
Continue Synthroid

## 2019-03-05 NOTE — PROGRESS NOTE ADULT - ASSESSMENT
86 y/o female s/p left foot partial 1st ray amp (DOS 2/27/19)  - Pt seen and evaluated, POD #6  - Sutures intact, skin well-coapted, no signs of infection  - Offload heels w/ Z flow boots in bed at all times  - L foot dressed w/ DSD to incision site and santyl to posterior heel  - Please continue daily right foot dressing changes  - Pt can weight bear to the left heel w/ Darco post op shoe as tolerated  - clean bone margin culture is negative.   - ID rec d/c vanco.   - Pod stable for dc to rehab.   - d/w attending

## 2019-03-05 NOTE — PROGRESS NOTE ADULT - PROBLEM SELECTOR PLAN 4
Continue allopurinol and colchicine
Continue allopurinol and colchicine
sec to vanco   vanco discontinued
Continue Synthroid
Continue Synthroid
Continue allopurinol and colchicine

## 2019-03-05 NOTE — PROGRESS NOTE ADULT - SUBJECTIVE AND OBJECTIVE BOX
Podiatry pager #:  [phone]    Patient is a 85y old  Female who presents with a chief complaint of OM, will need surgery with Dr. Beasley (04 Mar 2019 18:14)       INTERVAL HPI/OVERNIGHT EVENTS:  Patient seen and evaluated at bedside.  Pt is resting comfortable in NAD. Denies N/V/F/C.      Allergies    iodine (Other; Anaphylaxis)  shellfish (Anaphylaxis)  sulfa drugs (Other)    Intolerances        Vital Signs Last 24 Hrs  T(C): 37.1 (05 Mar 2019 11:23), Max: 37.4 (04 Mar 2019 23:54)  T(F): 98.8 (05 Mar 2019 11:23), Max: 99.4 (04 Mar 2019 23:54)  HR: 82 (05 Mar 2019 11:23) (74 - 100)  BP: 149/72 (05 Mar 2019 11:23) (140/78 - 150/89)  BP(mean): --  RR: 18 (05 Mar 2019 11:23) (17 - 18)  SpO2: 97% (05 Mar 2019 11:23) (95% - 97%)    LABS:                        9.8    6.64  )-----------( 200      ( 05 Mar 2019 08:29 )             31.1     03-05    140  |  107  |  31<H>  ----------------------------<  106<H>  4.1   |  26  |  1.92<H>    Ca    9.0      05 Mar 2019 08:29  Phos  4.4     03-05  Mg     1.7     03-05          CAPILLARY BLOOD GLUCOSE          Lower Extremity Physical Exam:  s/p left foot partial 1st ray amputation - closed, no signs of infection present, skin well coapted sutures intact, drain intact  eschars at the posterior aspect of the heel bilaterally with no signs of infection

## 2019-03-05 NOTE — PROGRESS NOTE ADULT - PROBLEM SELECTOR PROBLEM 3
Chronic gout of multiple sites, unspecified cause
Acquired hypothyroidism
Chronic gout of multiple sites, unspecified cause
Essential hypertension
Essential hypertension
Chronic gout of multiple sites, unspecified cause
Acquired hypothyroidism
Acquired hypothyroidism

## 2019-03-05 NOTE — PROGRESS NOTE ADULT - PROBLEM SELECTOR PLAN 6
eschars at the posterior aspect of the heel bilaterally with no signs of infection  podiatry following for wound care

## 2019-03-05 NOTE — PROGRESS NOTE ADULT - PROBLEM SELECTOR PLAN 1
likely sec to Colchicine and vanc, give 1L IVF,   Cr still rising, but possibly at anabell, will follow BMP, continue with IVF   If Cr continues to rise will consult Nephrology   lungs clear b/l. No SOB.

## 2019-03-05 NOTE — PROGRESS NOTE ADULT - PROBLEM SELECTOR PLAN 2
s/p  for Left 1st ray amputation--DOS 2/27/19. POD #6  repeat Bone cx NGTD  initial bone Cx -MRSA  Analgesia PRN  completed Vancomycin  podiatry following

## 2019-03-05 NOTE — PROGRESS NOTE ADULT - SUBJECTIVE AND OBJECTIVE BOX
Patient is a 85y old  Female who presents with a chief complaint of OM, will need surgery with Dr. Beasley (25 Feb 2019 21:58)      INTERVAL HPI/OVERNIGHT EVENTS:  Patient seen and examined bedside.   No overnight events.   complaining of mild pain in the left post-op area.     Denies fever, chills, N/V, HA, dizziness, CP, palpitations, SOB, abdominal pain, dysuria, constipation, diarrhea.     PAST MEDICAL & SURGICAL HISTORY:  Fistula, perirectal  Hiatal hernia  Hypothyroidism  Hypertension  S/P foot joint surgery: right ( 10 years ago )  S/P arthroscopy of right knee  S/P ileostomy: colectomy ( 2005 )  S/P cholecystectomy: at age 20 ,s      MEDICATIONS  (STANDING):  allopurinol 100 milliGRAM(s) Oral two times a day  colchicine 0.6 milliGRAM(s) Oral daily  collagenase Ointment 1 Application(s) Topical daily  ferrous    sulfate 325 milliGRAM(s) Oral daily  levothyroxine 50 MICROGram(s) Oral daily  metoprolol succinate ER 25 milliGRAM(s) Oral daily  multivitamin 1 Tablet(s) Oral daily  pantoprazole    Tablet 40 milliGRAM(s) Oral before breakfast  vancomycin  IVPB 750 milliGRAM(s) IV Intermittent every 12 hours    MEDICATIONS  (PRN):  acetaminophen   Tablet .. 650 milliGRAM(s) Oral every 6 hours PRN Temp greater or equal to 38C (100.4F), Mild Pain (1 - 3)  acetaminophen   Tablet .. 650 milliGRAM(s) Oral every 6 hours PRN Temp greater or equal to 38C (100.4F), Mild Pain (1 - 3)  ibuprofen  Tablet. 600 milliGRAM(s) Oral every 6 hours PRN Moderate Pain (4 - 6)  morphine  - Injectable 2 milliGRAM(s) IV Push every 4 hours PRN Severe Pain (7 - 10)  oxyCODONE    5 mG/acetaminophen 325 mG 1 Tablet(s) Oral every 4 hours PRN Moderate Pain (4 - 6)    Allergies:  iodine (Other; Anaphylaxis)  shellfish (Anaphylaxis)  sulfa drugs (Other)    Vitals noted.       PHYSICAL EXAM:  GENERAL: NAD, well-groomed, well-developed  HEAD:  Atraumatic, Normocephalic  EYES: EOMI, PERRLA, conjunctiva and sclera clear  ENMT: No tonsillar erythema, exudates, or enlargement; Moist mucous membranes   NECK: Supple, No JVD  NERVOUS SYSTEM:  Alert & Oriented X3, Good concentration; moving all extremities   CHEST/LUNG: Clear to percussion bilaterally; No rales, rhonchi, wheezing, or rubs b/l   HEART: Regular rate and rhythm; No murmurs, rubs, or gallops  ABDOMEN: Soft, Nontender, Nondistended; Bowel sounds present  EXTREMITIES: 2+ peripheral pulses b/l. No clubbing, cyanosis, edema b/l. Left foot wrap c/d/i. Right heel wrap c/d/i.     Labs and imaging reviewed.                                 9.8    6.64  )-----------( 200      ( 05 Mar 2019 08:29 )             31.1         03-05    135  |  103  |  31<H>  ----------------------------<  102<H>  3.6   |  23  |  2.00<H>    Ca    8.6      05 Mar 2019 16:34  Phos  4.4     03-05  Mg     1.7     03-05          RADIOLOGY & ADDITIONAL TESTS:    Imaging Personally Reviewed:  [ x] YES  [ ] NO    Consultant(s) Notes Reviewed:  [x ] YES  [ ] NO    Care Discussed with Consultants/Other Providers [ x] YES  [ ] NO    Care discussed in detail with patient and daughter at bedside.  All questions and concerns addressed.

## 2019-03-05 NOTE — PROGRESS NOTE ADULT - PROBLEM SELECTOR PLAN 5
replete as needed
replete as needed
Continue allopurinol   will hold colchicine --sec to LUIS
Continue allopurinol   will hold colchicine --sec to LUIS
SCD for DVT prophylaxis  General precautions
SCD for DVT prophylaxis  General precautions
replete as needed
replete as needed

## 2019-03-06 ENCOUNTER — TRANSCRIPTION ENCOUNTER (OUTPATIENT)
Age: 84
End: 2019-03-06

## 2019-03-06 VITALS
HEART RATE: 78 BPM | DIASTOLIC BLOOD PRESSURE: 73 MMHG | RESPIRATION RATE: 17 BRPM | TEMPERATURE: 98 F | OXYGEN SATURATION: 98 % | SYSTOLIC BLOOD PRESSURE: 154 MMHG

## 2019-03-06 DIAGNOSIS — L89.623 PRESSURE ULCER OF LEFT HEEL, STAGE 3: ICD-10-CM

## 2019-03-06 DIAGNOSIS — L89.610 PRESSURE ULCER OF RIGHT HEEL, UNSTAGEABLE: ICD-10-CM

## 2019-03-06 DIAGNOSIS — H26.9 UNSPECIFIED CATARACT: ICD-10-CM

## 2019-03-06 DIAGNOSIS — Z79.899 OTHER LONG TERM (CURRENT) DRUG THERAPY: ICD-10-CM

## 2019-03-06 DIAGNOSIS — Z88.2 ALLERGY STATUS TO SULFONAMIDES: ICD-10-CM

## 2019-03-06 DIAGNOSIS — M10.09 IDIOPATHIC GOUT, MULTIPLE SITES: ICD-10-CM

## 2019-03-06 DIAGNOSIS — Z79.890 HORMONE REPLACEMENT THERAPY: ICD-10-CM

## 2019-03-06 DIAGNOSIS — L89.894 PRESSURE ULCER OF OTHER SITE, STAGE 4: ICD-10-CM

## 2019-03-06 DIAGNOSIS — Z66 DO NOT RESUSCITATE: ICD-10-CM

## 2019-03-06 DIAGNOSIS — Z91.81 HISTORY OF FALLING: ICD-10-CM

## 2019-03-06 DIAGNOSIS — J32.9 CHRONIC SINUSITIS, UNSPECIFIED: ICD-10-CM

## 2019-03-06 DIAGNOSIS — I48.91 UNSPECIFIED ATRIAL FIBRILLATION: ICD-10-CM

## 2019-03-06 DIAGNOSIS — G60.9 HEREDITARY AND IDIOPATHIC NEUROPATHY, UNSPECIFIED: ICD-10-CM

## 2019-03-06 DIAGNOSIS — M86.072 ACUTE HEMATOGENOUS OSTEOMYELITIS, LEFT ANKLE AND FOOT: ICD-10-CM

## 2019-03-06 DIAGNOSIS — Z74.1 NEED FOR ASSISTANCE WITH PERSONAL CARE: ICD-10-CM

## 2019-03-06 DIAGNOSIS — R26.9 UNSPECIFIED ABNORMALITIES OF GAIT AND MOBILITY: ICD-10-CM

## 2019-03-06 DIAGNOSIS — Z79.82 LONG TERM (CURRENT) USE OF ASPIRIN: ICD-10-CM

## 2019-03-06 DIAGNOSIS — D64.9 ANEMIA, UNSPECIFIED: ICD-10-CM

## 2019-03-06 DIAGNOSIS — L02.612 CUTANEOUS ABSCESS OF LEFT FOOT: ICD-10-CM

## 2019-03-06 DIAGNOSIS — Z91.013 ALLERGY TO SEAFOOD: ICD-10-CM

## 2019-03-06 LAB
ANION GAP SERPL CALC-SCNC: 9 MMOL/L — SIGNIFICANT CHANGE UP (ref 5–17)
BUN SERPL-MCNC: 29 MG/DL — HIGH (ref 7–23)
CALCIUM SERPL-MCNC: 8.8 MG/DL — SIGNIFICANT CHANGE UP (ref 8.5–10.1)
CHLORIDE SERPL-SCNC: 104 MMOL/L — SIGNIFICANT CHANGE UP (ref 96–108)
CO2 SERPL-SCNC: 24 MMOL/L — SIGNIFICANT CHANGE UP (ref 22–31)
CREAT SERPL-MCNC: 1.88 MG/DL — HIGH (ref 0.5–1.3)
GLUCOSE SERPL-MCNC: 120 MG/DL — HIGH (ref 70–99)
HCT VFR BLD CALC: 28.2 % — LOW (ref 34.5–45)
HGB BLD-MCNC: 9.5 G/DL — LOW (ref 11.5–15.5)
MAGNESIUM SERPL-MCNC: 1.3 MG/DL — LOW (ref 1.6–2.6)
MCHC RBC-ENTMCNC: 30.4 PG — SIGNIFICANT CHANGE UP (ref 27–34)
MCHC RBC-ENTMCNC: 33.7 GM/DL — SIGNIFICANT CHANGE UP (ref 32–36)
MCV RBC AUTO: 90.1 FL — SIGNIFICANT CHANGE UP (ref 80–100)
NRBC # BLD: 0 /100 WBCS — SIGNIFICANT CHANGE UP (ref 0–0)
PHOSPHATE SERPL-MCNC: 4.2 MG/DL — SIGNIFICANT CHANGE UP (ref 2.5–4.5)
PLATELET # BLD AUTO: 182 K/UL — SIGNIFICANT CHANGE UP (ref 150–400)
POTASSIUM SERPL-MCNC: 3.7 MMOL/L — SIGNIFICANT CHANGE UP (ref 3.5–5.3)
POTASSIUM SERPL-SCNC: 3.7 MMOL/L — SIGNIFICANT CHANGE UP (ref 3.5–5.3)
RBC # BLD: 3.13 M/UL — LOW (ref 3.8–5.2)
RBC # FLD: 14.3 % — SIGNIFICANT CHANGE UP (ref 10.3–14.5)
SODIUM SERPL-SCNC: 137 MMOL/L — SIGNIFICANT CHANGE UP (ref 135–145)
WBC # BLD: 6.07 K/UL — SIGNIFICANT CHANGE UP (ref 3.8–10.5)
WBC # FLD AUTO: 6.07 K/UL — SIGNIFICANT CHANGE UP (ref 3.8–10.5)

## 2019-03-06 PROCEDURE — 99239 HOSP IP/OBS DSCHRG MGMT >30: CPT

## 2019-03-06 RX ORDER — MAGNESIUM OXIDE 400 MG ORAL TABLET 241.3 MG
400 TABLET ORAL
Qty: 0 | Refills: 0 | Status: DISCONTINUED | OUTPATIENT
Start: 2019-03-06 | End: 2019-03-06

## 2019-03-06 RX ORDER — ACETAMINOPHEN 500 MG
2 TABLET ORAL
Qty: 0 | Refills: 0 | DISCHARGE
Start: 2019-03-06

## 2019-03-06 RX ORDER — MAGNESIUM OXIDE 400 MG ORAL TABLET 241.3 MG
1 TABLET ORAL
Qty: 0 | Refills: 0 | DISCHARGE
Start: 2019-03-06

## 2019-03-06 RX ORDER — CARVEDILOL PHOSPHATE 80 MG/1
1 CAPSULE, EXTENDED RELEASE ORAL
Qty: 0 | Refills: 0 | DISCHARGE
Start: 2019-03-06

## 2019-03-06 RX ORDER — SODIUM CHLORIDE 9 MG/ML
1000 INJECTION, SOLUTION INTRAVENOUS
Qty: 0 | Refills: 0 | Status: DISCONTINUED | OUTPATIENT
Start: 2019-03-06 | End: 2019-03-06

## 2019-03-06 RX ORDER — CARVEDILOL PHOSPHATE 80 MG/1
25 CAPSULE, EXTENDED RELEASE ORAL EVERY 12 HOURS
Qty: 0 | Refills: 0 | Status: DISCONTINUED | OUTPATIENT
Start: 2019-03-06 | End: 2019-03-06

## 2019-03-06 RX ORDER — COLLAGENASE CLOSTRIDIUM HIST. 250 UNIT/G
1 OINTMENT (GRAM) TOPICAL
Qty: 0 | Refills: 0 | DISCHARGE
Start: 2019-03-06

## 2019-03-06 RX ADMIN — Medication 325 MILLIGRAM(S): at 14:24

## 2019-03-06 RX ADMIN — Medication 100 MILLIGRAM(S): at 06:44

## 2019-03-06 RX ADMIN — PANTOPRAZOLE SODIUM 40 MILLIGRAM(S): 20 TABLET, DELAYED RELEASE ORAL at 08:28

## 2019-03-06 RX ADMIN — MAGNESIUM OXIDE 400 MG ORAL TABLET 400 MILLIGRAM(S): 241.3 TABLET ORAL at 14:23

## 2019-03-06 RX ADMIN — Medication 50 MICROGRAM(S): at 06:44

## 2019-03-06 RX ADMIN — Medication 1 TABLET(S): at 14:24

## 2019-03-06 RX ADMIN — SODIUM CHLORIDE 75 MILLILITER(S): 9 INJECTION, SOLUTION INTRAVENOUS at 10:43

## 2019-03-06 RX ADMIN — HEPARIN SODIUM 5000 UNIT(S): 5000 INJECTION INTRAVENOUS; SUBCUTANEOUS at 06:44

## 2019-03-06 RX ADMIN — CARVEDILOL PHOSPHATE 12.5 MILLIGRAM(S): 80 CAPSULE, EXTENDED RELEASE ORAL at 06:44

## 2019-03-06 NOTE — DISCHARGE NOTE NURSING/CASE MANAGEMENT/SOCIAL WORK - NSDPDISTO_GEN_ALL_CORE
Plan  Hand foot and mouth resolved  Can return to   Follow up as needed  Hand, Foot, and Mouth Disease   WHAT YOU NEED TO KNOW:   Hand, foot, and mouth disease (HFMD) is an infection caused by a virus  HFMD is easily spread from person to person through direct contact  Anyone can get HFMD, but it is most common in children younger than 10 years  DISCHARGE INSTRUCTIONS:   Medicines:   · Mouthwash: Your healthcare provider may give you special mouthwash to help relieve mouth pain caused by the sores  · Acetaminophen  decreases pain and fever  It is available without a doctor's order  Ask how much to take and how often to take it  Follow directions  Read the labels of all other medicines you are using to see if they also contain acetaminophen, or ask your doctor or pharmacist  Acetaminophen can cause liver damage if not taken correctly  Do not use more than 4 grams (4,000 milligrams) total of acetaminophen in one day  · NSAIDs , such as ibuprofen, help decrease swelling, pain, and fever  This medicine is available with or without a doctor's order  NSAIDs can cause stomach bleeding or kidney problems in certain people  If you take blood thinner medicine, always ask if NSAIDs are safe for you  Always read the medicine label and follow directions  Do not give these medicines to children under 10months of age without direction from your child's healthcare provider  · Take your medicine as directed  Contact your healthcare provider if you think your medicine is not helping or if you have side effects  Tell him or her if you are allergic to any medicine  Keep a list of the medicines, vitamins, and herbs you take  Include the amounts, and when and why you take them  Bring the list or the pill bottles to follow-up visits  Carry your medicine list with you in case of an emergency  Drink liquids:  Drink at least 9 cups of liquid each day to prevent dehydration  One cup is 8 ounces   Water and milk are good choices because they will not irritate your mouth or throat  Follow up with your healthcare provider as directed:  Write down your questions so you remember to ask them during your visits  Prevent the spread of hand, foot, and mouth disease: You can spread the virus for weeks after your symptoms have gone away  The following can help prevent the spread of HFMD:  · Wash your hands often  Use soap and water  Wash your hands after you use the bathroom, change a child's diapers, or sneeze  Wash your hands before you prepare or eat food  · Avoid close contact with others:  Do not kiss, hug, or share food or drink  Ask your child's school or  if you need to keep your child home while he has symptoms of HFMD      · Clean surfaces well:  Wash all items and surfaces with diluted bleach  This includes toys, tables, counter tops, and door knobs  Contact your healthcare provider if:   · Your mouth or throat are so sore you cannot eat or drink  · Your fever, sore throat, mouth sores, or rash do not go away after 10 days  · You have questions about your condition or care  Seek care immediately if:   · You urinate less than normal or not at all  · You have a severe headache, stiff neck, and back pain  · You have trouble moving, or cannot move part of your body  · You become confused and sleepy  · You have trouble breathing, are breathing very fast, or you cough up pink, foamy spit  · You have a seizure  · You have a high fever and your heart is beating much faster than it normally does  © 2017 [employer]  Information is for End User's use only and may not be sold, redistributed or otherwise used for commercial purposes  All illustrations and images included in CareNotes® are the copyrighted property of [employer]  or [employer]  The above information is an  only   It is not intended as medical advice for individual conditions or treatments  Talk to your doctor, nurse or pharmacist before following any medical regimen to see if it is safe and effective for you  Long term rehab

## 2019-03-06 NOTE — PROGRESS NOTE ADULT - ASSESSMENT
· Assessment		  84 y/o female s/p left foot partial 1st ray amp (DOS 2/27/19)  - Pt seen and evaluated, POD #7  - afebrile, VSS, WBC 6  - Sutures intact, skin well-coapted, no signs of infection  - Please apply bilateral Z flow boots to heels in bed at all times  - L foot dressed w/ DSD to incision site and santyl to posterior heel, R heel was dressed with santyl DSD  - Pt can weight bear to the left heel w/ Darco post op shoe as tolerated  - clean bone margin culture is negative  - Pod stable for dc to rehab.   - d/w attending

## 2019-03-06 NOTE — PROGRESS NOTE ADULT - REASON FOR ADMISSION
OM, will need surgery with Dr. Beasley
OM, will need surgery with Dr. Beasley
OM
OM, surgery with Dr. Beasley
OM, will need surgery with Dr. Beasley
OM
OM, will need surgery with Dr. Beasley

## 2019-03-06 NOTE — PROGRESS NOTE ADULT - SUBJECTIVE AND OBJECTIVE BOX
Podiatry pager #: [phone]/ [phone]    Patient is a 85y old  Female who presents with a chief complaint of OM, will need surgery with Dr. Beasley (05 Mar 2019 16:30)       INTERVAL HPI/OVERNIGHT EVENTS:  Patient seen and evaluated at bedside.  Pt is resting comfortable in NAD. Denies N/V/F/C.  Pain rated at X/10    Allergies    iodine (Other; Anaphylaxis)  shellfish (Anaphylaxis)  sulfa drugs (Other)    Intolerances        Vital Signs Last 24 Hrs  T(C): 36.2 (06 Mar 2019 06:39), Max: 37.3 (05 Mar 2019 17:45)  T(F): 97.2 (06 Mar 2019 06:39), Max: 99.2 (05 Mar 2019 17:45)  HR: 78 (06 Mar 2019 06:39) (78 - 95)  BP: 162/73 (06 Mar 2019 06:39) (152/74 - 164/82)  BP(mean): --  RR: 16 (06 Mar 2019 06:39) (16 - 18)  SpO2: 98% (06 Mar 2019 06:39) (95% - 98%)    LABS:                        9.5    6.07  )-----------( 182      ( 06 Mar 2019 08:23 )             28.2     03-06    137  |  104  |  29<H>  ----------------------------<  120<H>  3.7   |  24  |  1.88<H>    Ca    8.8      06 Mar 2019 08:23  Phos  4.2     03-06  Mg     1.3     03-06          CAPILLARY BLOOD GLUCOSE          Lower Extremity Physical Exam:  s/p left foot partial 1st ray amputation - closed, no signs of infection present, skin well coapted sutures intact.  Stable dry eschars at the posterior aspect of the heels bilaterally with no signs of infection, no bogginess.     RADIOLOGY & ADDITIONAL TESTS:

## 2019-03-06 NOTE — PROGRESS NOTE ADULT - PROVIDER SPECIALTY LIST ADULT
Hospitalist
Infectious Disease
Infectious Disease
Podiatry
Hospitalist
Infectious Disease

## 2019-03-06 NOTE — DISCHARGE NOTE NURSING/CASE MANAGEMENT/SOCIAL WORK - NSDCDPATPORTLINK_GEN_ALL_CORE
You can access the [facility] Patient Portal, offered by [facility], by registering with the following website: [email or web address]

## 2019-03-11 DIAGNOSIS — Z88.2 ALLERGY STATUS TO SULFONAMIDES: ICD-10-CM

## 2019-03-11 DIAGNOSIS — Z90.49 ACQUIRED ABSENCE OF OTHER SPECIFIED PARTS OF DIGESTIVE TRACT: ICD-10-CM

## 2019-03-11 DIAGNOSIS — M1A.9XX0 CHRONIC GOUT, UNSPECIFIED, WITHOUT TOPHUS (TOPHI): ICD-10-CM

## 2019-03-11 DIAGNOSIS — L03.032 CELLULITIS OF LEFT TOE: ICD-10-CM

## 2019-03-11 DIAGNOSIS — M86.9 OSTEOMYELITIS, UNSPECIFIED: ICD-10-CM

## 2019-03-11 DIAGNOSIS — N17.9 ACUTE KIDNEY FAILURE, UNSPECIFIED: ICD-10-CM

## 2019-03-11 DIAGNOSIS — Z91.013 ALLERGY TO SEAFOOD: ICD-10-CM

## 2019-03-11 DIAGNOSIS — K44.9 DIAPHRAGMATIC HERNIA WITHOUT OBSTRUCTION OR GANGRENE: ICD-10-CM

## 2019-03-11 DIAGNOSIS — E03.9 HYPOTHYROIDISM, UNSPECIFIED: ICD-10-CM

## 2019-03-11 DIAGNOSIS — B95.62 METHICILLIN RESISTANT STAPHYLOCOCCUS AUREUS INFECTION AS THE CAUSE OF DISEASES CLASSIFIED ELSEWHERE: ICD-10-CM

## 2019-03-11 DIAGNOSIS — L97.524 NON-PRESSURE CHRONIC ULCER OF OTHER PART OF LEFT FOOT WITH NECROSIS OF BONE: ICD-10-CM

## 2019-03-11 DIAGNOSIS — E87.6 HYPOKALEMIA: ICD-10-CM

## 2019-03-11 DIAGNOSIS — I10 ESSENTIAL (PRIMARY) HYPERTENSION: ICD-10-CM

## 2019-03-11 DIAGNOSIS — L97.411 NON-PRESSURE CHRONIC ULCER OF RIGHT HEEL AND MIDFOOT LIMITED TO BREAKDOWN OF SKIN: ICD-10-CM

## 2019-03-11 DIAGNOSIS — Z79.82 LONG TERM (CURRENT) USE OF ASPIRIN: ICD-10-CM

## 2019-04-08 ENCOUNTER — RESULT REVIEW (OUTPATIENT)
Age: 84
End: 2019-04-08

## 2019-04-08 ENCOUNTER — OUTPATIENT (OUTPATIENT)
Dept: OUTPATIENT SERVICES | Facility: HOSPITAL | Age: 84
LOS: 1 days | Discharge: HOME | End: 2019-04-08
Payer: MEDICARE

## 2019-04-08 DIAGNOSIS — L89.90 PRESSURE ULCER OF UNSPECIFIED SITE, UNSPECIFIED STAGE: ICD-10-CM

## 2019-04-08 PROCEDURE — 88304 TISSUE EXAM BY PATHOLOGIST: CPT | Mod: 26

## 2019-04-09 LAB — SURGICAL PATHOLOGY STUDY: SIGNIFICANT CHANGE UP

## 2019-04-16 DIAGNOSIS — Z82.49 FAMILY HISTORY OF ISCHEMIC HEART DISEASE AND OTHER DISEASES OF THE CIRCULATORY SYSTEM: ICD-10-CM

## 2019-04-16 DIAGNOSIS — Z89.422 ACQUIRED ABSENCE OF OTHER LEFT TOE(S): ICD-10-CM

## 2019-04-16 DIAGNOSIS — J32.9 CHRONIC SINUSITIS, UNSPECIFIED: ICD-10-CM

## 2019-04-16 DIAGNOSIS — E78.5 HYPERLIPIDEMIA, UNSPECIFIED: ICD-10-CM

## 2019-04-16 DIAGNOSIS — Z80.9 FAMILY HISTORY OF MALIGNANT NEOPLASM, UNSPECIFIED: ICD-10-CM

## 2019-04-16 DIAGNOSIS — Z66 DO NOT RESUSCITATE: ICD-10-CM

## 2019-04-16 DIAGNOSIS — Z97.3 PRESENCE OF SPECTACLES AND CONTACT LENSES: ICD-10-CM

## 2019-04-16 DIAGNOSIS — Z83.3 FAMILY HISTORY OF DIABETES MELLITUS: ICD-10-CM

## 2019-04-16 DIAGNOSIS — D64.9 ANEMIA, UNSPECIFIED: ICD-10-CM

## 2019-04-16 DIAGNOSIS — Z74.1 NEED FOR ASSISTANCE WITH PERSONAL CARE: ICD-10-CM

## 2019-04-16 DIAGNOSIS — Z79.82 LONG TERM (CURRENT) USE OF ASPIRIN: ICD-10-CM

## 2019-04-16 DIAGNOSIS — Z79.890 HORMONE REPLACEMENT THERAPY: ICD-10-CM

## 2019-04-16 DIAGNOSIS — Z96.641 PRESENCE OF RIGHT ARTIFICIAL HIP JOINT: ICD-10-CM

## 2019-04-16 DIAGNOSIS — Z88.2 ALLERGY STATUS TO SULFONAMIDES: ICD-10-CM

## 2019-04-16 DIAGNOSIS — M10.09 IDIOPATHIC GOUT, MULTIPLE SITES: ICD-10-CM

## 2019-04-16 DIAGNOSIS — L89.614 PRESSURE ULCER OF RIGHT HEEL, STAGE 4: ICD-10-CM

## 2019-04-16 DIAGNOSIS — E03.9 HYPOTHYROIDISM, UNSPECIFIED: ICD-10-CM

## 2019-04-16 DIAGNOSIS — Z91.013 ALLERGY TO SEAFOOD: ICD-10-CM

## 2019-04-16 DIAGNOSIS — H26.9 UNSPECIFIED CATARACT: ICD-10-CM

## 2019-04-16 DIAGNOSIS — Z79.899 OTHER LONG TERM (CURRENT) DRUG THERAPY: ICD-10-CM

## 2019-04-16 DIAGNOSIS — R60.9 EDEMA, UNSPECIFIED: ICD-10-CM

## 2019-04-16 DIAGNOSIS — Z96.651 PRESENCE OF RIGHT ARTIFICIAL KNEE JOINT: ICD-10-CM

## 2019-04-16 DIAGNOSIS — Z88.1 ALLERGY STATUS TO OTHER ANTIBIOTIC AGENTS STATUS: ICD-10-CM

## 2019-04-16 DIAGNOSIS — I48.91 UNSPECIFIED ATRIAL FIBRILLATION: ICD-10-CM

## 2019-04-16 DIAGNOSIS — R26.9 UNSPECIFIED ABNORMALITIES OF GAIT AND MOBILITY: ICD-10-CM

## 2019-09-14 NOTE — ED PROVIDER NOTE - PMH
Fistula, perirectal    Hiatal hernia    Hypertension    Hypothyroidism HLD (hyperlipidemia)    HTN (hypertension)    TIA (transient ischemic attack)

## 2020-03-05 ENCOUNTER — APPOINTMENT (OUTPATIENT)
Dept: OPHTHALMOLOGY | Facility: CLINIC | Age: 85
End: 2020-03-05

## 2020-03-09 ENCOUNTER — INPATIENT (INPATIENT)
Facility: HOSPITAL | Age: 85
LOS: 2 days | Discharge: HOME | DRG: 641 | End: 2020-03-12
Attending: INTERNAL MEDICINE | Admitting: INTERNAL MEDICINE
Payer: MEDICARE

## 2020-03-09 VITALS
WEIGHT: 190.04 LBS | OXYGEN SATURATION: 94 % | SYSTOLIC BLOOD PRESSURE: 156 MMHG | TEMPERATURE: 98 F | DIASTOLIC BLOOD PRESSURE: 91 MMHG | HEART RATE: 59 BPM | RESPIRATION RATE: 18 BRPM | HEIGHT: 63 IN

## 2020-03-09 DIAGNOSIS — E87.1 HYPO-OSMOLALITY AND HYPONATREMIA: ICD-10-CM

## 2020-03-09 DIAGNOSIS — M54.9 DORSALGIA, UNSPECIFIED: ICD-10-CM

## 2020-03-09 DIAGNOSIS — K60.4 RECTAL FISTULA: ICD-10-CM

## 2020-03-09 DIAGNOSIS — I10 ESSENTIAL (PRIMARY) HYPERTENSION: ICD-10-CM

## 2020-03-09 DIAGNOSIS — Z29.9 ENCOUNTER FOR PROPHYLACTIC MEASURES, UNSPECIFIED: ICD-10-CM

## 2020-03-09 DIAGNOSIS — E03.9 HYPOTHYROIDISM, UNSPECIFIED: ICD-10-CM

## 2020-03-09 LAB
ALBUMIN SERPL ELPH-MCNC: 4 G/DL — SIGNIFICANT CHANGE UP (ref 3.3–5)
ALP SERPL-CCNC: 78 U/L — SIGNIFICANT CHANGE UP (ref 40–120)
ALT FLD-CCNC: 18 U/L — SIGNIFICANT CHANGE UP (ref 10–45)
ANION GAP SERPL CALC-SCNC: 14 MMOL/L — SIGNIFICANT CHANGE UP (ref 5–17)
ANION GAP SERPL CALC-SCNC: 14 MMOL/L — SIGNIFICANT CHANGE UP (ref 5–17)
AST SERPL-CCNC: 34 U/L — SIGNIFICANT CHANGE UP (ref 10–40)
BASOPHILS # BLD AUTO: 0.03 K/UL — SIGNIFICANT CHANGE UP (ref 0–0.2)
BASOPHILS NFR BLD AUTO: 0.2 % — SIGNIFICANT CHANGE UP (ref 0–2)
BILIRUB SERPL-MCNC: 0.4 MG/DL — SIGNIFICANT CHANGE UP (ref 0.2–1.2)
BUN SERPL-MCNC: 21 MG/DL — SIGNIFICANT CHANGE UP (ref 7–23)
BUN SERPL-MCNC: 23 MG/DL — SIGNIFICANT CHANGE UP (ref 7–23)
CALCIUM SERPL-MCNC: 9.4 MG/DL — SIGNIFICANT CHANGE UP (ref 8.4–10.5)
CALCIUM SERPL-MCNC: 9.6 MG/DL — SIGNIFICANT CHANGE UP (ref 8.4–10.5)
CHLORIDE SERPL-SCNC: 85 MMOL/L — LOW (ref 96–108)
CHLORIDE SERPL-SCNC: 88 MMOL/L — LOW (ref 96–108)
CO2 SERPL-SCNC: 25 MMOL/L — SIGNIFICANT CHANGE UP (ref 22–31)
CO2 SERPL-SCNC: 27 MMOL/L — SIGNIFICANT CHANGE UP (ref 22–31)
CREAT SERPL-MCNC: 0.85 MG/DL — SIGNIFICANT CHANGE UP (ref 0.5–1.3)
CREAT SERPL-MCNC: 0.87 MG/DL — SIGNIFICANT CHANGE UP (ref 0.5–1.3)
EOSINOPHIL # BLD AUTO: 0.04 K/UL — SIGNIFICANT CHANGE UP (ref 0–0.5)
EOSINOPHIL NFR BLD AUTO: 0.3 % — SIGNIFICANT CHANGE UP (ref 0–6)
GLUCOSE SERPL-MCNC: 131 MG/DL — HIGH (ref 70–99)
GLUCOSE SERPL-MCNC: 138 MG/DL — HIGH (ref 70–99)
HCT VFR BLD CALC: 39.9 % — SIGNIFICANT CHANGE UP (ref 34.5–45)
HGB BLD-MCNC: 13.8 G/DL — SIGNIFICANT CHANGE UP (ref 11.5–15.5)
IMM GRANULOCYTES NFR BLD AUTO: 0.6 % — SIGNIFICANT CHANGE UP (ref 0–1.5)
LYMPHOCYTES # BLD AUTO: 22.8 % — SIGNIFICANT CHANGE UP (ref 13–44)
LYMPHOCYTES # BLD AUTO: 3.51 K/UL — HIGH (ref 1–3.3)
MCHC RBC-ENTMCNC: 30.7 PG — SIGNIFICANT CHANGE UP (ref 27–34)
MCHC RBC-ENTMCNC: 34.6 GM/DL — SIGNIFICANT CHANGE UP (ref 32–36)
MCV RBC AUTO: 88.7 FL — SIGNIFICANT CHANGE UP (ref 80–100)
MONOCYTES # BLD AUTO: 1.16 K/UL — HIGH (ref 0–0.9)
MONOCYTES NFR BLD AUTO: 7.5 % — SIGNIFICANT CHANGE UP (ref 2–14)
NEUTROPHILS # BLD AUTO: 10.55 K/UL — HIGH (ref 1.8–7.4)
NEUTROPHILS NFR BLD AUTO: 68.6 % — SIGNIFICANT CHANGE UP (ref 43–77)
NRBC # BLD: 0 /100 WBCS — SIGNIFICANT CHANGE UP (ref 0–0)
PLATELET # BLD AUTO: 254 K/UL — SIGNIFICANT CHANGE UP (ref 150–400)
POTASSIUM SERPL-MCNC: 2.8 MMOL/L — CRITICAL LOW (ref 3.5–5.3)
POTASSIUM SERPL-MCNC: 3.9 MMOL/L — SIGNIFICANT CHANGE UP (ref 3.5–5.3)
POTASSIUM SERPL-SCNC: 2.8 MMOL/L — CRITICAL LOW (ref 3.5–5.3)
POTASSIUM SERPL-SCNC: 3.9 MMOL/L — SIGNIFICANT CHANGE UP (ref 3.5–5.3)
PROT SERPL-MCNC: 7.6 G/DL — SIGNIFICANT CHANGE UP (ref 6–8.3)
RBC # BLD: 4.5 M/UL — SIGNIFICANT CHANGE UP (ref 3.8–5.2)
RBC # FLD: 13.1 % — SIGNIFICANT CHANGE UP (ref 10.3–14.5)
SODIUM SERPL-SCNC: 124 MMOL/L — LOW (ref 135–145)
SODIUM SERPL-SCNC: 129 MMOL/L — LOW (ref 135–145)
URATE SERPL-MCNC: 6.1 MG/DL — SIGNIFICANT CHANGE UP (ref 2.5–7)
WBC # BLD: 15.39 K/UL — HIGH (ref 3.8–10.5)
WBC # FLD AUTO: 15.39 K/UL — HIGH (ref 3.8–10.5)

## 2020-03-09 PROCEDURE — 93010 ELECTROCARDIOGRAM REPORT: CPT

## 2020-03-09 PROCEDURE — 72100 X-RAY EXAM L-S SPINE 2/3 VWS: CPT | Mod: 26

## 2020-03-09 PROCEDURE — 73502 X-RAY EXAM HIP UNI 2-3 VIEWS: CPT | Mod: 26,LT

## 2020-03-09 PROCEDURE — 99285 EMERGENCY DEPT VISIT HI MDM: CPT

## 2020-03-09 PROCEDURE — 71045 X-RAY EXAM CHEST 1 VIEW: CPT | Mod: 26

## 2020-03-09 PROCEDURE — 99223 1ST HOSP IP/OBS HIGH 75: CPT | Mod: AI

## 2020-03-09 RX ORDER — NADOLOL 80 MG/1
40 TABLET ORAL DAILY
Refills: 0 | Status: DISCONTINUED | OUTPATIENT
Start: 2020-03-09 | End: 2020-03-12

## 2020-03-09 RX ORDER — LIDOCAINE 4 G/100G
1 CREAM TOPICAL ONCE
Refills: 0 | Status: COMPLETED | OUTPATIENT
Start: 2020-03-09 | End: 2020-03-09

## 2020-03-09 RX ORDER — POTASSIUM CHLORIDE 20 MEQ
20 PACKET (EA) ORAL
Refills: 0 | Status: COMPLETED | OUTPATIENT
Start: 2020-03-09 | End: 2020-03-10

## 2020-03-09 RX ORDER — PANTOPRAZOLE SODIUM 20 MG/1
40 TABLET, DELAYED RELEASE ORAL
Refills: 0 | Status: DISCONTINUED | OUTPATIENT
Start: 2020-03-09 | End: 2020-03-12

## 2020-03-09 RX ORDER — ACETAMINOPHEN 500 MG
650 TABLET ORAL EVERY 6 HOURS
Refills: 0 | Status: DISCONTINUED | OUTPATIENT
Start: 2020-03-09 | End: 2020-03-12

## 2020-03-09 RX ORDER — ASPIRIN/CALCIUM CARB/MAGNESIUM 324 MG
81 TABLET ORAL DAILY
Refills: 0 | Status: DISCONTINUED | OUTPATIENT
Start: 2020-03-09 | End: 2020-03-12

## 2020-03-09 RX ORDER — SODIUM CHLORIDE 9 MG/ML
1000 INJECTION INTRAMUSCULAR; INTRAVENOUS; SUBCUTANEOUS
Refills: 0 | Status: DISCONTINUED | OUTPATIENT
Start: 2020-03-09 | End: 2020-03-12

## 2020-03-09 RX ORDER — HEPARIN SODIUM 5000 [USP'U]/ML
5000 INJECTION INTRAVENOUS; SUBCUTANEOUS EVERY 8 HOURS
Refills: 0 | Status: DISCONTINUED | OUTPATIENT
Start: 2020-03-09 | End: 2020-03-12

## 2020-03-09 RX ORDER — ALLOPURINOL 300 MG
100 TABLET ORAL
Refills: 0 | Status: DISCONTINUED | OUTPATIENT
Start: 2020-03-09 | End: 2020-03-12

## 2020-03-09 RX ORDER — LIDOCAINE 4 G/100G
1 CREAM TOPICAL DAILY
Refills: 0 | Status: DISCONTINUED | OUTPATIENT
Start: 2020-03-09 | End: 2020-03-12

## 2020-03-09 RX ORDER — LEVOTHYROXINE SODIUM 125 MCG
125 TABLET ORAL DAILY
Refills: 0 | Status: DISCONTINUED | OUTPATIENT
Start: 2020-03-09 | End: 2020-03-12

## 2020-03-09 RX ORDER — TRAMADOL HYDROCHLORIDE 50 MG/1
50 TABLET ORAL THREE TIMES A DAY
Refills: 0 | Status: DISCONTINUED | OUTPATIENT
Start: 2020-03-09 | End: 2020-03-12

## 2020-03-09 RX ORDER — CEPHALEXIN 500 MG
250 CAPSULE ORAL DAILY
Refills: 0 | Status: DISCONTINUED | OUTPATIENT
Start: 2020-03-09 | End: 2020-03-12

## 2020-03-09 RX ADMIN — HEPARIN SODIUM 5000 UNIT(S): 5000 INJECTION INTRAVENOUS; SUBCUTANEOUS at 22:53

## 2020-03-09 RX ADMIN — Medication 1 TABLET(S): at 20:20

## 2020-03-09 RX ADMIN — SODIUM CHLORIDE 100 MILLILITER(S): 9 INJECTION INTRAMUSCULAR; INTRAVENOUS; SUBCUTANEOUS at 20:20

## 2020-03-09 RX ADMIN — NADOLOL 40 MILLIGRAM(S): 80 TABLET ORAL at 22:54

## 2020-03-09 RX ADMIN — Medication 650 MILLIGRAM(S): at 20:40

## 2020-03-09 RX ADMIN — Medication 650 MILLIGRAM(S): at 19:50

## 2020-03-09 RX ADMIN — TRAMADOL HYDROCHLORIDE 50 MILLIGRAM(S): 50 TABLET ORAL at 22:53

## 2020-03-09 RX ADMIN — LIDOCAINE 1 PATCH: 4 CREAM TOPICAL at 19:50

## 2020-03-09 RX ADMIN — Medication 100 MILLIGRAM(S): at 20:19

## 2020-03-09 RX ADMIN — LIDOCAINE 1 PATCH: 4 CREAM TOPICAL at 12:41

## 2020-03-09 RX ADMIN — Medication 20 MILLIEQUIVALENT(S): at 22:53

## 2020-03-09 NOTE — ED PROVIDER NOTE - CHPI ED SYMPTOMS NEG
no bowel dysfunction/no weakness in extremities, no LOC, no N/V/D, no dysuria, no confusion/no bladder dysfunction

## 2020-03-09 NOTE — H&P ADULT - NSHPLABSRESULTS_GEN_ALL_CORE
13.8   15.39 )-----------( 254      ( 09 Mar 2020 14:13 )             39.9     03-09    124<L>  |  85<L>  |  23  ----------------------------<  131<H>  3.9   |  25  |  0.87    Ca    9.4      09 Mar 2020 14:13    TPro  7.6  /  Alb  4.0  /  TBili  0.4  /  DBili  x   /  AST  34  /  ALT  18  /  AlkPhos  78  03-09      CXR: personally reviewed by me: clear lungs    xray hip pelvis lumbar spine; Preservation of the vertebral body heights. Mild dextroconvexity of the lumbar spine. No listhesis. Multilevel loss of intervertebral disc height with osteophyte formation. Left hip arthroplasty visualized. No periprosthetic fracture. Mild to moderate right hip arthrosis. Surgical clips overlie the pelvis.

## 2020-03-09 NOTE — ED PROVIDER NOTE - PHYSICAL EXAMINATION
GEN: NAD, gross morbidly obese, awake, eyes open spontaneously  HEENT: NCAT, MMM, Trachea midline, normal conjunctiva, perrl  CHEST/LUNGS: Non-tachypneic, CTAB, bilateral breath sounds  CARDIAC: Non-tachycardic, normal perfusion  ABDOMEN: Soft, NTND, No rebound/guarding  MSK: No edema, no gross deformity of extremities, TTP over left paraspinal area, FROM actively and passively w/o tenderness  SKIN: No rashes, no petechiae, no vesicles  NEURO: CN grossly intact, normal coordination, no focal motor or sensory deficits  PSYCH: Alert, appropriate, cooperative, with capacity and insight

## 2020-03-09 NOTE — ED ADULT NURSE NOTE - OBJECTIVE STATEMENT
87 yo female hx htn/hypothroidism c/o L lower back pain and hip pain s/p fall 1 week ago. Patient reports she lost balance while standing at counter and fell onto L side. Patient reports taking tylenol/motrin for the past week, worsening today. Patient ambulates at baseline with walker, has been more difficult. Patient denies SOB, CP, nvd, fever/chills, dysuria/hematuria, LOC, use of blood thinners. Patient A&Ox3, breathing spontaneously, airway patent, bl clear lungs, abdomen nontender, +pulses, cap refill <2 seconds. Patient resting in bed, side rails up, plan of care explained.

## 2020-03-09 NOTE — ED PROVIDER NOTE - ATTENDING CONTRIBUTION TO CARE
MD Khan:  patient seen and evaluated with the resident.  I was present for key portions of the History & Physical, and I agree with the Impression & Plan.  MD Khan:  86F, c/o lower back pain s/p mechanical fall onto her R buttock area 1wk ago.  Pain has been getting worse every day since she fell.  Intensity: 9/10, cannot ambulate due to the pain.    Associated Sx:  No weakness/numbness, no bowel/bladder incontinence, no urinary hesitancy, no saddle anaesthesia, no CP/SOB, no abdominal pain, & no fever/chills.    VS: wnl.  Physical Exam: adult F, uncomfortable-appearing, mild distress, obese, NCAT, PERRL, EOMI, neck supple, CTA B, RRR, Abd: s/nd/nt, Ext: no edema.  Spine: no midline pain, no step-offs, + R lumbar paraspinal muscle spasm.    Strength in BLE 5/5. unable to assess gait 2/2 pain.    Impression:  lumbar back pain more consistent with sciatica w/o H&P features of a more serious etiology such as cord injury, epidural abscess, SHAHID, Ao pathology, ACS, or acute intraabdominal pathology.  Hx of mech fall onto bottom 1wk ago concerning for compression Fx with worsening radicular pain.    Plan: Lumbar xray, pain control with NSAIDs, +/-muscle relaxants.

## 2020-03-09 NOTE — ED PROVIDER NOTE - CLINICAL SUMMARY MEDICAL DECISION MAKING FREE TEXT BOX
Shukri PGY-2:  87yo F here with inability to ambulate 2/2 back pain, doubt fracture no focal TTP over back, will obtain xray lumbar spine, pain meds and admit for rehab Impression:  lumbar back pain more consistent with sciatica w/o H&P features of a more serious etiology such as cord injury, epidural abscess, SHAHID, Ao pathology, ACS, or acute intraabdominal pathology.  Hx of mech fall onto bottom 1wk ago concerning for compression Fx with worsening radicular pain.    Plan: Lumbar xray, pain control with NSAIDs, +/-muscle relaxants.  Possible admit for rehab/PT/Placement.

## 2020-03-09 NOTE — CHART NOTE - NSCHARTNOTEFT_GEN_A_CORE
Chemistry results    03-09    129<L>  |  88<L>  |  21  ----------------------------<  138<H>  2.8<LL>   |  27  |  0.85    Ca    9.6      09 Mar 2020 21:58    TPro  7.6  /  Alb  4.0  /  TBili  0.4  /  DBili  x   /  AST  34  /  ALT  18  /  AlkPhos  78  03-09    Discussed above with results with Dr. Fiore nephrologist    Plan  decrease Normal saline to 50ml/hr  give potassium 20mg times 3 dose tonight    Will endorse to primary day team, attending to follow    Krystal Qiuñones NP  [facility] [address]

## 2020-03-09 NOTE — H&P ADULT - HISTORY OF PRESENT ILLNESS
85 y/o f w / PMH of ileostomy colectomy, alexandrea-rectal fistula, HTN, hypothyroidism p/w back pain for one week, worsening progressively each day.  Pt took on a fall last week at a [facility], with no head trauma, no loss of consciousness.  At first she had no pain, but over the next few days, she noticed worsening back pain, decreased mobility, and eventually limited ambulation.  She has had back pain in the past, for which tylenol has been enough, but this time, her pain has not resolved with tylenol.  The pain is nonraidtaing, and she does not have any shooting pain down her legs.  She dneies any numbness or paresthesias.  She has no dysurea or frequency.  She has had decreased po intake over the last few days, and feels dehydrated, weak, and fatigued.    In the ED, xrays were negative for a fracture, and a lidoderm patch was applied with some pain relief.  She was fouND to have a Na level of 124.

## 2020-03-09 NOTE — DISCHARGE NOTE PROVIDER - HOSPITAL COURSE
Re: Botox approved    Approval rec'd for  from [facility] via [address]    Botox  Auth#  [id number] approved through 06/09/2020 by [employer]  Botox 24749 no PA required. ELLEN Mccoy ph [phone]. 85 year old woman with PMH HTN and hypothyroidism p/w as she was  sent in by podiatrist for OM confirmed as outpatient.      s/p  for Left 1st ray amputation--DOS 2/27/19. RX with Iv abx, seen by with ID and podiatry.            Problem/Plan - 1:    ·  Problem: LUIS (acute kidney injury).  Plan: likely sec to [place] and vanc, given IVF, down trending today, will need follow up BMP and mG to be chceked at [facility] in 3 days to see Creat trend and further management per attending at [facility]        Problem/Plan - 2:    ·  Problem: Osteomyelitis of left foot, unspecified type.  Plan: s/p   Left 1st ray amputation--DOS 2/27/19.     repeat Bone cx NGTD    initial bone Cx -MRSA    Analgesia PRN    completed Iv abx course per ID,     podiatry following. Cleared for dc from ID and Podiatry.        Problem/Plan - 3:    ·  Problem: Essential hypertension.  Plan: stable     Continue BB    DASH diet.         Problem/Plan - 4:    ·  Problem: Acquired hypothyroidism.  Plan: Continue Synthroid.         Problem/Plan - 5:    ·  Problem: Chronic gout of multiple sites, unspecified cause.  Plan: Continue home meds, monitor creat closely.        Problem/Plan - 6:    Problem: Skin ulcer of right heel, limited to breakdown of skin. Plan: eschars at the posterior aspect of the heel bilaterally with no signs of infection    seen by Podiatry for wound care.        Wound care per POdiatry:    Change bilateral dressings daily. right foot to have santyl applied to heel with DSD. Left foot DSD to incision site, with santyl to left heel and covered w/ DSD.     Pt can weight bear as tolerated to left heel w/ post-op Darco shoe    Follow up w/ Dr. Beasley at the wound care center, call [phone] to schedule an appt        Pt being d/c to [facility] for PT/ Rehab.

## 2020-03-09 NOTE — H&P ADULT - NSHPPHYSICALEXAM_GEN_ALL_CORE
VITAL SIGNS:    Vital Signs Last 24 Hrs  T(C): 36.6 (09 Mar 2020 17:00), Max: 36.8 (09 Mar 2020 12:16)  T(F): 97.8 (09 Mar 2020 17:00), Max: 98.2 (09 Mar 2020 12:16)  HR: 61 (09 Mar 2020 17:00) (59 - 61)  BP: 145/75 (09 Mar 2020 17:00) (145/75 - 156/91)  BP(mean): --  RR: 18 (09 Mar 2020 17:00) (18 - 18)  SpO2: 97% (09 Mar 2020 17:00) (94% - 97%)    PHYSICAL EXAM:     GENERAL: no acute distress  HEENT: NC/AT, EOMI, neck supple, MMM  RESPIRATORY: LCTAB/L, no rhonchi, rales, or wheezing  CARDIOVASCULAR: RRR, no murmurs, gallops, rubs  ABDOMINAL: soft, non-tender, non-distended, positive bowel sounds   EXTREMITIES: no clubbing, cyanosis, or edema  NEUROLOGICAL: alert and oriented x 3, non-focal  SKIN: no rashes or lesions   MUSCULOSKELETAL: + paraspinal tenderness

## 2020-03-09 NOTE — H&P ADULT - PROBLEM SELECTOR PLAN 2
Pt w/ Na of 124, and BUN/cr ratio > 20, pt also reports poor po intake over the last week, especially in the last ocuple of days  - IVF NS started, no known history of CHF  - will check BMP tonight, and again in am  - care discussed with medicine NP, who will have renal consult called, and will f/u on renal recs Pt w/ Na of 124, and BUN/cr ratio > 20, pt also reports poor po intake over the last week, especially in the last couple of days  - IVF NS started, no known history of CHF  - will check BMP tonight, and again in am  - care discussed with medicine NP, who will have renal consult called, and will f/u on renal recs

## 2020-03-09 NOTE — H&P ADULT - NSICDXPASTSURGICALHX_GEN_ALL_CORE_FT
PAST SURGICAL HISTORY:  S/P arthroscopy of right knee     S/P cholecystectomy at age 20 ,s    S/P foot joint surgery right ( 10 years ago )    S/P ileostomy colectomy ( 2005 )

## 2020-03-09 NOTE — H&P ADULT - PROBLEM SELECTOR PLAN 1
Pt with Pt with low back pain; pelvic and lumbar xrays negative for fracture or dislocation; if pain worsens, or ambulation does not improve, consider CT imaging?  - Pain control with lidoderm patch, tramadol, tylenol prn; pt's family says to avoid flexeril, and would like ot avoid narcotics  - PT eval, family prefers to bring pt home for PT instead of rehab Pt with low back pain; pelvic and lumbar xrays negative for fracture or dislocation; if pain worsens, or ambulation does not improve, consider CT imaging?  - Pain control with lidoderm patch, tramadol, tylenol prn; pt's family says to avoid flexeril, and would like ot avoid narcotics  - PT eval, family prefers to bring pt home for PT instead of rehab  - Leukocytosis thought TO be acute phase reactant, pt is afebrile, CXR is clear, and pt dawson snot have any urinary symptoms, but will monitor WBC closely, and CBC with diff ordered for tomorrow

## 2020-03-09 NOTE — ED ADULT NURSE NOTE - NSIMPLEMENTINTERV_GEN_ALL_ED
Implemented All Fall with Harm Risk Interventions:  [place] to call system. Call bell, personal items and telephone within reach. Instruct patient to call for assistance. Room bathroom lighting operational. Non-slip footwear when patient is off stretcher. Physically safe environment: no spills, clutter or unnecessary equipment. Stretcher in lowest position, wheels locked, appropriate side rails in place. Provide visual cue, wrist band, yellow gown, etc. Monitor gait and stability. Monitor for mental status changes and reorient to person, place, and time. Review medications for side effects contributing to fall risk. Reinforce activity limits and safety measures with patient and family. Provide visual clues: red socks.

## 2020-03-09 NOTE — H&P ADULT - ASSESSMENT
87 y/o f w / PMH of ileostomy colectomy, alexandrea-rectal fistula, HTN, hypothyroidism p/w back pain, difficulty ambulating, after a fall one week ago

## 2020-03-09 NOTE — ED PROVIDER NOTE - OBJECTIVE STATEMENT
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 Normally ambulates with a walker and assistance of 1 aide. Unsure if she hit her head at the time of fall. Denies use of blood thinners. Further denies weakness in extremities, bladder/bowel incontinence, LOC, N/V/D, fever, chills, dysuria, or confusion. Last took Tylenol at about 11am today. 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 Normally ambulates with a walker and assistance of 1 aide. Denies use of blood thinners, denies LOC, denies head trauma.  Further denies weakness in extremities, bladder/bowel incontinence, LOC, N/V/D, fever, chills, dysuria, or confusion.   Last took Tylenol at about 11am today.

## 2020-03-09 NOTE — H&P ADULT - NSHPREVIEWOFSYSTEMS_GEN_ALL_CORE
MUSCULOSKELETAL: + Back pain    CONSTITUTIONAL: No weakness, fevers or chills  EYES/ENT: No visual changes, no throat pain   RESPIRATORY: No cough, wheezing, hemoptysis; No shortness of breath  CARDIOVASCULAR: No chest pain or palpitations  GASTROINTESTINAL: No abdominal, nausea, vomiting, or hematemesis; No diarrhea or constipation. No melena or hematochezia.  GENITOURINARY: No dysuria, frequency or hematuria  NEUROLOGICAL: No dizziness, numbness, or weakness  SKIN: No itching, burning, rashes, or lesions   ALLERGY: No seasonal allergies, no rhinorrhea MUSCULOSKELETAL: + Back pain, + Difficulty ambulating  CONSTITUTIONAL: + Weakness, No fevers or chills    EYES/ENT: No visual changes, no throat pain   RESPIRATORY: No cough, wheezing, hemoptysis; No shortness of breath  CARDIOVASCULAR: No chest pain or palpitations  GASTROINTESTINAL: No abdominal, nausea, vomiting, or hematemesis; No diarrhea or constipation. No melena or hematochezia.  GENITOURINARY: No dysuria, frequency or hematuria  NEUROLOGICAL: No dizziness, numbness, or weakness  SKIN: No itching, burning, rashes, or lesions   ALLERGY: No seasonal allergies, no rhinorrhea

## 2020-03-10 LAB
ANION GAP SERPL CALC-SCNC: 11 MMOL/L — SIGNIFICANT CHANGE UP (ref 5–17)
ANION GAP SERPL CALC-SCNC: 12 MMOL/L — SIGNIFICANT CHANGE UP (ref 5–17)
APPEARANCE UR: CLEAR — SIGNIFICANT CHANGE UP
BASOPHILS # BLD AUTO: 0.03 K/UL — SIGNIFICANT CHANGE UP (ref 0–0.2)
BASOPHILS NFR BLD AUTO: 0.3 % — SIGNIFICANT CHANGE UP (ref 0–2)
BILIRUB UR-MCNC: NEGATIVE — SIGNIFICANT CHANGE UP
BUN SERPL-MCNC: 20 MG/DL — SIGNIFICANT CHANGE UP (ref 7–23)
BUN SERPL-MCNC: 22 MG/DL — SIGNIFICANT CHANGE UP (ref 7–23)
CALCIUM SERPL-MCNC: 9.2 MG/DL — SIGNIFICANT CHANGE UP (ref 8.4–10.5)
CALCIUM SERPL-MCNC: 9.7 MG/DL — SIGNIFICANT CHANGE UP (ref 8.4–10.5)
CHLORIDE SERPL-SCNC: 91 MMOL/L — LOW (ref 96–108)
CHLORIDE SERPL-SCNC: 96 MMOL/L — SIGNIFICANT CHANGE UP (ref 96–108)
CHLORIDE UR-SCNC: <35 MMOL/L — SIGNIFICANT CHANGE UP
CO2 SERPL-SCNC: 25 MMOL/L — SIGNIFICANT CHANGE UP (ref 22–31)
CO2 SERPL-SCNC: 29 MMOL/L — SIGNIFICANT CHANGE UP (ref 22–31)
COLOR SPEC: SIGNIFICANT CHANGE UP
CREAT ?TM UR-MCNC: 42 MG/DL — SIGNIFICANT CHANGE UP
CREAT SERPL-MCNC: 0.91 MG/DL — SIGNIFICANT CHANGE UP (ref 0.5–1.3)
CREAT SERPL-MCNC: 0.94 MG/DL — SIGNIFICANT CHANGE UP (ref 0.5–1.3)
DIFF PNL FLD: ABNORMAL
EOSINOPHIL # BLD AUTO: 0.09 K/UL — SIGNIFICANT CHANGE UP (ref 0–0.5)
EOSINOPHIL NFR BLD AUTO: 0.9 % — SIGNIFICANT CHANGE UP (ref 0–6)
GLUCOSE SERPL-MCNC: 153 MG/DL — HIGH (ref 70–99)
GLUCOSE SERPL-MCNC: 188 MG/DL — HIGH (ref 70–99)
GLUCOSE UR QL: NEGATIVE — SIGNIFICANT CHANGE UP
HCT VFR BLD CALC: 37.6 % — SIGNIFICANT CHANGE UP (ref 34.5–45)
HGB BLD-MCNC: 12.9 G/DL — SIGNIFICANT CHANGE UP (ref 11.5–15.5)
IMM GRANULOCYTES NFR BLD AUTO: 0.7 % — SIGNIFICANT CHANGE UP (ref 0–1.5)
KETONES UR-MCNC: NEGATIVE — SIGNIFICANT CHANGE UP
LEUKOCYTE ESTERASE UR-ACNC: ABNORMAL
LYMPHOCYTES # BLD AUTO: 2.98 K/UL — SIGNIFICANT CHANGE UP (ref 1–3.3)
LYMPHOCYTES # BLD AUTO: 28.2 % — SIGNIFICANT CHANGE UP (ref 13–44)
MAGNESIUM SERPL-MCNC: 1.8 MG/DL — SIGNIFICANT CHANGE UP (ref 1.6–2.6)
MCHC RBC-ENTMCNC: 31.2 PG — SIGNIFICANT CHANGE UP (ref 27–34)
MCHC RBC-ENTMCNC: 34.3 GM/DL — SIGNIFICANT CHANGE UP (ref 32–36)
MCV RBC AUTO: 90.8 FL — SIGNIFICANT CHANGE UP (ref 80–100)
MONOCYTES # BLD AUTO: 1 K/UL — HIGH (ref 0–0.9)
MONOCYTES NFR BLD AUTO: 9.5 % — SIGNIFICANT CHANGE UP (ref 2–14)
NEUTROPHILS # BLD AUTO: 6.38 K/UL — SIGNIFICANT CHANGE UP (ref 1.8–7.4)
NEUTROPHILS NFR BLD AUTO: 60.4 % — SIGNIFICANT CHANGE UP (ref 43–77)
NITRITE UR-MCNC: NEGATIVE — SIGNIFICANT CHANGE UP
NRBC # BLD: 0 /100 WBCS — SIGNIFICANT CHANGE UP (ref 0–0)
OSMOLALITY SERPL: 277 MOSMOL/KG — LOW (ref 280–301)
OSMOLALITY UR: 205 MOSM/KG — SIGNIFICANT CHANGE UP (ref 50–1200)
OSMOLALITY UR: 206 MOS/KG — LOW (ref 300–900)
PH UR: 6.5 — SIGNIFICANT CHANGE UP (ref 5–8)
PHOSPHATE 24H UR-MCNC: 33.9 MG/DL — SIGNIFICANT CHANGE UP
PHOSPHATE SERPL-MCNC: 3 MG/DL — SIGNIFICANT CHANGE UP (ref 2.5–4.5)
PLATELET # BLD AUTO: 228 K/UL — SIGNIFICANT CHANGE UP (ref 150–400)
POTASSIUM SERPL-MCNC: 3.6 MMOL/L — SIGNIFICANT CHANGE UP (ref 3.5–5.3)
POTASSIUM SERPL-MCNC: 3.7 MMOL/L — SIGNIFICANT CHANGE UP (ref 3.5–5.3)
POTASSIUM SERPL-SCNC: 3.6 MMOL/L — SIGNIFICANT CHANGE UP (ref 3.5–5.3)
POTASSIUM SERPL-SCNC: 3.7 MMOL/L — SIGNIFICANT CHANGE UP (ref 3.5–5.3)
POTASSIUM UR-SCNC: 11 MMOL/L — SIGNIFICANT CHANGE UP
PROT UR-MCNC: NEGATIVE — SIGNIFICANT CHANGE UP
RBC # BLD: 4.14 M/UL — SIGNIFICANT CHANGE UP (ref 3.8–5.2)
RBC # FLD: 13.3 % — SIGNIFICANT CHANGE UP (ref 10.3–14.5)
SODIUM SERPL-SCNC: 131 MMOL/L — LOW (ref 135–145)
SODIUM SERPL-SCNC: 133 MMOL/L — LOW (ref 135–145)
SODIUM UR-SCNC: <35 MMOL/L — SIGNIFICANT CHANGE UP
SP GR SPEC: 1.01 — LOW (ref 1.01–1.02)
TSH SERPL-MCNC: 1.77 UIU/ML — SIGNIFICANT CHANGE UP (ref 0.27–4.2)
UROBILINOGEN FLD QL: NEGATIVE — SIGNIFICANT CHANGE UP
WBC # BLD: 10.55 K/UL — HIGH (ref 3.8–10.5)
WBC # FLD AUTO: 10.55 K/UL — HIGH (ref 3.8–10.5)

## 2020-03-10 RX ORDER — ACETAMINOPHEN 500 MG
1000 TABLET ORAL ONCE
Refills: 0 | Status: COMPLETED | OUTPATIENT
Start: 2020-03-10 | End: 2020-03-10

## 2020-03-10 RX ADMIN — PANTOPRAZOLE SODIUM 40 MILLIGRAM(S): 20 TABLET, DELAYED RELEASE ORAL at 05:13

## 2020-03-10 RX ADMIN — Medication 400 MILLIGRAM(S): at 22:30

## 2020-03-10 RX ADMIN — TRAMADOL HYDROCHLORIDE 50 MILLIGRAM(S): 50 TABLET ORAL at 19:58

## 2020-03-10 RX ADMIN — Medication 100 MILLIGRAM(S): at 17:23

## 2020-03-10 RX ADMIN — LIDOCAINE 1 PATCH: 4 CREAM TOPICAL at 12:19

## 2020-03-10 RX ADMIN — Medication 650 MILLIGRAM(S): at 09:25

## 2020-03-10 RX ADMIN — HEPARIN SODIUM 5000 UNIT(S): 5000 INJECTION INTRAVENOUS; SUBCUTANEOUS at 14:42

## 2020-03-10 RX ADMIN — LIDOCAINE 1 PATCH: 4 CREAM TOPICAL at 00:27

## 2020-03-10 RX ADMIN — Medication 20 MILLIEQUIVALENT(S): at 00:28

## 2020-03-10 RX ADMIN — Medication 650 MILLIGRAM(S): at 08:57

## 2020-03-10 RX ADMIN — NADOLOL 40 MILLIGRAM(S): 80 TABLET ORAL at 05:13

## 2020-03-10 RX ADMIN — Medication 81 MILLIGRAM(S): at 12:17

## 2020-03-10 RX ADMIN — HEPARIN SODIUM 5000 UNIT(S): 5000 INJECTION INTRAVENOUS; SUBCUTANEOUS at 05:13

## 2020-03-10 RX ADMIN — Medication 1 TABLET(S): at 12:18

## 2020-03-10 RX ADMIN — Medication 125 MICROGRAM(S): at 05:13

## 2020-03-10 RX ADMIN — Medication 20 MILLIEQUIVALENT(S): at 02:10

## 2020-03-10 RX ADMIN — Medication 100 MILLIGRAM(S): at 05:13

## 2020-03-10 RX ADMIN — TRAMADOL HYDROCHLORIDE 50 MILLIGRAM(S): 50 TABLET ORAL at 05:13

## 2020-03-10 RX ADMIN — Medication 250 MILLIGRAM(S): at 12:16

## 2020-03-10 RX ADMIN — Medication 1 TABLET(S): at 12:17

## 2020-03-10 RX ADMIN — HEPARIN SODIUM 5000 UNIT(S): 5000 INJECTION INTRAVENOUS; SUBCUTANEOUS at 19:50

## 2020-03-10 RX ADMIN — TRAMADOL HYDROCHLORIDE 50 MILLIGRAM(S): 50 TABLET ORAL at 14:42

## 2020-03-10 RX ADMIN — TRAMADOL HYDROCHLORIDE 50 MILLIGRAM(S): 50 TABLET ORAL at 15:02

## 2020-03-10 NOTE — PROGRESS NOTE ADULT - PROBLEM SELECTOR PLAN 2
Pt w/ Na of 124, and BUN/cr ratio > 20, pt also reports poor po intake over the last week, especially in the last couple of days  cont NS- sodium improving- monitor  \

## 2020-03-10 NOTE — PHYSICAL THERAPY INITIAL EVALUATION ADULT - PERTINENT HX OF CURRENT PROBLEM, REHAB EVAL
87 y/o F with PMH of ileostomy colectomy, alexandrea-rectal fistula, HTN, hypothyroidism p/w back pain, difficulty ambulating after a fall one week ago. Pt also with hyponatremia. XRAY LUMBAR SPINE/PELVIS/L HIP: no fx, R hip arthrosis.

## 2020-03-10 NOTE — CONSULT NOTE ADULT - ASSESSMENT
87 y/o f w / PMH of ileostomy colectomy, alexandrea-rectal fistula, HTN, hypothyroidism p/w back pain for one week, worsening progressively each day.  Pt took on a fall last week at a [facility], with no head trauma, no loss of consciousness.

## 2020-03-10 NOTE — CONSULT NOTE ADULT - SUBJECTIVE AND OBJECTIVE BOX
[facility] - QUINN Cornejo / QUINN Headley / ABDIAS Greene/ QUINN Fiore/ QUINN Meng/ FANI De León / ANDREY Pham / ELI Taylor  -------------------------------------------------------------------------------------------------------  The patient seen and examined today.  HPI:  87 y/o f w / PMH of ileostomy colectomy, alexandrea-rectal fistula, HTN, hypothyroidism p/w back pain for one week, worsening progressively each day.  Pt took on a fall last week at a [facility], with no head trauma, no loss of consciousness.  At first she had no pain, but over the next few days, she noticed worsening back pain, decreased mobility, and eventually limited ambulation.  She has had back pain in the past, for which tylenol has been enough, but this time, her pain has not resolved with tylenol.  The pain is nonraidtaing, and she does not have any shooting pain down her legs.  She dneies any numbness or paresthesias.  She has no dysurea or frequency.  She has had decreased po intake over the last few days, and feels dehydrated, weak, and fatigued.    In the ED, xrays were negative for a fracture, and a lidoderm patch was applied with some pain relief.  She was fouND to have a Na level of 124. (09 Mar 2020 17:45)      PAST MEDICAL & SURGICAL HISTORY:  Fistula, perirectal  Hiatal hernia  Hypothyroidism  Hypertension  S/P foot joint surgery: right ( 10 years ago )  S/P arthroscopy of right knee  S/P ileostomy: colectomy (  )  S/P cholecystectomy: at age 20 ,s    Allergies :- iodine (Other; Anaphylaxis)  shellfish (Anaphylaxis)  sulfa drugs (Other)    Home Medications Reviewed  Hospital Medications:   MEDICATIONS  (STANDING):  allopurinol 100 milliGRAM(s) Oral two times a day  aspirin  chewable 81 milliGRAM(s) Oral daily  calcium carbonate 1250 mG  + Vitamin D (OsCal 500 + D) 1 Tablet(s) Oral daily  cephalexin 250 milliGRAM(s) Oral daily  heparin  Injectable 5000 Unit(s) SubCutaneous every 8 hours  levothyroxine 125 MICROGram(s) Oral daily  lidocaine   Patch 1 Patch Transdermal daily  multivitamin 1 Tablet(s) Oral daily  nadolol 40 milliGRAM(s) Oral daily  pantoprazole    Tablet 40 milliGRAM(s) Oral before breakfast  sodium chloride 0.9%. 1000 milliLiter(s) (50 mL/Hr) IV Continuous <Continuous>  traMADol 50 milliGRAM(s) Oral three times a day    SOCIAL HISTORY:  Denies ETOh,Smoking,   FAMILY HISTORY:  No pertinent family history      REVIEW OF SYSTEMS:  CONSTITUTIONAL: Weakness +  EYES/ENT: No visual changes;  No vertigo or throat pain   NECK: No pain or stiffness  RESPIRATORY: No cough, wheezing, hemoptysis; No shortness of breath  CARDIOVASCULAR: No chest pain or palpitations.  GASTROINTESTINAL: No abdominal or epigastric pain. No nausea, vomiting, or hematemesis; No diarrhea or constipation. No melena or hematochezia.  GENITOURINARY: No dysuria, frequency, foamy urine, urinary urgency, incontinence or hematuria  NEUROLOGICAL: Back pain and fall +  SKIN: No itching, burning, rashes, or lesions   VASCULAR: No bilateral lower extremity edema.   All other review of systems is negative unless indicated above.    VITALS:  T(F): 97.4 (03-10-20 @ 08:41), Max: 97.9 (20 @ 19:35)  HR: 52 (03-10-20 @ 08:41)  BP: 159/68 (03-10-20 @ 08:41)  RR: 18 (03-10-20 @ 08:41)  SpO2: 93% (03-10-20 @ 08:41)  Wt(kg): --     @ 07:01  -  03-10 @ 07:00  --------------------------------------------------------  IN: 1277 mL / OUT: 70 mL / NET: 1207 mL    03-10 @ 07:01  -  03-10 @ 14:40  --------------------------------------------------------  IN: 0 mL / OUT: 400 mL / NET: -400 mL      Height (cm): 165.1 ( @ 19:35)  Weight (kg): 85.5 ( @ 19:35)  BMI (kg/m2): 31.4 ( @ 19:35)  BSA (m2): 1.93 ( @ 19:35)    PHYSICAL EXAM:  Constitutional: NAD  HEENT: anicteric sclera, oropharynx clear, MMM  Neck: supple.   Respiratory: Bilateral equal breath sounds , no wheezes, no crackles  Cardiovascular: S1, S2, Regular, Murmur present.  Gastrointestinal: Bowel Sound present, soft, NT/ND  Extremities: No cyanosis or clubbing. No peripheral edema  Neurological: Alert and oriented x 3, no focal deficits  Psychiatric: Normal mood, normal affect  : No CVA tenderness. No palafox.   Skin: No rashes    Data:  03-10    131<L>  |  91<L>  |  20  ----------------------------<  153<H>  3.6   |  29  |  0.91    Ca    9.7      10 Mar 2020 07:10  Phos  3.0     03-10  Mg     1.8     0310    TPro  7.6  /  Alb  4.0  /  TBili  0.4  /  DBili      /  AST  34  /  ALT  18  /  AlkPhos  78  03-09    Creatinine Trend: 0.91 <--, 0.85 <--, 0.87 <--                        12.9   10.55 )-----------( 228      ( 10 Mar 2020 07:10 )             37.6     Urine Studies:  Urinalysis Basic - ( 10 Mar 2020 12:13 )    Color: Light Yellow / Appearance: Clear / S.009 / pH:   Gluc:  / Ketone: Negative  / Bili: Negative / Urobili: Negative   Blood:  / Protein: Negative / Nitrite: Negative   Leuk Esterase: Small / RBC: 1 /hpf / WBC 4 /HPF   Sq Epi:  / Non Sq Epi: 1 /hpf / Bacteria: Negative      Osmolality, Random Urine: 206 mos/kg (03-10 @ 12:13)  Sodium, Random Urine: <35 mmol/L (03-10 @ :13)  Chloride, Random Urine: <35 mmol/L (03-10 @ :13)  Potassium, Random Urine: 11 mmol/L (03-10 @ 12:13)  Creatinine, Random Urine: 42 mg/dL (03-10 @ 12:13)

## 2020-03-10 NOTE — PHYSICAL THERAPY INITIAL EVALUATION ADULT - DISCHARGE DISPOSITION, PT EVAL
Subacute Rehab. Pt prefers home. *IF pt goes home recommend home PT to maximize functional status and assist with ALL ADLs and functional mobility.

## 2020-03-10 NOTE — PHYSICAL THERAPY INITIAL EVALUATION ADULT - LEVEL OF INDEPENDENCE: STAND/SIT, REHAB EVAL
moderate assist (50% patients effort)
deep reflexes intact/alert and oriented x 3/responds to verbal commands/sensation intact/cranial nerves intact/superficial reflexes intact/normal strength/no spontaneous movement/responds to pain

## 2020-03-10 NOTE — PROGRESS NOTE ADULT - SUBJECTIVE AND OBJECTIVE BOX
Patient is a 86y old  Female who presents with a chief complaint of back pain (09 Mar 2020 17:45)      INTERVAL HPI/OVERNIGHT EVENTS: feels well,   denies cp/sob/abd pain/n/v/d  improved po intake      Vital Signs Last 24 Hrs  T(C): 36.3 (10 Mar 2020 08:41), Max: 36.6 (09 Mar 2020 17:00)  T(F): 97.4 (10 Mar 2020 08:41), Max: 97.9 (09 Mar 2020 19:35)  HR: 52 (10 Mar 2020 08:41) (52 - 61)  BP: 159/68 (10 Mar 2020 08:41) (122/66 - 159/68)  BP(mean): --  RR: 18 (10 Mar 2020 08:41) (18 - 18)  SpO2: 93% (10 Mar 2020 08:41) (93% - 97%)    acetaminophen   Tablet .. 650 milliGRAM(s) Oral every 6 hours PRN  allopurinol 100 milliGRAM(s) Oral two times a day  aspirin  chewable 81 milliGRAM(s) Oral daily  calcium carbonate 1250 mG  + Vitamin D (OsCal 500 + D) 1 Tablet(s) Oral daily  cephalexin 250 milliGRAM(s) Oral daily  heparin  Injectable 5000 Unit(s) SubCutaneous every 8 hours  levothyroxine 125 MICROGram(s) Oral daily  lidocaine   Patch 1 Patch Transdermal daily  multivitamin 1 Tablet(s) Oral daily  nadolol 40 milliGRAM(s) Oral daily  pantoprazole    Tablet 40 milliGRAM(s) Oral before breakfast  sodium chloride 0.9%. 1000 milliLiter(s) IV Continuous <Continuous>  traMADol 50 milliGRAM(s) Oral three times a day      PHYSICAL EXAM:  GENERAL: NAD,   EYES: conjunctiva and sclera clear  ENMT: Moist mucous membranes  NECK: Supple, No JVD, Normal thyroid  NERVOUS SYSTEM:  Alert & Oriented X,   CHEST/LUNG: Clear to auscultation bilaterally; No rales, rhonchi, wheezing, or rubs  HEART: Regular rate and rhythm; No murmurs, rubs, or gallops  ABDOMEN: Soft, Nontender, Nondistended; Bowel sounds present, illeostomy  EXTREMITIES:  2+ Peripheral Pulses, No clubbing, cyanosis, or edema  LYMPH: No lymphadenopathy noted  SKIN: No rashes or lesions    Consultant(s) Notes Reviewed:  [x ] YES  [ ] NO  Care Discussed with Consultants/Other Providers [ x] YES  [ ] NO    LABS:                        12.9   10.55 )-----------( 228      ( 10 Mar 2020 07:10 )             37.6     03-10    131<L>  |  91<L>  |  20  ----------------------------<  153<H>  3.6   |  29  |  0.91    Ca    9.7      10 Mar 2020 07:10  Phos  3.0     03-10  Mg     1.8     03-10    TPro  7.6  /  Alb  4.0  /  TBili  0.4  /  DBili  x   /  AST  34  /  ALT  18  /  AlkPhos  78  03-09      Urinalysis Basic - ( 10 Mar 2020 12:13 )    Color: Light Yellow / Appearance: Clear / S.009 / pH: x  Gluc: x / Ketone: Negative  / Bili: Negative / Urobili: Negative   Blood: x / Protein: Negative / Nitrite: Negative   Leuk Esterase: Small / RBC: 1 /hpf / WBC 4 /HPF   Sq Epi: x / Non Sq Epi: 1 /hpf / Bacteria: Negative      CAPILLARY BLOOD GLUCOSE            Urinalysis Basic - ( 10 Mar 2020 12:13 )    Color: Light Yellow / Appearance: Clear / S.009 / pH: x  Gluc: x / Ketone: Negative  / Bili: Negative / Urobili: Negative   Blood: x / Protein: Negative / Nitrite: Negative   Leuk Esterase: Small / RBC: 1 /hpf / WBC 4 /HPF   Sq Epi: x / Non Sq Epi: 1 /hpf / Bacteria: Negative          RADIOLOGY & ADDITIONAL TESTS:    Imaging Personally Reviewed:  [x ] YES  [ ] NO

## 2020-03-10 NOTE — PHYSICAL THERAPY INITIAL EVALUATION ADULT - GENERAL OBSERVATIONS, REHAB EVAL
Pt zan 35 min eval fair. Pt s/p recent fall, a/w LBP and hyponatremia. Pt rec'd in bed, premedicated, NAD. Pt A&Ox4, agreed to session, required encouragement during session. Pt reported feeling "tired" throughout session. PT reviewed spinal precautions.

## 2020-03-10 NOTE — PROGRESS NOTE ADULT - PROBLEM SELECTOR PLAN 1
recent mech fall  Pt with low back pain; pelvic and lumbar xrays negative for fracture or dislocation; if pain worsens, or ambulation does not improve, consider CT imaging?  - Pain control with lidoderm patch, tramadol, tylenol prn; pt's family says to avoid flexeril, and would like ot avoid narcotics  - PT eval, family prefers to bring pt home for PT instead of rehab  - Leukocytosis thought TO be acute phase reactant, pt is afebrile, CXR is clear, and pt dawson snot have any urinary symptoms, but will monitor WBC closely, and CBC with diff ordered for tomorrow

## 2020-03-10 NOTE — PHYSICAL THERAPY INITIAL EVALUATION ADULT - BALANCE TRAINING, PT EVAL
GOAL: Pt will increase static/dynamic sitting and standing balance by at least 1/2 grade within 2 weeks

## 2020-03-10 NOTE — CONSULT NOTE ADULT - PROBLEM SELECTOR RECOMMENDATION 9
Low UOsm and Low Rafat, likely mixed etiology  Serum sodium improving with IVF  Continue IFV same rate for now  Daily BMP  Avoid overcorrection of sodium by >12mEq in 24hrs

## 2020-03-10 NOTE — PHYSICAL THERAPY INITIAL EVALUATION ADULT - DID THE PATIENT HAVE SURGERY?
n/a
PHYSICAL EXAM:      Constitutional: Well built, NAD, AAOx3    Eyes: No pallor or icterus.     ENMT: Normal.    Neck: Supple    Breasts:    Back: Normal.    Respiratory: B/L A/E present, no wheezing or rhonchi    Cardiovascular: S1 and S2+, no m, r, g.    Gastrointestinal: Soft, BS+, no organomegaly    Genitourinary: Deferred.    Rectal: Deferred.    Extremities: No C/C/E.    Vascular: Pedal Pulses equal     Neurological: AAOx3, No FND    Skin: No rash or wounds    Lymph Nodes: Not palpable    Musculoskeletal: No joint defromity    Psychiatric: No agitation noted.

## 2020-03-10 NOTE — PHYSICAL THERAPY INITIAL EVALUATION ADULT - ADDITIONAL COMMENTS
Pt states she lives in a private home with 2 steps to enter (+handrail), no steps inside. Pt states she has 24/7 HHA to assist with ADLs and IADLs. Pt states she owns a RW.

## 2020-03-10 NOTE — PHYSICAL THERAPY INITIAL EVALUATION ADULT - PLANNED THERAPY INTERVENTIONS, PT EVAL
bed mobility training/gait training/strengthening/balance training/transfer training/GOAL: Pt will negotiate 2 steps with unilateral handrail in a step-to pattern and CGA within 2 weeks

## 2020-03-11 ENCOUNTER — TRANSCRIPTION ENCOUNTER (OUTPATIENT)
Age: 85
End: 2020-03-11

## 2020-03-11 LAB
ANION GAP SERPL CALC-SCNC: 11 MMOL/L — SIGNIFICANT CHANGE UP (ref 5–17)
BASOPHILS # BLD AUTO: 0.04 K/UL — SIGNIFICANT CHANGE UP (ref 0–0.2)
BASOPHILS NFR BLD AUTO: 0.5 % — SIGNIFICANT CHANGE UP (ref 0–2)
BUN SERPL-MCNC: 22 MG/DL — SIGNIFICANT CHANGE UP (ref 7–23)
CALCIUM SERPL-MCNC: 9.6 MG/DL — SIGNIFICANT CHANGE UP (ref 8.4–10.5)
CHLORIDE SERPL-SCNC: 96 MMOL/L — SIGNIFICANT CHANGE UP (ref 96–108)
CO2 SERPL-SCNC: 27 MMOL/L — SIGNIFICANT CHANGE UP (ref 22–31)
CREAT SERPL-MCNC: 0.91 MG/DL — SIGNIFICANT CHANGE UP (ref 0.5–1.3)
EOSINOPHIL # BLD AUTO: 0.19 K/UL — SIGNIFICANT CHANGE UP (ref 0–0.5)
EOSINOPHIL NFR BLD AUTO: 2.2 % — SIGNIFICANT CHANGE UP (ref 0–6)
GLUCOSE SERPL-MCNC: 128 MG/DL — HIGH (ref 70–99)
HCT VFR BLD CALC: 37.6 % — SIGNIFICANT CHANGE UP (ref 34.5–45)
HGB BLD-MCNC: 12.9 G/DL — SIGNIFICANT CHANGE UP (ref 11.5–15.5)
IMM GRANULOCYTES NFR BLD AUTO: 0.5 % — SIGNIFICANT CHANGE UP (ref 0–1.5)
LYMPHOCYTES # BLD AUTO: 2.7 K/UL — SIGNIFICANT CHANGE UP (ref 1–3.3)
LYMPHOCYTES # BLD AUTO: 31.4 % — SIGNIFICANT CHANGE UP (ref 13–44)
MCHC RBC-ENTMCNC: 31.8 PG — SIGNIFICANT CHANGE UP (ref 27–34)
MCHC RBC-ENTMCNC: 34.3 GM/DL — SIGNIFICANT CHANGE UP (ref 32–36)
MCV RBC AUTO: 92.6 FL — SIGNIFICANT CHANGE UP (ref 80–100)
MONOCYTES # BLD AUTO: 0.7 K/UL — SIGNIFICANT CHANGE UP (ref 0–0.9)
MONOCYTES NFR BLD AUTO: 8.1 % — SIGNIFICANT CHANGE UP (ref 2–14)
NEUTROPHILS # BLD AUTO: 4.92 K/UL — SIGNIFICANT CHANGE UP (ref 1.8–7.4)
NEUTROPHILS NFR BLD AUTO: 57.3 % — SIGNIFICANT CHANGE UP (ref 43–77)
NRBC # BLD: 0 /100 WBCS — SIGNIFICANT CHANGE UP (ref 0–0)
PLATELET # BLD AUTO: 223 K/UL — SIGNIFICANT CHANGE UP (ref 150–400)
POTASSIUM SERPL-MCNC: 3.5 MMOL/L — SIGNIFICANT CHANGE UP (ref 3.5–5.3)
POTASSIUM SERPL-SCNC: 3.5 MMOL/L — SIGNIFICANT CHANGE UP (ref 3.5–5.3)
RBC # BLD: 4.06 M/UL — SIGNIFICANT CHANGE UP (ref 3.8–5.2)
RBC # FLD: 13.4 % — SIGNIFICANT CHANGE UP (ref 10.3–14.5)
SODIUM SERPL-SCNC: 134 MMOL/L — LOW (ref 135–145)
WBC # BLD: 8.59 K/UL — SIGNIFICANT CHANGE UP (ref 3.8–10.5)
WBC # FLD AUTO: 8.59 K/UL — SIGNIFICANT CHANGE UP (ref 3.8–10.5)

## 2020-03-11 RX ADMIN — NADOLOL 40 MILLIGRAM(S): 80 TABLET ORAL at 05:57

## 2020-03-11 RX ADMIN — Medication 125 MICROGRAM(S): at 05:57

## 2020-03-11 RX ADMIN — LIDOCAINE 1 PATCH: 4 CREAM TOPICAL at 19:00

## 2020-03-11 RX ADMIN — Medication 250 MILLIGRAM(S): at 11:13

## 2020-03-11 RX ADMIN — Medication 650 MILLIGRAM(S): at 04:47

## 2020-03-11 RX ADMIN — LIDOCAINE 1 PATCH: 4 CREAM TOPICAL at 05:39

## 2020-03-11 RX ADMIN — LIDOCAINE 1 PATCH: 4 CREAM TOPICAL at 11:13

## 2020-03-11 RX ADMIN — Medication 1 TABLET(S): at 11:12

## 2020-03-11 RX ADMIN — PANTOPRAZOLE SODIUM 40 MILLIGRAM(S): 20 TABLET, DELAYED RELEASE ORAL at 05:57

## 2020-03-11 RX ADMIN — Medication 100 MILLIGRAM(S): at 05:58

## 2020-03-11 RX ADMIN — HEPARIN SODIUM 5000 UNIT(S): 5000 INJECTION INTRAVENOUS; SUBCUTANEOUS at 21:34

## 2020-03-11 RX ADMIN — Medication 650 MILLIGRAM(S): at 11:44

## 2020-03-11 RX ADMIN — Medication 100 MILLIGRAM(S): at 21:34

## 2020-03-11 RX ADMIN — TRAMADOL HYDROCHLORIDE 50 MILLIGRAM(S): 50 TABLET ORAL at 05:57

## 2020-03-11 RX ADMIN — Medication 650 MILLIGRAM(S): at 11:14

## 2020-03-11 RX ADMIN — TRAMADOL HYDROCHLORIDE 50 MILLIGRAM(S): 50 TABLET ORAL at 21:33

## 2020-03-11 RX ADMIN — Medication 81 MILLIGRAM(S): at 11:12

## 2020-03-11 RX ADMIN — TRAMADOL HYDROCHLORIDE 50 MILLIGRAM(S): 50 TABLET ORAL at 13:52

## 2020-03-11 RX ADMIN — HEPARIN SODIUM 5000 UNIT(S): 5000 INJECTION INTRAVENOUS; SUBCUTANEOUS at 05:58

## 2020-03-11 RX ADMIN — HEPARIN SODIUM 5000 UNIT(S): 5000 INJECTION INTRAVENOUS; SUBCUTANEOUS at 13:54

## 2020-03-11 NOTE — DISCHARGE NOTE PROVIDER - NSDCCPCAREPLAN_GEN_ALL_CORE_FT
PRINCIPAL DISCHARGE DIAGNOSIS  Diagnosis: Hyponatremia  Assessment and Plan of Treatment:       SECONDARY DISCHARGE DIAGNOSES  Diagnosis: Hypothyroidism  Assessment and Plan of Treatment: Please continue your medications    Diagnosis: Back pain  Assessment and Plan of Treatment: HOME CARE INSTRUCTIONS  For many people, back pain returns. Since low back pain is rarely dangerous, it is often a condition that people can learn to manage on their own   Remain active. It is stressful on the back to sit or  one place. Do not sit, drive, or  one place for more than 30 minutes at a time. Take short walks on level surfaces as soon as pain allows. Try to increase the length of time you walk each day.  Do not stay in bed. Resting more than 1 or 2 days can delay your recovery.  Do not avoid exercise or work. Your body is made to move. It is not dangerous to be active, even though your back may hurt. Your back will likely heal faster if you return to being active before your pain is gone.   Only take over-the-counter or prescription medicines as directed by your caregiver. Over-the-counter medicines to reduce pain and inflammation are often the most helpful.  Your caregiver may prescribe muscle relaxant drugs. These medicines help dull your pain so you can more quickly return to your normal activities and healthy exercise.  Avoid feeling anxious or stressed. Stress increases muscle tension and can worsen back pain. It is important to recognize when you are anxious or stressed and learn ways to manage it. Exercise is a great option.  SEEK MEDICAL CARE IF:  You have pain that is not relieved with rest or medicine.  You have pain that does not improve in 1 week.  You have new symptoms.  You are generally not feeling well.  SEEK IMMEDIATE MEDICAL CARE IF:  You have pain that radiates from your back into your legs.  You develop new bowel or bladder control problems.  You have unusual weakness or numbness in your arms or legs.  You develop nausea or vomiting.  You develop abdominal pain.  You feel faint.      Diagnosis: Hypertension  Assessment and Plan of Treatment: Continue to follow a low salt/sodium diet.  Perform physical activities as tolerated in consultation with your Primary Care Provider and physical therapist.  Take all medications as prescribed.  Follow up with your medical doctor for routine blood pressure monitoring at your next visit.  Notify your doctor if you have any of the following symptoms:  Dizziness, lightheadedness, blurry vision, headache, chest pain, or shortness of breath. PRINCIPAL DISCHARGE DIAGNOSIS  Diagnosis: Hyponatremia  Assessment and Plan of Treatment: improved, follow up with PCP in 1-2 weeks      SECONDARY DISCHARGE DIAGNOSES  Diagnosis: Leukocytosis  Assessment and Plan of Treatment: resolved    Diagnosis: Back pain  Assessment and Plan of Treatment: HOME CARE INSTRUCTIONS  For many people, back pain returns. Since low back pain is rarely dangerous, it is often a condition that people can learn to manage on their own   Remain active. It is stressful on the back to sit or  one place. Do not sit, drive, or  one place for more than 30 minutes at a time. Take short walks on level surfaces as soon as pain allows. Try to increase the length of time you walk each day.  Do not stay in bed. Resting more than 1 or 2 days can delay your recovery.  Do not avoid exercise or work. Your body is made to move. It is not dangerous to be active, even though your back may hurt. Your back will likely heal faster if you return to being active before your pain is gone.   Only take over-the-counter or prescription medicines as directed by your caregiver. Over-the-counter medicines to reduce pain and inflammation are often the most helpful.  Your caregiver may prescribe muscle relaxant drugs. These medicines help dull your pain so you can more quickly return to your normal activities and healthy exercise.  Avoid feeling anxious or stressed. Stress increases muscle tension and can worsen back pain. It is important to recognize when you are anxious or stressed and learn ways to manage it. Exercise is a great option.  SEEK MEDICAL CARE IF:  You have pain that is not relieved with rest or medicine.  You have pain that does not improve in 1 week.  You have new symptoms.  You are generally not feeling well.  SEEK IMMEDIATE MEDICAL CARE IF:  You have pain that radiates from your back into your legs.  You develop new bowel or bladder control problems.  You have unusual weakness or numbness in your arms or legs.  You develop nausea or vomiting.  You develop abdominal pain.  You feel faint.  cont tylenol, tramadol, lidocaine patch and talk to Physical therapy.    Diagnosis: Hypothyroidism  Assessment and Plan of Treatment: Please continue your medications    Diagnosis: Hypertension  Assessment and Plan of Treatment: Continue to follow a low salt/sodium diet.  Perform physical activities as tolerated in consultation with your Primary Care Provider and physical therapist.  Take all medications as prescribed.  Follow up with your medical doctor for routine blood pressure monitoring at your next visit.  Notify your doctor if you have any of the following symptoms:  Dizziness, lightheadedness, blurry vision, headache, chest pain, or shortness of breath.  cont current home meds

## 2020-03-11 NOTE — DISCHARGE NOTE PROVIDER - CARE PROVIDER_API CALL
Nic Hilliard  PCP  Phone: [phone]  Fax: (   )    -  Follow Up Time:     Andrea Fiore)  Internal Medicine  [address] [place] [address]  Phone: [phone]  Fax: [phone]  Follow Up Time:

## 2020-03-11 NOTE — DISCHARGE NOTE PROVIDER - HOSPITAL COURSE
85 y/o f w / PMH of ileostomy colectomy, alexandrea-rectal fistula, HTN, hypothyroidism p/w back pain for one week, worsening progressively each day.  Pt took on a fall last week at a [facility], with no head trauma, no loss of consciousness.  At first she had no pain, but over the next few days, she noticed worsening back pain, decreased mobility, and eventually limited ambulation.  She has had back pain in the past, for which tylenol has been enough, but this time, her pain has not resolved with tylenol.  The pain is nonraidtaing, and she does not have any shooting pain down her legs.  She dneies any numbness or paresthesias.  She has no dysurea or frequency.  She has had decreased po intake over the last few days, and feels dehydrated, weak, and fatigued.        In the ED, xrays were negative for a fracture, and a lidoderm patch was applied with some pain relief.  She was fouND to have a Na level of 124.         Patient was admitted for further management.  Xrays performed did not reveal any acute fractures.  Renal was consulted for hyponatremia.  Patient received IVF and sodium improved to 134.  Pain was managed appropriately.        Discharge/dispo discussed with attending.    Patient cleared for discharge home with home PT. 87 y/o f w / PMH of ileostomy colectomy, alexandrea-rectal fistula, HTN, hypothyroidism p/w back pain for one week, worsening progressively each day.  Pt took on a fall last week at a [facility], with no head trauma, no loss of consciousness.  At first she had no pain, but over the next few days, she noticed worsening back pain, decreased mobility, and eventually limited ambulation.  She has had back pain in the past, for which tylenol has been enough, but this time, her pain has not resolved with tylenol.  The pain is nonraidtaing, and she does not have any shooting pain down her legs.  She dneies any numbness or paresthesias.  She has no dysurea or frequency.  She has had decreased po intake over the last few days, and feels dehydrated, weak, and fatigued.        In the ED, xrays were negative for a fracture, and a lidoderm patch was applied with some pain relief.  She was fouND to have a Na level of 124.         Patient was admitted for further management.  Xrays performed did not reveal any acute fractures.  Renal was consulted for hyponatremia.  Patient received IVF and sodium improved to 134.  Pain was managed appropriately.    She was seen by renal and PT.        Discharge/dispo discussed with attending.    Patient cleared for discharge home with home PT. 87 y/o f w / PMH of ileostomy colectomy, alexandrea-rectal fistula, HTN, hypothyroidism p/w back pain for one week, worsening progressively each day.  Pt took on a fall last week at a [facility], with no head trauma, no loss of consciousness.  At first she had no pain, but over the next few days, she noticed worsening back pain, decreased mobility, and eventually limited ambulation.  She has had back pain in the past, for which tylenol has been enough, but this time, her pain has not resolved with tylenol.  The pain is nonraidtaing, and she does not have any shooting pain down her legs.  She dneies any numbness or paresthesias.  She has no dysurea or frequency.  She has had decreased po intake over the last few days, and feels dehydrated, weak, and fatigued.        In the ED, xrays were negative for a fracture, and a lidoderm patch was applied with some pain relief.  She was fouND to have a Na level of 124.         Patient was admitted for further management.  Xrays performed did not reveal any acute fractures.  Renal was consulted for hyponatremia.  Patient received IVF and sodium improved to 134.  Pain was managed appropriately.    She was seen by renal and PT. Hemodynamically stable to be discharged home today, spoke to Attending.        Discharge/dispo discussed with attending.    Patient cleared for discharge home with home PT. 85 y/o f w / PMH of ileostomy colectomy, alexandrea-rectal fistula, HTN, hypothyroidism p/w back pain for one week, worsening progressively each day.  Pt took on a fall last week at a [facility], with no head trauma, no loss of consciousness.  At first she had no pain, but over the next few days, she noticed worsening back pain, decreased mobility, and eventually limited ambulation.  She has had back pain in the past, for which tylenol has been enough, but this time, her pain has not resolved with tylenol.  The pain is nonraidtaing, and she does not have any shooting pain down her legs.  She dneies any numbness or paresthesias.  She has no dysurea or frequency.  She has had decreased po intake over the last few days, and feels dehydrated, weak, and fatigued.        In the ED, xrays were negative for a fracture, and a lidoderm patch was applied with some pain relief.  She was fouND to have a Na level of 124.         Patient was admitted for further management.  Xrays performed did not reveal any acute fractures.  Renal was consulted for hyponatremia.  Patient received IVF and sodium improved to 134.  Pain was managed appropriately.    She was seen by renal and PT. Hemodynamically stable to be discharged home today, spoke to Attending, spoke to daughter and CM.        Discharge/dispo discussed with attending.    Patient cleared for discharge home with home PT.

## 2020-03-11 NOTE — PROGRESS NOTE ADULT - ASSESSMENT
85 y/o f w / PMH of ileostomy colectomy, alexandrea-rectal fistula, HTN, hypothyroidism p/w back pain, difficulty ambulating, after a fall one week ago

## 2020-03-11 NOTE — PROGRESS NOTE ADULT - SUBJECTIVE AND OBJECTIVE BOX
Patient seen and examined  no complaints    iodine (Other; Anaphylaxis)  shellfish (Anaphylaxis)  sulfa drugs (Other)    Hospital Medications:   MEDICATIONS  (STANDING):  allopurinol 100 milliGRAM(s) Oral two times a day  aspirin  chewable 81 milliGRAM(s) Oral daily  calcium carbonate 1250 mG  + Vitamin D (OsCal 500 + D) 1 Tablet(s) Oral daily  cephalexin 250 milliGRAM(s) Oral daily  heparin  Injectable 5000 Unit(s) SubCutaneous every 8 hours  levothyroxine 125 MICROGram(s) Oral daily  lidocaine   Patch 1 Patch Transdermal daily  multivitamin 1 Tablet(s) Oral daily  nadolol 40 milliGRAM(s) Oral daily  pantoprazole    Tablet 40 milliGRAM(s) Oral before breakfast  sodium chloride 0.9%. 1000 milliLiter(s) (50 mL/Hr) IV Continuous <Continuous>  traMADol 50 milliGRAM(s) Oral three times a day        VITALS:  T(F): 97.6 (20 @ 08:05), Max: 97.8 (03-10-20 @ 16:48)  HR: 53 (20 @ 08:05)  BP: 164/83 (20 @ 08:05)  RR: 18 (20 @ 08:05)  SpO2: 95% (20 @ 08:05)  Wt(kg): --    03-10 @ 07:01  -   @ 07:00  --------------------------------------------------------  IN: 280 mL / OUT: 400 mL / NET: -120 mL     @ 07:01  -   @ 13:28  --------------------------------------------------------  IN: 220 mL / OUT: 200 mL / NET: 20 mL        PHYSICAL EXAM:  Constitutional: NAD  HEENT: anicteric sclera, oropharynx clear, MMM  Neck: No JVD  Respiratory: CTAB, no wheezes, rales or rhonchi  Cardiovascular: S1, S2, RRR  Gastrointestinal: BS+, soft, NT/ND  Extremities: No cyanosis or clubbing. No peripheral edema  Neurological: A/O x 3, no focal deficits  Psychiatric: Normal mood, normal affect      LABS:      134<L>  |  96  |  22  ----------------------------<  128<H>  3.5   |  27  |  0.91    Ca    9.6      11 Mar 2020 07:53  Phos  3.0     03-10  Mg     1.8     03-10    TPro  7.6  /  Alb  4.0  /  TBili  0.4  /  DBili      /  AST  34  /  ALT  18  /  AlkPhos  78      Creatinine Trend: 0.91 <--, 0.94 <--, 0.91 <--, 0.85 <--, 0.87 <--                        12.9   8.59  )-----------( 223      ( 11 Mar 2020 07:53 )             37.6     Urine Studies:  Urinalysis Basic - ( 10 Mar 2020 12:13 )    Color: Light Yellow / Appearance: Clear / S.009 / pH:   Gluc:  / Ketone: Negative  / Bili: Negative / Urobili: Negative   Blood:  / Protein: Negative / Nitrite: Negative   Leuk Esterase: Small / RBC: 1 /hpf / WBC 4 /HPF   Sq Epi:  / Non Sq Epi: 1 /hpf / Bacteria: Negative      Osmolality, Random Urine: 205 mosm/Kg (03-10 @ 13:43)  Osmolality, Random Urine: 206 mos/kg (03-10 @ 12:13)  Sodium, Random Urine: <35 mmol/L (03-10 @ 12:13)  Chloride, Random Urine: <35 mmol/L (03-10 @ 12:13)  Potassium, Random Urine: 11 mmol/L (03-10 @ 12:13)  Creatinine, Random Urine: 42 mg/dL (03-10 @ 12:13)    RADIOLOGY & ADDITIONAL STUDIES:

## 2020-03-11 NOTE — PROGRESS NOTE ADULT - ASSESSMENT
85 y/o f w / PMH of ileostomy colectomy, alexandrea-rectal fistula, HTN, hypothyroidism p/w back pain for one week, worsening progressively each day.  Pt took on a fall last week at a [facility], with no head trauma, no loss of consciousness.

## 2020-03-11 NOTE — DISCHARGE NOTE PROVIDER - NSDCMRMEDTOKEN_GEN_ALL_CORE_FT
acetaminophen 325 mg oral tablet: 2 tab(s) orally every 6 hours, As needed, Temp greater or equal to 38C (100.4F), Mild Pain (1 - 3)  allopurinol 100 mg oral tablet: 1 tab(s) orally 2 times a day  aspirin 81 mg oral delayed release tablet: 1 tab(s) orally once a day  Calcium 600+D oral tablet: 1 tab(s) orally once a day  carvedilol 25 mg oral tablet: 1 tab(s) orally every 12 hours  cephalexin 250 mg oral tablet: 1 tab(s) orally once a day  colchicine 0.6 mg oral tablet: 1 tab(s) orally once a day  collagenase 250 units/g topical ointment: 1 application topically once a day  ferrous sulfate 325 mg (65 mg elemental iron) oral tablet: 1 tab(s) orally once a day   magnesium oxide 400 mg (241.3 mg elemental magnesium) oral tablet: 1 tab(s) orally 2 times a day (with meals) x 3 days.  Multiple Vitamins oral tablet: 1 tab(s) orally once a day  nadolol 40 mg oral tablet: 1 tab(s) orally once a day  nystatin 100,000 units/g topical powder: Apply topically to affected area 2 times a day, As Needed  oxycodone-acetaminophen 5 mg-325 mg oral tablet: 1 tab(s) orally every 4 hours, As needed, Moderate Pain (4 - 6)  pantoprazole 40 mg oral delayed release tablet: 1 tab(s) orally once a day  Synthroid 125 mcg (0.125 mg) oral tablet: 1 tab(s) orally once a day  Synthroid 50 mcg (0.05 mg) oral tablet: 1 tab(s) orally once a day acetaminophen 325 mg oral tablet: 2 tab(s) orally every 6 hours, As needed, Temp greater or equal to 38C (100.4F), Mild Pain (1 - 3)  allopurinol 100 mg oral tablet: 1 tab(s) orally 2 times a day  aspirin 81 mg oral delayed release tablet: 1 tab(s) orally once a day  Calcium 600+D oral tablet: 1 tab(s) orally once a day  cephalexin 250 mg oral tablet: 1 tab(s) orally once a day  ferrous sulfate 325 mg (65 mg elemental iron) oral tablet: 1 tab(s) orally once a day   lidocaine 5% topical film: Apply topically to affected area once a day  Multiple Vitamins oral tablet: 1 tab(s) orally once a day  nadolol 40 mg oral tablet: 1 tab(s) orally once a day  pantoprazole 40 mg oral delayed release tablet: 1 tab(s) orally once a day  Synthroid 50 mcg (0.05 mg) oral tablet: 1 tab(s) orally once a day  traMADol 50 mg oral tablet: 1 tab(s) orally 3 times a day MDD:3

## 2020-03-11 NOTE — PROGRESS NOTE ADULT - SUBJECTIVE AND OBJECTIVE BOX
Patient is a 86y old  Female who presents with a chief complaint of back pain (11 Mar 2020 19:28)      INTERVAL HPI/OVERNIGHT EVENTS: feels well  improved po intake  no n/v/d/abd pain      Vital Signs Last 24 Hrs  T(C): 36.7 (11 Mar 2020 15:58), Max: 36.7 (11 Mar 2020 15:58)  T(F): 98.1 (11 Mar 2020 15:58), Max: 98.1 (11 Mar 2020 15:58)  HR: 58 (11 Mar 2020 15:58) (53 - 58)  BP: 151/80 (11 Mar 2020 15:58) (148/85 - 164/83)  BP(mean): --  RR: 18 (11 Mar 2020 15:58) (18 - 18)  SpO2: 95% (11 Mar 2020 15:58) (95% - 95%)    acetaminophen   Tablet .. 650 milliGRAM(s) Oral every 6 hours PRN  allopurinol 100 milliGRAM(s) Oral two times a day  aspirin  chewable 81 milliGRAM(s) Oral daily  calcium carbonate 1250 mG  + Vitamin D (OsCal 500 + D) 1 Tablet(s) Oral daily  cephalexin 250 milliGRAM(s) Oral daily  heparin  Injectable 5000 Unit(s) SubCutaneous every 8 hours  levothyroxine 125 MICROGram(s) Oral daily  lidocaine   Patch 1 Patch Transdermal daily  multivitamin 1 Tablet(s) Oral daily  nadolol 40 milliGRAM(s) Oral daily  pantoprazole    Tablet 40 milliGRAM(s) Oral before breakfast  sodium chloride 0.9%. 1000 milliLiter(s) IV Continuous <Continuous>  traMADol 50 milliGRAM(s) Oral three times a day      PHYSICAL EXAM:  GENERAL: NAD,   EYES: conjunctiva and sclera clear  ENMT: Moist mucous membranes  NECK: Supple, No JVD, Normal thyroid  NERVOUS SYSTEM:  Alert & Oriented X,   CHEST/LUNG: Clear to auscultation bilaterally; No rales, rhonchi, wheezing, or rubs  HEART: Regular rate and rhythm; No murmurs, rubs, or gallops  ABDOMEN: Soft, Nontender, Nondistended; Bowel sounds present  EXTREMITIES:  2+ Peripheral Pulses, No clubbing, cyanosis, or edema  LYMPH: No lymphadenopathy noted  SKIN: No rashes or lesions    Consultant(s) Notes Reviewed:  [x ] YES  [ ] NO  Care Discussed with Consultants/Other Providers [ x] YES  [ ] NO    LABS:                        12.9   8.59  )-----------( 223      ( 11 Mar 2020 07:53 )             37.6     03-11    134<L>  |  96  |  22  ----------------------------<  128<H>  3.5   |  27  |  0.91    Ca    9.6      11 Mar 2020 07:53  Phos  3.0     03-10  Mg     1.8     03-10        Urinalysis Basic - ( 10 Mar 2020 12:13 )    Color: Light Yellow / Appearance: Clear / S.009 / pH: x  Gluc: x / Ketone: Negative  / Bili: Negative / Urobili: Negative   Blood: x / Protein: Negative / Nitrite: Negative   Leuk Esterase: Small / RBC: 1 /hpf / WBC 4 /HPF   Sq Epi: x / Non Sq Epi: 1 /hpf / Bacteria: Negative      CAPILLARY BLOOD GLUCOSE            Urinalysis Basic - ( 10 Mar 2020 12:13 )    Color: Light Yellow / Appearance: Clear / S.009 / pH: x  Gluc: x / Ketone: Negative  / Bili: Negative / Urobili: Negative   Blood: x / Protein: Negative / Nitrite: Negative   Leuk Esterase: Small / RBC: 1 /hpf / WBC 4 /HPF   Sq Epi: x / Non Sq Epi: 1 /hpf / Bacteria: Negative          RADIOLOGY & ADDITIONAL TESTS:    Imaging Personally Reviewed:  [x ] YES  [ ] NO

## 2020-03-11 NOTE — PROGRESS NOTE ADULT - PROBLEM SELECTOR PLAN 1
recent mech fall  Pt with low back pain; pelvic and lumbar xrays negative for fracture or dislocation; if pain worsens, or ambulation does not improve, consider CT imaging?  - Pain control with lidoderm patch, tramadol, tylenol prn; pt's family says to avoid flexeril, and would like ot avoid narcotics  - PT eval, family prefers to bring pt home for PT instead of rehab  - Leukocytosis resolved-thought TO be acute phase reactant, pt is afebrile, CXR is clear, and pt dawson snot have any urinary symptoms,

## 2020-03-11 NOTE — DISCHARGE NOTE PROVIDER - PROVIDER TOKENS
FREE:[LAST:[Babak],FIRST:[Nic],PHONE:[phone]],FAX:[(   [phone]    -],ADDRESS:[PCP]],PROVIDER:[TOKEN:[[id number]]]

## 2020-03-12 ENCOUNTER — TRANSCRIPTION ENCOUNTER (OUTPATIENT)
Age: 85
End: 2020-03-12

## 2020-03-12 VITALS
OXYGEN SATURATION: 98 % | TEMPERATURE: 98 F | SYSTOLIC BLOOD PRESSURE: 184 MMHG | HEART RATE: 57 BPM | DIASTOLIC BLOOD PRESSURE: 91 MMHG | RESPIRATION RATE: 18 BRPM

## 2020-03-12 DIAGNOSIS — D72.829 ELEVATED WHITE BLOOD CELL COUNT, UNSPECIFIED: ICD-10-CM

## 2020-03-12 LAB
ANION GAP SERPL CALC-SCNC: 13 MMOL/L — SIGNIFICANT CHANGE UP (ref 5–17)
BUN SERPL-MCNC: 24 MG/DL — HIGH (ref 7–23)
CALCIUM SERPL-MCNC: 9.6 MG/DL — SIGNIFICANT CHANGE UP (ref 8.4–10.5)
CHLORIDE SERPL-SCNC: 98 MMOL/L — SIGNIFICANT CHANGE UP (ref 96–108)
CO2 SERPL-SCNC: 27 MMOL/L — SIGNIFICANT CHANGE UP (ref 22–31)
CREAT SERPL-MCNC: 0.94 MG/DL — SIGNIFICANT CHANGE UP (ref 0.5–1.3)
GLUCOSE SERPL-MCNC: 118 MG/DL — HIGH (ref 70–99)
POTASSIUM SERPL-MCNC: 4.1 MMOL/L — SIGNIFICANT CHANGE UP (ref 3.5–5.3)
POTASSIUM SERPL-SCNC: 4.1 MMOL/L — SIGNIFICANT CHANGE UP (ref 3.5–5.3)
SODIUM SERPL-SCNC: 138 MMOL/L — SIGNIFICANT CHANGE UP (ref 135–145)

## 2020-03-12 PROCEDURE — 83735 ASSAY OF MAGNESIUM: CPT

## 2020-03-12 PROCEDURE — 99238 HOSP IP/OBS DSCHRG MGMT 30/<: CPT

## 2020-03-12 PROCEDURE — 85027 COMPLETE CBC AUTOMATED: CPT

## 2020-03-12 PROCEDURE — 84105 ASSAY OF URINE PHOSPHORUS: CPT

## 2020-03-12 PROCEDURE — 80048 BASIC METABOLIC PNL TOTAL CA: CPT

## 2020-03-12 PROCEDURE — 97530 THERAPEUTIC ACTIVITIES: CPT

## 2020-03-12 PROCEDURE — 84100 ASSAY OF PHOSPHORUS: CPT

## 2020-03-12 PROCEDURE — 73502 X-RAY EXAM HIP UNI 2-3 VIEWS: CPT

## 2020-03-12 PROCEDURE — 99285 EMERGENCY DEPT VISIT HI MDM: CPT

## 2020-03-12 PROCEDURE — 83930 ASSAY OF BLOOD OSMOLALITY: CPT

## 2020-03-12 PROCEDURE — 71045 X-RAY EXAM CHEST 1 VIEW: CPT

## 2020-03-12 PROCEDURE — 84443 ASSAY THYROID STIM HORMONE: CPT

## 2020-03-12 PROCEDURE — 81001 URINALYSIS AUTO W/SCOPE: CPT

## 2020-03-12 PROCEDURE — 84300 ASSAY OF URINE SODIUM: CPT

## 2020-03-12 PROCEDURE — 97116 GAIT TRAINING THERAPY: CPT

## 2020-03-12 PROCEDURE — 82436 ASSAY OF URINE CHLORIDE: CPT

## 2020-03-12 PROCEDURE — 82570 ASSAY OF URINE CREATININE: CPT

## 2020-03-12 PROCEDURE — 80053 COMPREHEN METABOLIC PANEL: CPT

## 2020-03-12 PROCEDURE — 84133 ASSAY OF URINE POTASSIUM: CPT

## 2020-03-12 PROCEDURE — 93005 ELECTROCARDIOGRAM TRACING: CPT

## 2020-03-12 PROCEDURE — 84550 ASSAY OF BLOOD/URIC ACID: CPT

## 2020-03-12 PROCEDURE — 83935 ASSAY OF URINE OSMOLALITY: CPT

## 2020-03-12 PROCEDURE — 97161 PT EVAL LOW COMPLEX 20 MIN: CPT

## 2020-03-12 PROCEDURE — 72100 X-RAY EXAM L-S SPINE 2/3 VWS: CPT

## 2020-03-12 RX ORDER — LIDOCAINE 4 G/100G
1 CREAM TOPICAL
Qty: 5 | Refills: 0
Start: 2020-03-12 | End: 2020-03-16

## 2020-03-12 RX ORDER — NYSTATIN CREAM 100000 [USP'U]/G
1 CREAM TOPICAL
Qty: 0 | Refills: 0 | DISCHARGE

## 2020-03-12 RX ORDER — COLCHICINE 0.6 MG
1 TABLET ORAL
Qty: 0 | Refills: 0 | DISCHARGE

## 2020-03-12 RX ORDER — TRAMADOL HYDROCHLORIDE 50 MG/1
1 TABLET ORAL
Qty: 15 | Refills: 0
Start: 2020-03-12 | End: 2020-03-16

## 2020-03-12 RX ORDER — LEVOTHYROXINE SODIUM 125 MCG
1 TABLET ORAL
Qty: 0 | Refills: 0 | DISCHARGE

## 2020-03-12 RX ADMIN — HEPARIN SODIUM 5000 UNIT(S): 5000 INJECTION INTRAVENOUS; SUBCUTANEOUS at 13:29

## 2020-03-12 RX ADMIN — LIDOCAINE 1 PATCH: 4 CREAM TOPICAL at 11:59

## 2020-03-12 RX ADMIN — Medication 1 TABLET(S): at 11:59

## 2020-03-12 RX ADMIN — HEPARIN SODIUM 5000 UNIT(S): 5000 INJECTION INTRAVENOUS; SUBCUTANEOUS at 05:54

## 2020-03-12 RX ADMIN — TRAMADOL HYDROCHLORIDE 50 MILLIGRAM(S): 50 TABLET ORAL at 13:29

## 2020-03-12 RX ADMIN — Medication 650 MILLIGRAM(S): at 09:26

## 2020-03-12 RX ADMIN — TRAMADOL HYDROCHLORIDE 50 MILLIGRAM(S): 50 TABLET ORAL at 05:53

## 2020-03-12 RX ADMIN — PANTOPRAZOLE SODIUM 40 MILLIGRAM(S): 20 TABLET, DELAYED RELEASE ORAL at 05:54

## 2020-03-12 RX ADMIN — NADOLOL 40 MILLIGRAM(S): 80 TABLET ORAL at 05:54

## 2020-03-12 RX ADMIN — Medication 250 MILLIGRAM(S): at 11:58

## 2020-03-12 RX ADMIN — Medication 100 MILLIGRAM(S): at 05:54

## 2020-03-12 RX ADMIN — Medication 81 MILLIGRAM(S): at 11:58

## 2020-03-12 RX ADMIN — Medication 125 MICROGRAM(S): at 05:54

## 2020-03-12 NOTE — DISCHARGE NOTE NURSING/CASE MANAGEMENT/SOCIAL WORK - PATIENT PORTAL LINK FT
You can access the FollowMyHealth Patient Portal offered by [facility] by registering at the following website: [email or web address]. By joining [facility]’s FollowMyHealth portal, you will also be able to view your health information using other applications (apps) compatible with our system.

## 2020-10-08 ENCOUNTER — APPOINTMENT (OUTPATIENT)
Dept: OPHTHALMOLOGY | Facility: CLINIC | Age: 85
End: 2020-10-08
Payer: MEDICARE

## 2020-10-08 ENCOUNTER — NON-APPOINTMENT (OUTPATIENT)
Age: 85
End: 2020-10-08

## 2020-10-08 PROCEDURE — 92014 COMPRE OPH EXAM EST PT 1/>: CPT

## 2020-10-08 PROCEDURE — 92015 DETERMINE REFRACTIVE STATE: CPT

## 2021-04-22 NOTE — H&P ADULT - PROBLEM SELECTOR PROBLEM 2
Referred by: Claude Colorado Hyponatremia

## 2021-08-25 NOTE — ED ADULT NURSE NOTE - NS ED NURSE REPORT GIVEN DT
09-Mar-2020 17:55
Implemented All Universal Safety Interventions:  [place] to call system. Call bell, personal items and telephone within reach. Instruct patient to call for assistance. Room bathroom lighting operational. Non-slip footwear when patient is off stretcher. Physically safe environment: no spills, clutter or unnecessary equipment. Stretcher in lowest position, wheels locked, appropriate side rails in place.

## 2022-02-22 NOTE — ED ADULT NURSE NOTE - NSFALLRSKPASTHIST_ED_ALL_ED
Show Aperture Variable?: Yes Number Of Freeze-Thaw Cycles: 2 freeze-thaw cycles Application Tool (Optional): Liquid Nitrogen Sprayer Consent: The patient's consent was obtained including but not limited to risks of crusting, scabbing, blistering, scarring, darker or lighter pigmentary change, recurrence, incomplete removal and infection. Medical Necessity Clause: This procedure was medically necessary because the lesions that were treated were: Spray Paint Text: The liquid nitrogen was applied to the skin utilizing a spray paint frosting technique. Post-Care Instructions: I reviewed with the patient in detail post-care instructions. Patient is to wear sunprotection, and avoid picking at any of the treated lesions. Pt may apply Vaseline to crusted or scabbing areas. Duration Of Freeze Thaw-Cycle (Seconds): 5-10 Medical Necessity Information: It is in your best interest to select a reason for this procedure from the list below. All of these items fulfill various CMS LCD requirements except the new and changing color options. Detail Level: Detailed Render Post-Care Instructions In Note?: no no

## 2022-05-06 NOTE — PATIENT PROFILE ADULT - OVER THE PAST TWO WEEKS, HAVE YOU FELT LITTLE INTEREST OR PLEASURE IN DOING THINGS?
PAST SURGICAL HISTORY:  History of cataract surgery Bilateral    History of tonsillectomy     S/P bilateral hip replacements     
no

## 2022-05-25 NOTE — PROGRESS NOTE ADULT - NS NEC GEN PE MLT EXAM PC
No bruits; no thyromegaly or nodules
No bruits; no thyromegaly or nodules
Follow up with your physicians as needed.

## 2022-06-06 NOTE — PATIENT PROFILE ADULT - NSPROGENPREVTRANSF_GEN_A_NUR
Upon entering room to obtain ua sample from pt, pt asking why she is needing an xray of her stomach. Pt is educated on the need with abd and orders. Pt is stating that she does not want an xray and would like to go home because her girlfriend is in the car with her dog and she \"is not fond of storms\" and \"I would just like to get her home\"  Dr. Fabienne Rocha is notified and updated. Pt was given AMA. Risks were explained and to return if needed, pt verbalized understanding and signed AMA.       [address], [address] [facility]  06/06/22 1920 no

## 2022-08-16 ENCOUNTER — INPATIENT (INPATIENT)
Facility: HOSPITAL | Age: 87
LOS: 6 days | Discharge: NURSING, REHAB OR LONG-TERM CARE | DRG: 467 | End: 2022-08-23
Attending: INTERNAL MEDICINE | Admitting: INTERNAL MEDICINE
Payer: MEDICARE

## 2022-08-16 VITALS
RESPIRATION RATE: 19 BRPM | HEIGHT: 63 IN | HEART RATE: 84 BPM | TEMPERATURE: 100 F | DIASTOLIC BLOOD PRESSURE: 67 MMHG | OXYGEN SATURATION: 96 % | WEIGHT: 184.97 LBS | SYSTOLIC BLOOD PRESSURE: 106 MMHG

## 2022-08-16 DIAGNOSIS — K52.9 NONINFECTIVE GASTROENTERITIS AND COLITIS, UNSPECIFIED: ICD-10-CM

## 2022-08-16 DIAGNOSIS — M10.9 GOUT, UNSPECIFIED: ICD-10-CM

## 2022-08-16 DIAGNOSIS — K21.9 GASTRO-ESOPHAGEAL REFLUX DISEASE WITHOUT ESOPHAGITIS: ICD-10-CM

## 2022-08-16 DIAGNOSIS — Z29.9 ENCOUNTER FOR PROPHYLACTIC MEASURES, UNSPECIFIED: ICD-10-CM

## 2022-08-16 DIAGNOSIS — E03.9 HYPOTHYROIDISM, UNSPECIFIED: ICD-10-CM

## 2022-08-16 DIAGNOSIS — T81.89XA OTHER COMPLICATIONS OF PROCEDURES, NOT ELSEWHERE CLASSIFIED, INITIAL ENCOUNTER: ICD-10-CM

## 2022-08-16 DIAGNOSIS — I10 ESSENTIAL (PRIMARY) HYPERTENSION: ICD-10-CM

## 2022-08-16 DIAGNOSIS — S71.009A UNSPECIFIED OPEN WOUND, UNSPECIFIED HIP, INITIAL ENCOUNTER: ICD-10-CM

## 2022-08-16 LAB
ALBUMIN SERPL ELPH-MCNC: 3.6 G/DL — SIGNIFICANT CHANGE UP (ref 3.3–5)
ALP SERPL-CCNC: 79 U/L — SIGNIFICANT CHANGE UP (ref 40–120)
ALT FLD-CCNC: 13 U/L — SIGNIFICANT CHANGE UP (ref 10–45)
ANION GAP SERPL CALC-SCNC: 16 MMOL/L — SIGNIFICANT CHANGE UP (ref 5–17)
APPEARANCE UR: CLEAR — SIGNIFICANT CHANGE UP
APTT BLD: 34.5 SEC — SIGNIFICANT CHANGE UP (ref 27.5–35.5)
AST SERPL-CCNC: 24 U/L — SIGNIFICANT CHANGE UP (ref 10–40)
BASOPHILS # BLD AUTO: 0.04 K/UL — SIGNIFICANT CHANGE UP (ref 0–0.2)
BASOPHILS NFR BLD AUTO: 0.3 % — SIGNIFICANT CHANGE UP (ref 0–2)
BILIRUB SERPL-MCNC: 0.6 MG/DL — SIGNIFICANT CHANGE UP (ref 0.2–1.2)
BILIRUB UR-MCNC: NEGATIVE — SIGNIFICANT CHANGE UP
BUN SERPL-MCNC: 27 MG/DL — HIGH (ref 7–23)
CALCIUM SERPL-MCNC: 9.7 MG/DL — SIGNIFICANT CHANGE UP (ref 8.4–10.5)
CHLORIDE SERPL-SCNC: 97 MMOL/L — SIGNIFICANT CHANGE UP (ref 96–108)
CO2 SERPL-SCNC: 24 MMOL/L — SIGNIFICANT CHANGE UP (ref 22–31)
COLOR SPEC: YELLOW — SIGNIFICANT CHANGE UP
CREAT SERPL-MCNC: 0.97 MG/DL — SIGNIFICANT CHANGE UP (ref 0.5–1.3)
CRP SERPL-MCNC: 187 MG/L — HIGH (ref 0–4)
DIFF PNL FLD: NEGATIVE — SIGNIFICANT CHANGE UP
EGFR: 56 ML/MIN/1.73M2 — LOW
EOSINOPHIL # BLD AUTO: 0.04 K/UL — SIGNIFICANT CHANGE UP (ref 0–0.5)
EOSINOPHIL NFR BLD AUTO: 0.3 % — SIGNIFICANT CHANGE UP (ref 0–6)
ERYTHROCYTE [SEDIMENTATION RATE] IN BLOOD: 86 MM/HR — HIGH (ref 0–20)
GAS PNL BLDV: SIGNIFICANT CHANGE UP
GLUCOSE SERPL-MCNC: 153 MG/DL — HIGH (ref 70–99)
GLUCOSE UR QL: NEGATIVE — SIGNIFICANT CHANGE UP
HCT VFR BLD CALC: 35.1 % — SIGNIFICANT CHANGE UP (ref 34.5–45)
HGB BLD-MCNC: 11.6 G/DL — SIGNIFICANT CHANGE UP (ref 11.5–15.5)
IMM GRANULOCYTES NFR BLD AUTO: 1 % — SIGNIFICANT CHANGE UP (ref 0–1.5)
INR BLD: 1.38 RATIO — HIGH (ref 0.88–1.16)
KETONES UR-MCNC: NEGATIVE — SIGNIFICANT CHANGE UP
LACTATE SERPL-SCNC: 0.7 MMOL/L — SIGNIFICANT CHANGE UP (ref 0.5–2)
LEUKOCYTE ESTERASE UR-ACNC: NEGATIVE — SIGNIFICANT CHANGE UP
LYMPHOCYTES # BLD AUTO: 19.4 % — SIGNIFICANT CHANGE UP (ref 13–44)
LYMPHOCYTES # BLD AUTO: 3.01 K/UL — SIGNIFICANT CHANGE UP (ref 1–3.3)
MCHC RBC-ENTMCNC: 30.4 PG — SIGNIFICANT CHANGE UP (ref 27–34)
MCHC RBC-ENTMCNC: 33 GM/DL — SIGNIFICANT CHANGE UP (ref 32–36)
MCV RBC AUTO: 91.9 FL — SIGNIFICANT CHANGE UP (ref 80–100)
MONOCYTES # BLD AUTO: 1.33 K/UL — HIGH (ref 0–0.9)
MONOCYTES NFR BLD AUTO: 8.6 % — SIGNIFICANT CHANGE UP (ref 2–14)
NEUTROPHILS # BLD AUTO: 10.97 K/UL — HIGH (ref 1.8–7.4)
NEUTROPHILS NFR BLD AUTO: 70.4 % — SIGNIFICANT CHANGE UP (ref 43–77)
NITRITE UR-MCNC: NEGATIVE — SIGNIFICANT CHANGE UP
NRBC # BLD: 0 /100 WBCS — SIGNIFICANT CHANGE UP (ref 0–0)
PH UR: 6 — SIGNIFICANT CHANGE UP (ref 5–8)
PLATELET # BLD AUTO: 295 K/UL — SIGNIFICANT CHANGE UP (ref 150–400)
POTASSIUM SERPL-MCNC: 4.4 MMOL/L — SIGNIFICANT CHANGE UP (ref 3.5–5.3)
POTASSIUM SERPL-SCNC: 4.4 MMOL/L — SIGNIFICANT CHANGE UP (ref 3.5–5.3)
PROT SERPL-MCNC: 7.7 G/DL — SIGNIFICANT CHANGE UP (ref 6–8.3)
PROT UR-MCNC: ABNORMAL
PROTHROM AB SERPL-ACNC: 15.9 SEC — HIGH (ref 10.5–13.4)
RAPID RVP RESULT: SIGNIFICANT CHANGE UP
RBC # BLD: 3.82 M/UL — SIGNIFICANT CHANGE UP (ref 3.8–5.2)
RBC # FLD: 13.7 % — SIGNIFICANT CHANGE UP (ref 10.3–14.5)
SARS-COV-2 RNA SPEC QL NAA+PROBE: SIGNIFICANT CHANGE UP
SODIUM SERPL-SCNC: 137 MMOL/L — SIGNIFICANT CHANGE UP (ref 135–145)
SP GR SPEC: 1.02 — SIGNIFICANT CHANGE UP (ref 1.01–1.02)
UROBILINOGEN FLD QL: NEGATIVE — SIGNIFICANT CHANGE UP
WBC # BLD: 15.54 K/UL — HIGH (ref 3.8–10.5)
WBC # FLD AUTO: 15.54 K/UL — HIGH (ref 3.8–10.5)

## 2022-08-16 PROCEDURE — 74176 CT ABD & PELVIS W/O CONTRAST: CPT | Mod: 26,MA

## 2022-08-16 PROCEDURE — 93010 ELECTROCARDIOGRAM REPORT: CPT

## 2022-08-16 PROCEDURE — 71045 X-RAY EXAM CHEST 1 VIEW: CPT | Mod: 26

## 2022-08-16 PROCEDURE — 99223 1ST HOSP IP/OBS HIGH 75: CPT

## 2022-08-16 PROCEDURE — 99222 1ST HOSP IP/OBS MODERATE 55: CPT

## 2022-08-16 PROCEDURE — 99285 EMERGENCY DEPT VISIT HI MDM: CPT

## 2022-08-16 PROCEDURE — 73502 X-RAY EXAM HIP UNI 2-3 VIEWS: CPT | Mod: 26,LT

## 2022-08-16 PROCEDURE — 73552 X-RAY EXAM OF FEMUR 2/>: CPT | Mod: 26,LT

## 2022-08-16 RX ORDER — ACETAMINOPHEN 500 MG
650 TABLET ORAL EVERY 6 HOURS
Refills: 0 | Status: DISCONTINUED | OUTPATIENT
Start: 2022-08-16 | End: 2022-08-18

## 2022-08-16 RX ORDER — PANTOPRAZOLE SODIUM 20 MG/1
40 TABLET, DELAYED RELEASE ORAL
Refills: 0 | Status: DISCONTINUED | OUTPATIENT
Start: 2022-08-16 | End: 2022-08-18

## 2022-08-16 RX ORDER — LANOLIN ALCOHOL/MO/W.PET/CERES
3 CREAM (GRAM) TOPICAL AT BEDTIME
Refills: 0 | Status: DISCONTINUED | OUTPATIENT
Start: 2022-08-16 | End: 2022-08-18

## 2022-08-16 RX ORDER — ALLOPURINOL 300 MG
100 TABLET ORAL
Refills: 0 | Status: DISCONTINUED | OUTPATIENT
Start: 2022-08-16 | End: 2022-08-18

## 2022-08-16 RX ORDER — LEVOTHYROXINE SODIUM 125 MCG
50 TABLET ORAL DAILY
Refills: 0 | Status: DISCONTINUED | OUTPATIENT
Start: 2022-08-16 | End: 2022-08-18

## 2022-08-16 RX ORDER — SODIUM CHLORIDE 9 MG/ML
1600 INJECTION INTRAMUSCULAR; INTRAVENOUS; SUBCUTANEOUS ONCE
Refills: 0 | Status: COMPLETED | OUTPATIENT
Start: 2022-08-16 | End: 2022-08-16

## 2022-08-16 RX ORDER — VANCOMYCIN HCL 1 G
1000 VIAL (EA) INTRAVENOUS ONCE
Refills: 0 | Status: DISCONTINUED | OUTPATIENT
Start: 2022-08-16 | End: 2022-08-16

## 2022-08-16 RX ORDER — CEPHALEXIN 500 MG
250 CAPSULE ORAL DAILY
Refills: 0 | Status: DISCONTINUED | OUTPATIENT
Start: 2022-08-16 | End: 2022-08-18

## 2022-08-16 RX ORDER — ONDANSETRON 8 MG/1
4 TABLET, FILM COATED ORAL EVERY 8 HOURS
Refills: 0 | Status: DISCONTINUED | OUTPATIENT
Start: 2022-08-16 | End: 2022-08-18

## 2022-08-16 RX ORDER — PIPERACILLIN AND TAZOBACTAM 4; .5 G/20ML; G/20ML
3.38 INJECTION, POWDER, LYOPHILIZED, FOR SOLUTION INTRAVENOUS ONCE
Refills: 0 | Status: COMPLETED | OUTPATIENT
Start: 2022-08-16 | End: 2022-08-16

## 2022-08-16 RX ORDER — ACETAMINOPHEN 500 MG
650 TABLET ORAL ONCE
Refills: 0 | Status: COMPLETED | OUTPATIENT
Start: 2022-08-16 | End: 2022-08-16

## 2022-08-16 RX ADMIN — Medication 650 MILLIGRAM(S): at 17:22

## 2022-08-16 RX ADMIN — Medication 650 MILLIGRAM(S): at 14:20

## 2022-08-16 RX ADMIN — SODIUM CHLORIDE 1600 MILLILITER(S): 9 INJECTION INTRAMUSCULAR; INTRAVENOUS; SUBCUTANEOUS at 14:23

## 2022-08-16 RX ADMIN — Medication 100 MILLIGRAM(S): at 20:24

## 2022-08-16 RX ADMIN — PIPERACILLIN AND TAZOBACTAM 200 GRAM(S): 4; .5 INJECTION, POWDER, LYOPHILIZED, FOR SOLUTION INTRAVENOUS at 14:24

## 2022-08-16 NOTE — ED ADULT NURSE REASSESSMENT NOTE - NS ED NURSE REASSESS COMMENT FT1
Surgery at bedside discussing plan of care with patient and daughter.  Pt v/s table. Call bell within reach. bed lowest position, side rails up.

## 2022-08-16 NOTE — ED PROVIDER NOTE - CLINICAL SUMMARY MEDICAL DECISION MAKING FREE TEXT BOX
87 yo F w/ PMH of ileostomy s/p total colectomy, HTN, hypothyroidism, GERD, arrythmia, and L hip arthoplasty s/p mechanical fall 7 yrs ago p/w 2 month L hip wound with erythema and serous, non-purulent drainage, 3 days fever, L thigh numbness s/p Abx minocycline and doxy w/ MRI findings of fluid collection secondary to trochanteric bursitis w/o osteomyelitis.      Assessment:  - pt w/ L hip arthoplasty p/w 2 months wound w/ serous drainage s/p Abx w/ new onset fever should be worked up for sepsis  - osteomyelitis vs bursitis as possible sources of infection  - consider rectovaginal fistula and other abd sources of bacteremia  - ortho eval can be considered in hardware seems to be involved       Plan:  - admit to medicine for potential sepsis workup   - cbc and cmp and blood cultures  - EKG   - VBG w/ lactate, ESR, CRP  - CT abd and pelvis w/o contrast   - X-Ray 2 view of L hip to eval for hardware, signs of osteomyelitic changes

## 2022-08-16 NOTE — ED PROVIDER NOTE - ATTENDING CONTRIBUTION TO CARE
89 F w / fever worsening weaness dec appetite found to have a L hip wound that is draining serous drainage, initially hadgrown proteus 7/25 then treated w/ minocycline, pt then w/ worsening redness and drainage from the wound, pt taken to UC on August 5 was started on Doxycycline, pt reports compliance today, no nausea no vomiting reports fevers started 3 days ago,   prior hip replacement Dr. 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   On exam has a 1.5 open wound w/ mild drainage from the wound serous, no blood, no purulent drainage  pt ambulates w/a walker, here w/ fever in the setting of recent antibiotics

## 2022-08-16 NOTE — ED PROVIDER NOTE - NSICDXPASTMEDICALHX_GEN_ALL_CORE_FT
PAST MEDICAL HISTORY:  Fistula, perirectal     Hiatal hernia     Hypertension     Hypothyroidism

## 2022-08-16 NOTE — CONSULT NOTE ADULT - SUBJECTIVE AND OBJECTIVE BOX
History of Present Illness: 89yFemale history of colectomy with perirectal fistula presenting after L hip wound for several months. Imaging reveals a wound with subcutaneous collection. IR consulted for aspiration.    Allergies:   iodine (Other; Anaphylaxis)  shellfish (Anaphylaxis)  sulfa drugs (Other)    Medications (Antibiotics/Cardiovascular/Anticoagulants/Blood Products):     piperacillin/tazobactam IVPB...: 200 mL/Hr IV Intermittent (08-16-22 @ 14:24)    Vital Signs:  T(F): 98.8 (08-16-22 @ 16:34), Max: 99.9 (08-16-22 @ 10:38)  HR: 82 (08-16-22 @ 16:34) (80 - 84)  BP: 128/57 (08-16-22 @ 16:34) (106/67 - 128/57)  RR: 16 (08-16-22 @ 16:34) (16 - 19)  SpO2: 98% (08-16-22 @ 16:34) (96% - 98%)    Relevant Lab Results:            11.6  15.54)-----(295     (08-16-22 @ 12:38)         35.1     137 | 97 | 27  --------------------< 153     (08-16-22 @ 12:38)  4.4 | 24 | 0.97       PT: 15.9<H> 08-16-22 @ 12:38  aPTT: 34.5 08-16-22 @ 12:38   INR: 1.38<H> 08-16-22 @ 12:38    Imaging: reviewed

## 2022-08-16 NOTE — H&P ADULT - ASSESSMENT
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

## 2022-08-16 NOTE — ED ADULT NURSE NOTE - OBJECTIVE STATEMENT
88 yo female PMH Gout, Illeostomy, Left Hip replacement A&Ox3, presents to ED c/o left hip wound, low grade fevers.  PT reports having unhealed left hip wound with associated low grade fevers at home, on ABX course, and left leg numbness.  Breathing even and unlabored, abdomen soft and nontender 4 quads, illeostomy right abdominal wall, sacral redness non blanchable, skin tear to left buttock, redness to b/l groin area, left hip wound open draining clear exudate. Pt denies chest pain, palpitations, shortness of breath, headache, visual disturbances, numbness/tingling, chills, diaphoresis,  nausea, vomiting, constipation, diarrhea, or urinary symptoms.

## 2022-08-16 NOTE — H&P ADULT - HISTORY OF PRESENT ILLNESS
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 Patient formerly followed with [facility] wound care where she was on doxycycline and had a wound vac until 2 weeks ago. Currently continues on antibiotics prior to admission, however wound is worsening and patient reports subjective fever for the past 3 days. Patient had an MRI in late june which revealed a fluid collection without osteomyelitis     ED course: VSS. CBC with WBC count of 15.54. CRP elevated. Metabolic panel normal. UA normal. CT performed with 6.0 by 3.3 cm wound of the left hip. Patient received dose of zosyn. Orthopedics consulted. Patient admitted to medicine for further evaluation and treatment

## 2022-08-16 NOTE — PATIENT PROFILE ADULT - FALL HARM RISK - HARM RISK INTERVENTIONS
Assistance with ambulation/Assistance OOB with selected safe patient handling equipment/Communicate Risk of Fall with Harm to all staff/Discuss with provider need for PT consult/Monitor gait and stability/Reinforce activity limits and safety measures with patient and family/Tailored Fall Risk Interventions/Visual Cue: Yellow wristband and red socks/Bed in lowest position, wheels locked, appropriate side rails in place/Call bell, personal items and telephone in reach/Instruct patient to call for assistance before getting out of bed or chair/Non-slip footwear when patient is out of bed/Mobile to call system/Physically safe environment - no spills, clutter or unnecessary equipment/Purposeful Proactive Rounding/Room/bathroom lighting operational, light cord in reach

## 2022-08-16 NOTE — CONSULT NOTE ADULT - ATTENDING COMMENTS
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  Pt not a candidate for 2 stage infection treatment due to age, medical comorbidities and severe osteoporosis.  Pt indicated for 1 stage treatment w I and D, head/poly exchange, abx beads and retention of hip stem.  plan discussed w pt and her family

## 2022-08-16 NOTE — ED ADULT NURSE NOTE - NS ED NURSE LEVEL OF CONSCIOUSNESS SPEECH
H&P- Kellie De Luna 1949, 79 y o  male MRN: [id number]    Unit/Bed#: ED 24 Encounter: [id number]    Primary Care Provider: Nasir Maria DO   Date and time admitted to hospital: 6/22/2019  2:09 PM        * FUO (fever of unknown origin)  Assessment & Plan  Likely related to rheumatological process  Chart states that patient has history of sarcoidosis  RF positive on prior labs  Quantiferon negative  Will consult ID to assist ruling out infectious process  Will reach out to rheumatology tomorrow  For now follow up cultures  LFTs are elevated - RUQ US now  Hold off on ABx, doubt antibiotics  VTE Prophylaxis: Heparin  / sequential compression device   Code Status: Full Code  POLST: There is no POLST form on file for this patient (pre-hospital)  Discussion with family:     Anticipated Length of Stay:  Patient will be admitted on an Inpatient basis with an anticipated length of stay of  More than 2 midnights  Justification for Hospital Stay: febrile  Total Time for Visit, including Counseling / Coordination of Care: 45 minutes  Greater than 50% of this total time spent on direct patient counseling and coordination of care  Chief Complaint:     Febrile and joint pain  History of Present Illness:    Kellie De Luna is a 79 y o  male who presents with a 5 day history of feeling unwell in terms of joint pain and weakness  Furthermore the patient had a measured temperature urgent care earlier today and has a temperature of 103 1 in the emergency department  He was screened for infection in the ER and his chest x-ray is negative for pneumonia UA is negative for UTI and no focus of infection has been identified thus far  The patient states he has had no sick contacts apart from a grandchild with croup 1 week ago  He denies any cough skin lesions lymphadenopathy diarrhea nausea vomiting      He does note joint pain in both lower extremities and upper extremities and generalized weakness  The patient is not aware of any significant medical history however on his chart sarcoidiosis is listed  I also see that he has a positive rheumatoid factor on recent lab work and that he has been tested for tuberculosis and testing is negative  In the emergency department the patient is found to have elevated LFTs a CT abdomen was done which did not show any abnormalities  He was given 1 dose of Rocephin in the ER  Review of Systems:    Review of Systems   Constitutional: Positive for chills, diaphoresis, fatigue and fever  Negative for activity change, appetite change and unexpected weight change  HENT: Negative  Eyes: Negative  Respiratory: Negative  Cardiovascular: Negative  Gastrointestinal: Negative  Endocrine: Negative  Genitourinary: Negative  Musculoskeletal: Positive for arthralgias, joint swelling and myalgias  Negative for back pain, gait problem, neck pain and neck stiffness  Skin: Negative  Allergic/Immunologic: Negative  Neurological: Negative  Hematological: Negative  Psychiatric/Behavioral: Negative  Past Medical and Surgical History:     Past Medical History:   Diagnosis Date    Acute pancreatitis     last assessed - [id number]    BPH (benign prostatic hypertrophy)     Celiac disease     current GI w/u to r/o celiac    Cholelithiasis     Elevated blood pressure reading     last assessed - [id number]    Gallstones     last assessed - [id number]    High cholesterol     Hx of mitral valve disorder     Sarcoidosis     1990's Diagnosed 15 years ago by biopsy, not active; last assessed - [id number]       Past Surgical History:   Procedure Laterality Date    ERCP N/A 4/3/2017    Procedure: ENDOSCOPIC RETROGRADE CHOLANGIOPANCREATOGRAPHY (ERCP); Surgeon: Trini Johnson MD;  Location: [facility];   Service:     UT COLONOSCOPY FLX DX W/COLLJ SPEC WHEN PFRMD N/A 3/1/2017    Procedure: EGD AND COLONOSCOPY;  Surgeon: Nimco Arias Elvie Tristan MD;  Location: [facility]; Service: Gastroenterology    TX ESOPHAGOGASTRODUODENOSCOPY TRANSORAL DIAGNOSTIC N/A 6/5/2017    Procedure: ESOPHAGOGASTRODUODENOSCOPY (EGD); Surgeon: Shana Smith MD;  Location: [facility]; Service: Gastroenterology    TX LAP,CHOLECYSTECTOMY N/A 3/29/2017    Procedure: CHOLECYSTECTOMY LAPAROSCOPIC; POSSIBLE OPEN;  Surgeon: Argelia Olivo MD;  Location: [facility];  Service: General; complicated by bile leak and pancreatitis s/p stent     ROTATOR CUFF REPAIR Right 04/29/2015    for rotator cuff tear, Onset: 4/14       Meds/Allergies:    Prior to Admission medications    Medication Sig Start Date End Date Taking? Authorizing Provider   atorvastatin (LIPITOR) 20 mg tablet TAKE 1 TABLET BY MOUTH  DAILY 6/20/19  Yes Nasir Maria, DO   betamethasone dipropionate (DIPROSONE) 0 05 % cream Apply topically daily To your elbows and rub in well 2/22/19   Nasir Maria, DO   Cholecalciferol (VITAMIN D-3) 1000 units CAPS 2 daily 9/14/18   Nasir Maria, DO   finasteride (PROSCAR) 5 mg tablet Take 5 mg by mouth daily after dinner      Historical Provider, MD   Glucosamine-Chondroitin (MOVE FREE PO) Take 2 tablets by mouth daily    Historical Provider, MD   Multiple Vitamin (MULTIVITAMIN) tablet Take 1 tablet by mouth daily    Historical Provider, MD   Omega-3 Fatty Acids (OMEGA 3 PO) Take 1 capsule by mouth daily    Historical Provider, MD     I have reviewed home medications with patient personally  Allergies: No Known Allergies    Social History:     Marital Status: /Civil Union   Occupation: retired  Patient Pre-hospital Living Situation: lives alone  Patient Pre-hospital Level of Mobility: fully mobile  Patient Pre-hospital Diet Restrictions: none     Substance Use History:   Social History     Substance and Sexual Activity   Alcohol Use Yes    Comment: wine, 3-4x/week; social drinker     Social History     Tobacco Use   Smoking Status Never Smoker   Smokeless Tobacco Never Used Social History     Substance and Sexual Activity   Drug Use No    Comment: Denied history of drug use       Family History:    Family History   Problem Relation Age of Onset    Diabetes Mother         Diabetes Mellitus    Hypertension Mother     Ovarian cancer Mother     Rheumatologic disease Mother     Diabetes Father         Diabetes Mellitus    Heart disease Father     Hypertension Father     Diabetes Sister         Diabetes Mellitus    Other Sister         H  pylori infection    Hypertension Sister     Other Brother         H  pylori infection    Alcohol abuse Neg Hx     Substance Abuse Neg Hx     Mental illness Neg Hx     Depression Neg Hx        Physical Exam:     Vitals:   Blood Pressure: 132/60 (06/22/19 1815)  Pulse: 88 (06/22/19 1800)  Temperature: (!) 101 6 °F (38 7 °C) (06/22/19 1843)  Temp Source: Oral (06/22/19 1843)  Respirations: 18 (06/22/19 1600)  SpO2: 94 % (06/22/19 1800)    Physical Exam   Constitutional: He is oriented to person, place, and time  He appears well-developed  No distress  HENT:   Head: Normocephalic  Mouth/Throat: No oropharyngeal exudate  Eyes: Right eye exhibits no discharge  Left eye exhibits no discharge  No scleral icterus  Neck: Normal range of motion  No JVD present  No tracheal deviation present  No thyromegaly present  Cardiovascular: Normal rate  Exam reveals no gallop and no friction rub  No murmur heard  Pulmonary/Chest: Effort normal  No stridor  No respiratory distress  He has no wheezes  He has no rales  He exhibits no tenderness  Abdominal: Soft  He exhibits no distension and no mass  There is no tenderness  There is no rebound and no guarding  No hernia  Musculoskeletal: He exhibits no edema, tenderness or deformity  Lymphadenopathy:     He has no cervical adenopathy  Neurological: He is alert and oriented to person, place, and time  He displays normal reflexes  No cranial nerve deficit or sensory deficit   He exhibits normal muscle tone  Coordination normal    Skin: Skin is warm  Capillary refill takes less than 2 seconds  No rash noted  He is not diaphoretic  No erythema  No pallor  Psychiatric: He has a normal mood and affect  Additional Data:     Lab Results: I have personally reviewed pertinent reports  Results from last 7 days   Lab Units 06/22/19  1443   WBC Thousand/uL 9 68   HEMOGLOBIN g/dL 13 6   HEMATOCRIT % 38 6   PLATELETS Thousands/uL 218   NEUTROS PCT % 81*   LYMPHS PCT % 9*   MONOS PCT % 9   EOS PCT % 0     Results from last 7 days   Lab Units 06/22/19  1437   SODIUM mmol/L 134*   POTASSIUM mmol/L 4 5   CHLORIDE mmol/L 99*   CO2 mmol/L 27   BUN mg/dL 26*   CREATININE mg/dL 1 11   ANION GAP mmol/L 8   CALCIUM mg/dL 8 9   ALBUMIN g/dL 3 2*   TOTAL BILIRUBIN mg/dL 1 30*   ALK PHOS U/L 210*   ALT U/L 143*   AST U/L 102*   GLUCOSE RANDOM mg/dL 151*     Results from last 7 days   Lab Units 06/22/19  1437   INR  1 27*             Results from last 7 days   Lab Units 06/22/19  1650 06/22/19  1437   LACTIC ACID mmol/L 1 5 2 3*       Imaging: I have personally reviewed pertinent reports  CT abdomen pelvis with contrast   Final Result by Davide Sinha MD (06/22 1753)      No acute intra-abdominal abnormality  No large or small bowel obstruction  Scattered colonic diverticulosis with no inflammatory changes present to suggest acute diverticulitis  No free air or free fluid  Workstation performed: [id number]             EKG, Pathology, and Other Studies Reviewed on Admission:   · EKG: normal sinus  Allscripts / Epic Records Reviewed: Yes     ** Please Note: This note has been constructed using a voice recognition system   ** Speaking Coherently

## 2022-08-16 NOTE — H&P ADULT - PROBLEM SELECTOR PLAN 2
Patient is with a history of hypertension, takes nadolol 40 mg PO Qd as an outpatient  - Hold antihypertensives in setting of infection, restart if needed

## 2022-08-16 NOTE — H&P ADULT - MUSCULOSKELETAL
normal/ROM intact/no joint swelling/normal gait/strength 5/5 bilateral upper extremities/strength 5/5 bilateral lower extremities details…

## 2022-08-16 NOTE — H&P ADULT - PROBLEM SELECTOR PLAN 1
Patient is with a longstanding poorly healing wound of the left hip/groin. Evaluated by orthopedics in the ED and with concern for infection of underlying hardware. Patient received dose of zosyn from ED and currently is hemodynamically stable.  - Agree with orthopedics, will hold off on antibiotics at this time, will initiate broad spectrum coverage if patient becomes septic  - IR consulted by ED, will follow up with recs to determine if culture can be obtained  - Will follow up with ongoing orthopedics recs

## 2022-08-16 NOTE — ED ADULT NURSE REASSESSMENT NOTE - NS ED NURSE REASSESS COMMENT FT1
Straight urinary catheter inserted using sterile technique. Procedure, risks, and benefits of catheter explained to patient, patient verbalized understanding. Second RN present to confirm sterility. Pt tolerated well. Urinary catheter drained    [ 250 ]  mL  clear familia urine, no clots visualized. Urinalysis and urine cultures obtained. Catheter removed promptly. The patient is a 71y Male complaining of

## 2022-08-16 NOTE — ED ADULT TRIAGE NOTE - MEANS OF ARRIVAL
Was paged by Chante Pagan  Regarding AP/AC recs  Reviewed MRI, large right MCA/PCA stroke  MRA H with M2 abrupt cut off  However there are punctate infarcts in the left hemisphere as well suggestive of embolic source  MRA carotids pending ECHO pending    ASA 81 mg daily for secondary stroke prevention  Addition of Plavix or AC at this time would increase risk of hemorrhagic conversion  If ECHO shows thrombus, please let neurology on call know immediately  Avoid hypotension   Permissive HTN    D/w Kaitlin [facility] Neurology
wheelchair

## 2022-08-16 NOTE — ED PROVIDER NOTE - NEUROLOGICAL, MLM
Alert and oriented, no focal deficits, no motor or sensory deficits. Intertrigo in the groin with small wound in the L buttock

## 2022-08-16 NOTE — H&P ADULT - PROBLEM SELECTOR PLAN 7
DVT ppx - patent warrants, however will hold off given potential for surgical intervention    #####  case d/w acp Cindy Reyes  #####

## 2022-08-16 NOTE — CONSULT NOTE ADULT - SUBJECTIVE AND OBJECTIVE BOX
Orthopaedic Surgery Consult Note    Patient is a 89y old  Female who presents with a chief complaint of L hip wound, fevers  HPI:  89F s/p L hip hemiarthroplasty in 2015 ([facility]) presents with 3 days of fever (up to 101) and L hip wound for 2 months. Patient first noticed wound over left hip 2 months ago, has had serous drainage since, following with [facility] wound care. She has had wound vac therapy and was on minocycline for 2 weeks, now doxycycline for +wound cultures (enterococcus avium, proteus, e coli). Had an MRI done at [facility]  which showed large fluid collection between greater troch and IT band communicating with wound and with another collection in the anterior superior hip joint. Denies hip pain. Has been able to ambulate with a walker which is her baseline. Denies chills, weight loss, weakness. Has one day of paresthesias over the anterolateral thigh.     PAST MEDICAL & SURGICAL HISTORY:  Hypertension      Hypothyroidism      Hiatal hernia      Fistula, perirectal      S/P cholecystectomy  at age 20 ,s      S/P ileostomy  colectomy (  )      S/P arthroscopy of right knee      S/P foot joint surgery  right ( 10 years ago )    MEDICATIONS  (STANDING):    MEDICATIONS  (PRN):    Allergies    iodine (Other; Anaphylaxis)  shellfish (Anaphylaxis)  sulfa drugs (Other)    Intolerances        Vital Signs Last 24 Hrs  T(C): 37.1 (16 Aug 2022 14:27), Max: 37.7 (16 Aug 2022 10:38)  T(F): 98.8 (16 Aug 2022 14:27), Max: 99.9 (16 Aug 2022 10:38)  HR: 80 (16 Aug 2022 16:05) (80 - 84)  BP: 116/59 (16 Aug 2022 16:05) (106/67 - 128/57)  BP(mean): --  RR: 18 (16 Aug 2022 16:05) (18 - 19)  SpO2: 97% (16 Aug 2022 16:05) (96% - 98%)    Parameters below as of 16 Aug 2022 16:05  Patient On (Oxygen Delivery Method): room air          PHYSICAL EXAM:  NAD  LLE:  0.5x0.5cm wound just distal to greater troch and posterior to surgical incision, wound probes deep  no erythema or ecchymosis  5/5 EHL/FHL/TA/Gastrocnemius, able to straight leg raise  Painless ROM hip/knee/ankle  SILT DP/SP/S/S/T nerve distributions, anterolateral thigh paresthesias  Compartments soft and compressible  2+ Dorsalis Pedis pulse                           11.6   15.54 )-----------( 295      ( 16 Aug 2022 12:38 )             35.1     08-16    137  |  97  |  27<H>  ----------------------------<  153<H>  4.4   |  24  |  0.97    Ca    9.7      16 Aug 2022 12:38    TPro  7.7  /  Alb  3.6  /  TBili  0.6  /  DBili  x   /  AST  24  /  ALT  13  /  AlkPhos  79  08-16    PT/INR - ( 16 Aug 2022 12:38 )   PT: 15.9 sec;   INR: 1.38 ratio         PTT - ( 16 Aug 2022 12:38 )  PTT:34.5 sec  Urinalysis Basic - ( 16 Aug 2022 15:09 )    Color: Yellow / Appearance: Clear / S.018 / pH: x  Gluc: x / Ketone: Negative  / Bili: Negative / Urobili: Negative   Blood: x / Protein: Trace / Nitrite: Negative   Leuk Esterase: Negative / RBC: 3 /hpf / WBC 1 /HPF   Sq Epi: x / Non Sq Epi: 0 /hpf / Bacteria: 0.0        IMAGING STUDIES:  XR L hip/femur without fracture/dislocation  CTAP showing large fluid collection communicating with L hip wound, unclear if communicates with hip joint due to metal artifact    ASSESSMENT AND PLAN  89y Female with L hip fluid collection in the setting of open wound concerning for prosthetic joint infection    - IR consult for aspiration, please send fluid for cell count, crystals, gram stain, culture  - Will likely need to proceed to OR for I&D, revision of modular components pending aspiration results  - FU blood cultures, ESR  - Hold antibiotics as patient is not clinically septic and long-term culture-directed antibiotic therapy will likely be necessary  - WBAT LLE  - Pain control PRN  - Will need medical optimization documented ahead of likely OR  - DVT ppx if okay w/ IR  - Remainder of care per primary team    Discussed with attending orthopaedic surgeon, Dr. Samayoa    For all questions related to patient care, please reach out to the on-call team via the pager.     Maryann Cabrera PGY-3  Orthopaedic Surgery  [facility] [phone]  [facility] [phone]  [facility] [phone]

## 2022-08-16 NOTE — CONSULT NOTE ADULT - ASSESSMENT
89yFemale history of colectomy with perirectal fistula presenting after L hip wound for several months. Imaging reveals a wound with subcutaneous collection. IR consulted for aspiration.      Plan:  -Please place order for IR Procedure, approving attending Dr. Lacy Farmer  -Plan for L hip subQ aspiration tomorrow 8/17/22  -negative covid test within 5 days of procedure    --  James Raza MD  Radiology Resident PGY-4  Office: [phone]  IR pager for urgent needs [phone]

## 2022-08-16 NOTE — H&P ADULT - PROBLEM SELECTOR PLAN 4
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 Takes keflex 250 mg PO Qd  - Continue home keflex for ppx

## 2022-08-16 NOTE — ED PROVIDER NOTE - PROGRESS NOTE DETAILS
Daughter confirmed that the surgeon was Dr. Haroldo Samayoa not Dr. Vel Pratt was previously documented

## 2022-08-16 NOTE — ED PROVIDER NOTE - OBJECTIVE STATEMENT
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 Vel Pratt @ [place] p/w 2 month L hip wound with erythema and serous, non-purulent drainage, 3 days fever, L thigh numbness. Pt was seen at [facility] wound care 6/7/22 for hip wound w/ Cx growing E. coli, Proteus mirabilis, enterococcus avium s/p wound vac D/C'd 2 wks ago, 2wk of minocycline, and currently on 2 wks of doxycyline (recently taken today). MRI was done last week showing fluid collection secondary to trochanteric bursitis w/o osteomyelitis. Today pt is stating fever, weakness, and reduced appetite. Pt does not state HA, CP, SOB, N/V, changes in ostomy output, abd pain, dysuria, or extremity pain/edema. 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 Haroldo Samayoa p/w 2 month L hip wound with erythema and serous, non-purulent drainage, 3 days fever, L thigh numbness. Pt was seen at [facility] wound care 6/7/22 for hip wound w/ Cx growing E. coli, Proteus mirabilis, enterococcus avium s/p wound vac D/C'd 2 wks ago, 2wk of minocycline, and currently on 2 wks of doxycyline (recently taken today). MRI was done last week showing fluid collection secondary to trochanteric bursitis w/o osteomyelitis. Today pt is stating fever, weakness, and reduced appetite. Pt does not state HA, CP, SOB, N/V, changes in ostomy output, abd pain, dysuria, or extremity pain/edema.

## 2022-08-16 NOTE — H&P ADULT - NEGATIVE IMMUNOLOGICAL SYMPTOMS
CHIEF COMPLAINT:    History of recent perforated rectosigmoid diverticulitis with pelvic abscess and status post interventional radiology drainage placement of catheters ×2    HISTORY OF PRESENT ILLNESS:   This is a 65-year-old white female who was hospitalized at [facility] several weeks ago with large pelvic abscess secondary to perforated rectosigmoid diverticulitis. 2 different drains were placed and the other drain was removed on Tuesday and now the left gluteal drain will be removed in the office today. Her white count has been normal at 5900 with a hemoglobin of 12.5.. Her CRP was at  43.9 and is now down to 1.5. She feels well and denies any fevers chills night sweats or abdominal pain at this time. She is eating well and having normal bowel movements.    Past Surgical History:   Procedure Laterality Date   • Breast surgery  2000    lump removed, benign   • Colonoscopy diagnostic  08/29/2012   • D and c     • Hysterectomy      ovaries intact   • Ir epidural injection  7/16/2014   • Lumbar fusion N/A 4/29/15    L4-S1 A/P decompression instr. fusion w/ vitoss and local bone and bone marrow asp.   • Tonsillectomy and adenoidectomy     • Trigger finger release  2016    Right hand, 2nd and 4th finger       Essential (primary) hypertension                              Hyperlipidemia                                                Diabetes mellitus (CMS/HCC)                                   Right lumbar radiculopathy                                      Comment: L5-S1    Diverticulitis                                                Hx of acne rosacea                                            Benign hemangioma                               4/2007          Comment: benign cavernous    Hx of adenomatous polyp of colon                2007            Comment: tubular adenoma    Trigger finger                                                Carpal tunnel syndrome, bilateral                              Cervical spondylosis with radiculopathy                         Comment: left thumb    Bursitis of hip, right                                      Current Outpatient Prescriptions   Medication Sig Dispense Refill   • metroNIDAZOLE (FLAGYL) 500 MG tablet Take 500 mg by mouth 3 times daily.     • acetaminophen (TYLENOL) 325 MG tablet Take 2 tablets by mouth every 6 hours as needed for Pain or Fever. 30 tablet 0   • lactobacillus acidophilus (BACID) Take 2 tablets by mouth daily. 120 tablet 1   • Ascorbic Acid (VITAMIN C) 500 MG tablet Take 500 mg by mouth 2 times daily.     • cholecalciferol 1000 units tablet Take 1,000 Units by mouth daily.     • atorvastatin (LIPITOR) 20 MG tablet TAKE ONE TABLET BY MOUTH DAILY 90 tablet 3   • Multiple Vitamins-Minerals (MULTIVITAMIN ADULT) Tab Take 1 tablet by mouth daily.     • metFORMIN (GLUCOPHAGE) 500 MG tablet TAKE TWO TABLETS BY MOUTH DAILY. 60 tablet 5   • aspirin 81 MG EC tablet Take 1 tablet by mouth daily. 90 tablet 0   • losartan (COZAAR) 25 MG tablet TAKE ONE TABLET BY MOUTH EVERY DAY 30 tablet 11   • CALCIUM CITRATE-VITAMIN D PO Take 1 tablet by mouth 2 times daily.        No current facility-administered medications for this visit.      ALLERGIES:   Allergen Reactions   • Ace Inhibitors Cough   • Catapres RASH   • Eggs [Egg] Other (See Comments)     Outgrown?-allergic to immunizations    • Antihistamine Allergy Relief Palpitations     Social History     Social History   • Marital status:      Spouse name: N/A   • Number of children: N/A   • Years of education: N/A     Occupational History   • Not on file.     Social History Main Topics   • Smoking status: Never Smoker   • Smokeless tobacco: Never Used   • Alcohol use Yes      Comment: occ   • Drug use: No   • Sexual activity: Not Currently     Other Topics Concern   • Not on file     Social History Narrative    . Works part time at [employer]. Enjoys gardening.     is paralyzed.                 Family  no recurring infections/no recurring pneumonia History   Problem Relation Age of Onset   • Heart disease Mother    • Diabetes Mother    • Stroke Mother    • High blood pressure Mother    • High cholesterol Mother    • Heart disease Father    • High blood pressure Father    • High cholesterol Father    • Stroke Sister    • High blood pressure Sister    • High cholesterol Sister    • Heart disease Brother    • Stroke Brother    • Diabetes Brother    • High blood pressure Brother    • High cholesterol Brother    • Stroke Brother    • High blood pressure Brother    • High cholesterol Brother    • Cancer Brother      prostate   • Heart disease Brother    • High blood pressure Brother    • High cholesterol Brother        REVIEW OF SYSTEMS:  Constitutional:  [x] negative  [] fever   []  Weight loss   [] fatigue   Eyes:   [x] negative [] change in vision   Ears, Nose, Throat:  [x] negative  [] nose congestion  [] sore throat   [] ear pain   Cardiovascular:   [x] negative [] chest pain   [] palpitations  Respiratory:   [x] negative [] cough  [] shortness of breath  Gastrointestinal:   [x] negative [] nausea or vomiting   [] heartburn   [] diarrhea   [] rectal bleeding   [] abdominal pain   [] dysphagia   [] constipation   Genitourinary:  [x] negative [] dysuria   [] abnormal periods   [] hematuria  Musculoskeletal:  [x] negative  [] joint pain   [] muscle pain  Skin:  [x] negative  [] rashes   [] changing moles  [] jaundice.  Neurologic:  [x] negative  [] numbness   [] focal weakness   [] headache  Psychiatric:  [x] negative  [] depression   [] anxiety    Endocrine:  [x] negative  [] diabetes   [] thyroid disease   Hematologic/lymphatic: [x] negative  [] anemia  [] lymphoma/leukemia   Allergy/immune:  [x] negative    PHYSICAL EXAMINATION:  Constitutional:   [x] appears healthy  [x] no acute distress     Visit Vitals  /70 (BP Location: [facility], Patient Position: Sitting, Cuff Size: Regular)   Pulse 88   Resp 16   Ht 5' 3\" (1.6 m)   Wt 65.7 kg   BMI 25.65 kg/m²     Eyes:    [x] conjunctivae and lids normal   [x] pupils equal, round, reactive to light  Sclerae are anicteric. [x] extraocular movements intact  [] cataracts none     [] fundi benign.   Ears, Nose, Throat:   [] nose not congested   [] canals clear   [] tympanic membranes normal right and left   [] lips, gums, teeth normal   [x] mucosa, tongue, pharynx normal.  Neck:   [x] symmetrical, no masses  [x] thyroid normal.  Lymphatic:   [x] neck normal right and left   [x] axilla normal right and left   [x] groin normal right and left.  Lungs:   [x] normal respiration effort   [x] no rales, rhonchi or wheezes.  Heart:   [x] regular rate and rhythm   [x] carotids 2+/2+   [x] no bruits   [x] femoral pulse 2+/2+   [x] no bruits   [] pedal pulses 2+/2+   [] no pedal edema  [] no varicosities.  Breasts:   [] symmetrical   [] no discharge   [] no masses   [] no chest wall recurrence  Abdomen (Gastrointestinal):   [x] soft, no masses, not tender   [x] bowel sounds normal   [x] no hernia   [] occult blood  []negative  []positive,  [] rectal exam normal   [x] liver normal. The left gluteal drain was removed today.  Female Genitourinary:   [] external genitalia and vaginal mucosa normal   [] urethra normal   [] bladder normal   [] cervix normal (hysterectomy)   [] uterus normal size (hysterectomy)   [] no adnexal mass or tenderness.  Male Genitourinary:   [] penis normal   [] testicles and scrotum normal   [] prostate normal size, no nodules.    Orthopedic:   [x] Normal gait and station   [x] normal digits and nails   [x] normal muscle tone and strength   [] joints - no inflammation   [x] no restricted motion.  Skin:   [x] no rashes   [x] no lesions   [x] no induration   [x] no nodules.  Neurologic:   [x] cranial nerves intact   [x] no sensory deficit   [] deep tendon reflexes normal   [x] strength normal.  Psychiatric:   [x] normal judgment and insight   [x] oriented times 3   [x] memory intact   [x] mood happy  [] sad  [] anxious  []  neutral  [] flat.    Lab Services on 03/06/2018   Component Date Value Ref Range Status   • WBC 03/06/2018 5.9  4.2 - 11.0 K/mcL Final   • RBC 03/06/2018 4.36  4.00 - 5.20 mil/mcL Final   • HGB 03/06/2018 12.5  12.0 - 15.5 g/dL Final   • HCT 03/06/2018 38.7  36.0 - 46.5 % Final   • MCV 03/06/2018 88.8  78.0 - 100.0 fl Final   • MCH 03/06/2018 28.7  26.0 - 34.0 pg Final   • MCHC 03/06/2018 32.3  32.0 - 36.5 g/dL Final   • RDW-CV 03/06/2018 14.3  11.0 - 15.0 % Final   • PLT 03/06/2018 420  140 - 450 K/mcL Final   • DIFF TYPE 03/06/2018 AUTOMATED DIFFERENTIAL   Final   • Neutrophil 03/06/2018 45  % Final   • LYMPH 03/06/2018 37  % Final   • MONO 03/06/2018 14  % Final   • EOSIN 03/06/2018 3  % Final   • BASO 03/06/2018 1  % Final   • Absolute Neutrophil 03/06/2018 2.7  1.8 - 7.7 K/mcL Final   • Absolute Lymph 03/06/2018 2.1  1.0 - 4.0 K/mcL Final   • Absolute Mono 03/06/2018 0.8  0.3 - 0.9 K/mcL Final   • Absolute Eos 03/06/2018 0.2  0.1 - 0.5 K/mcL Final   • Absolute Baso 03/06/2018 0.0  0.0 - 0.3 K/mcL Final   Lab Services on 03/05/2018   Component Date Value Ref Range Status   • Creatinine 03/05/2018 0.58  0.51 - 0.95 mg/dL Final   • GFR Estimate,  03/05/2018 >90   Final   • GFR Estimate, Non  03/05/2018 >90   Final          ASSESSMENT:  #1 rectosigmoid perforated diverticulitis with pelvic abscess drained with pelvic drain placed by interventional radiology ×2. #2 left gluteal drain removed today.    PLAN:    #1 we'll monitor temperature and for sepsis over the next 10-14 days. #2 return to clinic in 10-14 days for repeat examination and to rule out recurrence of abscess #3 we'll plan to schedule colonoscopy in 6-8 weeks to rule out the remote possibility of a perforated colon cancer and to assess for polyps.                  Kwasi Albarran M.D.

## 2022-08-16 NOTE — H&P ADULT - PROBLEM SELECTOR PLAN 5
Patient is with a history of hypothyroidism, takes levothyroxine 50 mcg  - Continue home medication at home dose

## 2022-08-17 ENCOUNTER — TRANSCRIPTION ENCOUNTER (OUTPATIENT)
Age: 87
End: 2022-08-17

## 2022-08-17 LAB
ANION GAP SERPL CALC-SCNC: 12 MMOL/L — SIGNIFICANT CHANGE UP (ref 5–17)
BUN SERPL-MCNC: 19 MG/DL — SIGNIFICANT CHANGE UP (ref 7–23)
CALCIUM SERPL-MCNC: 9.2 MG/DL — SIGNIFICANT CHANGE UP (ref 8.4–10.5)
CHLORIDE SERPL-SCNC: 102 MMOL/L — SIGNIFICANT CHANGE UP (ref 96–108)
CO2 SERPL-SCNC: 24 MMOL/L — SIGNIFICANT CHANGE UP (ref 22–31)
CREAT SERPL-MCNC: 0.78 MG/DL — SIGNIFICANT CHANGE UP (ref 0.5–1.3)
CULTURE RESULTS: NO GROWTH — SIGNIFICANT CHANGE UP
EGFR: 73 ML/MIN/1.73M2 — SIGNIFICANT CHANGE UP
GLUCOSE SERPL-MCNC: 120 MG/DL — HIGH (ref 70–99)
HCT VFR BLD CALC: 33.2 % — LOW (ref 34.5–45)
HGB BLD-MCNC: 11 G/DL — LOW (ref 11.5–15.5)
MCHC RBC-ENTMCNC: 30.7 PG — SIGNIFICANT CHANGE UP (ref 27–34)
MCHC RBC-ENTMCNC: 33.1 GM/DL — SIGNIFICANT CHANGE UP (ref 32–36)
MCV RBC AUTO: 92.7 FL — SIGNIFICANT CHANGE UP (ref 80–100)
NRBC # BLD: 0 /100 WBCS — SIGNIFICANT CHANGE UP (ref 0–0)
PLATELET # BLD AUTO: 240 K/UL — SIGNIFICANT CHANGE UP (ref 150–400)
POTASSIUM SERPL-MCNC: 4.3 MMOL/L — SIGNIFICANT CHANGE UP (ref 3.5–5.3)
POTASSIUM SERPL-SCNC: 4.3 MMOL/L — SIGNIFICANT CHANGE UP (ref 3.5–5.3)
RBC # BLD: 3.58 M/UL — LOW (ref 3.8–5.2)
RBC # FLD: 13.6 % — SIGNIFICANT CHANGE UP (ref 10.3–14.5)
SODIUM SERPL-SCNC: 138 MMOL/L — SIGNIFICANT CHANGE UP (ref 135–145)
SPECIMEN SOURCE: SIGNIFICANT CHANGE UP
WBC # BLD: 9.89 K/UL — SIGNIFICANT CHANGE UP (ref 3.8–10.5)
WBC # FLD AUTO: 9.89 K/UL — SIGNIFICANT CHANGE UP (ref 3.8–10.5)

## 2022-08-17 PROCEDURE — 20611 DRAIN/INJ JOINT/BURSA W/US: CPT | Mod: LT

## 2022-08-17 RX ORDER — NYSTATIN CREAM 100000 [USP'U]/G
1 CREAM TOPICAL THREE TIMES A DAY
Refills: 0 | Status: DISCONTINUED | OUTPATIENT
Start: 2022-08-17 | End: 2022-08-18

## 2022-08-17 RX ORDER — SODIUM CHLORIDE 0.65 %
1 AEROSOL, SPRAY (ML) NASAL THREE TIMES A DAY
Refills: 0 | Status: DISCONTINUED | OUTPATIENT
Start: 2022-08-17 | End: 2022-08-18

## 2022-08-17 RX ORDER — CHLORHEXIDINE GLUCONATE 213 G/1000ML
1 SOLUTION TOPICAL DAILY
Refills: 0 | Status: DISCONTINUED | OUTPATIENT
Start: 2022-08-17 | End: 2022-08-18

## 2022-08-17 RX ADMIN — Medication 650 MILLIGRAM(S): at 13:54

## 2022-08-17 RX ADMIN — Medication 100 MILLIGRAM(S): at 05:34

## 2022-08-17 RX ADMIN — Medication 650 MILLIGRAM(S): at 05:34

## 2022-08-17 RX ADMIN — Medication 1 TABLET(S): at 11:59

## 2022-08-17 RX ADMIN — CHLORHEXIDINE GLUCONATE 1 APPLICATION(S): 213 SOLUTION TOPICAL at 12:00

## 2022-08-17 RX ADMIN — NYSTATIN CREAM 1 APPLICATION(S): 100000 CREAM TOPICAL at 13:53

## 2022-08-17 RX ADMIN — NYSTATIN CREAM 1 APPLICATION(S): 100000 CREAM TOPICAL at 21:30

## 2022-08-17 RX ADMIN — Medication 50 MICROGRAM(S): at 05:34

## 2022-08-17 RX ADMIN — Medication 650 MILLIGRAM(S): at 06:22

## 2022-08-17 RX ADMIN — Medication 1 SPRAY(S): at 18:19

## 2022-08-17 RX ADMIN — PANTOPRAZOLE SODIUM 40 MILLIGRAM(S): 20 TABLET, DELAYED RELEASE ORAL at 05:33

## 2022-08-17 RX ADMIN — Medication 650 MILLIGRAM(S): at 14:35

## 2022-08-17 RX ADMIN — Medication 250 MILLIGRAM(S): at 11:59

## 2022-08-17 RX ADMIN — Medication 100 MILLIGRAM(S): at 18:20

## 2022-08-17 NOTE — PROCEDURE NOTE - PROCEDURE FINDINGS AND DETAILS
FNA of left hip fluid using US and 19 g sheathed needle yielded approx 2-3 cc of turbid fluid.  Sent for culture.  Full report to follow.

## 2022-08-17 NOTE — PROGRESS NOTE ADULT - PROBLEM SELECTOR PLAN 1
Patient is with a longstanding poorly healing wound of the left hip/groin. Evaluated by orthopedics in the ED and with concern for infection of underlying hardware. Patient received dose of zosyn from ED and currently is hemodynamically stable.  - Agree with orthopedics, will hold off on antibiotics at this time, will initiate broad spectrum coverage if patient becomes septic  - Pending IR aspiration 8/17/22 for cell count, crystals, gram stain, culture  - Pt is medically cleared for I&D, revision of modular components  - Appreciate orthopedics

## 2022-08-17 NOTE — PROGRESS NOTE ADULT - SUBJECTIVE AND OBJECTIVE BOX
Pt without acute pain  No acute SOB or CP  Aide @ bedside  Spoke with daughter over phone    Vital Signs Last 24 Hrs  T(C): 37.4 (17 Aug 2022 11:29), Max: 37.6 (17 Aug 2022 04:51)  T(F): 99.4 (17 Aug 2022 11:29), Max: 99.7 (17 Aug 2022 04:51)  HR: 87 (17 Aug 2022 11:29) (75 - 87)  BP: 149/82 (17 Aug 2022 11:29) (116/59 - 154/73)  BP(mean): 97 (17 Aug 2022 04:51) (80 - 100)  RR: 18 (17 Aug 2022 11:29) (16 - 18)  SpO2: 95% (17 Aug 2022 11:29) (95% - 98%)    I&O's Summary    22 @ 07:01  -  22 @ 12:39  --------------------------------------------------------  IN: 240 mL / OUT: 0 mL / NET: 240 mL    GENERAL: NAD  HEAD:  Atraumatic, Normocephalic  EYES: EOMI, PERRLA, conjunctiva and sclera clear  NECK: Supple, No JVD, No LAD  CHEST/LUNG: Clear to auscultation bilaterally; Normal respiratory effort w/o intercostal retractions  HEART: Regular rate and rhythm; nl S1/S2; No murmurs, rubs, or gallops  ABDOMEN: Soft, Nontender, Nondistended; Bowel sounds present; No hepatosplenomegaly  EXTREMITIES:  2+ Peripheral Pulses; No clubbing, cyanosis, or edema b/l; Nl ROM  NEURO: A+O x 3; nonfocal CN/motor/sensory/reflexes; speech + comprehension are intact  PSYCH: Nl affect; no delirium or agitation; no suicidal/homicidal ideation    LABS:                        11.0   9.89  )-----------( 240      ( 17 Aug 2022 07:43 )             33.2         138  |  102  |  19  ----------------------------<  120<H>  4.3   |  24  |  0.78    Ca    9.2      17 Aug 2022 07:43    TPro  7.7  /  Alb  3.6  /  TBili  0.6  /  DBili  x   /  AST  24  /  ALT  13  /  AlkPhos  79  08-16    PT/INR - ( 16 Aug 2022 12:38 )   PT: 15.9 sec;   INR: 1.38 ratio         PTT - ( 16 Aug 2022 12:38 )  PTT:34.5 sec  CAPILLARY BLOOD GLUCOSE            Urinalysis Basic - ( 16 Aug 2022 15:09 )    Color: Yellow / Appearance: Clear / S.018 / pH: x  Gluc: x / Ketone: Negative  / Bili: Negative / Urobili: Negative   Blood: x / Protein: Trace / Nitrite: Negative   Leuk Esterase: Negative / RBC: 3 /hpf / WBC 1 /HPF   Sq Epi: x / Non Sq Epi: 0 /hpf / Bacteria: 0.0        RADIOLOGY & ADDITIONAL TESTS:    Imaging Personally Reviewed:  [x] YES  [ ] NO    Will obtain old records:  [ ] YES  [x] NO

## 2022-08-17 NOTE — CONSULT NOTE ADULT - ASSESSMENT
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    L hip abscess  ESR 86,   agree w/ holding off antibiotics for now to increase yield of cultures  s/p CT abd/pelvis- left hip wound with underlying subcutaneous collection approximately 6 cm.  plans for IR aspiration; please send for cell count w/ diff, cultures w/ gram stain, crystal analysis  f/u ortho recs, possible plans for OR  hold off antibiotics for now as pt not septic and hemodynamically stable; following OR would start on zosyn pending culture data  f/u blood cultures  pt denies dysuria, frequency, has ?urgency but this is chronic x at least 8 months per patient    rectovaginal fistula  ok to continue keflex daily for now  once started on IV antibiotics would discontinue    Josias De Leon M.D.  [facility], Division of Infectious Diseases  [phone]  After 5pm on weekdays and all day on weekends - please call [phone]  89F PMhx pseudomembranous colitis in 2005 requiring total colectomy, HTN, hypothyroidism, GERD and left hip arthroplasty who presented w/ presents left hip wound c/b drainage.    L hip abscess  concern for possible PJI  ESR 86,   agree w/ holding off antibiotics for now to increase yield of cultures  s/p CT abd/pelvis- Left hip wound with underlying subcutaneous collection, measures approximately 6 cm.  plans for IR aspiration; please send for cell count w/ diff, cultures w/ gram stain, crystal analysis  f/u ortho recs, possible plans for OR  hold off antibiotics for now as pt not septic and hemodynamically stable; following OR would start on zosyn pending culture data  f/u blood cultures  pt denies dysuria, frequency, has ?urgency but this is chronic x at least 8 months per patient    rectovaginal fistula  ok to continue keflex daily for now  once started on IV antibiotics would discontinue    Josias De Leon M.D.  [facility], Division of Infectious Diseases  [phone]  After 5pm on weekdays and all day on weekends - please call [phone]

## 2022-08-17 NOTE — PROGRESS NOTE ADULT - SUBJECTIVE AND OBJECTIVE BOX
Orthopaedic Surgery Progress Note    Subjective:   Patient seen and examined  No acute events overnight. Afebrile pain moderately well controlled    Objective:  T(C): 37.6 (08-17-22 @ 04:51), Max: 37.7 (08-16-22 @ 10:38)  HR: 87 (08-17-22 @ 04:51) (75 - 87)  BP: 132/79 (08-17-22 @ 04:51) (106/67 - 154/73)  RR: 18 (08-17-22 @ 04:51) (16 - 19)  SpO2: 95% (08-17-22 @ 04:51) (95% - 98%)  Wt(kg): --      PE    NAD  LLE:   lateral thigh wound unchanged  motor intact GS/TA/EHL  SILT S/S/SP/DP  WWP                          11.6   15.54 )-----------( 295      ( 16 Aug 2022 12:38 )             35.1         89y Female with L hip fluid collection in the setting of open wound concerning for prosthetic joint infection    - IR consult for aspiration, please send fluid for cell count, crystals, gram stain, culture  - Will likely need to proceed to OR for I&D, revision of modular components pending aspiration results  - FU blood cultures  - Hold antibiotics as patient is not clinically septic and long-term culture-directed antibiotic therapy will likely be necessary  - WBAT LLE  - Pain control - would recommend increasing tylenol to 975mg q8h standing with oxycodone 2.5/5mg PRN moderate/severe given poor pain control on current regimen.  - Will need medical optimization documented ahead of likely OR  - DVT ppx if okay w/ IR  - Remainder of care per primary team    For all questions related to patient care, please reach out to the on-call team via the pager.     Maryann Cabrera PGY-3  Orthopaedic Surgery  [facility] [id number]  [facility] [id number]  [facility] [phone]

## 2022-08-17 NOTE — CONSULT NOTE ADULT - SUBJECTIVE AND OBJECTIVE BOX
[facility], Division of Infectious Diseases  STEVE Loaiza S. Shah, Y. Patel, G. Moises  [phone]  ([phone] - weekdays after 5pm and weekends)    GEORGE ROSE  89y, Female  [id number]    HPI--  HPI:  89F PMhx pseudomembranous colitis in  requiring total colectomy, HTN, hypothyroidism, GERD and left hip arthroplasty who presented w/ presents left hip wound c/b drainage. Patient reports having wound x 2-3 months and states it has progressively worsened. Has been followed at wound care and reports course of oral antibiotics without improvement followed by another course with closure of the wound. She states the infection never fully went away and the wound reopened. Endorses fever. Reports has been on keflex daily for ppx for rectovaginal fistula.  In ED noted to have leukocytosis w/ elevated inflammatory markers. S/p vanc 1g x 1 and zosyn x 1. Ortho consulted. s/p CT abd/pelvis- left hip wound with underlying subcutaneous collection approximately 6 cm.    ROS: 10 point review of systems completed, pertinent positives and negatives as per HPI.    Allergies: iodine (Other; Anaphylaxis)  shellfish (Anaphylaxis)  sulfa drugs (Other)    PMH -- Hypertension    Hypothyroidism    Hiatal hernia    Fistula, perirectal      PSH -- S/P cholecystectomy    S/P ileostomy    S/P arthroscopy of right knee    S/P foot joint surgery      FH -- No pertinent family history      Social History -- denies tobacco, alcohol or illicit drug use  Travel/Environmental/Occupational History: ***    Physical Exam--  Vital Signs Last 24 Hrs  T(F): 99.4 (17 Aug 2022 11:29), Max: 99.7 (17 Aug 2022 04:51)  HR: 87 (17 Aug 2022 11:29) (75 - 87)  BP: 149/82 (17 Aug 2022 11:29) (116/59 - 154/73)  RR: 18 (17 Aug 2022 11:29) (16 - 18)  SpO2: 95% (17 Aug 2022 11:29) (95% - 98%)  General: nontoxic-appearing, no acute distress  HEENT: anicteric  Neck: Not rigid. No LAD  Lungs: Clear bilaterally without rales  Heart: S1, S2, normal rate. No murmur  Abdomen: Soft. Nondistended. Nontender, colostomy  Neuro: AAOx3, no obvious focal deficits   Back: No costovertebral angle tenderness.  Extremities: No LE edema.   Skin: Warm. Dry. mild erythema surrounding L hip open wound  Psychiatric: Appropriate affect and mood for situation.     Laboratory & Imaging Data--  CBC:                       11.0   9.89  )-----------( 240      ( 17 Aug 2022 07:43 )             33.2     WBC Count: 9.89 K/uL (22 @ 07:43)  WBC Count: 15.54 K/uL (22 @ 12:38)    CMP:     138  |  102  |  19  ----------------------------<  120<H>  4.3   |  24  |  0.78    Ca    9.2      17 Aug 2022 07:43    TPro  7.7  /  Alb  3.6  /  TBili  0.6  /  DBili  x   /  AST  24  /  ALT  13  /  AlkPhos  79  08-16    LIVER FUNCTIONS - ( 16 Aug 2022 12:38 )  Alb: 3.6 g/dL / Pro: 7.7 g/dL / ALK PHOS: 79 U/L / ALT: 13 U/L / AST: 24 U/L / GGT: x           Urinalysis Basic - ( 16 Aug 2022 15:09 )    Color: Yellow / Appearance: Clear / S.018 / pH: x  Gluc: x / Ketone: Negative  / Bili: Negative / Urobili: Negative   Blood: x / Protein: Trace / Nitrite: Negative   Leuk Esterase: Negative / RBC: 3 /hpf / WBC 1 /HPF   Sq Epi: x / Non Sq Epi: 0 /hpf / Bacteria: 0.0      Microbiology:     Radiology--  ***  Active Medications--  acetaminophen     Tablet .. 650 milliGRAM(s) Oral every 6 hours PRN  allopurinol 100 milliGRAM(s) Oral two times a day  aluminum hydroxide/magnesium hydroxide/simethicone Suspension 30 milliLiter(s) Oral every 4 hours PRN  cephalexin 250 milliGRAM(s) Oral daily  chlorhexidine 2% Cloths 1 Application(s) Topical daily  levothyroxine 50 MICROGram(s) Oral daily  melatonin 3 milliGRAM(s) Oral at bedtime PRN  multivitamin 1 Tablet(s) Oral daily  nystatin Powder 1 Application(s) Topical three times a day  ondansetron Injectable 4 milliGRAM(s) IV Push every 8 hours PRN  pantoprazole    Tablet 40 milliGRAM(s) Oral before breakfast    Antimicrobials:   cephalexin 250 milliGRAM(s) Oral daily    Immunologic:    [facility], Division of Infectious Diseases  STEVE Loaiza S. Shah, Y. Patel, G. Moises  [phone]  ([phone] - weekdays after 5pm and weekends)    GEORGE ROES  89y, Female  [id number]    HPI--  HPI:  89F PMhx pseudomembranous colitis in  requiring total colectomy s/p ileostomy, HTN, hypothyroidism, GERD and left hip arthroplasty who presented w/ presents left hip wound c/b drainage. Patient reports having wound x 2-3 months and states it has progressively worsened. Has been followed at wound care and reports course of oral antibiotics without improvement followed by another course with closure of the wound. She states the infection never fully went away and the wound reopened. Endorses fever. Reports has been on keflex daily for ppx for rectovaginal fistula.  In ED noted to have leukocytosis w/ elevated inflammatory markers. S/p vanc 1g x 1 and zosyn x 1. Ortho consulted. s/p CT abd/pelvis- left hip wound with underlying subcutaneous collection approximately 6 cm.    ROS: 10 point review of systems completed, pertinent positives and negatives as per HPI.    Allergies: iodine (Other; Anaphylaxis)  shellfish (Anaphylaxis)  sulfa drugs (Other)    PMH -- Hypertension    Hypothyroidism    Hiatal hernia    Fistula, perirectal      PSH -- S/P cholecystectomy    S/P ileostomy    S/P arthroscopy of right knee    S/P foot joint surgery      FH -- No pertinent family history      Social History -- denies tobacco, alcohol or illicit drug use  Travel/Environmental/Occupational History: ***    Physical Exam--  Vital Signs Last 24 Hrs  T(F): 99.4 (17 Aug 2022 11:29), Max: 99.7 (17 Aug 2022 04:51)  HR: 87 (17 Aug 2022 11:29) (75 - 87)  BP: 149/82 (17 Aug 2022 11:29) (116/59 - 154/73)  RR: 18 (17 Aug 2022 11:29) (16 - 18)  SpO2: 95% (17 Aug 2022 11:29) (95% - 98%)  General: nontoxic-appearing, no acute distress  HEENT: anicteric  Neck: Not rigid. No LAD  Lungs: Clear bilaterally without rales  Heart: S1, S2, normal rate. No murmur  Abdomen: Soft. Nondistended. Nontender, ileostomy  Neuro: AAOx3, no obvious focal deficits   Back: No costovertebral angle tenderness.  Extremities: No LE edema.   Skin: Warm. Dry. mild erythema surrounding L hip open wound  Psychiatric: Appropriate affect and mood for situation.     Laboratory & Imaging Data--  CBC:                       11.0   9.89  )-----------( 240      ( 17 Aug 2022 07:43 )             33.2     WBC Count: 9.89 K/uL (22 @ 07:43)  WBC Count: 15.54 K/uL (22 @ 12:38)    CMP:     138  |  102  |  19  ----------------------------<  120<H>  4.3   |  24  |  0.78    Ca    9.2      17 Aug 2022 07:43    TPro  7.7  /  Alb  3.6  /  TBili  0.6  /  DBili  x   /  AST  24  /  ALT  13  /  AlkPhos  79  08-16    LIVER FUNCTIONS - ( 16 Aug 2022 12:38 )  Alb: 3.6 g/dL / Pro: 7.7 g/dL / ALK PHOS: 79 U/L / ALT: 13 U/L / AST: 24 U/L / GGT: x           Urinalysis Basic - ( 16 Aug 2022 15:09 )    Color: Yellow / Appearance: Clear / S.018 / pH: x  Gluc: x / Ketone: Negative  / Bili: Negative / Urobili: Negative   Blood: x / Protein: Trace / Nitrite: Negative   Leuk Esterase: Negative / RBC: 3 /hpf / WBC 1 /HPF   Sq Epi: x / Non Sq Epi: 0 /hpf / Bacteria: 0.0      Microbiology:     Radiology--  ***  Active Medications--  acetaminophen     Tablet .. 650 milliGRAM(s) Oral every 6 hours PRN  allopurinol 100 milliGRAM(s) Oral two times a day  aluminum hydroxide/magnesium hydroxide/simethicone Suspension 30 milliLiter(s) Oral every 4 hours PRN  cephalexin 250 milliGRAM(s) Oral daily  chlorhexidine 2% Cloths 1 Application(s) Topical daily  levothyroxine 50 MICROGram(s) Oral daily  melatonin 3 milliGRAM(s) Oral at bedtime PRN  multivitamin 1 Tablet(s) Oral daily  nystatin Powder 1 Application(s) Topical three times a day  ondansetron Injectable 4 milliGRAM(s) IV Push every 8 hours PRN  pantoprazole    Tablet 40 milliGRAM(s) Oral before breakfast    Antimicrobials:   cephalexin 250 milliGRAM(s) Oral daily    Immunologic:

## 2022-08-18 ENCOUNTER — APPOINTMENT (OUTPATIENT)
Dept: ORTHOPEDIC SURGERY | Facility: HOSPITAL | Age: 87
End: 2022-08-18

## 2022-08-18 ENCOUNTER — RESULT REVIEW (OUTPATIENT)
Age: 87
End: 2022-08-18

## 2022-08-18 LAB
ANION GAP SERPL CALC-SCNC: 10 MMOL/L — SIGNIFICANT CHANGE UP (ref 5–17)
ANION GAP SERPL CALC-SCNC: 8 MMOL/L — SIGNIFICANT CHANGE UP (ref 5–17)
APTT BLD: 32 SEC — SIGNIFICANT CHANGE UP (ref 27.5–35.5)
BLD GP AB SCN SERPL QL: NEGATIVE — SIGNIFICANT CHANGE UP
BUN SERPL-MCNC: 13 MG/DL — SIGNIFICANT CHANGE UP (ref 7–23)
BUN SERPL-MCNC: 16 MG/DL — SIGNIFICANT CHANGE UP (ref 7–23)
CALCIUM SERPL-MCNC: 9.1 MG/DL — SIGNIFICANT CHANGE UP (ref 8.4–10.5)
CALCIUM SERPL-MCNC: 9.2 MG/DL — SIGNIFICANT CHANGE UP (ref 8.4–10.5)
CHLORIDE SERPL-SCNC: 104 MMOL/L — SIGNIFICANT CHANGE UP (ref 96–108)
CHLORIDE SERPL-SCNC: 105 MMOL/L — SIGNIFICANT CHANGE UP (ref 96–108)
CO2 SERPL-SCNC: 24 MMOL/L — SIGNIFICANT CHANGE UP (ref 22–31)
CO2 SERPL-SCNC: 26 MMOL/L — SIGNIFICANT CHANGE UP (ref 22–31)
CREAT SERPL-MCNC: 0.76 MG/DL — SIGNIFICANT CHANGE UP (ref 0.5–1.3)
CREAT SERPL-MCNC: 0.86 MG/DL — SIGNIFICANT CHANGE UP (ref 0.5–1.3)
EGFR: 65 ML/MIN/1.73M2 — SIGNIFICANT CHANGE UP
EGFR: 75 ML/MIN/1.73M2 — SIGNIFICANT CHANGE UP
GLUCOSE SERPL-MCNC: 143 MG/DL — HIGH (ref 70–99)
GLUCOSE SERPL-MCNC: 188 MG/DL — HIGH (ref 70–99)
GRAM STN FLD: SIGNIFICANT CHANGE UP
GRAM STN FLD: SIGNIFICANT CHANGE UP
HCT VFR BLD CALC: 32.2 % — LOW (ref 34.5–45)
HCT VFR BLD CALC: 33.5 % — LOW (ref 34.5–45)
HGB BLD-MCNC: 10.6 G/DL — LOW (ref 11.5–15.5)
HGB BLD-MCNC: 10.9 G/DL — LOW (ref 11.5–15.5)
INR BLD: 1.29 RATIO — HIGH (ref 0.88–1.16)
MCHC RBC-ENTMCNC: 29.9 PG — SIGNIFICANT CHANGE UP (ref 27–34)
MCHC RBC-ENTMCNC: 30 PG — SIGNIFICANT CHANGE UP (ref 27–34)
MCHC RBC-ENTMCNC: 32.5 GM/DL — SIGNIFICANT CHANGE UP (ref 32–36)
MCHC RBC-ENTMCNC: 32.9 GM/DL — SIGNIFICANT CHANGE UP (ref 32–36)
MCV RBC AUTO: 91 FL — SIGNIFICANT CHANGE UP (ref 80–100)
MCV RBC AUTO: 92.3 FL — SIGNIFICANT CHANGE UP (ref 80–100)
MRSA PCR RESULT.: SIGNIFICANT CHANGE UP
NRBC # BLD: 0 /100 WBCS — SIGNIFICANT CHANGE UP (ref 0–0)
NRBC # BLD: 0 /100 WBCS — SIGNIFICANT CHANGE UP (ref 0–0)
PLATELET # BLD AUTO: 261 K/UL — SIGNIFICANT CHANGE UP (ref 150–400)
PLATELET # BLD AUTO: 277 K/UL — SIGNIFICANT CHANGE UP (ref 150–400)
POTASSIUM SERPL-MCNC: 3.9 MMOL/L — SIGNIFICANT CHANGE UP (ref 3.5–5.3)
POTASSIUM SERPL-MCNC: 4.4 MMOL/L — SIGNIFICANT CHANGE UP (ref 3.5–5.3)
POTASSIUM SERPL-SCNC: 3.9 MMOL/L — SIGNIFICANT CHANGE UP (ref 3.5–5.3)
POTASSIUM SERPL-SCNC: 4.4 MMOL/L — SIGNIFICANT CHANGE UP (ref 3.5–5.3)
PROTHROM AB SERPL-ACNC: 15 SEC — HIGH (ref 10.5–13.4)
RBC # BLD: 3.54 M/UL — LOW (ref 3.8–5.2)
RBC # BLD: 3.63 M/UL — LOW (ref 3.8–5.2)
RBC # FLD: 13.5 % — SIGNIFICANT CHANGE UP (ref 10.3–14.5)
RBC # FLD: 13.7 % — SIGNIFICANT CHANGE UP (ref 10.3–14.5)
RH IG SCN BLD-IMP: POSITIVE — SIGNIFICANT CHANGE UP
S AUREUS DNA NOSE QL NAA+PROBE: SIGNIFICANT CHANGE UP
SODIUM SERPL-SCNC: 138 MMOL/L — SIGNIFICANT CHANGE UP (ref 135–145)
SODIUM SERPL-SCNC: 139 MMOL/L — SIGNIFICANT CHANGE UP (ref 135–145)
SPECIMEN SOURCE: SIGNIFICANT CHANGE UP
SPECIMEN SOURCE: SIGNIFICANT CHANGE UP
WBC # BLD: 11.02 K/UL — HIGH (ref 3.8–10.5)
WBC # BLD: 14.95 K/UL — HIGH (ref 3.8–10.5)
WBC # FLD AUTO: 11.02 K/UL — HIGH (ref 3.8–10.5)
WBC # FLD AUTO: 14.95 K/UL — HIGH (ref 3.8–10.5)

## 2022-08-18 PROCEDURE — 72170 X-RAY EXAM OF PELVIS: CPT | Mod: 26

## 2022-08-18 PROCEDURE — 27324 BIOPSY THIGH SOFT TISSUES: CPT | Mod: LT

## 2022-08-18 PROCEDURE — 88304 TISSUE EXAM BY PATHOLOGIST: CPT | Mod: 26

## 2022-08-18 PROCEDURE — 27360 PARTIAL REMOVAL LEG BONE(S): CPT | Mod: LT

## 2022-08-18 PROCEDURE — 27138 REVISE HIP JOINT REPLACEMENT: CPT | Mod: LT

## 2022-08-18 DEVICE — BONE FILLER BIOCOMPOSITE STIMULAN RAPID CURE 10CC: Type: IMPLANTABLE DEVICE | Site: LEFT | Status: FUNCTIONAL

## 2022-08-18 DEVICE — IMPLANTABLE DEVICE: Type: IMPLANTABLE DEVICE | Site: LEFT | Status: FUNCTIONAL

## 2022-08-18 DEVICE — HEAD FEM 12/14 TAPR 28 PLUS0 NK: Type: IMPLANTABLE DEVICE | Site: LEFT | Status: FUNCTIONAL

## 2022-08-18 RX ORDER — FENTANYL CITRATE 50 UG/ML
25 INJECTION INTRAVENOUS
Refills: 0 | Status: DISCONTINUED | OUTPATIENT
Start: 2022-08-18 | End: 2022-08-18

## 2022-08-18 RX ORDER — ONDANSETRON 8 MG/1
4 TABLET, FILM COATED ORAL EVERY 8 HOURS
Refills: 0 | Status: DISCONTINUED | OUTPATIENT
Start: 2022-08-18 | End: 2022-08-23

## 2022-08-18 RX ORDER — SODIUM CHLORIDE 9 MG/ML
1000 INJECTION INTRAMUSCULAR; INTRAVENOUS; SUBCUTANEOUS
Refills: 0 | Status: COMPLETED | OUTPATIENT
Start: 2022-08-18 | End: 2022-08-19

## 2022-08-18 RX ORDER — MAGNESIUM HYDROXIDE 400 MG/1
30 TABLET, CHEWABLE ORAL DAILY
Refills: 0 | Status: DISCONTINUED | OUTPATIENT
Start: 2022-08-18 | End: 2022-08-23

## 2022-08-18 RX ORDER — ALLOPURINOL 300 MG
100 TABLET ORAL
Refills: 0 | Status: DISCONTINUED | OUTPATIENT
Start: 2022-08-18 | End: 2022-08-23

## 2022-08-18 RX ORDER — ACETAMINOPHEN 500 MG
1000 TABLET ORAL ONCE
Refills: 0 | Status: COMPLETED | OUTPATIENT
Start: 2022-08-18 | End: 2022-08-18

## 2022-08-18 RX ORDER — ACETAMINOPHEN 500 MG
975 TABLET ORAL EVERY 8 HOURS
Refills: 0 | Status: DISCONTINUED | OUTPATIENT
Start: 2022-08-18 | End: 2022-08-18

## 2022-08-18 RX ORDER — ENOXAPARIN SODIUM 100 MG/ML
40 INJECTION SUBCUTANEOUS EVERY 24 HOURS
Refills: 0 | Status: DISCONTINUED | OUTPATIENT
Start: 2022-08-18 | End: 2022-08-23

## 2022-08-18 RX ORDER — ONDANSETRON 8 MG/1
4 TABLET, FILM COATED ORAL ONCE
Refills: 0 | Status: DISCONTINUED | OUTPATIENT
Start: 2022-08-18 | End: 2022-08-18

## 2022-08-18 RX ORDER — OXYCODONE HYDROCHLORIDE 5 MG/1
5 TABLET ORAL EVERY 4 HOURS
Refills: 0 | Status: DISCONTINUED | OUTPATIENT
Start: 2022-08-18 | End: 2022-08-23

## 2022-08-18 RX ORDER — LEVOTHYROXINE SODIUM 125 MCG
50 TABLET ORAL DAILY
Refills: 0 | Status: DISCONTINUED | OUTPATIENT
Start: 2022-08-18 | End: 2022-08-23

## 2022-08-18 RX ORDER — SODIUM CHLORIDE 9 MG/ML
1000 INJECTION, SOLUTION INTRAVENOUS
Refills: 0 | Status: DISCONTINUED | OUTPATIENT
Start: 2022-08-18 | End: 2022-08-18

## 2022-08-18 RX ORDER — ACETAMINOPHEN 500 MG
975 TABLET ORAL EVERY 8 HOURS
Refills: 0 | Status: DISCONTINUED | OUTPATIENT
Start: 2022-08-19 | End: 2022-08-23

## 2022-08-18 RX ORDER — OXYCODONE HYDROCHLORIDE 5 MG/1
2.5 TABLET ORAL EVERY 4 HOURS
Refills: 0 | Status: DISCONTINUED | OUTPATIENT
Start: 2022-08-18 | End: 2022-08-23

## 2022-08-18 RX ORDER — CEPHALEXIN 500 MG
250 CAPSULE ORAL DAILY
Refills: 0 | Status: DISCONTINUED | OUTPATIENT
Start: 2022-08-18 | End: 2022-08-18

## 2022-08-18 RX ORDER — VANCOMYCIN HCL 1 G
1000 VIAL (EA) INTRAVENOUS ONCE
Refills: 0 | Status: COMPLETED | OUTPATIENT
Start: 2022-08-18 | End: 2022-08-18

## 2022-08-18 RX ORDER — LANOLIN ALCOHOL/MO/W.PET/CERES
3 CREAM (GRAM) TOPICAL AT BEDTIME
Refills: 0 | Status: DISCONTINUED | OUTPATIENT
Start: 2022-08-18 | End: 2022-08-23

## 2022-08-18 RX ORDER — PANTOPRAZOLE SODIUM 20 MG/1
40 TABLET, DELAYED RELEASE ORAL
Refills: 0 | Status: DISCONTINUED | OUTPATIENT
Start: 2022-08-18 | End: 2022-08-23

## 2022-08-18 RX ORDER — PIPERACILLIN AND TAZOBACTAM 4; .5 G/20ML; G/20ML
3.38 INJECTION, POWDER, LYOPHILIZED, FOR SOLUTION INTRAVENOUS EVERY 8 HOURS
Refills: 0 | Status: DISCONTINUED | OUTPATIENT
Start: 2022-08-18 | End: 2022-08-21

## 2022-08-18 RX ORDER — SENNA PLUS 8.6 MG/1
2 TABLET ORAL AT BEDTIME
Refills: 0 | Status: DISCONTINUED | OUTPATIENT
Start: 2022-08-18 | End: 2022-08-23

## 2022-08-18 RX ADMIN — NYSTATIN CREAM 1 APPLICATION(S): 100000 CREAM TOPICAL at 06:22

## 2022-08-18 RX ADMIN — Medication 100 MILLIGRAM(S): at 17:50

## 2022-08-18 RX ADMIN — Medication 400 MILLIGRAM(S): at 17:47

## 2022-08-18 RX ADMIN — Medication 50 MICROGRAM(S): at 06:24

## 2022-08-18 RX ADMIN — Medication 1000 MILLIGRAM(S): at 23:22

## 2022-08-18 RX ADMIN — Medication 3 MILLIGRAM(S): at 22:02

## 2022-08-18 RX ADMIN — Medication 100 MILLIGRAM(S): at 06:23

## 2022-08-18 RX ADMIN — Medication 250 MILLIGRAM(S): at 11:21

## 2022-08-18 RX ADMIN — Medication 650 MILLIGRAM(S): at 06:22

## 2022-08-18 RX ADMIN — PIPERACILLIN AND TAZOBACTAM 25 GRAM(S): 4; .5 INJECTION, POWDER, LYOPHILIZED, FOR SOLUTION INTRAVENOUS at 22:02

## 2022-08-18 RX ADMIN — SODIUM CHLORIDE 100 MILLILITER(S): 9 INJECTION INTRAMUSCULAR; INTRAVENOUS; SUBCUTANEOUS at 12:00

## 2022-08-18 RX ADMIN — PIPERACILLIN AND TAZOBACTAM 25 GRAM(S): 4; .5 INJECTION, POWDER, LYOPHILIZED, FOR SOLUTION INTRAVENOUS at 14:03

## 2022-08-18 RX ADMIN — PANTOPRAZOLE SODIUM 40 MILLIGRAM(S): 20 TABLET, DELAYED RELEASE ORAL at 06:23

## 2022-08-18 RX ADMIN — ENOXAPARIN SODIUM 40 MILLIGRAM(S): 100 INJECTION SUBCUTANEOUS at 17:48

## 2022-08-18 RX ADMIN — Medication 400 MILLIGRAM(S): at 23:21

## 2022-08-18 RX ADMIN — SODIUM CHLORIDE 100 MILLILITER(S): 9 INJECTION INTRAMUSCULAR; INTRAVENOUS; SUBCUTANEOUS at 22:06

## 2022-08-18 NOTE — DIETITIAN INITIAL EVALUATION ADULT - NSFNSPHYEXAMSKINFT_GEN_A_CORE
Pressure Injury 1: Right:,buttocks, Stage II  Pressure Injury 2: none, none  Pressure Injury 3: none, none  Pressure Injury 4: none, none  Pressure Injury 5: none, none  Pressure Injury 6: none, none  Pressure Injury 7: none, none  Pressure Injury 8: none, none  Pressure Injury 9: none, none  Pressure Injury 10: none, none  Pressure Injury 11: none, none

## 2022-08-18 NOTE — DIETITIAN INITIAL EVALUATION ADULT - PERTINENT LABORATORY DATA
08-18    139  |  105  |  16  ----------------------------<  188<H>  4.4   |  24  |  0.86    Ca    9.1      18 Aug 2022 10:53

## 2022-08-18 NOTE — PROGRESS NOTE ADULT - SUBJECTIVE AND OBJECTIVE BOX
Orthopaedic Surgery Progress Note    Subjective:   Patient seen and examined  No acute events overnight. Temp 100.9. Pain moderately well controlled    Objective:  Vital Signs Last 24 Hrs  T(C): 38.3 (18 Aug 2022 05:17), Max: 38.3 (18 Aug 2022 05:17)  T(F): 100.9 (18 Aug 2022 05:17), Max: 100.9 (18 Aug 2022 05:17)  HR: 95 (18 Aug 2022 05:17) (85 - 99)  BP: 147/78 (18 Aug 2022 05:17) (121/80 - 149/82)  BP(mean): 101 (18 Aug 2022 05:17) (101 - 101)  RR: 18 (18 Aug 2022 05:17) (18 - 18)  SpO2: 95% (18 Aug 2022 05:17) (94% - 96%)    Parameters below as of 18 Aug 2022 05:17  Patient On (Oxygen Delivery Method): room air          PE    NAD  LLE:   lateral thigh wound unchanged  motor intact GS/TA/EHL  SILT S/S/SP/DP  WWP                                     11.0   9.89  )-----------( 240      ( 17 Aug 2022 07:43 )             33.2           89y Female with L hip fluid collection in the setting of open wound concerning for prosthetic joint infection    - FU IR aspiration culture, does not look like cell count sent  - OR today for I&D, revision of modular components - NPO/IVF, hold AC, AM labs  - Hold antibiotics as patient is not clinically septic and long-term culture-directed antibiotic therapy will likely be necessary  - WBAT LLE  - Pain control - would recommend increasing tylenol to 975mg q8h standing with oxycodone 2.5/5mg PRN moderate/severe given poor pain control on current regimen.  - Appreciate medical optimization  - Remainder of care per primary team    For all questions related to patient care, please reach out to the on-call team via the pager.     Maryann Cabrera PGY-3  Orthopaedic Surgery  [facility] [id number]  [facility] [id number]  [facility] [id number]

## 2022-08-18 NOTE — PRE-ANESTHESIA EVALUATION ADULT - NSANTHOSAYNRD_GEN_A_CORE
No. MELY screening performed.  STOP BANG Legend: 0-2 = LOW Risk; 3-4 = INTERMEDIATE Risk; 5-8 = HIGH Risk

## 2022-08-18 NOTE — PRE-ANESTHESIA EVALUATION ADULT - NSANTHPMHFT_GEN_ALL_CORE
86F PMH pseudomembranous colitis in 2005 requiring total colectomy with perirectal fistula, HTN, hypothyroidism, GERD and left hip arthroplasty presents with 2 months of left hip wound with serous, nonpurulent drainage, now worsening and with fever, found to have severe wound and admitted for IR drainage and orthopedic intervention

## 2022-08-18 NOTE — CHART NOTE - NSCHARTNOTEFT_GEN_A_CORE
POC      Resting without complaints.  Seen in PACU     No Chest Pain, SOB, N/V.    T(C): 36.2 (08-18-22 @ 12:00), Max: 38.3 (08-18-22 @ 05:17)  HR: 74 (08-18-22 @ 13:00) (72 - 99)  BP: 121/62 (08-18-22 @ 13:00) (98/56 - 147/78)  RR: 18 (08-18-22 @ 13:00) (16 - 22)  SpO2: 95% (08-18-22 @ 13:00) (94% - 100%)  Wt(kg): --    Exam:  Alert and Thompson, No Acute Distress  Pulm: CTAB  Abdomen soft / benign  Key  [n ]   EXT   LLE       Aquacel dressing C/D [ x]        Calves soft       (+) DF  PF;  EHL /FHL 5/5        No Sensory Deficits noted        2+ pulses    Xray:---- prosthesis in good alignment                           10.9<L>  14.95<H> )-----------( 277      ( 18 Aug 2022 10:53 )             33.5<L>     08-18    139  |  105  |  16  ----------------------------<  188<H>  4.4   |  24  |  0.86      Culture - Body Fluid with Gram Stain (08.17.22 @ 17:36)    Gram Stain:   Prelim (-)      A/P: S/p Partial hip revision, femur component only, exchange acetabular liner / I& D/  Placement of Abx Beads    As per ID Recs    **  zosyn started     **Keflex to be DC'D  - Follow up OR  & Asp Cx  -PT/OT-WBAT- Anterior precautions  -Chk AM Labs  -DVT PPx: Lovenox QD  -Pain Control PO/IV Pain Rx  -Will follow w/ you      ***See Above  Roman PULLIAM  Orthopedics  B: [id number]

## 2022-08-18 NOTE — PROGRESS NOTE ADULT - SUBJECTIVE AND OBJECTIVE BOX
Infectious disease plan of care note    patient not seen today as she was taken to the OR  will f/u with patient tomorrow    89F PMhx pseudomembranous colitis in 2005 requiring total colectomy, HTN, hypothyroidism, GERD and left hip arthroplasty who presented w/ presents left hip wound c/b drainage.    L hip abscess, prosthetic joint infection  ESR 86,   s/p CT abd/pelvis- Left hip wound with underlying subcutaneous collection, measures approximately 6 cm.  s/p IR aspiration  patient taken to OR for I and D, placement abx beads and revision L  hip hemiarthroplasty  please send for cell count w/ diff, cultures w/ gram stain  f/u blood cultures  discontinue keflex  after OR start angelica De Leon M.D.  [facility], Division of Infectious Diseases  [phone]  After 5pm on weekdays and all day on weekends - please call [phone]

## 2022-08-18 NOTE — DIETITIAN INITIAL EVALUATION ADULT - ORAL INTAKE PTA/DIET HISTORY
bread and cheese for breakfast, whatever her aide makes her for lunch-she could not recall, lite meal at dinner including yogurt. snacks on animal cookies.

## 2022-08-18 NOTE — DIETITIAN INITIAL EVALUATION ADULT - PERTINENT MEDS FT
MEDICATIONS  (STANDING):  acetaminophen   IVPB .. 1000 milliGRAM(s) IV Intermittent once  acetaminophen   IVPB .. 1000 milliGRAM(s) IV Intermittent once  allopurinol 100 milliGRAM(s) Oral two times a day  enoxaparin Injectable 40 milliGRAM(s) SubCutaneous every 24 hours  levothyroxine 50 MICROGram(s) Oral daily  pantoprazole    Tablet 40 milliGRAM(s) Oral before breakfast  piperacillin/tazobactam IVPB.. 3.375 Gram(s) IV Intermittent every 8 hours  senna 2 Tablet(s) Oral at bedtime  sodium chloride 0.9%. 1000 milliLiter(s) (100 mL/Hr) IV Continuous <Continuous>    MEDICATIONS  (PRN):  magnesium hydroxide Suspension 30 milliLiter(s) Oral daily PRN Constipation  melatonin 3 milliGRAM(s) Oral at bedtime PRN Insomnia  ondansetron Injectable 4 milliGRAM(s) IV Push every 8 hours PRN Nausea and/or Vomiting  oxyCODONE    IR 2.5 milliGRAM(s) Oral every 4 hours PRN Moderate Pain (4 - 6)  oxyCODONE    IR 5 milliGRAM(s) Oral every 4 hours PRN Severe Pain (7 - 10)

## 2022-08-18 NOTE — BRIEF OPERATIVE NOTE - NSICDXBRIEFPROCEDURE_GEN_ALL_CORE_FT
PROCEDURES:  Partial hip revision, femur component only, exchange acetabular liner 18-Aug-2022 10:00:03  Christian Campbell

## 2022-08-18 NOTE — BRIEF OPERATIVE NOTE - NSICDXBRIEFPREOP_GEN_ALL_CORE_FT
PRE-OP DIAGNOSIS:  Infection of prosthetic hip joint, sequela 18-Aug-2022 10:00:28  Christian Campbell

## 2022-08-18 NOTE — DIETITIAN INITIAL EVALUATION ADULT - NSFNSGIIOFT_GEN_A_CORE
08-17-22 @ 07:01  -  08-18-22 @ 07:00  --------------------------------------------------------  OUT:    Colostomy (mL): 50 mL  Total OUT: 50 mL    Total NET: -50 mL

## 2022-08-18 NOTE — PROGRESS NOTE ADULT - SUBJECTIVE AND OBJECTIVE BOX
ORTHO ATTENDING POST OP    s/p I and D, placement abx beads and revision L  hip hemiarthroplasty  WBAT L  LE  Anterior hip precautions  Lovenox  venodynes  ancef x 24h  OOB to chair in AM  rehab consult  CBC in RR and AM   f/u medicine  f/u cx x 3  f/u path x 2 of sinus tract and capular tissues ORTHO ATTENDING POST OP    s/p I and D, placement abx beads and revision L  hip hemiarthroplasty  WBAT L  LE  Anterior hip precautions  Lovenox  venodynes  vanco/zosyn until cx return, as per ID  OOB to chair in AM  rehab consult  CBC in RR and AM   f/u medicine  f/u cx x 3  f/u path x 2 of sinus tract and capular tissues

## 2022-08-19 LAB
ANION GAP SERPL CALC-SCNC: 12 MMOL/L — SIGNIFICANT CHANGE UP (ref 5–17)
BASOPHILS # BLD AUTO: 0.03 K/UL — SIGNIFICANT CHANGE UP (ref 0–0.2)
BASOPHILS NFR BLD AUTO: 0.2 % — SIGNIFICANT CHANGE UP (ref 0–2)
BUN SERPL-MCNC: 22 MG/DL — SIGNIFICANT CHANGE UP (ref 7–23)
CALCIUM SERPL-MCNC: 8.9 MG/DL — SIGNIFICANT CHANGE UP (ref 8.4–10.5)
CHLORIDE SERPL-SCNC: 105 MMOL/L — SIGNIFICANT CHANGE UP (ref 96–108)
CO2 SERPL-SCNC: 23 MMOL/L — SIGNIFICANT CHANGE UP (ref 22–31)
CREAT SERPL-MCNC: 1.19 MG/DL — SIGNIFICANT CHANGE UP (ref 0.5–1.3)
EGFR: 44 ML/MIN/1.73M2 — LOW
EOSINOPHIL # BLD AUTO: 0.03 K/UL — SIGNIFICANT CHANGE UP (ref 0–0.5)
EOSINOPHIL NFR BLD AUTO: 0.2 % — SIGNIFICANT CHANGE UP (ref 0–6)
GLUCOSE SERPL-MCNC: 152 MG/DL — HIGH (ref 70–99)
HCT VFR BLD CALC: 27.5 % — LOW (ref 34.5–45)
HGB BLD-MCNC: 8.9 G/DL — LOW (ref 11.5–15.5)
IMM GRANULOCYTES NFR BLD AUTO: 0.8 % — SIGNIFICANT CHANGE UP (ref 0–1.5)
LYMPHOCYTES # BLD AUTO: 15.5 % — SIGNIFICANT CHANGE UP (ref 13–44)
LYMPHOCYTES # BLD AUTO: 2.17 K/UL — SIGNIFICANT CHANGE UP (ref 1–3.3)
MCHC RBC-ENTMCNC: 29.9 PG — SIGNIFICANT CHANGE UP (ref 27–34)
MCHC RBC-ENTMCNC: 32.4 GM/DL — SIGNIFICANT CHANGE UP (ref 32–36)
MCV RBC AUTO: 92.3 FL — SIGNIFICANT CHANGE UP (ref 80–100)
MONOCYTES # BLD AUTO: 1.28 K/UL — HIGH (ref 0–0.9)
MONOCYTES NFR BLD AUTO: 9.1 % — SIGNIFICANT CHANGE UP (ref 2–14)
NEUTROPHILS # BLD AUTO: 10.42 K/UL — HIGH (ref 1.8–7.4)
NEUTROPHILS NFR BLD AUTO: 74.2 % — SIGNIFICANT CHANGE UP (ref 43–77)
NIGHT BLUE STAIN TISS: SIGNIFICANT CHANGE UP
NRBC # BLD: 0 /100 WBCS — SIGNIFICANT CHANGE UP (ref 0–0)
PLATELET # BLD AUTO: 243 K/UL — SIGNIFICANT CHANGE UP (ref 150–400)
POTASSIUM SERPL-MCNC: 4.6 MMOL/L — SIGNIFICANT CHANGE UP (ref 3.5–5.3)
POTASSIUM SERPL-SCNC: 4.6 MMOL/L — SIGNIFICANT CHANGE UP (ref 3.5–5.3)
RBC # BLD: 2.98 M/UL — LOW (ref 3.8–5.2)
RBC # FLD: 13.6 % — SIGNIFICANT CHANGE UP (ref 10.3–14.5)
SODIUM SERPL-SCNC: 140 MMOL/L — SIGNIFICANT CHANGE UP (ref 135–145)
SPECIMEN SOURCE: SIGNIFICANT CHANGE UP
WBC # BLD: 14.04 K/UL — HIGH (ref 3.8–10.5)
WBC # FLD AUTO: 14.04 K/UL — HIGH (ref 3.8–10.5)

## 2022-08-19 RX ADMIN — ENOXAPARIN SODIUM 40 MILLIGRAM(S): 100 INJECTION SUBCUTANEOUS at 17:39

## 2022-08-19 RX ADMIN — OXYCODONE HYDROCHLORIDE 5 MILLIGRAM(S): 5 TABLET ORAL at 12:14

## 2022-08-19 RX ADMIN — Medication 975 MILLIGRAM(S): at 13:04

## 2022-08-19 RX ADMIN — Medication 100 MILLIGRAM(S): at 17:40

## 2022-08-19 RX ADMIN — Medication 975 MILLIGRAM(S): at 21:02

## 2022-08-19 RX ADMIN — Medication 50 MICROGRAM(S): at 05:36

## 2022-08-19 RX ADMIN — Medication 975 MILLIGRAM(S): at 21:32

## 2022-08-19 RX ADMIN — OXYCODONE HYDROCHLORIDE 2.5 MILLIGRAM(S): 5 TABLET ORAL at 10:21

## 2022-08-19 RX ADMIN — PIPERACILLIN AND TAZOBACTAM 25 GRAM(S): 4; .5 INJECTION, POWDER, LYOPHILIZED, FOR SOLUTION INTRAVENOUS at 13:04

## 2022-08-19 RX ADMIN — Medication 975 MILLIGRAM(S): at 05:37

## 2022-08-19 RX ADMIN — PIPERACILLIN AND TAZOBACTAM 25 GRAM(S): 4; .5 INJECTION, POWDER, LYOPHILIZED, FOR SOLUTION INTRAVENOUS at 21:03

## 2022-08-19 RX ADMIN — SODIUM CHLORIDE 100 MILLILITER(S): 9 INJECTION INTRAMUSCULAR; INTRAVENOUS; SUBCUTANEOUS at 12:11

## 2022-08-19 RX ADMIN — OXYCODONE HYDROCHLORIDE 5 MILLIGRAM(S): 5 TABLET ORAL at 13:13

## 2022-08-19 RX ADMIN — OXYCODONE HYDROCHLORIDE 2.5 MILLIGRAM(S): 5 TABLET ORAL at 09:18

## 2022-08-19 RX ADMIN — Medication 100 MILLIGRAM(S): at 05:36

## 2022-08-19 RX ADMIN — Medication 975 MILLIGRAM(S): at 06:07

## 2022-08-19 RX ADMIN — Medication 3 MILLIGRAM(S): at 21:02

## 2022-08-19 RX ADMIN — PANTOPRAZOLE SODIUM 40 MILLIGRAM(S): 20 TABLET, DELAYED RELEASE ORAL at 05:36

## 2022-08-19 RX ADMIN — PIPERACILLIN AND TAZOBACTAM 25 GRAM(S): 4; .5 INJECTION, POWDER, LYOPHILIZED, FOR SOLUTION INTRAVENOUS at 05:36

## 2022-08-19 RX ADMIN — Medication 975 MILLIGRAM(S): at 14:11

## 2022-08-19 NOTE — PROGRESS NOTE ADULT - SUBJECTIVE AND OBJECTIVE BOX
Ortho Progress Note    S: Patient seen and examined. No acute events overnight. Pain well controlled with current regimen. Denies lightheadedness/dizziness, CP/SOB. Tolerating diet.       O:  Physical Exam:  Gen: Laying in bed, NAD, alert and oriented.   Resp: Unlabored breathing  LLExt: Dressing c/d/i  EHL/FHL/TA/Sol intact          + SILT DP/SP/ASH/Sa/T          +DP, extremity WWP    Vital Signs Last 24 Hrs  T(C): 36.4 (19 Aug 2022 04:33), Max: 38.3 (18 Aug 2022 07:15)  T(F): 97.5 (19 Aug 2022 04:33), Max: 98 (18 Aug 2022 19:53)  HR: 85 (19 Aug 2022 04:33) (72 - 95)  BP: 106/68 (19 Aug 2022 04:33) (98/56 - 147/78)  BP(mean): 84 (18 Aug 2022 13:00) (69 - 101)  RR: 18 (19 Aug 2022 04:33) (16 - 22)  SpO2: 98% (19 Aug 2022 04:33) (94% - 100%)    Parameters below as of 19 Aug 2022 04:33  Patient On (Oxygen Delivery Method): room air                              10.9   14.95 )-----------( 277      ( 18 Aug 2022 10:53 )             33.5                         10.6   11.02 )-----------( 261      ( 18 Aug 2022 07:03 )             32.2       08-18    139  |  105  |  16  ----------------------------<  188<H>  4.4   |  24  |  0.86        PT/INR - ( 18 Aug 2022 07:03 )   PT: 15.0 sec;   INR: 1.29 ratio         PTT - ( 18 Aug 2022 07:03 )  PTT:32.0 sec

## 2022-08-19 NOTE — OCCUPATIONAL THERAPY INITIAL EVALUATION ADULT - SHORT TERM MEMORY, REHAB EVAL
Reports oxygen tank was dropped on left foot last Monday. Seen at  and x-rayed and had no fractures. Has swelling and some redness to left lower leg.
impaired

## 2022-08-19 NOTE — OCCUPATIONAL THERAPY INITIAL EVALUATION ADULT - LIVES WITH, PROFILE
pt lives in a private house with HHA 24/7. pt has 2 steps to enter +BHR,, with first floor set up. pt has walk in shower with shower chair and grab bars. ambulated with Rolling walker at baseline. Pt was independent in bathing, grooming, and needed assistance for dressing. -drives

## 2022-08-19 NOTE — PHYSICAL THERAPY INITIAL EVALUATION ADULT - NSPTDISCHREC_GEN_A_CORE
If pt is dc'd home, recommend resume 24 hour care. Pt will benefit from Physical therapy services at home for general strengthening, fall prevention, balance training, safety assessment, and to restore pt's prior level of function. DME: rolling walker/[facility]-acute Rehab

## 2022-08-19 NOTE — PROGRESS NOTE ADULT - ASSESSMENT
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    L hip abscess, PJI  ESR 86,   s/p vanc and zosyn x 1 in ED; daughter reports vanc not given but in EMR was marked as given  s/p CT abd/pelvis- Left hip wound with underlying subcutaneous collection, measures approximately 6 cm.  plans for IR aspiration 8/17- cx w/ growth of E faecalis  s/p OR for I & D 8/18, placement abx beads and revision L  hip hemiarthroplasty  f/u OR cultures  f/u blood cultures- NGTD  c/w zosyn while awaiting culture data  discussed plan w/ patient in person and patient's daughter (via phone)    rectovaginal fistula  on chronic keflex daily for ppx  discontinue    Josias De Leon M.D.  [facility], Division of Infectious Diseases  [phone]  After 5pm on weekdays and all day on weekends - please call [phone]

## 2022-08-19 NOTE — PROGRESS NOTE ADULT - SUBJECTIVE AND OBJECTIVE BOX
Patient is a 89y old  Female who presents with a chief complaint of Hip wound (19 Aug 2022 13:47)      SUBJECTIVE / OVERNIGHT EVENTS:    Patient seen and examined. no cp sob. co left hip pain.       Vital Signs Last 24 Hrs  T(C): 36.9 (19 Aug 2022 08:45), Max: 36.9 (19 Aug 2022 08:45)  T(F): 98.4 (19 Aug 2022 08:45), Max: 98.4 (19 Aug 2022 08:45)  HR: 60 (19 Aug 2022 09:56) (60 - 85)  BP: 116/69 (19 Aug 2022 09:56) (103/80 - 119/73)  BP(mean): --  RR: 18 (19 Aug 2022 08:45) (18 - 18)  SpO2: 97% (19 Aug 2022 09:56) (95% - 98%)    Parameters below as of 19 Aug 2022 09:56  Patient On (Oxygen Delivery Method): room air      I&O's Summary    18 Aug 2022 07:01  -  19 Aug 2022 07:00  --------------------------------------------------------  IN: 1990 mL / OUT: 0 mL / NET: 1990 mL    19 Aug 2022 07:01  -  19 Aug 2022 14:48  --------------------------------------------------------  IN: 480 mL / OUT: 0 mL / NET: 480 mL        PE:  GENERAL: NAD, AAOx3  HEAD:  Atraumatic, Normocephalic  CHEST/LUNG: CTABL, No wheeze  HEART: Regular rate and rhythm; no murmur  ABDOMEN: Soft, Nontender, Nondistended; Bowel sounds present, ostomy, old surgical scar  EXTREMITIES:  2+ Peripheral Pulses, No edema  NEURO: No focal deficits    LABS:                        8.9    14.04 )-----------( 243      ( 19 Aug 2022 06:46 )             27.5     08-19    140  |  105  |  22  ----------------------------<  152<H>  4.6   |  23  |  1.19    Ca    8.9      19 Aug 2022 06:49      PT/INR - ( 18 Aug 2022 07:03 )   PT: 15.0 sec;   INR: 1.29 ratio         PTT - ( 18 Aug 2022 07:03 )  PTT:32.0 sec  CAPILLARY BLOOD GLUCOSE                RADIOLOGY & ADDITIONAL TESTS:    Imaging Personally Reviewed:  [x] YES  [ ] NO    Consultant(s) Notes Reviewed:  [x] YES  [ ] NO    MEDICATIONS  (STANDING):  acetaminophen     Tablet .. 975 milliGRAM(s) Oral every 8 hours  allopurinol 100 milliGRAM(s) Oral two times a day  enoxaparin Injectable 40 milliGRAM(s) SubCutaneous every 24 hours  levothyroxine 50 MICROGram(s) Oral daily  pantoprazole    Tablet 40 milliGRAM(s) Oral before breakfast  piperacillin/tazobactam IVPB.. 3.375 Gram(s) IV Intermittent every 8 hours  senna 2 Tablet(s) Oral at bedtime    MEDICATIONS  (PRN):  magnesium hydroxide Suspension 30 milliLiter(s) Oral daily PRN Constipation  melatonin 3 milliGRAM(s) Oral at bedtime PRN Insomnia  ondansetron Injectable 4 milliGRAM(s) IV Push every 8 hours PRN Nausea and/or Vomiting  oxyCODONE    IR 2.5 milliGRAM(s) Oral every 4 hours PRN Moderate Pain (4 - 6)  oxyCODONE    IR 5 milliGRAM(s) Oral every 4 hours PRN Severe Pain (7 - 10)      Care Discussed with Consultants/Other Providers [x] YES  [ ] NO    HEALTH ISSUES - PROBLEM Dx:  HTN (hypertension)    Gout    Colitis with fistula    Hypothyroidism    Non-healing surgical wound of left groin    GERD (gastroesophageal reflux disease)    Prophylactic measure

## 2022-08-19 NOTE — PROGRESS NOTE ADULT - ASSESSMENT
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     Non-healing surgical wound of left groin / Left hip partial joint infection  sp IR aspiration 8/17/22 fu cultures  POD #1 exchange of femoral head, I&D, abx bead placement  Appreciate orthopedics  cont zosyn, fu OR cx  pain control  orthostatics neg  PT OT    # HTN    takes nadolol 40 mg PO Qd as an outpatient, currently on hold    # Gout  Continue with home allopurinol 100 BID    # Colitis with fistula  Patient has a history of colitis with a persistent fistula from the colon to the vagina. Takes keflex 250 mg PO Qd  hold home keflex for ppx    # Hypothyroidism  Patient is with a history of hypothyroidism, takes levothyroxine 50 mcg  Continue home medication at home dose    # GERD   Pantoprazole 40 mg PO Qd in AM    dvt ppx lovenox    Please call [facility] with questions [phone].   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 Takes keflex 250 mg PO Qd  hold home keflex for ppx    # Hypothyroidism  Patient is with a history of hypothyroidism, takes levothyroxine 50 mcg  Continue home medication at home dose    # GERD   Pantoprazole 40 mg PO Qd in AM    dvt ppx lovenox    Please call [facility] with questions [phone].

## 2022-08-19 NOTE — OCCUPATIONAL THERAPY INITIAL EVALUATION ADULT - PERTINENT HX OF CURRENT PROBLEM, REHAB EVAL
86F presents with 2 months of left hip wound with serous, nonpurulent drainage. Patient formerly followed with [facility] wound care where she was on doxycycline and had a wound vac until 2 weeks ago. Currently continues on antibiotics prior to admission, however wound is worsening and patient reports subjective fever for the past 3 days. Patient had an MRI in late june which revealed a fluid collection without osteomyelitis. Orthopedics consulted. Patient admitted to medicine for further evaluation and treatment. XRAY hip 8/16- Stable intact aligned previously seen left hip hemiarthroplasty prosthesis. No periprosthetic fractures or suspicious periprosthetic lucencies. No tracking gas collections or gross radiographic evidence for osteomyelitis. Medial greater than lateral tibiofemoral osteoarthritis. Mild osteopenia otherwise no discrete suspicious lytic or blastic lesions. Vascular calcifications. now s/p I&D and hemiarthroplasty to L hip 8/18.

## 2022-08-19 NOTE — PHYSICAL THERAPY INITIAL EVALUATION ADULT - LEVEL OF INDEPENDENCE: GAIT, REHAB EVAL
unable to weightshift at present, c/o 101/10 pain in L hip with standing, pt transferred bed to chair via nonmechnical PLD.

## 2022-08-19 NOTE — OCCUPATIONAL THERAPY INITIAL EVALUATION ADULT - NSOTDISCHREC_GEN_A_CORE
[place], if home, home with OT, 3-in-1 commode, and assistance for all ADLs and functional tasks./[facility]

## 2022-08-19 NOTE — PHYSICAL THERAPY INITIAL EVALUATION ADULT - TRANSFER SAFETY CONCERNS NOTED: SIT/STAND, REHAB EVAL
with verbal cues for technique/losing balance/decreased sequencing ability/decreased weight-shifting ability

## 2022-08-19 NOTE — PROGRESS NOTE ADULT - ASSESSMENT
A/P: 89F with infected L hip savannah s/p I&D, head/liner exchange, and abx bead placement 8/18    Neuro: Pain control  Resp: IS  GI: Regular diet, bowel reg  MSK: WBAT, PT/OT, anterior precautions  Heme: DVT PPX: Lovenox  ID: FU OR cxs/path, Zosyn  Primary care per medicine

## 2022-08-19 NOTE — OCCUPATIONAL THERAPY INITIAL EVALUATION ADULT - DIAGNOSIS, OT EVAL
pt presents with decreased strength, endurance, postural control, balance, and ROM limiting their ability to engage in ADLs and functional tasks.

## 2022-08-19 NOTE — PHYSICAL THERAPY INITIAL EVALUATION ADULT - DIAGNOSIS, PT EVAL
Impaired mobility/function secondary to generalized weakness, decreased balance, post-op discomfort. Impaired mobility/function secondary to generalized weakness, limited AROM/strength L hip, decreased balance, post-op discomfort.

## 2022-08-19 NOTE — PHYSICAL THERAPY INITIAL EVALUATION ADULT - GENERAL OBSERVATIONS, REHAB EVAL
Pt received supine in bed A & O x 4, + PIV, L hip aquacel dressing C/D/I, + external female catheter, aide and daughter (retired nurse) at b/s.

## 2022-08-19 NOTE — PROGRESS NOTE ADULT - SUBJECTIVE AND OBJECTIVE BOX
[facility], Division of Infectious Diseases  STEVE Loaiza Y. Patel, S. Shah, G. Casimir  [phone]  ([phone] - weekdays after 5pm and weekends)    Name: GEORGE ROSE  Age/Gender: 89y Female  MRN: [id number]    Interval History:  Patient seen this morning.   Notes reviewed.   No concerning overnight events.  febrile yesterday AM, afebrile since then  s/p OR yesterday  reports some hip pain but otherwise doing well    Allergies: iodine (Other; Anaphylaxis)  shellfish (Anaphylaxis)  sulfa drugs (Other)      Objective:  Vitals:   T(F): 98.4 (08-19-22 @ 08:45), Max: 98.4 (08-19-22 @ 08:45)  HR: 60 (08-19-22 @ 09:56) (60 - 85)  BP: 116/69 (08-19-22 @ 09:56) (103/80 - 119/73)  RR: 18 (08-19-22 @ 08:45) (18 - 18)  SpO2: 97% (08-19-22 @ 09:56) (95% - 98%)  Physical Examination:  General: no acute distress  HEENT: anicteric  Cardio: S1, S2, normal rate  Resp: clear to auscultation anteriorly  Abd: soft, ND, NT, ileostomy  Ext: no LE edema, L hip dressing   Skin: warm, dry    Laboratory Studies:  CBC:                       8.9    14.04 )-----------( 243      ( 19 Aug 2022 06:46 )             27.5     WBC Trend:  14.04 08-19-22 @ 06:46  14.95 08-18-22 @ 10:53  11.02 08-18-22 @ 07:03  9.89 08-17-22 @ 07:43  15.54 08-16-22 @ 12:38    CMP: 08-19    140  |  105  |  22  ----------------------------<  152<H>  4.6   |  23  |  1.19    Ca    8.9      19 Aug 2022 06:49              Microbiology: reviewed     Culture - Tissue with Gram Stain (collected 08-18-22 @ 17:04)  Source: .Tissue Other  Gram Stain (08-18-22 @ 23:44):    No polymorphonuclear leukocytes per low power field    No organisms seen per oil power field    Culture - Acid Fast - Tissue w/Smear (collected 08-18-22 @ 17:04)  Source: .Tissue Other    Culture - Fungal, Tissue (collected 08-18-22 @ 17:04)  Source: .Tissue Other  Preliminary Report (08-19-22 @ 08:16):    Testing in progress    Culture - Fungal, Other (collected 08-18-22 @ 17:04)  Source: .Other Other  Preliminary Report (08-19-22 @ 08:16):    Testing in progress    Culture - Fungal, Other (collected 08-18-22 @ 17:04)  Source: .Other Other  Preliminary Report (08-19-22 @ 08:16):    Testing in progress    Culture - Body Fluid with Gram Stain (collected 08-17-22 @ 17:36)  Source: .Body Fluid Interstitial Fluid  Gram Stain (08-18-22 @ 02:48):    polymorphonuclear leukocytes seen    No organisms seen    by cytocentrifuge  Preliminary Report (08-18-22 @ 18:56):    No growth    Culture - Other (collected 08-17-22 @ 15:45)  Source: Wound Wound  Preliminary Report (08-19-22 @ 06:19):    No growth to date.    Culture - Other (collected 08-17-22 @ 15:45)  Source: Wound Wound  Preliminary Report (08-19-22 @ 10:12):    Few Enterococcus faecalis    Culture - Urine (collected 08-16-22 @ 15:09)  Source: Clean Catch Clean Catch (Midstream)  Final Report (08-17-22 @ 16:08):    No growth    Culture - Blood (collected 08-16-22 @ 12:05)  Source: .Blood Blood-Peripheral  Preliminary Report (08-17-22 @ 17:01):    No growth to date.    Culture - Blood (collected 08-16-22 @ 11:55)  Source: .Blood Blood-Peripheral  Preliminary Report (08-17-22 @ 17:01):    No growth to date.      Radiology: reviewed     Medications:  acetaminophen     Tablet .. 975 milliGRAM(s) Oral every 8 hours  allopurinol 100 milliGRAM(s) Oral two times a day  enoxaparin Injectable 40 milliGRAM(s) SubCutaneous every 24 hours  levothyroxine 50 MICROGram(s) Oral daily  magnesium hydroxide Suspension 30 milliLiter(s) Oral daily PRN  melatonin 3 milliGRAM(s) Oral at bedtime PRN  ondansetron Injectable 4 milliGRAM(s) IV Push every 8 hours PRN  oxyCODONE    IR 2.5 milliGRAM(s) Oral every 4 hours PRN  oxyCODONE    IR 5 milliGRAM(s) Oral every 4 hours PRN  pantoprazole    Tablet 40 milliGRAM(s) Oral before breakfast  piperacillin/tazobactam IVPB.. 3.375 Gram(s) IV Intermittent every 8 hours  senna 2 Tablet(s) Oral at bedtime    Antimicrobials:  piperacillin/tazobactam IVPB.. 3.375 Gram(s) IV Intermittent every 8 hours

## 2022-08-19 NOTE — OCCUPATIONAL THERAPY INITIAL EVALUATION ADULT - PHYSICAL ASSIST/NONPHYSICAL ASSIST: SIT/STAND, REHAB EVAL
Chief Complaints and History of Present Illnesses   Patient presents with     Eye Itching Both Eyes     Chief Complaint(s) and History of Present Illness(es)     Eye Itching Both Eyes     Laterality: both eyes    Characteristics: blood shot    Associated symptoms: itching, dryness and discharge (rarely).  Negative for burning    Timing: at random times    Duration: 2 years              Comments     He states that he has frequent eye itching.  The timing seems random and the symptom varies in intensity.   He has not tried using any drops, compresses or allergy medications.  The problems seems stable over the past couple years.    Jovana Bailey, COT 1:24 PM  March 3, 2020                    2 person assist

## 2022-08-19 NOTE — OCCUPATIONAL THERAPY INITIAL EVALUATION ADULT - ADL RETRAINING, OT EVAL
GOAL: pt will require min A with all UB/LB dressing tasks within 4 weeks. GOAL: Pt will be independent with all bathing tasks within 4 weeks.

## 2022-08-20 LAB
-  AMPICILLIN: SIGNIFICANT CHANGE UP
-  AMPICILLIN: SIGNIFICANT CHANGE UP
-  TETRACYCLINE: SIGNIFICANT CHANGE UP
-  TETRACYCLINE: SIGNIFICANT CHANGE UP
-  VANCOMYCIN: SIGNIFICANT CHANGE UP
-  VANCOMYCIN: SIGNIFICANT CHANGE UP
CULTURE RESULTS: SIGNIFICANT CHANGE UP
HCT VFR BLD CALC: 27.8 % — LOW (ref 34.5–45)
HGB BLD-MCNC: 9 G/DL — LOW (ref 11.5–15.5)
MCHC RBC-ENTMCNC: 30 PG — SIGNIFICANT CHANGE UP (ref 27–34)
MCHC RBC-ENTMCNC: 32.4 GM/DL — SIGNIFICANT CHANGE UP (ref 32–36)
MCV RBC AUTO: 92.7 FL — SIGNIFICANT CHANGE UP (ref 80–100)
METHOD TYPE: SIGNIFICANT CHANGE UP
METHOD TYPE: SIGNIFICANT CHANGE UP
NRBC # BLD: 0 /100 WBCS — SIGNIFICANT CHANGE UP (ref 0–0)
ORGANISM # SPEC MICROSCOPIC CNT: SIGNIFICANT CHANGE UP
ORGANISM # SPEC MICROSCOPIC CNT: SIGNIFICANT CHANGE UP
PLATELET # BLD AUTO: 246 K/UL — SIGNIFICANT CHANGE UP (ref 150–400)
RBC # BLD: 3 M/UL — LOW (ref 3.8–5.2)
RBC # FLD: 13.8 % — SIGNIFICANT CHANGE UP (ref 10.3–14.5)
SPECIMEN SOURCE: SIGNIFICANT CHANGE UP
WBC # BLD: 11.47 K/UL — HIGH (ref 3.8–10.5)
WBC # FLD AUTO: 11.47 K/UL — HIGH (ref 3.8–10.5)

## 2022-08-20 RX ADMIN — Medication 975 MILLIGRAM(S): at 22:41

## 2022-08-20 RX ADMIN — PIPERACILLIN AND TAZOBACTAM 25 GRAM(S): 4; .5 INJECTION, POWDER, LYOPHILIZED, FOR SOLUTION INTRAVENOUS at 22:43

## 2022-08-20 RX ADMIN — OXYCODONE HYDROCHLORIDE 5 MILLIGRAM(S): 5 TABLET ORAL at 10:05

## 2022-08-20 RX ADMIN — OXYCODONE HYDROCHLORIDE 5 MILLIGRAM(S): 5 TABLET ORAL at 04:14

## 2022-08-20 RX ADMIN — PIPERACILLIN AND TAZOBACTAM 25 GRAM(S): 4; .5 INJECTION, POWDER, LYOPHILIZED, FOR SOLUTION INTRAVENOUS at 05:08

## 2022-08-20 RX ADMIN — Medication 975 MILLIGRAM(S): at 13:18

## 2022-08-20 RX ADMIN — ENOXAPARIN SODIUM 40 MILLIGRAM(S): 100 INJECTION SUBCUTANEOUS at 17:44

## 2022-08-20 RX ADMIN — PANTOPRAZOLE SODIUM 40 MILLIGRAM(S): 20 TABLET, DELAYED RELEASE ORAL at 05:07

## 2022-08-20 RX ADMIN — Medication 50 MICROGRAM(S): at 05:08

## 2022-08-20 RX ADMIN — Medication 100 MILLIGRAM(S): at 17:44

## 2022-08-20 RX ADMIN — PIPERACILLIN AND TAZOBACTAM 25 GRAM(S): 4; .5 INJECTION, POWDER, LYOPHILIZED, FOR SOLUTION INTRAVENOUS at 13:17

## 2022-08-20 RX ADMIN — Medication 3 MILLIGRAM(S): at 22:10

## 2022-08-20 RX ADMIN — Medication 975 MILLIGRAM(S): at 13:55

## 2022-08-20 RX ADMIN — Medication 975 MILLIGRAM(S): at 05:07

## 2022-08-20 RX ADMIN — OXYCODONE HYDROCHLORIDE 5 MILLIGRAM(S): 5 TABLET ORAL at 11:00

## 2022-08-20 RX ADMIN — Medication 975 MILLIGRAM(S): at 05:37

## 2022-08-20 RX ADMIN — OXYCODONE HYDROCHLORIDE 5 MILLIGRAM(S): 5 TABLET ORAL at 03:44

## 2022-08-20 RX ADMIN — Medication 975 MILLIGRAM(S): at 22:11

## 2022-08-20 RX ADMIN — Medication 100 MILLIGRAM(S): at 05:08

## 2022-08-20 NOTE — PROGRESS NOTE ADULT - SUBJECTIVE AND OBJECTIVE BOX
Patient is a 89y old  Female who presents with a chief complaint of Hip wound (20 Aug 2022 06:44)      SUBJECTIVE / OVERNIGHT EVENTS:    Patient seen and examined. no cp sob. co sore thoat HA.       Vital Signs Last 24 Hrs  T(C): 36.7 (20 Aug 2022 09:11), Max: 37.2 (20 Aug 2022 04:45)  T(F): 98.1 (20 Aug 2022 09:11), Max: 98.9 (20 Aug 2022 04:45)  HR: 90 (20 Aug 2022 09:11) (74 - 100)  BP: 107/67 (20 Aug 2022 09:12) (94/60 - 132/80)  BP(mean): --  RR: 18 (20 Aug 2022 09:11) (18 - 18)  SpO2: 94% (20 Aug 2022 09:11) (94% - 96%)    Parameters below as of 20 Aug 2022 04:45  Patient On (Oxygen Delivery Method): room air      I&O's Summary    19 Aug 2022 07:01  -  20 Aug 2022 07:00  --------------------------------------------------------  IN: 1110 mL / OUT: 2050 mL / NET: -940 mL    20 Aug 2022 07:01  -  20 Aug 2022 15:14  --------------------------------------------------------  IN: 360 mL / OUT: 350 mL / NET: 10 mL        PE:  GENERAL: NAD, AAOx3  HEAD:  Atraumatic, Normocephalic  CHEST/LUNG: CTABL, No wheeze  HEART: Regular rate and rhythm; no murmur  ABDOMEN: Soft, Nontender, Nondistended; Bowel sounds present, ostomy, old surgical scar  EXTREMITIES:  2+ Peripheral Pulses, No edema  NEURO: No focal deficits    LABS:                        9.0    11.47 )-----------( 246      ( 20 Aug 2022 07:27 )             27.8     08-19    140  |  105  |  22  ----------------------------<  152<H>  4.6   |  23  |  1.19    Ca    8.9      19 Aug 2022 06:49        CAPILLARY BLOOD GLUCOSE                RADIOLOGY & ADDITIONAL TESTS:    Imaging Personally Reviewed:  [x] YES  [ ] NO    Consultant(s) Notes Reviewed:  [x] YES  [ ] NO    MEDICATIONS  (STANDING):  acetaminophen     Tablet .. 975 milliGRAM(s) Oral every 8 hours  allopurinol 100 milliGRAM(s) Oral two times a day  enoxaparin Injectable 40 milliGRAM(s) SubCutaneous every 24 hours  levothyroxine 50 MICROGram(s) Oral daily  pantoprazole    Tablet 40 milliGRAM(s) Oral before breakfast  piperacillin/tazobactam IVPB.. 3.375 Gram(s) IV Intermittent every 8 hours  senna 2 Tablet(s) Oral at bedtime    MEDICATIONS  (PRN):  bisacodyl Suppository 10 milliGRAM(s) Rectal daily PRN If no bowel movement by POD#2  magnesium hydroxide Suspension 30 milliLiter(s) Oral daily PRN Constipation  melatonin 3 milliGRAM(s) Oral at bedtime PRN Insomnia  ondansetron Injectable 4 milliGRAM(s) IV Push every 8 hours PRN Nausea and/or Vomiting  oxyCODONE    IR 2.5 milliGRAM(s) Oral every 4 hours PRN Moderate Pain (4 - 6)  oxyCODONE    IR 5 milliGRAM(s) Oral every 4 hours PRN Severe Pain (7 - 10)      Care Discussed with Consultants/Other Providers [x] YES  [ ] NO    HEALTH ISSUES - PROBLEM Dx:  HTN (hypertension)    Gout    Colitis with fistula    Hypothyroidism    Non-healing surgical wound of left groin    GERD (gastroesophageal reflux disease)    Prophylactic measure

## 2022-08-20 NOTE — PROGRESS NOTE ADULT - SUBJECTIVE AND OBJECTIVE BOX
Ortho Progress Note    S: Patient seen and examined. No acute events overnight. Pain well controlled with current regimen. Denies lightheadedness/dizziness, CP/SOB. Tolerating diet.       O:  Physical Exam:  Gen: Laying in bed, NAD, alert and oriented.   Resp: Unlabored breathing  LLExt: Dressing c/d/i  EHL/FHL/TA/Sol intact          + SILT DP/SP/ASH/Sa/T          +DP, extremity WWP    Vital Signs Last 24 Hrs  T(C): 37.2 (20 Aug 2022 04:45), Max: 37.2 (20 Aug 2022 04:45)  T(F): 98.9 (20 Aug 2022 04:45), Max: 98.9 (20 Aug 2022 04:45)  HR: 74 (20 Aug 2022 04:45) (60 - 100)  BP: 111/72 (20 Aug 2022 04:45) (103/80 - 132/80)  BP(mean): --  RR: 18 (20 Aug 2022 04:45) (18 - 18)  SpO2: 95% (20 Aug 2022 04:45) (95% - 97%)    Parameters below as of 20 Aug 2022 04:45  Patient On (Oxygen Delivery Method): room air

## 2022-08-20 NOTE — PROGRESS NOTE ADULT - ASSESSMENT
A/P: 89F with infected L hip savannah s/p I&D, head/liner exchange, and abx bead placement 8/18    Neuro: Pain control  Resp: IS  GI: Regular diet, bowel reg  MSK: WBAT, PT/OT, anterior precautions  Heme: DVT PPX: Lovenox  ID: FU OR cxs/path, Zosyn -- OR Cx growing Enterococcus Faecalis  Primary care per medicine   Dispo: pending

## 2022-08-20 NOTE — PROGRESS NOTE ADULT - ASSESSMENT
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 Takes keflex 250 mg PO Qd  hold home keflex for ppx    # Hypothyroidism  Patient is with a history of hypothyroidism, takes levothyroxine 50 mcg  Continue home medication at home dose    # GERD   Pantoprazole 40 mg PO Qd in AM    dvt ppx lovenox    dw dtr at bedside    [facility] [phone].

## 2022-08-21 LAB
-  AMPICILLIN: SIGNIFICANT CHANGE UP
-  AMPICILLIN: SIGNIFICANT CHANGE UP
-  TETRACYCLINE: SIGNIFICANT CHANGE UP
-  TETRACYCLINE: SIGNIFICANT CHANGE UP
-  VANCOMYCIN: SIGNIFICANT CHANGE UP
-  VANCOMYCIN: SIGNIFICANT CHANGE UP
ANION GAP SERPL CALC-SCNC: 17 MMOL/L — SIGNIFICANT CHANGE UP (ref 5–17)
BUN SERPL-MCNC: 22 MG/DL — SIGNIFICANT CHANGE UP (ref 7–23)
CALCIUM SERPL-MCNC: 9.9 MG/DL — SIGNIFICANT CHANGE UP (ref 8.4–10.5)
CHLORIDE SERPL-SCNC: 106 MMOL/L — SIGNIFICANT CHANGE UP (ref 96–108)
CO2 SERPL-SCNC: 20 MMOL/L — LOW (ref 22–31)
CREAT SERPL-MCNC: 1.06 MG/DL — SIGNIFICANT CHANGE UP (ref 0.5–1.3)
CULTURE RESULTS: SIGNIFICANT CHANGE UP
CULTURE RESULTS: SIGNIFICANT CHANGE UP
EGFR: 50 ML/MIN/1.73M2 — LOW
GLUCOSE SERPL-MCNC: 138 MG/DL — HIGH (ref 70–99)
HCT VFR BLD CALC: 30.4 % — LOW (ref 34.5–45)
HGB BLD-MCNC: 9.4 G/DL — LOW (ref 11.5–15.5)
MCHC RBC-ENTMCNC: 30.4 PG — SIGNIFICANT CHANGE UP (ref 27–34)
MCHC RBC-ENTMCNC: 30.9 GM/DL — LOW (ref 32–36)
MCV RBC AUTO: 98.4 FL — SIGNIFICANT CHANGE UP (ref 80–100)
METHOD TYPE: SIGNIFICANT CHANGE UP
METHOD TYPE: SIGNIFICANT CHANGE UP
NRBC # BLD: 0 /100 WBCS — SIGNIFICANT CHANGE UP (ref 0–0)
PLATELET # BLD AUTO: 279 K/UL — SIGNIFICANT CHANGE UP (ref 150–400)
POTASSIUM SERPL-MCNC: 4.7 MMOL/L — SIGNIFICANT CHANGE UP (ref 3.5–5.3)
POTASSIUM SERPL-SCNC: 4.7 MMOL/L — SIGNIFICANT CHANGE UP (ref 3.5–5.3)
RBC # BLD: 3.09 M/UL — LOW (ref 3.8–5.2)
RBC # FLD: 13.9 % — SIGNIFICANT CHANGE UP (ref 10.3–14.5)
SODIUM SERPL-SCNC: 143 MMOL/L — SIGNIFICANT CHANGE UP (ref 135–145)
SPECIMEN SOURCE: SIGNIFICANT CHANGE UP
SPECIMEN SOURCE: SIGNIFICANT CHANGE UP
WBC # BLD: 10.63 K/UL — HIGH (ref 3.8–10.5)
WBC # FLD AUTO: 10.63 K/UL — HIGH (ref 3.8–10.5)

## 2022-08-21 RX ORDER — AMPICILLIN TRIHYDRATE 250 MG
2 CAPSULE ORAL EVERY 6 HOURS
Refills: 0 | Status: DISCONTINUED | OUTPATIENT
Start: 2022-08-21 | End: 2022-08-23

## 2022-08-21 RX ORDER — CEFTRIAXONE 500 MG/1
2000 INJECTION, POWDER, FOR SOLUTION INTRAMUSCULAR; INTRAVENOUS EVERY 24 HOURS
Refills: 0 | Status: DISCONTINUED | OUTPATIENT
Start: 2022-08-21 | End: 2022-08-23

## 2022-08-21 RX ADMIN — Medication 975 MILLIGRAM(S): at 05:04

## 2022-08-21 RX ADMIN — Medication 975 MILLIGRAM(S): at 15:36

## 2022-08-21 RX ADMIN — Medication 975 MILLIGRAM(S): at 15:11

## 2022-08-21 RX ADMIN — Medication 975 MILLIGRAM(S): at 05:34

## 2022-08-21 RX ADMIN — OXYCODONE HYDROCHLORIDE 5 MILLIGRAM(S): 5 TABLET ORAL at 13:50

## 2022-08-21 RX ADMIN — PIPERACILLIN AND TAZOBACTAM 25 GRAM(S): 4; .5 INJECTION, POWDER, LYOPHILIZED, FOR SOLUTION INTRAVENOUS at 05:05

## 2022-08-21 RX ADMIN — ENOXAPARIN SODIUM 40 MILLIGRAM(S): 100 INJECTION SUBCUTANEOUS at 17:24

## 2022-08-21 RX ADMIN — Medication 50 MICROGRAM(S): at 05:04

## 2022-08-21 RX ADMIN — PIPERACILLIN AND TAZOBACTAM 25 GRAM(S): 4; .5 INJECTION, POWDER, LYOPHILIZED, FOR SOLUTION INTRAVENOUS at 15:11

## 2022-08-21 RX ADMIN — OXYCODONE HYDROCHLORIDE 5 MILLIGRAM(S): 5 TABLET ORAL at 12:51

## 2022-08-21 RX ADMIN — Medication 100 MILLIGRAM(S): at 05:04

## 2022-08-21 RX ADMIN — PANTOPRAZOLE SODIUM 40 MILLIGRAM(S): 20 TABLET, DELAYED RELEASE ORAL at 05:04

## 2022-08-21 RX ADMIN — CEFTRIAXONE 100 MILLIGRAM(S): 500 INJECTION, POWDER, FOR SOLUTION INTRAMUSCULAR; INTRAVENOUS at 21:08

## 2022-08-21 RX ADMIN — Medication 100 MILLIGRAM(S): at 17:24

## 2022-08-21 RX ADMIN — Medication 3 MILLIGRAM(S): at 21:03

## 2022-08-21 NOTE — PROGRESS NOTE ADULT - ASSESSMENT
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    L hip abscess, PJI   ESR 86,   s/p CT abd/pelvis- Left hip wound with underlying subcutaneous collection, measures approximately 6 cm.   IR aspiration 8/17- cx w/ growth of E faecalis   I & D 8/18, placement abx beads and revision L  hip hemiarthroplasty with head and liner replacement and retention of rest of prosthetic joint   OR cultures- e faecalis   f/u blood cultures- NGTD   zosyn will tailor to ampicillin + ceftriaxone in the setting of retained prosthesis       rectovaginal fistula  on chronic keflex daily for ppx  discontinue

## 2022-08-21 NOTE — PROGRESS NOTE ADULT - SUBJECTIVE AND OBJECTIVE BOX
[facility], Division of Infectious Diseases  STEVE Loaiza Y. Patel, S. Shah, G. Moises  [phone]  after hours and weekends [phone]    Name: GEORGE ROSE  Age: 89y  Gender: Female  MRN: [id number]    Interval History--  Notes reviewed  no events no complaints      Allergies    iodine (Other; Anaphylaxis)  shellfish (Anaphylaxis)  sulfa drugs (Other)    Intolerances        Medications--  Antibiotics:  piperacillin/tazobactam IVPB.. 3.375 Gram(s) IV Intermittent every 8 hours    Immunologic:    Other:  acetaminophen     Tablet ..  allopurinol  bisacodyl Suppository PRN  enoxaparin Injectable  levothyroxine  magnesium hydroxide Suspension PRN  melatonin PRN  ondansetron Injectable PRN  oxyCODONE    IR PRN  oxyCODONE    IR PRN  pantoprazole    Tablet  senna      Review of Systems--  A 10-point review of systems was obtained.     Pertinent positives and negatives--  Constitutional: No fevers. No Chills. No Rigors.   Cardiovascular: No chest pain. No palpitations.  Respiratory: No shortness of breath. No cough.  Gastrointestinal: No nausea or vomiting. No diarrhea or constipation.   Psychiatric: Pleasant. Appropriate affect.    Review of systems otherwise negative except as previously noted.    Physical Examination--  Vital Signs: T(F): 98.8 (08-21-22 @ 11:20), Max: 99.3 (08-20-22 @ 20:33)  HR: 96 (08-21-22 @ 11:20)  BP: 137/85 (08-21-22 @ 11:20)  RR: 18 (08-21-22 @ 11:20)  SpO2: 94% (08-21-22 @ 11:20)  Wt(kg): --  General: Nontoxic-appearing Female in no acute distress.  HEENT: AT/NC.   Neck: Not rigid. No sense of mass.  Nodes: None palpable.  Lungs: Clear bilaterally without rales, wheezing or rhonchi  Heart: Regular rate and rhythm.  Abdomen: Bowel sounds present and normoactive. Soft. Nondistende  Back: No spinal tenderness. No costovertebral angle tenderness.   Extremities: No cyanosis or clubbing. No edema. left hip site clean  Skin: Warm. Dry. Good turgor. No rash. No vasculitic stigmata.  Psychiatric: Appropriate affect and mood for situation.         Laboratory Studies--  CBC                        9.4    10.63 )-----------( 279      ( 21 Aug 2022 07:57 )             30.4       Chemistries  08-21    143  |  106  |  22  ----------------------------<  138<H>  4.7   |  20<L>  |  1.06    Ca    9.9      21 Aug 2022 07:55        Culture Data    Culture - Tissue with Gram Stain (collected 18 Aug 2022 17:04)  Source: .Tissue Other  Gram Stain (18 Aug 2022 23:44):    No polymorphonuclear leukocytes per low power field    No organisms seen per oil power field  Preliminary Report (19 Aug 2022 20:13):    Rare Enterococcus faecalis  Organism: Enterococcus faecalis (20 Aug 2022 22:25)  Organism: Enterococcus faecalis (20 Aug 2022 22:25)    Culture - Acid Fast - Tissue w/Smear (collected 18 Aug 2022 17:04)  Source: .Tissue Other  Preliminary Report (20 Aug 2022 15:04):    Culture is being performed.    Culture - Fungal, Tissue (collected 18 Aug 2022 17:04)  Source: .Tissue Other  Preliminary Report (19 Aug 2022 08:16):    Testing in progress    Culture - Fungal, Other (collected 18 Aug 2022 17:04)  Source: .Other Other  Preliminary Report (19 Aug 2022 08:16):    Testing in progress    Culture - Surgical Swab (collected 18 Aug 2022 17:04)  Source: .Surgical Swab Surgical Swab  Preliminary Report (21 Aug 2022 00:49):    Growth in fluid media only Enterococcus faecalis    Culture - Surgical Swab (collected 18 Aug 2022 17:04)  Source: .Surgical Swab Surgical Swab  Preliminary Report (21 Aug 2022 01:24):    Rare Enterococcus faecalis  Organism: Enterococcus faecalis (21 Aug 2022 01:24)  Organism: Enterococcus faecalis (21 Aug 2022 01:24)    Culture - Fungal, Other (collected 18 Aug 2022 17:04)  Source: .Other Other  Preliminary Report (19 Aug 2022 08:16):    Testing in progress    Culture - Body Fluid with Gram Stain (collected 17 Aug 2022 17:36)  Source: .Body Fluid Interstitial Fluid  Gram Stain (18 Aug 2022 02:48):    polymorphonuclear leukocytes seen    No organisms seen    by cytocentrifuge  Preliminary Report (18 Aug 2022 18:56):    No growth    Culture - Other (collected 17 Aug 2022 15:45)  Source: Wound Wound  Preliminary Report (20 Aug 2022 11:31):    Rare Gram positive organisms Identification to follow.    Culture - Other (collected 17 Aug 2022 15:45)  Source: Wound Wound  Final Report (20 Aug 2022 11:56):    Few Enterococcus faecalis  Organism: Enterococcus faecalis (20 Aug 2022 11:56)  Organism: Enterococcus faecalis (20 Aug 2022 11:56)    Culture - Urine (collected 16 Aug 2022 15:09)  Source: Clean Catch Clean Catch (Midstream)  Final Report (17 Aug 2022 16:08):    No growth    Culture - Blood (collected 16 Aug 2022 12:05)  Source: .Blood Blood-Peripheral  Final Report (21 Aug 2022 17:00):    No Growth Final    Culture - Blood (collected 16 Aug 2022 11:55)  Source: .Blood Blood-Peripheral  Final Report (21 Aug 2022 17:00):    No Growth Final

## 2022-08-21 NOTE — PROGRESS NOTE ADULT - ASSESSMENT
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 Takes keflex 250 mg PO Qd ppx  hold home keflex    # Hypothyroidism  Patient is with a history of hypothyroidism, takes levothyroxine 50 mcg  Continue home medication at home dose    # GERD   Pantoprazole 40 mg PO Qd in AM    dvt ppx lovenox    dw dtr at bedside    [facility] [phone]. 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 Takes keflex 250 mg PO Qd ppx  hold home keflex    # Hypothyroidism  cont levothyroxine 50 mcg    # GERD   Pantoprazole 40 mg PO Qd in AM    dvt ppx lovenox    Please call [facility] with questions [phone].

## 2022-08-21 NOTE — PROGRESS NOTE ADULT - SUBJECTIVE AND OBJECTIVE BOX
Patient seen and examined at bedside. Pain well controlled with medication. Notes that she is having numbness over the left thigh, consistent with prior exam. Patient denies any other numbness, tingling, weakness, or any other orthopaedic complaint.         Vital Signs Last 24 Hrs  T(C): 37.2 (20 Aug 2022 04:45), Max: 37.2 (20 Aug 2022 04:45)  T(F): 98.9 (20 Aug 2022 04:45), Max: 98.9 (20 Aug 2022 04:45)  HR: 74 (20 Aug 2022 04:45) (60 - 100)  BP: 111/72 (20 Aug 2022 04:45) (103/80 - 132/80)  BP(mean): --  RR: 18 (20 Aug 2022 04:45) (18 - 18)  SpO2: 95% (20 Aug 2022 04:45) (95% - 97%)    Parameters below as of 20 Aug 2022 04:45  Patient On (Oxygen Delivery Method): room air      O:  Physical Exam:  Gen: Laying in bed, NAD, alert and oriented.   Resp: Unlabored breathing  LLExt: Dressing c/d/i  EHL/FHL/TA/Sol intact          + SILT DP/SP/ASH/Sa/T          +DP, extremity WWP                          9.4    10.63 )-----------( 279      ( 21 Aug 2022 07:57 )             30.4

## 2022-08-21 NOTE — PROGRESS NOTE ADULT - SUBJECTIVE AND OBJECTIVE BOX
Patient is a 89y old  Female who presents with a chief complaint of Hip wound (21 Aug 2022 07:09)      SUBJECTIVE / OVERNIGHT EVENTS:    Patient seen and examined. co pain left hip.       Vital Signs Last 24 Hrs  T(C): 37.1 (21 Aug 2022 11:20), Max: 37.4 (20 Aug 2022 20:33)  T(F): 98.8 (21 Aug 2022 11:20), Max: 99.3 (20 Aug 2022 20:33)  HR: 96 (21 Aug 2022 11:20) (93 - 100)  BP: 137/85 (21 Aug 2022 11:20) (97/63 - 137/85)  BP(mean): --  RR: 18 (21 Aug 2022 11:20) (18 - 18)  SpO2: 94% (21 Aug 2022 11:20) (94% - 96%)    Parameters below as of 21 Aug 2022 11:20  Patient On (Oxygen Delivery Method): room air      I&O's Summary    20 Aug 2022 07:01  -  21 Aug 2022 07:00  --------------------------------------------------------  IN: 940 mL / OUT: 550 mL / NET: 390 mL    21 Aug 2022 07:01  -  21 Aug 2022 12:30  --------------------------------------------------------  IN: 240 mL / OUT: 0 mL / NET: 240 mL        PE:  GENERAL: NAD, AAOx3  HEAD:  Atraumatic, Normocephalic  CHEST/LUNG: CTABL, No wheeze  HEART: Regular rate and rhythm; no murmur  ABDOMEN: Soft, Nontender, Nondistended; Bowel sounds present, ostomy, old surgical scar  EXTREMITIES:  2+ Peripheral Pulses, No edema, L hip CDI  NEURO: No focal deficits    LABS:                        9.4    10.63 )-----------( 279      ( 21 Aug 2022 07:57 )             30.4     08-21    143  |  106  |  22  ----------------------------<  138<H>  4.7   |  20<L>  |  1.06    Ca    9.9      21 Aug 2022 07:55        CAPILLARY BLOOD GLUCOSE                RADIOLOGY & ADDITIONAL TESTS:    Imaging Personally Reviewed:  [x] YES  [ ] NO    Consultant(s) Notes Reviewed:  [x] YES  [ ] NO    MEDICATIONS  (STANDING):  acetaminophen     Tablet .. 975 milliGRAM(s) Oral every 8 hours  allopurinol 100 milliGRAM(s) Oral two times a day  enoxaparin Injectable 40 milliGRAM(s) SubCutaneous every 24 hours  levothyroxine 50 MICROGram(s) Oral daily  pantoprazole    Tablet 40 milliGRAM(s) Oral before breakfast  piperacillin/tazobactam IVPB.. 3.375 Gram(s) IV Intermittent every 8 hours  senna 2 Tablet(s) Oral at bedtime    MEDICATIONS  (PRN):  bisacodyl Suppository 10 milliGRAM(s) Rectal daily PRN If no bowel movement by POD#2  magnesium hydroxide Suspension 30 milliLiter(s) Oral daily PRN Constipation  melatonin 3 milliGRAM(s) Oral at bedtime PRN Insomnia  ondansetron Injectable 4 milliGRAM(s) IV Push every 8 hours PRN Nausea and/or Vomiting  oxyCODONE    IR 2.5 milliGRAM(s) Oral every 4 hours PRN Moderate Pain (4 - 6)  oxyCODONE    IR 5 milliGRAM(s) Oral every 4 hours PRN Severe Pain (7 - 10)      Care Discussed with Consultants/Other Providers [x] YES  [ ] NO    HEALTH ISSUES - PROBLEM Dx:  HTN (hypertension)    Gout    Colitis with fistula    Hypothyroidism    Non-healing surgical wound of left groin    GERD (gastroesophageal reflux disease)    Prophylactic measure

## 2022-08-22 LAB
ANION GAP SERPL CALC-SCNC: 10 MMOL/L — SIGNIFICANT CHANGE UP (ref 5–17)
BUN SERPL-MCNC: 20 MG/DL — SIGNIFICANT CHANGE UP (ref 7–23)
CALCIUM SERPL-MCNC: 10 MG/DL — SIGNIFICANT CHANGE UP (ref 8.4–10.5)
CHLORIDE SERPL-SCNC: 100 MMOL/L — SIGNIFICANT CHANGE UP (ref 96–108)
CO2 SERPL-SCNC: 26 MMOL/L — SIGNIFICANT CHANGE UP (ref 22–31)
CREAT SERPL-MCNC: 0.95 MG/DL — SIGNIFICANT CHANGE UP (ref 0.5–1.3)
CULTURE RESULTS: SIGNIFICANT CHANGE UP
EGFR: 57 ML/MIN/1.73M2 — LOW
GLUCOSE SERPL-MCNC: 158 MG/DL — HIGH (ref 70–99)
HCT VFR BLD CALC: 27.7 % — LOW (ref 34.5–45)
HGB BLD-MCNC: 9.1 G/DL — LOW (ref 11.5–15.5)
MCHC RBC-ENTMCNC: 30.1 PG — SIGNIFICANT CHANGE UP (ref 27–34)
MCHC RBC-ENTMCNC: 32.9 GM/DL — SIGNIFICANT CHANGE UP (ref 32–36)
MCV RBC AUTO: 91.7 FL — SIGNIFICANT CHANGE UP (ref 80–100)
NRBC # BLD: 0 /100 WBCS — SIGNIFICANT CHANGE UP (ref 0–0)
PLATELET # BLD AUTO: 319 K/UL — SIGNIFICANT CHANGE UP (ref 150–400)
POTASSIUM SERPL-MCNC: 4 MMOL/L — SIGNIFICANT CHANGE UP (ref 3.5–5.3)
POTASSIUM SERPL-SCNC: 4 MMOL/L — SIGNIFICANT CHANGE UP (ref 3.5–5.3)
RBC # BLD: 3.02 M/UL — LOW (ref 3.8–5.2)
RBC # FLD: 13.6 % — SIGNIFICANT CHANGE UP (ref 10.3–14.5)
SODIUM SERPL-SCNC: 136 MMOL/L — SIGNIFICANT CHANGE UP (ref 135–145)
SPECIMEN SOURCE: SIGNIFICANT CHANGE UP
WBC # BLD: 12.09 K/UL — HIGH (ref 3.8–10.5)
WBC # FLD AUTO: 12.09 K/UL — HIGH (ref 3.8–10.5)

## 2022-08-22 PROCEDURE — 71045 X-RAY EXAM CHEST 1 VIEW: CPT | Mod: 26

## 2022-08-22 RX ADMIN — Medication 200 GRAM(S): at 22:16

## 2022-08-22 RX ADMIN — Medication 200 GRAM(S): at 05:04

## 2022-08-22 RX ADMIN — Medication 200 GRAM(S): at 11:08

## 2022-08-22 RX ADMIN — Medication 975 MILLIGRAM(S): at 22:16

## 2022-08-22 RX ADMIN — Medication 975 MILLIGRAM(S): at 05:03

## 2022-08-22 RX ADMIN — ENOXAPARIN SODIUM 40 MILLIGRAM(S): 100 INJECTION SUBCUTANEOUS at 18:00

## 2022-08-22 RX ADMIN — Medication 975 MILLIGRAM(S): at 05:33

## 2022-08-22 RX ADMIN — OXYCODONE HYDROCHLORIDE 5 MILLIGRAM(S): 5 TABLET ORAL at 17:02

## 2022-08-22 RX ADMIN — Medication 100 MILLIGRAM(S): at 05:04

## 2022-08-22 RX ADMIN — Medication 50 MICROGRAM(S): at 05:04

## 2022-08-22 RX ADMIN — OXYCODONE HYDROCHLORIDE 5 MILLIGRAM(S): 5 TABLET ORAL at 11:09

## 2022-08-22 RX ADMIN — OXYCODONE HYDROCHLORIDE 5 MILLIGRAM(S): 5 TABLET ORAL at 11:08

## 2022-08-22 RX ADMIN — PANTOPRAZOLE SODIUM 40 MILLIGRAM(S): 20 TABLET, DELAYED RELEASE ORAL at 05:03

## 2022-08-22 RX ADMIN — SENNA PLUS 2 TABLET(S): 8.6 TABLET ORAL at 22:16

## 2022-08-22 RX ADMIN — OXYCODONE HYDROCHLORIDE 5 MILLIGRAM(S): 5 TABLET ORAL at 18:10

## 2022-08-22 RX ADMIN — Medication 200 GRAM(S): at 00:07

## 2022-08-22 RX ADMIN — CEFTRIAXONE 100 MILLIGRAM(S): 500 INJECTION, POWDER, FOR SOLUTION INTRAMUSCULAR; INTRAVENOUS at 22:26

## 2022-08-22 RX ADMIN — Medication 100 MILLIGRAM(S): at 18:00

## 2022-08-22 NOTE — PROGRESS NOTE ADULT - SUBJECTIVE AND OBJECTIVE BOX
[facility], Division of Infectious Diseases  STEVE Loaiza Y. Patel, S. Shah, G. Casimir  [phone]  ([phone] - weekdays after 5pm and weekends)    Name: GEORGE ROSE  Age/Gender: 89y Female  MRN: [id number]    Interval History:  Patient seen this morning.   Notes reviewed.   No concerning overnight events.  Afebrile.   patient states she is doing ok but has questions regarding her ambulation status  daughter at bedside    Allergies: iodine (Other; Anaphylaxis)  shellfish (Anaphylaxis)  sulfa drugs (Other)      Objective:  Vitals:   T(F): 98.7 (08-22-22 @ 09:25), Max: 99.5 (08-22-22 @ 04:42)  HR: 96 (08-22-22 @ 09:25) (96 - 105)  BP: 128/85 (08-22-22 @ 09:25) (106/69 - 137/85)  RR: 18 (08-22-22 @ 09:25) (18 - 18)  SpO2: 95% (08-22-22 @ 09:25) (92% - 95%)  Physical Examination:  General: no acute distress  HEENT: anicteric  Cardio: S1, S2, normal rate  Resp: clear to auscultation anteriorly  Abd: soft, ND, NT, ileostomy  Ext: no LE edema, L hip dressing   Skin: warm, dry    Laboratory Studies:  CBC:                       9.1    12.09 )-----------( 319      ( 22 Aug 2022 06:52 )             27.7     WBC Trend:  12.09 08-22-22 @ 06:52  10.63 08-21-22 @ 07:57  11.47 08-20-22 @ 07:27  14.04 08-19-22 @ 06:46  14.95 08-18-22 @ 10:53  11.02 08-18-22 @ 07:03  9.89 08-17-22 @ 07:43  15.54 08-16-22 @ 12:38    CMP: 08-22    136  |  100  |  20  ----------------------------<  158<H>  4.0   |  26  |  0.95    Ca    10.0      22 Aug 2022 06:52              Microbiology: reviewed     Culture - Tissue with Gram Stain (collected 08-18-22 @ 17:04)  Source: .Tissue Other  Gram Stain (08-18-22 @ 23:44):    No polymorphonuclear leukocytes per low power field    No organisms seen per oil power field  Preliminary Report (08-19-22 @ 20:13):    Rare Enterococcus faecalis  Organism: Enterococcus faecalis (08-20-22 @ 22:25)  Organism: Enterococcus faecalis (08-20-22 @ 22:25)      -  Ampicillin: S <=2 Predicts results to ampicillin/sulbactam, amoxacillin-clavulanate and  piperacillin-tazobactam.      -  Tetra/Doxy: R >8      -  Vancomycin: S 2      Method Type: PALMER    Culture - Acid Fast - Tissue w/Smear (collected 08-18-22 @ 17:04)  Source: .Tissue Other  Preliminary Report (08-20-22 @ 15:04):    Culture is being performed.    Culture - Fungal, Tissue (collected 08-18-22 @ 17:04)  Source: .Tissue Other  Preliminary Report (08-19-22 @ 08:16):    Testing in progress    Culture - Fungal, Other (collected 08-18-22 @ 17:04)  Source: .Other Other  Preliminary Report (08-19-22 @ 08:16):    Testing in progress    Culture - Surgical Swab (collected 08-18-22 @ 17:04)  Source: .Surgical Swab Surgical Swab  Preliminary Report (08-21-22 @ 00:49):    Growth in fluid media only Enterococcus faecalis  Organism: Enterococcus faecalis (08-21-22 @ 23:43)  Organism: Enterococcus faecalis (08-21-22 @ 23:43)      -  Ampicillin: S <=2 Predicts results to ampicillin/sulbactam, amoxacillin-clavulanate and  piperacillin-tazobactam.      -  Tetra/Doxy: R >8      -  Vancomycin: S 2      Method Type: PALMER    Culture - Surgical Swab (collected 08-18-22 @ 17:04)  Source: .Surgical Swab Surgical Swab  Preliminary Report (08-21-22 @ 01:24):    Rare Enterococcus faecalis  Organism: Enterococcus faecalis (08-21-22 @ 01:24)  Organism: Enterococcus faecalis (08-21-22 @ 01:24)      -  Ampicillin: S <=2 Predicts results to ampicillin/sulbactam, amoxacillin-clavulanate and  piperacillin-tazobactam.      -  Tetra/Doxy: R >8      -  Vancomycin: S 2      Method Type: PALMER    Culture - Fungal, Other (collected 08-18-22 @ 17:04)  Source: .Other Other  Preliminary Report (08-19-22 @ 08:16):    Testing in progress    Culture - Body Fluid with Gram Stain (collected 08-17-22 @ 17:36)  Source: .Body Fluid Interstitial Fluid  Gram Stain (08-18-22 @ 02:48):    polymorphonuclear leukocytes seen    No organisms seen    by cytocentrifuge  Preliminary Report (08-18-22 @ 18:56):    No growth    Culture - Other (collected 08-17-22 @ 15:45)  Source: Wound Wound  Preliminary Report (08-20-22 @ 11:31):    Rare Gram positive organisms Identification to follow.    Culture - Other (collected 08-17-22 @ 15:45)  Source: Wound Wound  Final Report (08-20-22 @ 11:56):    Few Enterococcus faecalis  Organism: Enterococcus faecalis (08-20-22 @ 11:56)  Organism: Enterococcus faecalis (08-20-22 @ 11:56)      -  Ampicillin: S <=2 Predicts results to ampicillin/sulbactam, amoxacillin-clavulanate and  piperacillin-tazobactam.      -  Tetra/Doxy: R >8      -  Vancomycin: S 4      Method Type: PALMER    Culture - Urine (collected 08-16-22 @ 15:09)  Source: Clean Catch Clean Catch (Midstream)  Final Report (08-17-22 @ 16:08):    No growth    Culture - Blood (collected 08-16-22 @ 12:05)  Source: .Blood Blood-Peripheral  Final Report (08-21-22 @ 17:00):    No Growth Final    Culture - Blood (collected 08-16-22 @ 11:55)  Source: .Blood Blood-Peripheral  Final Report (08-21-22 @ 17:00):    No Growth Final      Radiology: reviewed     Medications:  acetaminophen     Tablet .. 975 milliGRAM(s) Oral every 8 hours  allopurinol 100 milliGRAM(s) Oral two times a day  ampicillin  IVPB 2 Gram(s) IV Intermittent every 6 hours  bisacodyl Suppository 10 milliGRAM(s) Rectal daily PRN  cefTRIAXone   IVPB 2000 milliGRAM(s) IV Intermittent every 24 hours  enoxaparin Injectable 40 milliGRAM(s) SubCutaneous every 24 hours  levothyroxine 50 MICROGram(s) Oral daily  magnesium hydroxide Suspension 30 milliLiter(s) Oral daily PRN  melatonin 3 milliGRAM(s) Oral at bedtime PRN  ondansetron Injectable 4 milliGRAM(s) IV Push every 8 hours PRN  oxyCODONE    IR 2.5 milliGRAM(s) Oral every 4 hours PRN  oxyCODONE    IR 5 milliGRAM(s) Oral every 4 hours PRN  pantoprazole    Tablet 40 milliGRAM(s) Oral before breakfast  senna 2 Tablet(s) Oral at bedtime    Antimicrobials:  ampicillin  IVPB 2 Gram(s) IV Intermittent every 6 hours  cefTRIAXone   IVPB 2000 milliGRAM(s) IV Intermittent every 24 hours

## 2022-08-22 NOTE — PROGRESS NOTE ADULT - SUBJECTIVE AND OBJECTIVE BOX
Patient is a 89y old  Female who presents with a chief complaint of Hip wound (22 Aug 2022 10:52)      SUBJECTIVE / OVERNIGHT EVENTS:    Patient seen and examined. states she is okay.      Vital Signs Last 24 Hrs  T(C): 37.1 (22 Aug 2022 09:25), Max: 37.5 (22 Aug 2022 04:42)  T(F): 98.7 (22 Aug 2022 09:25), Max: 99.5 (22 Aug 2022 04:42)  HR: 96 (22 Aug 2022 09:25) (96 - 105)  BP: 128/85 (22 Aug 2022 09:25) (106/69 - 128/85)  BP(mean): --  RR: 18 (22 Aug 2022 09:25) (18 - 18)  SpO2: 95% (22 Aug 2022 09:25) (92% - 95%)    Parameters below as of 22 Aug 2022 09:25  Patient On (Oxygen Delivery Method): room air      I&O's Summary    21 Aug 2022 07:01  -  22 Aug 2022 07:00  --------------------------------------------------------  IN: 1160 mL / OUT: 1501 mL / NET: -341 mL    22 Aug 2022 07:01  -  22 Aug 2022 12:57  --------------------------------------------------------  IN: 340 mL / OUT: 700 mL / NET: -360 mL        PE:  GENERAL: NAD, AAOx2  HEAD:  Atraumatic, Normocephalic  CHEST/LUNG: CTABL, No wheeze  HEART: Regular rate and rhythm; no murmur  ABDOMEN: Soft, Nontender, Nondistended; Bowel sounds present, ostomy, old surgical scar  EXTREMITIES:  2+ Peripheral Pulses, No edema, L hip CDI  NEURO: No focal deficits      LABS:                        9.1    12.09 )-----------( 319      ( 22 Aug 2022 06:52 )             27.7     08-22    136  |  100  |  20  ----------------------------<  158<H>  4.0   |  26  |  0.95    Ca    10.0      22 Aug 2022 06:52        CAPILLARY BLOOD GLUCOSE                RADIOLOGY & ADDITIONAL TESTS:    Imaging Personally Reviewed:  [x] YES  [ ] NO    Consultant(s) Notes Reviewed:  [x] YES  [ ] NO    MEDICATIONS  (STANDING):  acetaminophen     Tablet .. 975 milliGRAM(s) Oral every 8 hours  allopurinol 100 milliGRAM(s) Oral two times a day  ampicillin  IVPB 2 Gram(s) IV Intermittent every 6 hours  cefTRIAXone   IVPB 2000 milliGRAM(s) IV Intermittent every 24 hours  enoxaparin Injectable 40 milliGRAM(s) SubCutaneous every 24 hours  levothyroxine 50 MICROGram(s) Oral daily  pantoprazole    Tablet 40 milliGRAM(s) Oral before breakfast  senna 2 Tablet(s) Oral at bedtime    MEDICATIONS  (PRN):  bisacodyl Suppository 10 milliGRAM(s) Rectal daily PRN If no bowel movement by POD#2  magnesium hydroxide Suspension 30 milliLiter(s) Oral daily PRN Constipation  melatonin 3 milliGRAM(s) Oral at bedtime PRN Insomnia  ondansetron Injectable 4 milliGRAM(s) IV Push every 8 hours PRN Nausea and/or Vomiting  oxyCODONE    IR 2.5 milliGRAM(s) Oral every 4 hours PRN Moderate Pain (4 - 6)  oxyCODONE    IR 5 milliGRAM(s) Oral every 4 hours PRN Severe Pain (7 - 10)      Care Discussed with Consultants/Other Providers [x] YES  [ ] NO    HEALTH ISSUES - PROBLEM Dx:  HTN (hypertension)    Gout    Colitis with fistula    Hypothyroidism    Non-healing surgical wound of left groin    GERD (gastroesophageal reflux disease)    Prophylactic measure

## 2022-08-22 NOTE — PROGRESS NOTE ADULT - ASSESSMENT
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    L hip abscess, PJI   ESR 86,   s/p CT abd/pelvis- Left hip wound with underlying subcutaneous collection, measures approximately 6 cm. No evidence of osteomyelitis.  s/p IR aspiration 8/17- cx w/ growth of E faecalis  s/p I & D 8/18, placement abx beads and revision L  hip hemiarthroplasty with head and liner replacement and retention of rest of prosthetic joint  OR cultures- e faecalis   f/u blood cultures- NGTD  c/w ampicillin + ceftriaxone in the setting of retained prosthesis to complete 4 week course of antibiotics from date of surgical intervention w/ EOT 9/29/2022  please fax weekly cbc, cmp, esr, crp to [phone]  f/u in [facility] ID clinic in about 2 weeks  likely plan to transition to PO amoxicillin thereafter for chronic suppression    rectovaginal fistula  on chronic keflex daily for ppx  discontinue as patient will be on antibiotics as above    Josias De Leon M.D.  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    L hip abscess, PJI   ESR 86,   s/p CT abd/pelvis- Left hip wound with underlying subcutaneous collection, measures approximately 6 cm. No evidence of osteomyelitis.  s/p IR aspiration 8/17- cx w/ growth of E faecalis  s/p I & D 8/18, placement abx beads and revision L  hip hemiarthroplasty with head and liner replacement and retention of rest of prosthetic joint  OR cultures- e faecalis   f/u blood cultures- NGTD  c/w ampicillin + ceftriaxone in the setting of retained prosthesis to complete 4-6 week course of antibiotics from date of surgical intervention w/ tentative EOT 9/29/2022  please fax weekly cbc, cmp, esr, crp to [phone]  f/u in [facility] ID clinic in about 2 weeks  likely plan to transition to PO amoxicillin thereafter for chronic suppression    rectovaginal fistula  on chronic keflex daily for ppx  discontinue as patient will be on antibiotics as above    Josias De Leon M.D.  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    L hip abscess, PJI   ESR 86,   s/p CT abd/pelvis- Left hip wound with underlying subcutaneous collection, measures approximately 6 cm. No evidence of osteomyelitis.  s/p IR aspiration 8/17- cx w/ growth of E faecalis  s/p I & D 8/18, placement abx beads and revision L  hip hemiarthroplasty with head and liner replacement and retention of rest of prosthetic joint  OR cultures- e faecalis   f/u blood cultures- NGTD  c/w ampicillin + ceftriaxone in the setting of retained prosthesis to complete 6 week course of antibiotics from date of surgical intervention w/ EOT 9/29/2022  please fax weekly cbc, cmp, esr, crp to [phone]  f/u in [facility] ID clinic in about 2 weeks  likely plan to transition to PO amoxicillin thereafter for chronic suppression    rectovaginal fistula  on chronic keflex daily for ppx  discontinue as patient will be on antibiotics as above    Josias De Leon M.D.  [facility], Division of Infectious Diseases  [phone]  After 5pm on weekdays and all day on weekends - please call [phone]

## 2022-08-22 NOTE — PROGRESS NOTE ADULT - ASSESSMENT
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 with infected L hip savannah s/p I&D, head/liner exchange, and abx bead placement 8/18    Neuro: Pain control  Resp: IS  GI: Regular diet, bowel reg  MSK: WBAT, PT/OT, anterior precautions  Heme: DVT PPX: Lovenox  ID: FU OR cxs/path, abx per ID (ceftriaxone + ampicillin) -- OR Cx growing Enterococcus Faecalis  Primary care per medicine   Dispo: pending A/P: 89F with infected L hip savannah s/p I&D, head/liner exchange, and abx bead placement 8/18    Neuro: Pain control  Resp: IS  GI: Regular diet, bowel reg  MSK: WBAT, PT/OT, anterior precautions  Heme: DVT PPX: Lovenox  ID: FU OR cxs/path, abx per ID (ceftriaxone + ampicillin) -- OR Cx growing Enterococcus Faecalis  Primary care per medicine   Dispo: [facility] vs. home w/ home PT

## 2022-08-22 NOTE — PROGRESS NOTE ADULT - SUBJECTIVE AND OBJECTIVE BOX
Patient seen and examined at bedside. Pain well controlled with medication. No new complaints.     Vital Signs Last 24 Hrs  T(C): 36.9 (21 Aug 2022 20:09), Max: 37.1 (21 Aug 2022 11:20)  T(F): 98.5 (21 Aug 2022 20:09), Max: 98.8 (21 Aug 2022 11:20)  HR: 105 (21 Aug 2022 20:09) (96 - 105)  BP: 106/69 (21 Aug 2022 20:09) (106/69 - 137/85)  RR: 18 (21 Aug 2022 20:09) (18 - 18)  SpO2: 92% (21 Aug 2022 20:09) (92% - 96%)  Parameters below as of 21 Aug 2022 20:09  Patient On (Oxygen Delivery Method): room air    O:  Physical Exam:  Gen: Laying in bed, NAD, alert and oriented.   Resp: Unlabored breathing  LLExt: Dressing c/d/i  EHL/FHL/TA/Sol intact          + SILT DP/SP/ASH/Sa/T          +DP, extremity WWP                          9.4    10.63 )-----------( 279      ( 21 Aug 2022 07:57 )             30.4

## 2022-08-23 ENCOUNTER — TRANSCRIPTION ENCOUNTER (OUTPATIENT)
Age: 87
End: 2022-08-23

## 2022-08-23 VITALS
SYSTOLIC BLOOD PRESSURE: 109 MMHG | DIASTOLIC BLOOD PRESSURE: 71 MMHG | TEMPERATURE: 98 F | OXYGEN SATURATION: 94 % | RESPIRATION RATE: 18 BRPM | HEART RATE: 100 BPM

## 2022-08-23 LAB
-  AMPICILLIN: SIGNIFICANT CHANGE UP
-  TETRACYCLINE: SIGNIFICANT CHANGE UP
-  VANCOMYCIN: SIGNIFICANT CHANGE UP
CULTURE RESULTS: SIGNIFICANT CHANGE UP
HCT VFR BLD CALC: 28.2 % — LOW (ref 34.5–45)
HGB BLD-MCNC: 9 G/DL — LOW (ref 11.5–15.5)
MCHC RBC-ENTMCNC: 29.6 PG — SIGNIFICANT CHANGE UP (ref 27–34)
MCHC RBC-ENTMCNC: 31.9 GM/DL — LOW (ref 32–36)
MCV RBC AUTO: 92.8 FL — SIGNIFICANT CHANGE UP (ref 80–100)
METHOD TYPE: SIGNIFICANT CHANGE UP
NRBC # BLD: 0 /100 WBCS — SIGNIFICANT CHANGE UP (ref 0–0)
ORGANISM # SPEC MICROSCOPIC CNT: SIGNIFICANT CHANGE UP
PLATELET # BLD AUTO: 327 K/UL — SIGNIFICANT CHANGE UP (ref 150–400)
RBC # BLD: 3.04 M/UL — LOW (ref 3.8–5.2)
RBC # FLD: 13.6 % — SIGNIFICANT CHANGE UP (ref 10.3–14.5)
SARS-COV-2 RNA SPEC QL NAA+PROBE: SIGNIFICANT CHANGE UP
SPECIMEN SOURCE: SIGNIFICANT CHANGE UP
WBC # BLD: 11.5 K/UL — HIGH (ref 3.8–10.5)
WBC # FLD AUTO: 11.5 K/UL — HIGH (ref 3.8–10.5)

## 2022-08-23 PROCEDURE — C1894: CPT

## 2022-08-23 PROCEDURE — 87205 SMEAR GRAM STAIN: CPT

## 2022-08-23 PROCEDURE — 20611 DRAIN/INJ JOINT/BURSA W/US: CPT

## 2022-08-23 PROCEDURE — 74176 CT ABD & PELVIS W/O CONTRAST: CPT | Mod: MA

## 2022-08-23 PROCEDURE — 97162 PT EVAL MOD COMPLEX 30 MIN: CPT

## 2022-08-23 PROCEDURE — 82435 ASSAY OF BLOOD CHLORIDE: CPT

## 2022-08-23 PROCEDURE — U0003: CPT

## 2022-08-23 PROCEDURE — 84295 ASSAY OF SERUM SODIUM: CPT

## 2022-08-23 PROCEDURE — 82947 ASSAY GLUCOSE BLOOD QUANT: CPT

## 2022-08-23 PROCEDURE — 86901 BLOOD TYPING SEROLOGIC RH(D): CPT

## 2022-08-23 PROCEDURE — 71045 X-RAY EXAM CHEST 1 VIEW: CPT

## 2022-08-23 PROCEDURE — 85730 THROMBOPLASTIN TIME PARTIAL: CPT

## 2022-08-23 PROCEDURE — 85025 COMPLETE CBC W/AUTO DIFF WBC: CPT

## 2022-08-23 PROCEDURE — 87102 FUNGUS ISOLATION CULTURE: CPT

## 2022-08-23 PROCEDURE — C1713: CPT

## 2022-08-23 PROCEDURE — U0005: CPT

## 2022-08-23 PROCEDURE — 83605 ASSAY OF LACTIC ACID: CPT

## 2022-08-23 PROCEDURE — 87206 SMEAR FLUORESCENT/ACID STAI: CPT

## 2022-08-23 PROCEDURE — 85610 PROTHROMBIN TIME: CPT

## 2022-08-23 PROCEDURE — 87641 MR-STAPH DNA AMP PROBE: CPT

## 2022-08-23 PROCEDURE — 87116 MYCOBACTERIA CULTURE: CPT

## 2022-08-23 PROCEDURE — 87640 STAPH A DNA AMP PROBE: CPT

## 2022-08-23 PROCEDURE — 97530 THERAPEUTIC ACTIVITIES: CPT

## 2022-08-23 PROCEDURE — 86140 C-REACTIVE PROTEIN: CPT

## 2022-08-23 PROCEDURE — 99285 EMERGENCY DEPT VISIT HI MDM: CPT | Mod: 25

## 2022-08-23 PROCEDURE — 81001 URINALYSIS AUTO W/SCOPE: CPT

## 2022-08-23 PROCEDURE — 87086 URINE CULTURE/COLONY COUNT: CPT

## 2022-08-23 PROCEDURE — 0225U NFCT DS DNA&RNA 21 SARSCOV2: CPT

## 2022-08-23 PROCEDURE — 80048 BASIC METABOLIC PNL TOTAL CA: CPT

## 2022-08-23 PROCEDURE — 87070 CULTURE OTHR SPECIMN AEROBIC: CPT

## 2022-08-23 PROCEDURE — 86900 BLOOD TYPING SEROLOGIC ABO: CPT

## 2022-08-23 PROCEDURE — 73502 X-RAY EXAM HIP UNI 2-3 VIEWS: CPT

## 2022-08-23 PROCEDURE — 85652 RBC SED RATE AUTOMATED: CPT

## 2022-08-23 PROCEDURE — 87075 CULTR BACTERIA EXCEPT BLOOD: CPT

## 2022-08-23 PROCEDURE — 87186 SC STD MICRODIL/AGAR DIL: CPT

## 2022-08-23 PROCEDURE — 82330 ASSAY OF CALCIUM: CPT

## 2022-08-23 PROCEDURE — 87040 BLOOD CULTURE FOR BACTERIA: CPT

## 2022-08-23 PROCEDURE — C1776: CPT

## 2022-08-23 PROCEDURE — 85018 HEMOGLOBIN: CPT

## 2022-08-23 PROCEDURE — C9399: CPT

## 2022-08-23 PROCEDURE — C1729: CPT

## 2022-08-23 PROCEDURE — 73552 X-RAY EXAM OF FEMUR 2/>: CPT

## 2022-08-23 PROCEDURE — 36569 INSJ PICC 5 YR+ W/O IMAGING: CPT

## 2022-08-23 PROCEDURE — 80053 COMPREHEN METABOLIC PANEL: CPT

## 2022-08-23 PROCEDURE — 87077 CULTURE AEROBIC IDENTIFY: CPT

## 2022-08-23 PROCEDURE — 97110 THERAPEUTIC EXERCISES: CPT

## 2022-08-23 PROCEDURE — 85014 HEMATOCRIT: CPT

## 2022-08-23 PROCEDURE — 36415 COLL VENOUS BLD VENIPUNCTURE: CPT

## 2022-08-23 PROCEDURE — 84132 ASSAY OF SERUM POTASSIUM: CPT

## 2022-08-23 PROCEDURE — 88304 TISSUE EXAM BY PATHOLOGIST: CPT

## 2022-08-23 PROCEDURE — 96374 THER/PROPH/DIAG INJ IV PUSH: CPT

## 2022-08-23 PROCEDURE — 85027 COMPLETE CBC AUTOMATED: CPT

## 2022-08-23 PROCEDURE — 82803 BLOOD GASES ANY COMBINATION: CPT

## 2022-08-23 PROCEDURE — 72170 X-RAY EXAM OF PELVIS: CPT

## 2022-08-23 PROCEDURE — 97166 OT EVAL MOD COMPLEX 45 MIN: CPT

## 2022-08-23 PROCEDURE — 86850 RBC ANTIBODY SCREEN: CPT

## 2022-08-23 PROCEDURE — 87015 SPECIMEN INFECT AGNT CONCNTJ: CPT

## 2022-08-23 RX ORDER — CHLORHEXIDINE GLUCONATE 213 G/1000ML
1 SOLUTION TOPICAL DAILY
Refills: 0 | Status: DISCONTINUED | OUTPATIENT
Start: 2022-08-23 | End: 2022-08-23

## 2022-08-23 RX ORDER — ENOXAPARIN SODIUM 100 MG/ML
40 INJECTION SUBCUTANEOUS
Qty: 0 | Refills: 0 | DISCHARGE
Start: 2022-08-23

## 2022-08-23 RX ORDER — AMPICILLIN TRIHYDRATE 250 MG
2000 CAPSULE ORAL EVERY 4 HOURS
Refills: 0 | Status: DISCONTINUED | OUTPATIENT
Start: 2022-08-23 | End: 2022-08-23

## 2022-08-23 RX ORDER — SENNA PLUS 8.6 MG/1
2 TABLET ORAL
Qty: 0 | Refills: 0 | DISCHARGE
Start: 2022-08-23

## 2022-08-23 RX ORDER — OXYCODONE HYDROCHLORIDE 5 MG/1
1 TABLET ORAL
Qty: 0 | Refills: 0 | DISCHARGE
Start: 2022-08-23

## 2022-08-23 RX ORDER — AMPICILLIN TRIHYDRATE 250 MG
2 CAPSULE ORAL
Qty: 0 | Refills: 0 | DISCHARGE
Start: 2022-08-23

## 2022-08-23 RX ORDER — CEPHALEXIN 500 MG
1 CAPSULE ORAL
Qty: 0 | Refills: 0 | DISCHARGE

## 2022-08-23 RX ORDER — AMPICILLIN TRIHYDRATE 250 MG
2000 CAPSULE ORAL EVERY 6 HOURS
Refills: 0 | Status: DISCONTINUED | OUTPATIENT
Start: 2022-08-23 | End: 2022-08-23

## 2022-08-23 RX ORDER — ACETAMINOPHEN 500 MG
3 TABLET ORAL
Qty: 0 | Refills: 0 | DISCHARGE
Start: 2022-08-23

## 2022-08-23 RX ORDER — MAGNESIUM HYDROXIDE 400 MG/1
30 TABLET, CHEWABLE ORAL
Qty: 0 | Refills: 0 | DISCHARGE
Start: 2022-08-23

## 2022-08-23 RX ORDER — LANOLIN ALCOHOL/MO/W.PET/CERES
1 CREAM (GRAM) TOPICAL
Qty: 0 | Refills: 0 | DISCHARGE
Start: 2022-08-23

## 2022-08-23 RX ORDER — CEFTRIAXONE 500 MG/1
2 INJECTION, POWDER, FOR SOLUTION INTRAMUSCULAR; INTRAVENOUS
Qty: 0 | Refills: 0 | DISCHARGE
Start: 2022-08-23

## 2022-08-23 RX ADMIN — OXYCODONE HYDROCHLORIDE 5 MILLIGRAM(S): 5 TABLET ORAL at 10:20

## 2022-08-23 RX ADMIN — Medication 200 GRAM(S): at 05:15

## 2022-08-23 RX ADMIN — OXYCODONE HYDROCHLORIDE 5 MILLIGRAM(S): 5 TABLET ORAL at 10:18

## 2022-08-23 RX ADMIN — Medication 200 GRAM(S): at 10:18

## 2022-08-23 RX ADMIN — Medication 975 MILLIGRAM(S): at 13:35

## 2022-08-23 RX ADMIN — ENOXAPARIN SODIUM 40 MILLIGRAM(S): 100 INJECTION SUBCUTANEOUS at 16:22

## 2022-08-23 RX ADMIN — Medication 100 MILLIGRAM(S): at 16:21

## 2022-08-23 RX ADMIN — Medication 975 MILLIGRAM(S): at 13:36

## 2022-08-23 RX ADMIN — Medication 200 MILLIGRAM(S): at 16:21

## 2022-08-23 RX ADMIN — Medication 975 MILLIGRAM(S): at 05:09

## 2022-08-23 RX ADMIN — Medication 50 MICROGRAM(S): at 05:09

## 2022-08-23 RX ADMIN — PANTOPRAZOLE SODIUM 40 MILLIGRAM(S): 20 TABLET, DELAYED RELEASE ORAL at 05:15

## 2022-08-23 RX ADMIN — Medication 100 MILLIGRAM(S): at 05:09

## 2022-08-23 RX ADMIN — ONDANSETRON 4 MILLIGRAM(S): 8 TABLET, FILM COATED ORAL at 10:19

## 2022-08-23 NOTE — PROGRESS NOTE ADULT - ASSESSMENT
A/P: 89F with infected L hip savannah s/p I&D, head/liner exchange, and abx bead placement 8/18    Neuro: Pain control  Resp: IS  GI: Regular diet, bowel reg  MSK: WBAT, PT/OT, anterior precautions  Heme: DVT PPX: Lovenox  ID: FU OR cxs/path, abx per ID (ceftriaxone + ampicillin) -- OR Cx growing Enterococcus Faecalis  Primary care per medicine   Dispo: [facility] vs. home w/ home PT

## 2022-08-23 NOTE — PROVIDER CONTACT NOTE (CRITICAL VALUE NOTIFICATION) - TEST AND RESULT REPORTED:
tissue cx 8/18/22 preliminary shows rare enterococcus faecalis and 8/17/22 body fluid cx shows growth in body fluid media only shows enterococcus faecalis

## 2022-08-23 NOTE — DISCHARGE NOTE PROVIDER - PROVIDER TOKENS
PROVIDER:[TOKEN:[[id number]],FOLLOWUP:[2 weeks],ESTABLISHEDPATIENT:[T]],PROVIDER:[TOKEN:[[id number]],FOLLOWUP:[2 weeks]]

## 2022-08-23 NOTE — PROGRESS NOTE ADULT - ASSESSMENT
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     # Non-healing surgical wound of left groin / Left hip partial joint infection  sp IR aspiration 8/17/22   sp exchange of femoral head, I&D, abx bead placement  Appreciate orthopedics recs  CX growing enterococcus faecalis, sens to amp and ceft, will need to complete 6 week course of antibiotics from date of surgical intervention w/ EOT 9/29/2022  PICC line in  pain control  PT OT    # anemia  likely post op  stable    # HTN   takes nadolol 40 mg PO Qd as an outpatient, if BP cont to improve, can resume    # Gout  Continue with home allopurinol 100 BID    # Colitis with fistula  Patient has a history of colitis with a persistent fistula from the colon to the vagina. Takes keflex 250 mg PO Qd ppx  dc keflex on IV abx    # Hypothyroidism  cont levothyroxine 50 mcg    # GERD   Pantoprazole 40 mg PO Qd in AM    dvt ppx lovenox    dispo: PICC placement, dc planning [facility], pending auth    fidelina Palacio over the phone 8/22    PCP Dr Hilliard     Please call [facility] with questions [phone].

## 2022-08-23 NOTE — DISCHARGE NOTE NURSING/CASE MANAGEMENT/SOCIAL WORK - PATIENT PORTAL LINK FT
You can access the FollowMyHealth Patient Portal offered by [facility] by registering at the following website: [email or web address]. By joining [facility]’s FollowMyHealth portal, you will also be able to view your health information using other applications (apps) compatible with our system.

## 2022-08-23 NOTE — PROGRESS NOTE ADULT - ATTENDING COMMENTS
L hip infected prosthesis.  For I and D and exchange of head and placement beads
left hip infection.  plan for I and d and revision tomorrow. IR aspiration today
PT. DVT ppx. ABX. DC planning
PT. DVT ppx. f/u lab. f/u ID.
PT. DVT ppx. ABX. DC planning
PT. DVT ppx. f/u lab. f/u ID.
s/p I and D and head exchange.  WBAT. ABX. PT. DC planning

## 2022-08-23 NOTE — PROGRESS NOTE ADULT - ASSESSMENT
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    L hip abscess, PJI   ESR 86,   s/p CT abd/pelvis- Left hip wound with underlying subcutaneous collection, measures approximately 6 cm. No evidence of osteomyelitis.  s/p IR aspiration 8/17- cx w/ growth of E faecalis  s/p I & D 8/18, placement abx beads and revision L  hip hemiarthroplasty with head and liner replacement and retention of rest of prosthetic joint  OR cultures- e faecalis   f/u blood cultures- NGTD  c/w ampicillin 2g every 6 hours  c/w ceftriaxone 2g every 24 hours in the setting of retained prosthesis to complete 6 week course of antibiotics from date of surgical intervention w/ EOT 9/29/2022  please fax weekly cbc, cmp, esr, crp to [phone]  f/u in [facility] ID clinic in about 2 weeks, business card provided  likely plan to transition to PO amoxicillin thereafter for chronic suppression    rectovaginal fistula  on chronic keflex daily for ppx  discontinue as patient will be on antibiotics as above    Josias De Leon M.D.  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    L hip abscess, PJI   ESR 86,   s/p CT abd/pelvis- Left hip wound with underlying subcutaneous collection, measures approximately 6 cm. No evidence of osteomyelitis.  s/p IR aspiration 8/17- cx w/ growth of E faecalis  s/p I & D 8/18, placement abx beads and revision L  hip hemiarthroplasty with head and liner replacement and retention of rest of prosthetic joint  OR cultures- e faecalis   f/u blood cultures- NGTD  c/w ampicillin; dosing for ampicillin if CrCl 30-49 is 2g every 6 hours vs 2g every 4 hours if CrCl >50  As CrCl 53 will adjust ampicillin to 2g every 4 hours  c/w ampicillin and ceftriaxone 2g every 24 hours in the setting of retained prosthesis to complete 6 week course of antibiotics from date of surgical intervention w/ EOT 9/29/2022  please fax weekly cbc, cmp, esr, crp to [phone]  f/u in [facility] ID clinic in about 2 weeks, business card provided  likely plan to transition to PO amoxicillin thereafter for chronic suppression    rectovaginal fistula  on chronic keflex daily for ppx  discontinue as patient will be on antibiotics as above    Josias De Leon M.D.  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    L hip abscess, PJI   ESR 86,   s/p CT abd/pelvis- Left hip wound with underlying subcutaneous collection, measures approximately 6 cm. No evidence of osteomyelitis.  s/p IR aspiration 8/17- cx w/ growth of E faecalis  s/p I & D 8/18, placement abx beads and revision L  hip hemiarthroplasty with head and liner replacement and retention of rest of prosthetic joint  OR cultures- e faecalis   f/u blood cultures- NGTD  c/w ampicillin 2g every 6 hours and ceftriaxone 2g every 24 hours in the setting of retained prosthesis to complete 6 week course of antibiotics from date of surgical intervention w/ EOT 9/29/2022  please fax weekly cbc, cmp, esr, crp to [phone]  f/u in [facility] ID clinic in about 2 weeks, business card provided  likely plan to transition to PO amoxicillin thereafter for chronic suppression    rectovaginal fistula  on chronic keflex daily for ppx  discontinue as patient will be on antibiotics as above    Josias De Leon M.D.  [facility], Division of Infectious Diseases  [phone]  After 5pm on weekdays and all day on weekends - please call [phone]

## 2022-08-23 NOTE — DISCHARGE NOTE NURSING/CASE MANAGEMENT/SOCIAL WORK - NSDCFUADDAPPT_GEN_ALL_CORE_FT
VERY IMPORTANT:  1: c/w ampicillin 2g every 6 hours and ceftriaxone 2g every 24 hours in the setting of retained prosthesis to complete 6 week course of antibiotics from date of surgical intervention w/ EOT 9/29/2022  2. Please fax weekly cbc, cmp, esr, crp to [phone]  f/u in [facility] ID clinic in about 2 weeks,  3. 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 She takes keflex 250 mg PO Qd prophylactilcally.  Keflex now discontinued since pt on  IV antibiotics. Please ensure that pt is placed back on Keflex when being discharged home.

## 2022-08-23 NOTE — DISCHARGE NOTE PROVIDER - HOSPITAL COURSE
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     # Non-healing surgical wound of left groin / Left hip partial joint infection  sp IR aspiration 8/17/22   sp exchange of femoral head, I&D, abx bead placement  Appreciate orthopedics recs  CX growing enterococcus faecalis, sens to amp and ceft, will need to complete 6 week course of antibiotics from date of surgical intervention w/ EOT 9/29/2022.  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 Takes keflex 250 mg PO Qd ppx  dc keflex on IV abx    # Hypothyroidism  cont levothyroxine 50 mcg    # GERD   Pantoprazole 40 mg PO Qd in AM    dvt ppx lovenox    dispo: PICC placement, dc planning [place]. Medically cleared for discharge to Rehab.     fidelina Palacio over the phone 8/22    PCP Dr Hilliard

## 2022-08-23 NOTE — DISCHARGE NOTE PROVIDER - NSDCMRMEDTOKEN_GEN_ALL_CORE_FT
acetaminophen 325 mg oral tablet: 3 tab(s) orally every 8 hours, As Needed  allopurinol 100 mg oral tablet: 1 tab(s) orally 2 times a day  ampicillin: 2 gram(s) intravenous every 4 hours  Calcium 600+D oral tablet: 1 tab(s) orally once a day  cefTRIAXone: 2  rectal once a day  enoxaparin: 40 milligram(s) subcutaneous once a day  magnesium hydroxide 8% oral suspension: 30 milliliter(s) orally once a day, As needed, Constipation  melatonin 3 mg oral tablet: 1 tab(s) orally once a day (at bedtime), As needed, Insomnia  Multiple Vitamins oral tablet: 1 tab(s) orally once a day  nadolol 40 mg oral tablet: 1 tab(s) orally once a day  oxyCODONE 5 mg oral tablet: 1 tab(s) orally every 4 hours, As needed, Severe Pain (7 - 10)  pantoprazole 40 mg oral delayed release tablet: 1 tab(s) orally once a day  senna leaf extract oral tablet: 2 tab(s) orally once a day (at bedtime)  Synthroid 50 mcg (0.05 mg) oral tablet: 1 tab(s) orally once a day  WEEKLY LABS: every 7 days -[phone]  ([facility] ID clinic)  FOLLOW UP  IN  CLINIC IN 2 weeks,

## 2022-08-23 NOTE — DISCHARGE NOTE PROVIDER - NSDCFUADDAPPT_GEN_ALL_CORE_FT
VERY IMPORTANT:  1: c/w ampicillin 2g every 6 hours and ceftriaxone 2g every 24 hours in the setting of retained prosthesis to complete 6 week course of antibiotics from date of surgical intervention w/ EOT 9/29/2022  2. Please fax weekly cbc, cmp, esr, crp to [phone]  f/u in [facility] ID clinic in about 2 weeks,  3. 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 She takes keflex 250 mg PO Qd prophylactilcally.  Keflex now discontinued since pt on  IV antibiotics. Please ensure that pt is placed back on Keflex when being discharged home.

## 2022-08-23 NOTE — PROGRESS NOTE ADULT - SUBJECTIVE AND OBJECTIVE BOX
[facility], Division of Infectious Diseases  STEVE Loaiza Y. Patel, S. Shah, G. Casimir  [phone]  ([phone] - weekdays after 5pm and weekends)    Name: GEORGE ROSE  Age/Gender: 89y Female  MRN: [id number]    Interval History:  Patient seen this morning.   Notes reviewed.   No concerning overnight events.  Afebrile.   states she feels a little better today  daughter at bedside    Allergies: iodine (Other; Anaphylaxis)  shellfish (Anaphylaxis)  sulfa drugs (Other)      Objective:  Vitals:   T(F): 98.7 (08-23-22 @ 05:15), Max: 99.3 (08-22-22 @ 19:51)  HR: 93 (08-23-22 @ 05:15) (93 - 109)  BP: 148/72 (08-23-22 @ 05:15) (107/72 - 148/72)  RR: 18 (08-23-22 @ 05:15) (18 - 18)  SpO2: 97% (08-23-22 @ 05:15) (94% - 97%)  Physical Examination:  General: no acute distress  HEENT: anicteric  Cardio: S1, S2, normal rate, LUE PICC line  Resp: clear to auscultation anteriorly  Abd: soft, ND, NT, ileostomy  Ext: no LE edema, L hip dressing   Skin: warm, dry    Laboratory Studies:  CBC:                       9.0    11.50 )-----------( 327      ( 23 Aug 2022 07:08 )             28.2     WBC Trend:  11.50 08-23-22 @ 07:08  12.09 08-22-22 @ 06:52  10.63 08-21-22 @ 07:57  11.47 08-20-22 @ 07:27  14.04 08-19-22 @ 06:46  14.95 08-18-22 @ 10:53  11.02 08-18-22 @ 07:03  9.89 08-17-22 @ 07:43  15.54 08-16-22 @ 12:38    CMP: 08-22    136  |  100  |  20  ----------------------------<  158<H>  4.0   |  26  |  0.95    Ca    10.0      22 Aug 2022 06:52              Microbiology: reviewed     Culture - Tissue with Gram Stain (collected 08-18-22 @ 17:04)  Source: .Tissue Other  Gram Stain (08-18-22 @ 23:44):    No polymorphonuclear leukocytes per low power field    No organisms seen per oil power field  Preliminary Report (08-19-22 @ 20:13):    Rare Enterococcus faecalis  Organism: Enterococcus faecalis (08-20-22 @ 22:25)  Organism: Enterococcus faecalis (08-20-22 @ 22:25)      -  Ampicillin: S <=2 Predicts results to ampicillin/sulbactam, amoxacillin-clavulanate and  piperacillin-tazobactam.      -  Tetra/Doxy: R >8      -  Vancomycin: S 2      Method Type: PALMER    Culture - Acid Fast - Tissue w/Smear (collected 08-18-22 @ 17:04)  Source: .Tissue Other  Preliminary Report (08-20-22 @ 15:04):    Culture is being performed.    Culture - Fungal, Tissue (collected 08-18-22 @ 17:04)  Source: .Tissue Other  Preliminary Report (08-19-22 @ 08:16):    Testing in progress    Culture - Fungal, Other (collected 08-18-22 @ 17:04)  Source: .Other Other  Preliminary Report (08-19-22 @ 08:16):    Testing in progress    Culture - Surgical Swab (collected 08-18-22 @ 17:04)  Source: .Surgical Swab Surgical Swab  Preliminary Report (08-21-22 @ 00:49):    Growth in fluid media only Enterococcus faecalis  Organism: Enterococcus faecalis (08-21-22 @ 23:43)  Organism: Enterococcus faecalis (08-21-22 @ 23:43)      -  Ampicillin: S <=2 Predicts results to ampicillin/sulbactam, amoxacillin-clavulanate and  piperacillin-tazobactam.      -  Tetra/Doxy: R >8      -  Vancomycin: S 2      Method Type: PALMER    Culture - Surgical Swab (collected 08-18-22 @ 17:04)  Source: .Surgical Swab Surgical Swab  Preliminary Report (08-21-22 @ 01:24):    Rare Enterococcus faecalis  Organism: Enterococcus faecalis (08-21-22 @ 01:24)  Organism: Enterococcus faecalis (08-21-22 @ 01:24)      -  Ampicillin: S <=2 Predicts results to ampicillin/sulbactam, amoxacillin-clavulanate and  piperacillin-tazobactam.      -  Tetra/Doxy: R >8      -  Vancomycin: S 2      Method Type: PALMER    Culture - Fungal, Other (collected 08-18-22 @ 17:04)  Source: .Other Other  Preliminary Report (08-19-22 @ 08:16):    Testing in progress    Culture - Body Fluid with Gram Stain (collected 08-17-22 @ 17:36)  Source: .Body Fluid Interstitial Fluid  Gram Stain (08-18-22 @ 02:48):    polymorphonuclear leukocytes seen    No organisms seen    by cytocentrifuge  Preliminary Report (08-22-22 @ 22:34):    Growth in fluid media only Enterococcus faecalis    Culture - Other (collected 08-17-22 @ 15:45)  Source: Wound Wound  Final Report (08-22-22 @ 16:01):    Rare Enterococcus faecalis    See previous culture [id number]    Culture - Other (collected 08-17-22 @ 15:45)  Source: Wound Wound  Final Report (08-20-22 @ 11:56):    Few Enterococcus faecalis  Organism: Enterococcus faecalis (08-20-22 @ 11:56)  Organism: Enterococcus faecalis (08-20-22 @ 11:56)      -  Ampicillin: S <=2 Predicts results to ampicillin/sulbactam, amoxacillin-clavulanate and  piperacillin-tazobactam.      -  Tetra/Doxy: R >8      -  Vancomycin: S 4      Method Type: PALMER    Culture - Urine (collected 08-16-22 @ 15:09)  Source: Clean Catch Clean Catch (Midstream)  Final Report (08-17-22 @ 16:08):    No growth    Culture - Blood (collected 08-16-22 @ 12:05)  Source: .Blood Blood-Peripheral  Final Report (08-21-22 @ 17:00):    No Growth Final    Culture - Blood (collected 08-16-22 @ 11:55)  Source: .Blood Blood-Peripheral  Final Report (08-21-22 @ 17:00):    No Growth Final      Radiology: reviewed     Medications:  acetaminophen     Tablet .. 975 milliGRAM(s) Oral every 8 hours  allopurinol 100 milliGRAM(s) Oral two times a day  ampicillin  IVPB 2 Gram(s) IV Intermittent every 6 hours  bisacodyl Suppository 10 milliGRAM(s) Rectal daily PRN  cefTRIAXone   IVPB 2000 milliGRAM(s) IV Intermittent every 24 hours  chlorhexidine 2% Cloths 1 Application(s) Topical daily  enoxaparin Injectable 40 milliGRAM(s) SubCutaneous every 24 hours  levothyroxine 50 MICROGram(s) Oral daily  magnesium hydroxide Suspension 30 milliLiter(s) Oral daily PRN  melatonin 3 milliGRAM(s) Oral at bedtime PRN  ondansetron Injectable 4 milliGRAM(s) IV Push every 8 hours PRN  oxyCODONE    IR 2.5 milliGRAM(s) Oral every 4 hours PRN  oxyCODONE    IR 5 milliGRAM(s) Oral every 4 hours PRN  pantoprazole    Tablet 40 milliGRAM(s) Oral before breakfast  senna 2 Tablet(s) Oral at bedtime    Antimicrobials:  ampicillin  IVPB 2 Gram(s) IV Intermittent every 6 hours  cefTRIAXone   IVPB 2000 milliGRAM(s) IV Intermittent every 24 hours   LMP

## 2022-08-23 NOTE — PROGRESS NOTE ADULT - REASON FOR ADMISSION
Hip wound

## 2022-08-23 NOTE — PROGRESS NOTE ADULT - SUBJECTIVE AND OBJECTIVE BOX
Patient is a 89y old  Female who presents with a chief complaint of Hip wound (23 Aug 2022 09:25)      SUBJECTIVE / OVERNIGHT EVENTS:    Patient seen and examined. denies cp sob. pending auth.      Vital Signs Last 24 Hrs  T(C): 36.6 (23 Aug 2022 10:40), Max: 37.4 (22 Aug 2022 19:51)  T(F): 97.9 (23 Aug 2022 10:40), Max: 99.3 (22 Aug 2022 19:51)  HR: 100 (23 Aug 2022 10:40) (93 - 109)  BP: 109/71 (23 Aug 2022 10:40) (109/71 - 148/72)  BP(mean): --  RR: 18 (23 Aug 2022 10:40) (18 - 18)  SpO2: 94% (23 Aug 2022 10:40) (94% - 97%)    Parameters below as of 23 Aug 2022 10:40  Patient On (Oxygen Delivery Method): room air      I&O's Summary    22 Aug 2022 07:01  -  23 Aug 2022 07:00  --------------------------------------------------------  IN: 730 mL / OUT: 2600 mL / NET: -1870 mL    23 Aug 2022 07:01  -  23 Aug 2022 14:50  --------------------------------------------------------  IN: 100 mL / OUT: 0 mL / NET: 100 mL        PE:  GENERAL: NAD, AAOx2  HEAD:  Atraumatic, Normocephalic  CHEST/LUNG: CTABL, No wheeze  HEART: Regular rate and rhythm; no murmur  ABDOMEN: Soft, Nontender, Nondistended; Bowel sounds present, ostomy, old surgical scar  EXTREMITIES:  2+ Peripheral Pulses, No edema, L hip CDI, left arm picc  NEURO: No focal deficits    LABS:                        9.0    11.50 )-----------( 327      ( 23 Aug 2022 07:08 )             28.2     08-22    136  |  100  |  20  ----------------------------<  158<H>  4.0   |  26  |  0.95    Ca    10.0      22 Aug 2022 06:52        CAPILLARY BLOOD GLUCOSE                RADIOLOGY & ADDITIONAL TESTS:    Imaging Personally Reviewed:  [x] YES  [ ] NO    Consultant(s) Notes Reviewed:  [x] YES  [ ] NO    MEDICATIONS  (STANDING):  acetaminophen     Tablet .. 975 milliGRAM(s) Oral every 8 hours  allopurinol 100 milliGRAM(s) Oral two times a day  ampicillin  IVPB 2000 milliGRAM(s) IV Intermittent every 6 hours  cefTRIAXone   IVPB 2000 milliGRAM(s) IV Intermittent every 24 hours  chlorhexidine 2% Cloths 1 Application(s) Topical daily  enoxaparin Injectable 40 milliGRAM(s) SubCutaneous every 24 hours  levothyroxine 50 MICROGram(s) Oral daily  pantoprazole    Tablet 40 milliGRAM(s) Oral before breakfast  senna 2 Tablet(s) Oral at bedtime    MEDICATIONS  (PRN):  bisacodyl Suppository 10 milliGRAM(s) Rectal daily PRN If no bowel movement by POD#2  magnesium hydroxide Suspension 30 milliLiter(s) Oral daily PRN Constipation  melatonin 3 milliGRAM(s) Oral at bedtime PRN Insomnia  ondansetron Injectable 4 milliGRAM(s) IV Push every 8 hours PRN Nausea and/or Vomiting  oxyCODONE    IR 2.5 milliGRAM(s) Oral every 4 hours PRN Moderate Pain (4 - 6)  oxyCODONE    IR 5 milliGRAM(s) Oral every 4 hours PRN Severe Pain (7 - 10)      Care Discussed with Consultants/Other Providers [x] YES  [ ] NO    HEALTH ISSUES - PROBLEM Dx:  HTN (hypertension)    Gout    Colitis with fistula    Hypothyroidism    Non-healing surgical wound of left groin    GERD (gastroesophageal reflux disease)    Prophylactic measure

## 2022-08-23 NOTE — DISCHARGE NOTE PROVIDER - NSDCCPCAREPLAN_GEN_ALL_CORE_FT
PRINCIPAL DISCHARGE DIAGNOSIS  Diagnosis: Open wound of hip  Assessment and Plan of Treatment: CONTINUE  ampicillin 2g every 4  hours and ceftriaxone 2g every 24 hours in the setting of retained prosthesis to complete 6 week course of antibiotics from date of surgical intervention w/ EOT 9/29/2022  PLEASE  FAX:   weekly cbc, cmp, esr, crp to [phone]  f/u in [facility] ID clinic in about 2 weeks.      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 She takes keflex 250 mg PO Qd prophylactilcally.   Keflex now discontinued since pt on  IV antibiotics.  Please ensure that pt is placed back on Keflex when being discharged home.    Diagnosis: HTN (hypertension)  Assessment and Plan of Treatment: Take medications as prescried.  Monitor Blood Pressure while in Rehab.  Follow up with your PMD within 1 week after discahrge from Rehab.    Diagnosis: Hypothyroidism  Assessment and Plan of Treatment: Take medications as prescried.  Follow up with your PMD within 1 week after discahrge from Rehab.    Diagnosis: Gout  Assessment and Plan of Treatment: Take medications as prescried.  Follow up with your PMD within 1 week after discahrge from Rehab.    Diagnosis: GERD (gastroesophageal reflux disease)  Assessment and Plan of Treatment: Take medications as prescried.  Follow up with your PMD within 1 week after discahrge from Rehab.

## 2022-08-23 NOTE — PROGRESS NOTE ADULT - SUBJECTIVE AND OBJECTIVE BOX
Patient seen and examined at bedside. Pain well controlled with medication. Notes that the paresthesias in the left thigh are unchanged from prior exam. Patient denies any other numbness, tingling, weakness, or any other orthopaedic complaint.     Vital Signs Last 24 Hrs  T(C): 37.4 (22 Aug 2022 19:51), Max: 37.4 (22 Aug 2022 19:51)  T(F): 99.3 (22 Aug 2022 19:51), Max: 99.3 (22 Aug 2022 19:51)  HR: 100 (22 Aug 2022 20:30) (96 - 109)  BP: 119/76 (22 Aug 2022 19:51) (107/72 - 128/85)  BP(mean): --  RR: 18 (22 Aug 2022 19:51) (18 - 18)  SpO2: 96% (22 Aug 2022 19:51) (94% - 96%)    Parameters below as of 22 Aug 2022 19:51  Patient On (Oxygen Delivery Method): room air      O:  Physical Exam:  Gen: Laying in bed, NAD, alert and oriented.   Resp: Unlabored breathing  LLExt: Dressing c/d/i  EHL/FHL/TA/Sol intact          + SILT DP/SP/ASH/Sa/T          +DP, extremity WWP                          9.1    12.09 )-----------( 319      ( 22 Aug 2022 06:52 )             27.7   08-22    136  |  100  |  20  ----------------------------<  158<H>  4.0   |  26  |  0.95    Ca    10.0      22 Aug 2022 06:52

## 2022-08-23 NOTE — PROGRESS NOTE ADULT - PROVIDER SPECIALTY LIST ADULT
Infectious Disease
Orthopedics
Infectious Disease
Internal Medicine
Infectious Disease
Infectious Disease
Internal Medicine
Orthopedics
Orthopedics
Infectious Disease
Orthopedics
Orthopedics
Internal Medicine
Internal Medicine

## 2022-08-23 NOTE — DISCHARGE NOTE PROVIDER - CARE PROVIDER_API CALL
[facility], [address] [place] [address]  [id number] [address] [id number]  [phone]    Samantha Soni :2001 MRN:[id number]    2019      Begin Time: 5:00    End Time: 5:45    Session Type:45 Minute Therapy (94679)    Others Present: na    Intervention: Behavioral, Cognitive, Insight, Supportive    Suicide/Homicide/Violence Ideation: No    If Yes, explain: pt denies    Current Outpatient Medications   Medication Sig   • norgestimate-ethinyl estradiol (MONONESSA) 0.25-35 MG-MCG per tablet Take 1 tablet by mouth daily.   • guanFACINE (INTUNIV) 3 MG TABLET SR 24 HR Take 1 tablet by mouth nightly.   • hydrOXYzine (ATARAX) 25 MG tablet Take 1 tablet by mouth every 8 hours as needed for Anxiety.   • Vitamin D, Ergocalciferol, 2000 units Cap Take 2,000 Units by mouth daily.   • LORATADINE PO Take by mouth as needed.   • cetirizine (ZYRTEC) 10 MG tablet Take 10 mg by mouth daily.   • tretinoin (RETIN-A) 0.05 % cream Apply BID to affected areas     No current facility-administered medications for this visit.        Change in Medication(s) Reported: No  If Yes, explain: na    Patient/Family Education Provided: Yes  Patient/Family Displays Understanding: Yes    If No, explain: na    Chief complaint in patient's own words: \"My parents dont understand my diagnosis.\"    Progress Note containing chief complaint and symptoms/problems related to the complaint:    (Data/Action/Response/Plan)    D: Samantha is a 17 year old  female who presents to the session reporting that she is still annoyed by her parents not understanding her diagnosis..  A: The pt reports that she has made some small improvements for her treatment plan goals of standing up for herself and improving her anxiety. The pt reports that her parents are a large part of her anxiety and anger as they dont understand her. This writer attempts to psychoeducate the pt on improving communication with her parents by  actually communicating her needs. The pt reports that she is unable to do that at this time but \"maybe\" when she is 18 she will be able to tell them how she feels.  R: The pt is friendly and able to speak with this writer immediately.   P: Return to clinic in 1 month    Need for Community Resources Assessed: Yes    Resources Needed: No    If Yes, what resources: na    Diagnosis: Major depressive disorder, recurrent episode, moderate (CMS/HCC)  (primary encounter diagnosis)  ADHD (attention deficit hyperactivity disorder), combined type  SARA (generalized anxiety disorder)  Social phobia    Treatment Plan: Unchanged    Discharge Plan: Strategies Discussed to Maintain Gains    Next Appointment: 1 month  Devon Ayoub LCSW   [facility]  Internal Medicine  [address] [place] [address] [address]  [place] [address]  Phone: [phone]  Fax: [phone]  Established Patient  Follow Up Time: 2 weeks    Josias De Leon)  Infectious Disease; Internal Medicine  [address] [place] [address], [address]  [place] [address]  Phone: [phone]  Fax: [phone]  Follow Up Time: 2 weeks

## 2022-08-24 ENCOUNTER — RESULT REVIEW (OUTPATIENT)
Age: 87
End: 2022-08-24

## 2022-09-01 ENCOUNTER — RESULT REVIEW (OUTPATIENT)
Age: 87
End: 2022-09-01

## 2022-09-01 LAB
CULTURE RESULTS: SIGNIFICANT CHANGE UP
ORGANISM # SPEC MICROSCOPIC CNT: SIGNIFICANT CHANGE UP
ORGANISM # SPEC MICROSCOPIC CNT: SIGNIFICANT CHANGE UP
SPECIMEN SOURCE: SIGNIFICANT CHANGE UP

## 2022-09-14 ENCOUNTER — RESULT REVIEW (OUTPATIENT)
Age: 87
End: 2022-09-14

## 2022-09-14 ENCOUNTER — OUTPATIENT (OUTPATIENT)
Dept: OUTPATIENT SERVICES | Facility: HOSPITAL | Age: 87
LOS: 1 days | End: 2022-09-14
Payer: MEDICARE

## 2022-09-14 ENCOUNTER — APPOINTMENT (OUTPATIENT)
Dept: CT IMAGING | Facility: HOSPITAL | Age: 87
End: 2022-09-14

## 2022-09-14 DIAGNOSIS — T84.50XD INFECTION AND INFLAMMATORY REACTION DUE TO UNSPECIFIED INTERNAL JOINT PROSTHESIS, SUBSEQUENT ENCOUNTER: ICD-10-CM

## 2022-09-14 DIAGNOSIS — Z00.00 ENCOUNTER FOR GENERAL ADULT MEDICAL EXAMINATION WITHOUT ABNORMAL FINDINGS: ICD-10-CM

## 2022-09-14 PROCEDURE — 74176 CT ABD & PELVIS W/O CONTRAST: CPT | Mod: 26

## 2022-09-14 PROCEDURE — 74176 CT ABD & PELVIS W/O CONTRAST: CPT

## 2022-09-17 ENCOUNTER — INPATIENT (INPATIENT)
Facility: HOSPITAL | Age: 87
LOS: 5 days | Discharge: NURSING, REHAB OR LONG-TERM CARE | DRG: 314 | End: 2022-09-23
Attending: STUDENT IN AN ORGANIZED HEALTH CARE EDUCATION/TRAINING PROGRAM | Admitting: STUDENT IN AN ORGANIZED HEALTH CARE EDUCATION/TRAINING PROGRAM
Payer: MEDICARE

## 2022-09-17 VITALS
HEART RATE: 76 BPM | RESPIRATION RATE: 20 BRPM | WEIGHT: 179.9 LBS | DIASTOLIC BLOOD PRESSURE: 67 MMHG | TEMPERATURE: 99 F | SYSTOLIC BLOOD PRESSURE: 112 MMHG | HEIGHT: 63 IN | OXYGEN SATURATION: 98 %

## 2022-09-17 LAB
ALBUMIN SERPL ELPH-MCNC: 2.9 G/DL — LOW (ref 3.3–5)
ALP SERPL-CCNC: 82 U/L — SIGNIFICANT CHANGE UP (ref 40–120)
ALT FLD-CCNC: 16 U/L — SIGNIFICANT CHANGE UP (ref 10–45)
ANION GAP SERPL CALC-SCNC: 11 MMOL/L — SIGNIFICANT CHANGE UP (ref 5–17)
APPEARANCE UR: ABNORMAL
APTT BLD: 21.8 SEC — LOW (ref 27.5–35.5)
AST SERPL-CCNC: 17 U/L — SIGNIFICANT CHANGE UP (ref 10–40)
BACTERIA # UR AUTO: NEGATIVE — SIGNIFICANT CHANGE UP
BASE EXCESS BLDV CALC-SCNC: 2.2 MMOL/L — SIGNIFICANT CHANGE UP (ref -2–3)
BASOPHILS # BLD AUTO: 0.18 K/UL — SIGNIFICANT CHANGE UP (ref 0–0.2)
BASOPHILS NFR BLD AUTO: 0.9 % — SIGNIFICANT CHANGE UP (ref 0–2)
BILIRUB SERPL-MCNC: 0.2 MG/DL — SIGNIFICANT CHANGE UP (ref 0.2–1.2)
BILIRUB UR-MCNC: NEGATIVE — SIGNIFICANT CHANGE UP
BUN SERPL-MCNC: 17 MG/DL — SIGNIFICANT CHANGE UP (ref 7–23)
CA-I SERPL-SCNC: 1.26 MMOL/L — SIGNIFICANT CHANGE UP (ref 1.15–1.33)
CALCIUM SERPL-MCNC: 8.7 MG/DL — SIGNIFICANT CHANGE UP (ref 8.4–10.5)
CHLORIDE BLDV-SCNC: 100 MMOL/L — SIGNIFICANT CHANGE UP (ref 96–108)
CHLORIDE SERPL-SCNC: 100 MMOL/L — SIGNIFICANT CHANGE UP (ref 96–108)
CO2 BLDV-SCNC: 29 MMOL/L — HIGH (ref 22–26)
CO2 SERPL-SCNC: 25 MMOL/L — SIGNIFICANT CHANGE UP (ref 22–31)
COLOR SPEC: YELLOW — SIGNIFICANT CHANGE UP
CREAT SERPL-MCNC: 0.76 MG/DL — SIGNIFICANT CHANGE UP (ref 0.5–1.3)
CULTURE RESULTS: SIGNIFICANT CHANGE UP
DACRYOCYTES BLD QL SMEAR: SLIGHT — SIGNIFICANT CHANGE UP
DIFF PNL FLD: NEGATIVE — SIGNIFICANT CHANGE UP
EGFR: 75 ML/MIN/1.73M2 — SIGNIFICANT CHANGE UP
ELLIPTOCYTES BLD QL SMEAR: SLIGHT — SIGNIFICANT CHANGE UP
EOSINOPHIL # BLD AUTO: 0.18 K/UL — SIGNIFICANT CHANGE UP (ref 0–0.5)
EOSINOPHIL NFR BLD AUTO: 0.9 % — SIGNIFICANT CHANGE UP (ref 0–6)
EPI CELLS # UR: 3 /HPF — SIGNIFICANT CHANGE UP
GAS PNL BLDV: 132 MMOL/L — LOW (ref 136–145)
GAS PNL BLDV: SIGNIFICANT CHANGE UP
GAS PNL BLDV: SIGNIFICANT CHANGE UP
GLUCOSE BLDV-MCNC: 137 MG/DL — HIGH (ref 70–99)
GLUCOSE SERPL-MCNC: 142 MG/DL — HIGH (ref 70–99)
GLUCOSE UR QL: NEGATIVE — SIGNIFICANT CHANGE UP
HCO3 BLDV-SCNC: 27 MMOL/L — SIGNIFICANT CHANGE UP (ref 22–29)
HCT VFR BLD CALC: 25 % — LOW (ref 34.5–45)
HCT VFR BLDA CALC: 27 % — LOW (ref 34.5–46.5)
HGB BLD CALC-MCNC: 9 G/DL — LOW (ref 11.7–16.1)
HGB BLD-MCNC: 7.7 G/DL — LOW (ref 11.5–15.5)
HYALINE CASTS # UR AUTO: 3 /LPF — HIGH (ref 0–2)
INR BLD: 1.23 RATIO — HIGH (ref 0.88–1.16)
KETONES UR-MCNC: NEGATIVE — SIGNIFICANT CHANGE UP
LACTATE BLDV-MCNC: 2.2 MMOL/L — HIGH (ref 0.5–2)
LEUKOCYTE ESTERASE UR-ACNC: ABNORMAL
LYMPHOCYTES # BLD AUTO: 16.4 % — SIGNIFICANT CHANGE UP (ref 13–44)
LYMPHOCYTES # BLD AUTO: 3.37 K/UL — HIGH (ref 1–3.3)
MANUAL SMEAR VERIFICATION: SIGNIFICANT CHANGE UP
MCHC RBC-ENTMCNC: 28.6 PG — SIGNIFICANT CHANGE UP (ref 27–34)
MCHC RBC-ENTMCNC: 30.8 GM/DL — LOW (ref 32–36)
MCV RBC AUTO: 92.9 FL — SIGNIFICANT CHANGE UP (ref 80–100)
METAMYELOCYTES # FLD: 0.9 % — HIGH (ref 0–0)
MONOCYTES # BLD AUTO: 0.7 K/UL — SIGNIFICANT CHANGE UP (ref 0–0.9)
MONOCYTES NFR BLD AUTO: 3.4 % — SIGNIFICANT CHANGE UP (ref 2–14)
MYELOCYTES NFR BLD: 1.7 % — HIGH (ref 0–0)
NEUTROPHILS # BLD AUTO: 15.55 K/UL — HIGH (ref 1.8–7.4)
NEUTROPHILS NFR BLD AUTO: 74.1 % — SIGNIFICANT CHANGE UP (ref 43–77)
NEUTS BAND # BLD: 1.7 % — SIGNIFICANT CHANGE UP (ref 0–8)
NITRITE UR-MCNC: NEGATIVE — SIGNIFICANT CHANGE UP
NRBC # BLD: 1 /100 — HIGH (ref 0–0)
PCO2 BLDV: 44 MMHG — HIGH (ref 39–42)
PH BLDV: 7.4 — SIGNIFICANT CHANGE UP (ref 7.32–7.43)
PH UR: 6 — SIGNIFICANT CHANGE UP (ref 5–8)
PLAT MORPH BLD: NORMAL — SIGNIFICANT CHANGE UP
PLATELET # BLD AUTO: 247 K/UL — SIGNIFICANT CHANGE UP (ref 150–400)
PO2 BLDV: 40 MMHG — SIGNIFICANT CHANGE UP (ref 25–45)
POIKILOCYTOSIS BLD QL AUTO: SLIGHT — SIGNIFICANT CHANGE UP
POLYCHROMASIA BLD QL SMEAR: SLIGHT — SIGNIFICANT CHANGE UP
POTASSIUM BLDV-SCNC: 3.7 MMOL/L — SIGNIFICANT CHANGE UP (ref 3.5–5.1)
POTASSIUM SERPL-MCNC: 3.8 MMOL/L — SIGNIFICANT CHANGE UP (ref 3.5–5.3)
POTASSIUM SERPL-SCNC: 3.8 MMOL/L — SIGNIFICANT CHANGE UP (ref 3.5–5.3)
PROT SERPL-MCNC: 6.2 G/DL — SIGNIFICANT CHANGE UP (ref 6–8.3)
PROT UR-MCNC: ABNORMAL
PROTHROM AB SERPL-ACNC: 14.3 SEC — HIGH (ref 10.5–13.4)
RAPID RVP RESULT: SIGNIFICANT CHANGE UP
RBC # BLD: 2.69 M/UL — LOW (ref 3.8–5.2)
RBC # FLD: 15.8 % — HIGH (ref 10.3–14.5)
RBC BLD AUTO: ABNORMAL
RBC CASTS # UR COMP ASSIST: 3 /HPF — SIGNIFICANT CHANGE UP (ref 0–4)
SAO2 % BLDV: 69.4 % — SIGNIFICANT CHANGE UP (ref 67–88)
SARS-COV-2 RNA SPEC QL NAA+PROBE: SIGNIFICANT CHANGE UP
SODIUM SERPL-SCNC: 136 MMOL/L — SIGNIFICANT CHANGE UP (ref 135–145)
SP GR SPEC: 1.01 — SIGNIFICANT CHANGE UP (ref 1.01–1.02)
SPECIMEN SOURCE: SIGNIFICANT CHANGE UP
UROBILINOGEN FLD QL: NEGATIVE — SIGNIFICANT CHANGE UP
WBC # BLD: 20.52 K/UL — HIGH (ref 3.8–10.5)
WBC # FLD AUTO: 20.52 K/UL — HIGH (ref 3.8–10.5)
WBC UR QL: 41 /HPF — HIGH (ref 0–5)

## 2022-09-17 PROCEDURE — 71045 X-RAY EXAM CHEST 1 VIEW: CPT | Mod: 26

## 2022-09-17 PROCEDURE — 93010 ELECTROCARDIOGRAM REPORT: CPT

## 2022-09-17 PROCEDURE — 99285 EMERGENCY DEPT VISIT HI MDM: CPT

## 2022-09-17 NOTE — ED PROVIDER NOTE - BIRTH SEX
ENT ISSUE/POD:  S/P #8 DCT Tracheostomy POD # 1.      HPI: 65 YO M with significant PMHx, admitted for weakness and fatigue, was found to have CAD S/P CABG on 6/18. ENT called to evaluate for tracheostomy. Pt failed weaning trials and is not a candidate for extubation as per primary team. Now S/P # 8 DCT Tracheostomy POD # 2. Surgicel x1  placed around the stoma in OR.  Doing well on the ventilator. No bleeding noted.         PAST MEDICAL & SURGICAL HISTORY:  CHF (congestive heart failure)    CAD (coronary artery disease)    Depression    Pleural effusion    History of 2019 novel coronavirus disease (COVID-19)  april 2020    Hemorrhoids    Bleeding hemorrhoids    Peripheral arterial disease    Claudication    BPH with urinary obstruction    ACC/AHA stage D systolic heart failure    Anticoagulation goal of INR 2.0 to 2.5    Falls    Clavicle fracture    CKD (chronic kidney disease), stage III    Iron deficiency anemia    H/O epistaxis    Vertigo    GI bleed    S/P TVR (tricuspid valve repair)    S/P ventricular assist device    S/P endoscopy      Allergies    No Known Allergies    Intolerances      MEDICATIONS  (STANDING):  aMIOdarone    Tablet 200 milliGRAM(s) Oral daily  cefepime   IVPB 1000 milliGRAM(s) IV Intermittent every 8 hours  chlorhexidine 0.12% Liquid 15 milliLiter(s) Oral Mucosa every 12 hours  chlorhexidine 2% Cloths 1 Application(s) Topical <User Schedule>  clonazePAM  Tablet 1 milliGRAM(s) Oral every 8 hours  dexMEDEtomidine Infusion 0.5 MICROgram(s)/kG/Hr (9.81 mL/Hr) IV Continuous <Continuous>  heparin  Infusion 400 Unit(s)/Hr (12.5 mL/Hr) IV Continuous <Continuous>  insulin lispro (ADMELOG) corrective regimen sliding scale   SubCutaneous every 6 hours  metoclopramide Injectable 10 milliGRAM(s) IV Push every 8 hours  metroNIDAZOLE  IVPB 500 milliGRAM(s) IV Intermittent every 8 hours  pantoprazole  Injectable 40 milliGRAM(s) IV Push every 12 hours  sodium chloride 0.9%. 1000 milliLiter(s) (10 mL/Hr) IV Continuous <Continuous>  vancomycin    Solution 500 milliGRAM(s) Oral every 6 hours    MEDICATIONS  (PRN):  acetaminophen    Suspension .. 650 milliGRAM(s) Oral every 6 hours PRN Temp greater or equal to 38C (100.4F)      Social History: see consult    Family history: see consult    ROS:   ENT: all negative except as noted in HPI   Pulm: denies SOB, cough, hemoptysis  Neuro: denies numbness/tingling, loss of sensation  Endo: denies heat/cold intolerance, excessive sweating      Vital Signs Last 24 Hrs  T(C): 37.2 (27 Jun 2021 12:00), Max: 37.8 (26 Jun 2021 20:00)  T(F): 99 (27 Jun 2021 12:00), Max: 100 (26 Jun 2021 20:00)  HR: 66 (27 Jun 2021 15:10) (58 - 71)  BP: --  BP(mean): --  RR: 30 (27 Jun 2021 15:00) (13 - 30)  SpO2: 100% (27 Jun 2021 15:10) (97% - 100%)                          7.3    14.38 )-----------( 325      ( 27 Jun 2021 00:53 )             24.6    06-27    135  |  104  |  11  ----------------------------<  163<H>  4.0   |  20<L>  |  0.68    Ca    8.9      27 Jun 2021 00:53  Phos  2.6     06-27  Mg     1.8     06-27    TPro  7.0  /  Alb  3.1<L>  /  TBili  0.3  /  DBili  x   /  AST  36  /  ALT  47<H>  /  AlkPhos  227<H>  06-27   PT/INR - ( 27 Jun 2021 00:53 )   PT: 15.1 sec;   INR: 1.27 ratio         PTT - ( 27 Jun 2021 00:53 )  PTT:45.6 sec    PHYSICAL EXAM:  Gen: NAD, On Vent.   Skin: No rashes, bruises, or lesions.  Head: Normocephalic, Atraumatic.  Face: no edema, erythema, or fluctuance. Parotid glands soft without mass.  Eyes: no scleral injection.  Nose: Nares bilaterally patent, no discharge  Mouth: No Stridor / Drooling / Trismus.  Mucosa moist, tongue/uvula midline, oropharynx clear  Neck: # 8 DCT Trach secured with sutures x 4 and Umbilical Tie. Minimal oozing of serosanguinous secretions,.  No bleeding noted. No hematoma/crepitus. Flat, supple, no lymphadenopathy, trachea midline, no masses.  Lymphatic: No lymphadenopathy  Resp: On Vent.   Neuro: Unable to obtain.          Female

## 2022-09-17 NOTE — ED PROVIDER NOTE - ATTENDING CONTRIBUTION TO CARE
Patient from [facility] for continued rehab and antibiotic therapy after recent revision of infected artificial hip.  Over past few days developing worsening leukocytosis, malaise/weakness and persisting fevers despite IV antibiotics without identified source.  Patient cannot identify any specific symptoms other than generalized malaise.  Non diagnostic exam.  Vital signs not consistent with severe sepsis/septic shock.  Will obtain labs, cultures, RVP and admit for orthopedics and ID consultation to further determine treatment course.  Unclear if presentation is secondary to worsening/new infection of unidentified source or if drug fever?

## 2022-09-17 NOTE — ED PROVIDER NOTE - OBJECTIVE STATEMENT
89y F with PMH hypothyroidism, HTN, arrhythmia, MVP, ileostomy, rectovaginal fistula presenting with 1w of fevers. 89y F with PMH hypothyroidism, HTN, arrhythmia, MVP, ileostomy, rectovaginal fistula and PSH of L hip implant presenting with 1w of fevers. Pt is resident of [facility]. 3w ago had debridement for infection on L hip and has been getting treated with ampicillin, switched to vancomycin. Pt has been having fevers and elevated WBC. Endorses chills and one episode of NBNB vomiting. Denies CP, SOB, abdominal pain, or unrinary symptoms.

## 2022-09-17 NOTE — ED PROVIDER NOTE - CLINICAL SUMMARY MEDICAL DECISION MAKING FREE TEXT BOX
Pt, resident of [facility], presenting with fevers and elevated WBC. pt endroses chills and 1ep of vomiting. PE benign. VS WNL. r/o UTI, surgical site infection, PNA. Plan, CXR, CBC, CMP, VBG, Covid,

## 2022-09-17 NOTE — ED ADULT NURSE NOTE - INTERVENTIONS DEFINITIONS
[place] to call system/Call bell, personal items and telephone within reach/Instruct patient to call for assistance/Physically safe environment: no spills, clutter or unnecessary equipment/Stretcher in lowest position, wheels locked, appropriate side rails in place/Monitor gait and stability/Monitor for mental status changes and reorient to person, place, and time

## 2022-09-17 NOTE — ED ADULT NURSE NOTE - NSFALLRSKINDICATORS_ED_ALL_ED
PROVIDER:[TOKEN:[[id number]],FOLLOWUP:[2 weeks]],PROVIDER:[TOKEN:[[id number]],FOLLOWUP:[1 month]] yes

## 2022-09-17 NOTE — ED PROVIDER NOTE - PHYSICAL EXAMINATION
LOS:     VITALS:   T(C): 37.1 (09-18-22 @ 07:29), Max: 37.3 (09-17-22 @ 18:45)  HR: 68 (09-18-22 @ 07:29) (65 - 81)  BP: 135/42 (09-18-22 @ 07:29) (112/67 - 143/69)  RR: 18 (09-18-22 @ 07:29) (17 - 22)  SpO2: 98% (09-18-22 @ 07:29) (95% - 98%)    GENERAL: NAD, lying in bed comfortably  HEAD:  Atraumatic, Normocephalic  EYES: EOMI, conjunctiva and sclera clear  ENT: Moist mucous membranes  NECK: Supple  CHEST/LUNG: Clear to auscultation bilaterally;   HEART: +S1/+S2 No murmurs, rubs, or gallops  ABDOMEN: BSx4; Soft, nontender, nondistended. +ileostomy with output; ostomy looks pink and well-perfused  EXTREMITIES:  L hip non-TTP. L hip surgical scar seen, dry, well-healing, sutures in place. PICC line in L arm  NERVOUS SYSTEM:  A&Ox3, no focal deficits   SKIN: No rashes or lesions

## 2022-09-17 NOTE — ED ADULT NURSE NOTE - NSIMPLEMENTINTERV_GEN_ALL_ED
Specific interventions were implemented: Albendazole Counseling:  I discussed with the patient the risks of albendazole including but not limited to cytopenia, kidney damage, nausea/vomiting and severe allergy.  The patient understands that this medication is being used in an off-label manner.

## 2022-09-17 NOTE — ED ADULT NURSE NOTE - OBJECTIVE STATEMENT
89y female was sent to ED from [facility] for elevated WBC. A & O x 4, c/o generalized weakness. Denies any fever, chills, body aches, nausea, vomiting, or diarrhea. + swelling noted to left lower extremity, pedal pulse present, no sensation loss. 89y female was sent to ED from NH for elevated WBC. A & O x 4, c/o generalized weakness. Denies any fever, chills, body aches, nausea, vomiting, or diarrhea. Surgical incision to left hip, + edema noted to left lower extremity, pedal pulse present, no sensation loss.

## 2022-09-17 NOTE — ED PROVIDER NOTE - PROGRESS NOTE DETAILS
ED sign out, eval for infectious process complicated hx, pending admission now for continued villarreal / tx -- James Yanes MD

## 2022-09-18 DIAGNOSIS — R11.2 NAUSEA WITH VOMITING, UNSPECIFIED: ICD-10-CM

## 2022-09-18 DIAGNOSIS — R50.9 FEVER, UNSPECIFIED: ICD-10-CM

## 2022-09-18 DIAGNOSIS — M10.9 GOUT, UNSPECIFIED: ICD-10-CM

## 2022-09-18 DIAGNOSIS — E03.9 HYPOTHYROIDISM, UNSPECIFIED: ICD-10-CM

## 2022-09-18 DIAGNOSIS — I10 ESSENTIAL (PRIMARY) HYPERTENSION: ICD-10-CM

## 2022-09-18 DIAGNOSIS — Z29.9 ENCOUNTER FOR PROPHYLACTIC MEASURES, UNSPECIFIED: ICD-10-CM

## 2022-09-18 DIAGNOSIS — T81.49XA INFECTION FOLLOWING A PROCEDURE, OTHER SURGICAL SITE, INITIAL ENCOUNTER: ICD-10-CM

## 2022-09-18 DIAGNOSIS — G47.00 INSOMNIA, UNSPECIFIED: ICD-10-CM

## 2022-09-18 LAB
ALBUMIN SERPL ELPH-MCNC: 2.6 G/DL — LOW (ref 3.3–5)
ALP SERPL-CCNC: 81 U/L — SIGNIFICANT CHANGE UP (ref 40–120)
ALT FLD-CCNC: 15 U/L — SIGNIFICANT CHANGE UP (ref 10–45)
ANION GAP SERPL CALC-SCNC: 11 MMOL/L — SIGNIFICANT CHANGE UP (ref 5–17)
AST SERPL-CCNC: 13 U/L — SIGNIFICANT CHANGE UP (ref 10–40)
BILIRUB SERPL-MCNC: 0.3 MG/DL — SIGNIFICANT CHANGE UP (ref 0.2–1.2)
BUN SERPL-MCNC: 14 MG/DL — SIGNIFICANT CHANGE UP (ref 7–23)
CALCIUM SERPL-MCNC: 9.1 MG/DL — SIGNIFICANT CHANGE UP (ref 8.4–10.5)
CHLORIDE SERPL-SCNC: 102 MMOL/L — SIGNIFICANT CHANGE UP (ref 96–108)
CO2 SERPL-SCNC: 23 MMOL/L — SIGNIFICANT CHANGE UP (ref 22–31)
CREAT SERPL-MCNC: 0.69 MG/DL — SIGNIFICANT CHANGE UP (ref 0.5–1.3)
CRP SERPL-MCNC: 91 MG/L — HIGH (ref 0–4)
D DIMER BLD IA.RAPID-MCNC: 1053 NG/ML DDU — HIGH
EGFR: 83 ML/MIN/1.73M2 — SIGNIFICANT CHANGE UP
ERYTHROCYTE [SEDIMENTATION RATE] IN BLOOD: 65 MM/HR — HIGH (ref 0–20)
GLUCOSE SERPL-MCNC: 140 MG/DL — HIGH (ref 70–99)
HCT VFR BLD CALC: 25 % — LOW (ref 34.5–45)
HGB BLD-MCNC: 8 G/DL — LOW (ref 11.5–15.5)
LACTATE SERPL-SCNC: 0.9 MMOL/L — SIGNIFICANT CHANGE UP (ref 0.5–2)
MAGNESIUM SERPL-MCNC: 1.6 MG/DL — SIGNIFICANT CHANGE UP (ref 1.6–2.6)
MCHC RBC-ENTMCNC: 29.7 PG — SIGNIFICANT CHANGE UP (ref 27–34)
MCHC RBC-ENTMCNC: 32 GM/DL — SIGNIFICANT CHANGE UP (ref 32–36)
MCV RBC AUTO: 92.9 FL — SIGNIFICANT CHANGE UP (ref 80–100)
NRBC # BLD: 0 /100 WBCS — SIGNIFICANT CHANGE UP (ref 0–0)
PHOSPHATE SERPL-MCNC: 3.5 MG/DL — SIGNIFICANT CHANGE UP (ref 2.5–4.5)
PLATELET # BLD AUTO: 251 K/UL — SIGNIFICANT CHANGE UP (ref 150–400)
POTASSIUM SERPL-MCNC: 3.9 MMOL/L — SIGNIFICANT CHANGE UP (ref 3.5–5.3)
POTASSIUM SERPL-SCNC: 3.9 MMOL/L — SIGNIFICANT CHANGE UP (ref 3.5–5.3)
PROT SERPL-MCNC: 6.2 G/DL — SIGNIFICANT CHANGE UP (ref 6–8.3)
RBC # BLD: 2.69 M/UL — LOW (ref 3.8–5.2)
RBC # FLD: 15.7 % — HIGH (ref 10.3–14.5)
SODIUM SERPL-SCNC: 136 MMOL/L — SIGNIFICANT CHANGE UP (ref 135–145)
TSH SERPL-MCNC: 1.69 UIU/ML — SIGNIFICANT CHANGE UP (ref 0.27–4.2)
WBC # BLD: 18.37 K/UL — HIGH (ref 3.8–10.5)
WBC # FLD AUTO: 18.37 K/UL — HIGH (ref 3.8–10.5)

## 2022-09-18 PROCEDURE — 73552 X-RAY EXAM OF FEMUR 2/>: CPT | Mod: 26,LT

## 2022-09-18 PROCEDURE — 99223 1ST HOSP IP/OBS HIGH 75: CPT

## 2022-09-18 PROCEDURE — 73502 X-RAY EXAM HIP UNI 2-3 VIEWS: CPT | Mod: 26,LT

## 2022-09-18 RX ORDER — VANCOMYCIN HCL 1 G
1500 VIAL (EA) INTRAVENOUS EVERY 24 HOURS
Refills: 0 | Status: DISCONTINUED | OUTPATIENT
Start: 2022-09-18 | End: 2022-09-20

## 2022-09-18 RX ORDER — LEVOTHYROXINE SODIUM 125 MCG
50 TABLET ORAL DAILY
Refills: 0 | Status: DISCONTINUED | OUTPATIENT
Start: 2022-09-18 | End: 2022-09-23

## 2022-09-18 RX ORDER — SODIUM CHLORIDE 9 MG/ML
500 INJECTION INTRAMUSCULAR; INTRAVENOUS; SUBCUTANEOUS ONCE
Refills: 0 | Status: COMPLETED | OUTPATIENT
Start: 2022-09-18 | End: 2022-09-18

## 2022-09-18 RX ORDER — DIAZEPAM 5 MG
5 TABLET ORAL AT BEDTIME
Refills: 0 | Status: DISCONTINUED | OUTPATIENT
Start: 2022-09-18 | End: 2022-09-23

## 2022-09-18 RX ORDER — ENOXAPARIN SODIUM 100 MG/ML
40 INJECTION SUBCUTANEOUS EVERY 24 HOURS
Refills: 0 | Status: DISCONTINUED | OUTPATIENT
Start: 2022-09-18 | End: 2022-09-20

## 2022-09-18 RX ORDER — ALLOPURINOL 300 MG
100 TABLET ORAL
Refills: 0 | Status: DISCONTINUED | OUTPATIENT
Start: 2022-09-18 | End: 2022-09-23

## 2022-09-18 RX ORDER — VANCOMYCIN HCL 1 G
1.5 VIAL (EA) INTRAVENOUS EVERY 24 HOURS
Refills: 0 | Status: DISCONTINUED | OUTPATIENT
Start: 2022-09-18 | End: 2022-09-18

## 2022-09-18 RX ORDER — PANTOPRAZOLE SODIUM 20 MG/1
40 TABLET, DELAYED RELEASE ORAL
Refills: 0 | Status: DISCONTINUED | OUTPATIENT
Start: 2022-09-18 | End: 2022-09-23

## 2022-09-18 RX ADMIN — Medication 5 MILLIGRAM(S): at 20:05

## 2022-09-18 RX ADMIN — Medication 50 MICROGRAM(S): at 06:35

## 2022-09-18 RX ADMIN — Medication 300 MILLIGRAM(S): at 07:24

## 2022-09-18 RX ADMIN — Medication 100 MILLIGRAM(S): at 19:09

## 2022-09-18 RX ADMIN — ENOXAPARIN SODIUM 40 MILLIGRAM(S): 100 INJECTION SUBCUTANEOUS at 06:34

## 2022-09-18 RX ADMIN — PANTOPRAZOLE SODIUM 40 MILLIGRAM(S): 20 TABLET, DELAYED RELEASE ORAL at 06:35

## 2022-09-18 RX ADMIN — Medication 100 MILLIGRAM(S): at 06:35

## 2022-09-18 RX ADMIN — Medication 30 MILLILITER(S): at 19:09

## 2022-09-18 RX ADMIN — SODIUM CHLORIDE 50 MILLILITER(S): 9 INJECTION INTRAMUSCULAR; INTRAVENOUS; SUBCUTANEOUS at 07:21

## 2022-09-18 NOTE — H&P ADULT - HISTORY OF PRESENT ILLNESS
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 on 6 weeks of ampicillin + ceftriaxone via PICC line but had continued once daily fevers, up to 102.9, fevers were responsive to Tylenol, pt was transitioned from Ampicillin/Ceftriaxone to Vancomycin however she continued to have fevers and associated increased in WBC, other infectious workup was negative at [facility], pt was transferred to [facility] for further management, in the ED, VSS, labs showed leukocytosis with neutrophil predominance, CXR showed trace left pleural effusion, EKG normal sinus rhythm, pt admitted to general medicine for further management, she denies chest pain, palpitations, cough, n/v, diarrhea, hematuria, dysuria

## 2022-09-18 NOTE — CHART NOTE - NSCHARTNOTEFT_GEN_A_CORE
Patient with allergy to iodine and hx anaphylaxis.  Per Dr. Gondal, [facility], CTA chest D/C'd and ordered VQ scan.  Pulm consult to be called in am  c/w lovenox ppx for now per Attending pending VQ scan and LE doppler results.    Pushpa Loza, NP  Medicine  [id number]

## 2022-09-18 NOTE — H&P ADULT - ASSESSMENT
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

## 2022-09-18 NOTE — CONSULT NOTE ADULT - SUBJECTIVE AND OBJECTIVE BOX
Orthopaedic Surgery Consult Note    Patient is a 89y old  Female who presents with a chief complaint of fever (18 Sep 2022 05:03)    HPI:  88yo F w/ PMHx of pseudomembranous colitis in  s/p total colectomy w/ perirectal fistula, HTN, hypothyroidism, GERD and left hip hemiarthroplasty presents for fever, pt was recently admitted - at [facility] for L hip prosthetic joint infection, chronic draining wound. Now s/p I&D, head/liner exchange, antibiotic bead placement on , pt was discharged to [facility] on 6 weeks of ampicillin + ceftriaxone via PICC line but had continued once daily fevers, up to 102.9, fevers were responsive to Tylenol, pt was transitioned from Ampicillin/Ceftriaxone to Vancomycin however she continued to have fevers and associated increased in WBC, other infectious workup was negative at [facility], pt was transferred to [facility] for further management, in the ED, VSS, labs showed leukocytosis with neutrophil predominance, CXR showed trace left pleural effusion, EKG normal sinus rhythm, pt admitted to general medicine for further management, she denies chest pain, palpitations, cough, n/v, diarrhea, hematuria, dysuria  (18 Sep 2022 05:03)      Orthopaedic surgery consulted to evaluate hip as source of infection. Patient states she has been doing very well in rehab. Her pain has continually improved and she is ambulating with PT daily. She has not noticed any redness or draining from incision. Denies numbness, tingling.       PAST MEDICAL & SURGICAL HISTORY:  Hypertension      Hypothyroidism      Hiatal hernia      Fistula, perirectal      S/P cholecystectomy  at age 20 ,s      S/P ileostomy  colectomy (  )      S/P arthroscopy of right knee      S/P foot joint surgery  right ( 10 years ago )      MEDICATIONS  (STANDING):  allopurinol 100 milliGRAM(s) Oral two times a day  enoxaparin Injectable 40 milliGRAM(s) SubCutaneous every 24 hours  levothyroxine 50 MICROGram(s) Oral daily  pantoprazole    Tablet 40 milliGRAM(s) Oral before breakfast  propranolol 20 milliGRAM(s) Oral two times a day  vancomycin  IVPB 1500 milliGRAM(s) IV Intermittent every 24 hours    MEDICATIONS  (PRN):  diazepam    Tablet 5 milliGRAM(s) Oral at bedtime PRN insomnia    Allergies    iodine (Other; Anaphylaxis)  shellfish (Anaphylaxis)  sulfa drugs (Other)    Intolerances        Vital Signs Last 24 Hrs  T(C): 36.8 (18 Sep 2022 12:58), Max: 37.3 (17 Sep 2022 18:45)  T(F): 98.3 (18 Sep 2022 12:58), Max: 99.2 (17 Sep 2022 18:45)  HR: 66 (18 Sep 2022 12:58) (65 - 81)  BP: 140/58 (18 Sep 2022 12:58) (112/67 - 143/69)  BP(mean): 83 (18 Sep 2022 12:58) (70 - 83)  RR: 18 (18 Sep 2022 12:58) (17 - 22)  SpO2: 99% (18 Sep 2022 12:58) (95% - 99%)    Parameters below as of 18 Sep 2022 12:58  Patient On (Oxygen Delivery Method): nasal cannula  O2 Flow (L/min): 2        PHYSICAL EXAM:  NAD  LLE:  Well healing surgical incision, no erythema or ecchymosis, no drainaige  5/5 EHL/FHL/TA/Gastrocnemius  Painless ROM knee/ankle  Able to flex hip to 60 w/o pain, full IR/ER without pain  SILT DP/SP/S/S/T nerve distributions  Compartments soft and compressible  2+ Dorsalis Pedis pulse                           8.0    18.37 )-----------( 251      ( 18 Sep 2022 06:48 )             25.0     09-18    136  |  102  |  14  ----------------------------<  140<H>  3.9   |  23  |  0.69    Ca    9.1      18 Sep 2022 06:48  Phos  3.5     09-18  Mg     1.6     09-18    TPro  6.2  /  Alb  2.6<L>  /  TBili  0.3  /  DBili  x   /  AST  13  /  ALT  15  /  AlkPhos  81  09-18    PT/INR - ( 17 Sep 2022 20:39 )   PT: 14.3 sec;   INR: 1.23 ratio         PTT - ( 17 Sep 2022 20:39 )  PTT:21.8 sec  Urinalysis Basic - ( 17 Sep 2022 22:51 )    Color: Yellow / Appearance: Slightly Turbid / S.013 / pH: x  Gluc: x / Ketone: Negative  / Bili: Negative / Urobili: Negative   Blood: x / Protein: Trace / Nitrite: Negative   Leuk Esterase: Large / RBC: 3 /hpf / WBC 41 /HPF   Sq Epi: x / Non Sq Epi: 3 /hpf / Bacteria: Negative    CRP 91 (187 preop)    IMAGING STUDIES:  XR L hip without periprosthetic fracture or dislocation  CTAP showing no collections about the hip    ASSESSMENT AND PLAN  89y Female with L prosthetic hip infection s/p I&D, revision of modular components here with persistent fevers despite culture-specific antibiotics. Patient with colostomy, multiple possible sources of an infection. Clinical picture and imaging not consistent with active L hip PJI.    Discussed with family that any additional surgery for infection would involve a 2 stage procedure with explantation of current components, temporary placement of an antibiotic spacer, and replantation. The family would not want to proceed with this in the event there is persistent infection found in the hip and would prefer to continue with antibiotic suppression.    - WBAT, anterior dislocation precautions  - PT/OT/OOB  - Pain control PRN  - Abx per ID, FU ID recs in AM  - No acute orthopaedic surgical intervention indicated. We will sign off at this time. Please re-consult with questions. Patient can follow up with Dr. Samayoa as previously scheduled. Call [phone] for appt.    For all questions related to patient care, please reach out to the on-call team via the pager.     Mayrann Cabrera PGY-3  Orthopaedic Surgery  [facility] [phone]  [facility] [phone]  [facility] [phone]

## 2022-09-18 NOTE — H&P ADULT - PROBLEM SELECTOR PLAN 2
-previous wound cultures grew enterococcus Faecalis   -c/w vancomycin 1.5g daily (switched from Ceftriaxone + Ampicillin while at rehab)  -monitor vancomycin troughs   -ID consult in AM

## 2022-09-18 NOTE — PATIENT PROFILE ADULT - FALL HARM RISK - HARM RISK INTERVENTIONS
Assistance with ambulation/Assistance OOB with selected safe patient handling equipment/Communicate Risk of Fall with Harm to all staff/Discuss with provider need for PT consult/Monitor gait and stability/Reinforce activity limits and safety measures with patient and family/Tailored Fall Risk Interventions/Visual Cue: Yellow wristband and red socks/Bed in lowest position, wheels locked, appropriate side rails in place/Call bell, personal items and telephone in reach/Instruct patient to call for assistance before getting out of bed or chair/Non-slip footwear when patient is out of bed/[place] to call system/Physically safe environment - no spills, clutter or unnecessary equipment/Purposeful Proactive Rounding/Room/bathroom lighting operational, light cord in reach

## 2022-09-18 NOTE — ED ADULT NURSE REASSESSMENT NOTE - NS ED NURSE REASSESS COMMENT FT1
Pt resting comfortably in stretcher, VSS, NAD noted. Breathing non-labored with RR and O2 sat within normal limits on 2L NC. Pt denies SOB. Pt pending inpatient bed assignment. Safety and comfort measures maintained.

## 2022-09-18 NOTE — CONSULT NOTE ADULT - SUBJECTIVE AND OBJECTIVE BOX
[facility], Division of Infectious Diseases  STEVE Loaiza S. Shah, Y. Patel, G. Moises  [phone]  ([phone] - weekdays after 5pm and weekends)    GEORGE ROSE  89y, Female  [id number]    HPI--  HPI:  89yo F w/ PMHx of pseudomembranous colitis in  s/p total colectomy w/ perirectal fistula, HTN, hypothyroidism, GERD and left hip arthroplasty presents for fever, pt was recently admitted - at [facility] for IR drainage of chronic left hip wound, pt was discharged to [facility] on 6 weeks of ampicillin + ceftriaxone via PICC line but developed fever and she was therefore transitioned from Ampicillin/Ceftriaxone to Vancomycin however she continued to have fevers with an associated increased in WBC. Other infectious workup was negative at [facility]. Pt was transferred to [facility] for further management, in the ED, VSS, labs showed leukocytosis with neutrophil predominance, CXR showed trace left pleural effusion  She denies chest pain, abd pain, n/v, diarrhea, hematuria, dysuria      ROS: 10 point review of systems completed, pertinent positives and negatives as per HPI.    Allergies: iodine (Other; Anaphylaxis)  shellfish (Anaphylaxis)  sulfa drugs (Other)    PMH -- Hypertension    Hypothyroidism    Hiatal hernia    Fistula, perirectal      PSH -- S/P cholecystectomy    S/P ileostomy    S/P arthroscopy of right knee    S/P foot joint surgery      FH -- No pertinent family history      Social History -- denies tobacco, alcohol or illicit drug use    Physical Exam--  Vital Signs Last 24 Hrs  T(F): 97.9 (18 Sep 2022 14:17), Max: 98.9 (17 Sep 2022 22:45)  HR: 85 (18 Sep 2022 18:56) (65 - 85)  BP: 125/80 (18 Sep 2022 18:56) (118/63 - 143/69)  RR: 18 (18 Sep 2022 14:17) (17 - 20)  SpO2: 100% (18 Sep 2022 14:17) (95% - 100%)  General: nontoxic-appearing, no acute distress  HEENT: NC/AT, anicteric, oropharynx clear, dentition fair  Neck: Not rigid. No LAD  Lungs: Clear bilaterally without rales, wheezing or rhonchi  Heart: S1, S2, normal rate. No murmur  Abdomen: Soft. Nondistended. Nontender.   Neuro: no obvious focal deficits   Back: No costovertebral angle tenderness.  Extremities: LE edema.   Skin: Warm. Dry. No rash. L hip wound closed, no surrounding erythema or swelling  Psychiatric: Appropriate affect and mood for situation.   Lines: PICC line w/o surrounding erythema or tenderness    Laboratory & Imaging Data--  CBC:                       8.0    18.37 )-----------( 251      ( 18 Sep 2022 06:48 )             25.0     WBC Count: 18.37 K/uL (22 @ 06:48)  WBC Count: 20.52 K/uL (22 @ 20:39)    CMP:     136  |  102  |  14  ----------------------------<  140<H>  3.9   |  23  |  0.69    Ca    9.1      18 Sep 2022 06:48  Phos  3.5       Mg     1.6         TPro  6.2  /  Alb  2.6<L>  /  TBili  0.3  /  DBili  x   /  AST  13  /  ALT  15  /  AlkPhos  81      LIVER FUNCTIONS - ( 18 Sep 2022 06:48 )  Alb: 2.6 g/dL / Pro: 6.2 g/dL / ALK PHOS: 81 U/L / ALT: 15 U/L / AST: 13 U/L / GGT: x           Urinalysis Basic - ( 17 Sep 2022 22:51 )    Color: Yellow / Appearance: Slightly Turbid / S.013 / pH: x  Gluc: x / Ketone: Negative  / Bili: Negative / Urobili: Negative   Blood: x / Protein: Trace / Nitrite: Negative   Leuk Esterase: Large / RBC: 3 /hpf / WBC 41 /HPF   Sq Epi: x / Non Sq Epi: 3 /hpf / Bacteria: Negative      Microbiology:     Radiology--  ***  Active Medications--  allopurinol 100 milliGRAM(s) Oral two times a day  aluminum hydroxide/magnesium hydroxide/simethicone Suspension 30 milliLiter(s) Oral every 6 hours PRN  diazepam    Tablet 5 milliGRAM(s) Oral at bedtime PRN  enoxaparin Injectable 40 milliGRAM(s) SubCutaneous every 24 hours  levothyroxine 50 MICROGram(s) Oral daily  pantoprazole    Tablet 40 milliGRAM(s) Oral before breakfast  propranolol 20 milliGRAM(s) Oral two times a day  vancomycin  IVPB 1500 milliGRAM(s) IV Intermittent every 24 hours    Antimicrobials:   vancomycin  IVPB 1500 milliGRAM(s) IV Intermittent every 24 hours    Immunologic:

## 2022-09-18 NOTE — PATIENT PROFILE ADULT - NSPROGENOTHERPROVIDER_GEN_A_NUR
"    Assessment & Plan     Iron deficiency anemia, unspecified iron deficiency anemia type  Updating levels today. Ultimately with all there work up, no etiology was found. This may be related to the DAPT  - CBC with Platelets & Differential  - Ferritin  - Iron & Iron Binding Capacity    Coronary artery disease involving native coronary artery of native heart without angina pectoris  Status post coronary angiogram  Stable. Follow-up with cardiology later this month. His labs are up to date and BP well controlled.     Witnessed episode of apnea  Low STOP BANG but with the observed symptoms will send for screen  - SLEEP EVALUATION & MANAGEMENT REFERRAL - ADULT -; Future    Need for prophylactic vaccination and inoculation against influenza  - INFLUENZA, QUAD, HIGH DOSE, PF, 65YR + (FLUZONE HD)     BMI:   Estimated body mass index is 24.07 kg/m  as calculated from the following:    Height as of 1/21/21: 1.676 m (5' 6\").    Weight as of this encounter: 67.6 kg (149 lb 1.6 oz).         Return in about 6 months (around 3/9/2022).    Washington Yang PA-C  [facility] JUAN A Coon   Johan is a 66 year old who presents for the following health issues     HPI     Pt has question regarding sleep apnea    Medication Followup of Omeprazole    Taking Medication as prescribed: NO- Pt has not been taking it for a couple months    Side Effects:  None    Medication Helping Symptoms:  Pt didn't notice any difference.     Johan Navarro is a 66 year old male who presents today for med check/check in    He is feeling great  He has not been on iron since his infusions early summer/late spring  He denies any chest pain; energy is good; very active this summer without symptoms   Cutting back on napping - much more last summer    He has stopped taking his antiplatelet medication - actually prior to the recommended year following his stenting    Had an incident a few weeks ago when he had an episode when he woke " up and couldn't breathe  He does not snore  Always seems to be from phlegm  But wife does think he stops breathing at times  Feels rested in the morning    He has stopped omeprazole - wondering if he needs to continue     Review of Systems   Constitutional, HEENT, cardiovascular, pulmonary, gi and gu systems are negative, except as otherwise noted.      Objective    /60   Pulse 71   Temp 97.7  F (36.5  C) (Oral)   Resp 19   Wt 67.6 kg (149 lb 1.6 oz)   SpO2 98%   BMI 24.07 kg/m    Body mass index is 24.07 kg/m .  Physical Exam   GENERAL: healthy, alert and no distress  EYES: Eyes grossly normal to inspection; there is conjunctival pallor  RESP: lungs clear to auscultation - no rales, rhonchi or wheezes  CV: regular rates and rhythm; soft systolic murmur  SKIN: there is nail pallor  PSYCH: mentation appears normal, affect normal/bright             none

## 2022-09-18 NOTE — CONSULT NOTE ADULT - ATTENDING COMMENTS
s/p head/liner exchange.  At this point, hip does not appear to be source of fever.  Continue w/u.  No further ortho intervention on hip.  Family and pt does not want explntation as this is a significantly morbid procedure.  Additionally, ostomy would still be present which is a potential source of contamination.  rec: abx supression

## 2022-09-18 NOTE — H&P ADULT - NSHPPHYSICALEXAM_GEN_ALL_CORE
Vital Signs Last 24 Hrs  T(C): 37 (18 Sep 2022 04:42), Max: 37.3 (17 Sep 2022 18:45)  T(F): 98.6 (18 Sep 2022 04:42), Max: 99.2 (17 Sep 2022 18:45)  HR: 81 (18 Sep 2022 04:42) (66 - 81)  BP: 143/68 (18 Sep 2022 04:42) (112/67 - 143/69)  BP(mean): 79 (18 Sep 2022 00:50) (79 - 79)  RR: 20 (18 Sep 2022 04:42) (17 - 22)  SpO2: 97% (18 Sep 2022 04:42) (95% - 98%)    Parameters below as of 18 Sep 2022 04:42  Patient On (Oxygen Delivery Method): nasal cannula  O2 Flow (L/min): 2

## 2022-09-18 NOTE — ED ADULT NURSE REASSESSMENT NOTE - NS ED NURSE REASSESS COMMENT FT1
Report received from ALIX Rosario. Pt noted to be resting comfortably in stretcher, VSS, NAD noted. Pt endorsing generalized weakness at this time. Pt denies SOB. Breathing non-labored with RR and O2 sat within normal limits on 2L NC. Plan of care discussed with pt and verbalizes understanding. Safety and comfort measures maintained.

## 2022-09-19 LAB
ALBUMIN SERPL ELPH-MCNC: 2.9 G/DL — LOW (ref 3.3–5)
ALP SERPL-CCNC: 87 U/L — SIGNIFICANT CHANGE UP (ref 40–120)
ALT FLD-CCNC: 15 U/L — SIGNIFICANT CHANGE UP (ref 10–45)
ANION GAP SERPL CALC-SCNC: 12 MMOL/L — SIGNIFICANT CHANGE UP (ref 5–17)
AST SERPL-CCNC: 15 U/L — SIGNIFICANT CHANGE UP (ref 10–40)
BASOPHILS # BLD AUTO: 0.06 K/UL — SIGNIFICANT CHANGE UP (ref 0–0.2)
BASOPHILS NFR BLD AUTO: 0.4 % — SIGNIFICANT CHANGE UP (ref 0–2)
BILIRUB SERPL-MCNC: 0.2 MG/DL — SIGNIFICANT CHANGE UP (ref 0.2–1.2)
BUN SERPL-MCNC: 12 MG/DL — SIGNIFICANT CHANGE UP (ref 7–23)
CALCIUM SERPL-MCNC: 9.2 MG/DL — SIGNIFICANT CHANGE UP (ref 8.4–10.5)
CHLORIDE SERPL-SCNC: 101 MMOL/L — SIGNIFICANT CHANGE UP (ref 96–108)
CO2 SERPL-SCNC: 26 MMOL/L — SIGNIFICANT CHANGE UP (ref 22–31)
CREAT SERPL-MCNC: 0.67 MG/DL — SIGNIFICANT CHANGE UP (ref 0.5–1.3)
CULTURE RESULTS: SIGNIFICANT CHANGE UP
EGFR: 84 ML/MIN/1.73M2 — SIGNIFICANT CHANGE UP
EOSINOPHIL # BLD AUTO: 0.51 K/UL — HIGH (ref 0–0.5)
EOSINOPHIL NFR BLD AUTO: 3.7 % — SIGNIFICANT CHANGE UP (ref 0–6)
FERRITIN SERPL-MCNC: 353 NG/ML — HIGH (ref 15–150)
GLUCOSE SERPL-MCNC: 137 MG/DL — HIGH (ref 70–99)
HCT VFR BLD CALC: 26.1 % — LOW (ref 34.5–45)
HGB BLD-MCNC: 8 G/DL — LOW (ref 11.5–15.5)
IMM GRANULOCYTES NFR BLD AUTO: 10.3 % — HIGH (ref 0–0.9)
IRON SATN MFR SERPL: 10 % — LOW (ref 14–50)
IRON SATN MFR SERPL: 25 UG/DL — LOW (ref 30–160)
LYMPHOCYTES # BLD AUTO: 1.77 K/UL — SIGNIFICANT CHANGE UP (ref 1–3.3)
LYMPHOCYTES # BLD AUTO: 12.9 % — LOW (ref 13–44)
MCHC RBC-ENTMCNC: 29.4 PG — SIGNIFICANT CHANGE UP (ref 27–34)
MCHC RBC-ENTMCNC: 30.7 GM/DL — LOW (ref 32–36)
MCV RBC AUTO: 96 FL — SIGNIFICANT CHANGE UP (ref 80–100)
MONOCYTES # BLD AUTO: 0.67 K/UL — SIGNIFICANT CHANGE UP (ref 0–0.9)
MONOCYTES NFR BLD AUTO: 4.9 % — SIGNIFICANT CHANGE UP (ref 2–14)
NEUTROPHILS # BLD AUTO: 9.34 K/UL — HIGH (ref 1.8–7.4)
NEUTROPHILS NFR BLD AUTO: 67.8 % — SIGNIFICANT CHANGE UP (ref 43–77)
NRBC # BLD: 0 /100 WBCS — SIGNIFICANT CHANGE UP (ref 0–0)
PLATELET # BLD AUTO: 249 K/UL — SIGNIFICANT CHANGE UP (ref 150–400)
POTASSIUM SERPL-MCNC: 4.1 MMOL/L — SIGNIFICANT CHANGE UP (ref 3.5–5.3)
POTASSIUM SERPL-SCNC: 4.1 MMOL/L — SIGNIFICANT CHANGE UP (ref 3.5–5.3)
PROT SERPL-MCNC: 6.5 G/DL — SIGNIFICANT CHANGE UP (ref 6–8.3)
RBC # BLD: 2.72 M/UL — LOW (ref 3.8–5.2)
RBC # BLD: 2.72 M/UL — LOW (ref 3.8–5.2)
RBC # FLD: 15.8 % — HIGH (ref 10.3–14.5)
RETICS #: 81.6 K/UL — SIGNIFICANT CHANGE UP (ref 25–125)
RETICS/RBC NFR: 3 % — HIGH (ref 0.5–2.5)
SODIUM SERPL-SCNC: 139 MMOL/L — SIGNIFICANT CHANGE UP (ref 135–145)
SPECIMEN SOURCE: SIGNIFICANT CHANGE UP
TIBC SERPL-MCNC: 239 UG/DL — SIGNIFICANT CHANGE UP (ref 220–430)
TRANSFERRIN SERPL-MCNC: 176 MG/DL — LOW (ref 200–360)
UIBC SERPL-MCNC: 215 UG/DL — SIGNIFICANT CHANGE UP (ref 110–370)
WBC # BLD: 13.77 K/UL — HIGH (ref 3.8–10.5)
WBC # FLD AUTO: 13.77 K/UL — HIGH (ref 3.8–10.5)

## 2022-09-19 PROCEDURE — 71045 X-RAY EXAM CHEST 1 VIEW: CPT | Mod: 26

## 2022-09-19 PROCEDURE — 93970 EXTREMITY STUDY: CPT | Mod: 26

## 2022-09-19 PROCEDURE — 78582 LUNG VENTILAT&PERFUS IMAGING: CPT | Mod: 26

## 2022-09-19 RX ORDER — FERROUS SULFATE 325(65) MG
325 TABLET ORAL DAILY
Refills: 0 | Status: DISCONTINUED | OUTPATIENT
Start: 2022-09-19 | End: 2022-09-23

## 2022-09-19 RX ORDER — CHLORHEXIDINE GLUCONATE 213 G/1000ML
1 SOLUTION TOPICAL
Refills: 0 | Status: DISCONTINUED | OUTPATIENT
Start: 2022-09-19 | End: 2022-09-23

## 2022-09-19 RX ADMIN — Medication 50 MICROGRAM(S): at 06:25

## 2022-09-19 RX ADMIN — Medication 300 MILLIGRAM(S): at 06:55

## 2022-09-19 RX ADMIN — PANTOPRAZOLE SODIUM 40 MILLIGRAM(S): 20 TABLET, DELAYED RELEASE ORAL at 06:26

## 2022-09-19 RX ADMIN — Medication 100 MILLIGRAM(S): at 18:11

## 2022-09-19 RX ADMIN — Medication 5 MILLIGRAM(S): at 23:14

## 2022-09-19 RX ADMIN — Medication 325 MILLIGRAM(S): at 18:13

## 2022-09-19 RX ADMIN — Medication 100 MILLIGRAM(S): at 06:26

## 2022-09-19 RX ADMIN — ENOXAPARIN SODIUM 40 MILLIGRAM(S): 100 INJECTION SUBCUTANEOUS at 06:25

## 2022-09-19 NOTE — PROGRESS NOTE ADULT - ASSESSMENT
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 w/ perirectal fistula, HTN, hypothyroidism, GERD and left hip arthroplasty presents for further work up of fever    # Fever of unknown origin  # acute hypoxic resp failure ro PE  per history pt having daily fevers up to 102.9 with rising WBC, no fever documented here  daughter reports that blood cultures while at rehab have been negative  wound cultures on previous hospitalization grew Enterococcus  CXR small left pleural effusion, no consolidations  no clinical signs of cellulitis or worsening wound erythema/drainage  blood NTD, U/A mildly pos, fu urine cx  has PICC line  - to be removed  elevated DD - LE dopplers, allergic to contrast, check VQ scan, pulm consult called  ID following  WBC downtrending  ESR high but down from august    # Left hip postoperative wound infection  appreciate ortho recs, does not feel periprosthetic hip joint infection  previous wound cultures grew enterococcus Faecalis, cont vanco per ID    # Hypothyroidism  cont synthroid    # Hypertension  c/w home nadolol --> propranolol inpatient.    # Insomnia  c/w home valium PRN (confirmed via iSTOP Reference #[id number]).    # Gout  c/w home allopurinol    dvt ppx: lovenox    Please call [employer] with questions [phone]. 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 w/ perirectal fistula, HTN, hypothyroidism, GERD and left hip arthroplasty presents for further work up of fever    # Fever of unknown origin  # acute hypoxic resp failure ro VTE  per history pt having daily fevers up to 102.9 with rising WBC, no fever documented here  daughter reports that blood cultures while at rehab have been negative  wound cultures on previous hospitalization grew Enterococcus  CXR small left pleural effusion, no consolidations  no clinical signs of cellulitis or worsening wound erythema/drainage  blood NTD, U/A mildly pos, fu urine cx  has PICC line  - to be removed  elevated DD - LE dopplers UE and LE, allergic to contrast, check VQ scan, pulm consult called  ID following  WBC downtrending  ESR high but down from august  cont vanco    # Left hip postoperative wound infection  appreciate ortho recs, does not feel periprosthetic hip joint infection  previous wound cultures grew enterococcus Faecalis, cont vanco per ID    # Hypothyroidism  cont synthroid    # Hypertension  c/w home nadolol --> propranolol inpatient.    # Insomnia  c/w home valium PRN (confirmed via iSTOP Reference #[id number]).    # Gout  c/w home allopurinol  check uric acid in AM    dvt ppx: lovenox    Please call [employer] with questions [phone]. 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, HTN, hypothyroidism, GERD and left hip arthroplasty presents for further work up of fever    # Fever of unknown origin  # acute hypoxic resp failure ro VTE  per history pt having daily fevers up to 102.9 with rising WBC, no fever documented here  daughter reports that blood cultures while at rehab have been negative  wound cultures on previous hospitalization grew Enterococcus  CXR small left pleural effusion, no consolidations  no clinical signs of cellulitis or worsening wound erythema/drainage  blood NTD, U/A mildly pos, fu urine cx  has PICC line  - to be removed  elevated DD - LE dopplers UE and LE, allergic to contrast, check VQ scan, pulm consult called  ID following  WBC downtrending  ESR high but down from august  cont vanco  TTE    # Left hip postoperative wound infection  appreciate ortho recs, does not feel periprosthetic hip joint infection  previous wound cultures grew enterococcus Faecalis, cont vanco per ID    # Hypothyroidism  cont synthroid    # Hypertension  c/w home nadolol --> propranolol inpatient    # Insomnia  c/w home valium PRN (confirmed via iSTOP Reference #[id number])    # Gout  c/w home allopurinol  check uric acid in AM    dvt ppx: lovenox    Please call [employer] with questions [phone].

## 2022-09-19 NOTE — CONSULT NOTE ADULT - SUBJECTIVE AND OBJECTIVE BOX
PULMONARY CONSULT  Hermes Huston MD  [phone]    Initial HPI on admission:  HPI:  88yo F w/ PMHx of pseudomembranous colitis in  s/p total colectomy w/ perirectal fistula, HTN, hypothyroidism, GERD and left hip arthroplasty presents for fever, pt was recently admitted - at [facility] for IR drainage of chronic left hip wound, pt was discharged to [facility] on 6 weeks of ampicillin + ceftriaxone via PICC line but had continued once daily fevers, up to 102.9, fevers were responsive to Tylenol, pt was transitioned from Ampicillin/Ceftriaxone to Vancomycin however she continued to have fevers and associated increased in WBC, other infectious workup was negative at [facility], pt was transferred to [facility] for further management, in the ED, VSS, labs showed leukocytosis with neutrophil predominance, CXR showed trace left pleural effusion, EKG normal sinus rhythm, pt admitted to general medicine for further management, she denies chest pain, palpitations, cough, n/v, diarrhea, hematuria, dysuria        PAST MEDICAL & SURGICAL HISTORY:  Hypertension      Hypothyroidism      Hiatal hernia      Fistula, perirectal      S/P cholecystectomy  at age 20 ,s      S/P ileostomy  colectomy (  )      S/P arthroscopy of right knee      S/P foot joint surgery  right ( 10 years ago )    FAMILY HISTORY:     Social History:  · Substance use	No  · Social History (marital status, living situation, occupation, and sexual history)	currently at [facility] rehab, no active substance use,      Tobacco Screening:  · Core Measure Site	No       Review of Systems:  · Negative General Symptoms	no chills  · General Symptoms	fever  · Negative Skin Symptoms	no rash; no itching  · Negative Ophthalmologic Symptoms	no blurred vision L; no blurred vision R  · Negative ENMT Symptoms	no nasal congestion; no throat pain  · Negative Respiratory and Thorax Symptoms	no wheezing; no dyspnea  · Negative Cardiovascular Symptoms	no chest pain; no palpitations  · Negative Gastrointestinal Symptoms	no nausea; no vomiting  · Negative General Genitourinary Symptoms	no hematuria; no dysuria  · Musculoskeletal Symptoms	joint swelling; joint pain; leg pain L  · Negative Psychiatric Symptoms	no depression; no anxiety  · Negative Endocrine Symptoms	no cold intolerance; no heat intolerance  · Negative Allergic Reactions	no anaphylaxis; no photosensitivity      Allergies and Intolerances:        Allergies:  	iodine: Drug, Other, Anaphylaxis, h/o shellfidh allergy  	shellfish: Food, Anaphylaxis      Medications:  MEDICATIONS  (STANDING):  allopurinol 100 milliGRAM(s) Oral two times a day  enoxaparin Injectable 40 milliGRAM(s) SubCutaneous every 24 hours  levothyroxine 50 MICROGram(s) Oral daily  pantoprazole    Tablet 40 milliGRAM(s) Oral before breakfast  propranolol 20 milliGRAM(s) Oral two times a day  vancomycin  IVPB 1500 milliGRAM(s) IV Intermittent every 24 hours    MEDICATIONS  (PRN):  aluminum hydroxide/magnesium hydroxide/simethicone Suspension 30 milliLiter(s) Oral every 6 hours PRN Dyspepsia  diazepam    Tablet 5 milliGRAM(s) Oral at bedtime PRN insomnia    Vital Signs Last 24 Hrs  T(C): 36.9 (19 Sep 2022 04:15), Max: 37 (18 Sep 2022 22:27)  T(F): 98.5 (19 Sep 2022 04:15), Max: 98.6 (18 Sep 2022 22:27)  HR: 76 (19 Sep 2022 04:15) (66 - 85)  BP: 134/78 (19 Sep 2022 04:15) (125/80 - 147/88)  BP(mean): 83 (18 Sep 2022 12:58) (83 - 83)  RR: 18 (19 Sep 2022 04:15) (18 - 18)  SpO2: 99% (19 Sep 2022 04:15) (95% - 100%)    Parameters below as of 19 Sep 2022 04:15  Patient On (Oxygen Delivery Method): nasal cannula  O2 Flow (L/min): 2       @ 07:01  -   @ 07:00  --------------------------------------------------------  IN: 550 mL / OUT: 650 mL / NET: -100 mL      LABS:                        8.0    13.77 )-----------( 249      ( 19 Sep 2022 07:10 )             26.1         139  |  101  |  12  ----------------------------<  137<H>  4.1   |  26  |  0.67    Ca    9.2      19 Sep 2022 07:13  Phos  3.5       Mg     1.6         TPro  6.5  /  Alb  2.9<L>  /  TBili  0.2  /  DBili  x   /  AST  15  /  ALT  15  /  AlkPhos  87  -      PT/INR - ( 17 Sep 2022 20:39 )   PT: 14.3 sec;   INR: 1.23 ratio         PTT - ( 17 Sep 2022 20:39 )  PTT:21.8 sec  Urinalysis Basic - ( 17 Sep 2022 22:51 )    Color: Yellow / Appearance: Slightly Turbid / S.013 / pH: x  Gluc: x / Ketone: Negative  / Bili: Negative / Urobili: Negative   Blood: x / Protein: Trace / Nitrite: Negative   Leuk Esterase: Large / RBC: 3 /hpf / WBC 41 /HPF   Sq Epi: x / Non Sq Epi: 3 /hpf / Bacteria: Negative      CULTURES:  Culture Results:   No growth to date. ( @ 20:50)  Culture Results:   No growth to date. ( @ 20:40)      Physical Examination:    General: No acute distress.      HEENT: Pupils equal, reactive to light.  Symmetric.    PULM: Clear to auscultation bilaterally, no significant sputum production    CVS: Regular rate and rhythm, no murmurs, rubs, or gallops    ABD: Soft, nondistended, nontender, normoactive bowel sounds, no masses    EXT: No edema, nontender    SKIN: Warm and well perfused, no rashes noted.    NEURO: Alert, oriented, interactive, nonfocal    RADIOLOGY REVIEWED PERSONALLY  CXR:    CT chest:    PROCEDURE DATE:  2022        INTERPRETATION:  CLINICAL INDICATION:  Sepsis    COMPARISON: Chest radiograph dated 9/10/2022    TECHNIQUE: Frontal radiograph of the chest.    FINDINGS:    Support devices: Left-sided PICC terminates in the proximal SVC.    Cardiac/mediastinum/hilum: Heart size cannot be accurately assessed on   this projection.    Lung parenchyma/Pleura: The lungs are clear. Trace left pleural effusion.   There is no pneumothorax.    Skeleton/soft tissues: No acute osseous abnormalities.    IMPRESSION:    Trace left pleural effusion.      TTE:     PULMONARY CONSULT  Hermes Huston MD  [phone]    Initial HPI on admission:  HPI:  88yo F w/ PMHx of pseudomembranous colitis in  s/p total colectomy w/ perirectal fistula, HTN, hypothyroidism, GERD and left hip arthroplasty presents for fever, pt was recently admitted - at [facility] for IR drainage of chronic left hip wound, pt was discharged to [facility] on 6 weeks of ampicillin + ceftriaxone via PICC line but had continued once daily fevers, up to 102.9, fevers were responsive to Tylenol, pt was transitioned from Ampicillin/Ceftriaxone to Vancomycin however she continued to have fevers and associated increased in WBC, other infectious workup was negative at [facility], pt was transferred to [facility] for further management, in the ED, VSS, labs showed leukocytosis with neutrophil predominance, CXR showed trace left pleural effusion, EKG normal sinus rhythm, pt admitted to general medicine for further management, she denies chest pain, palpitations, cough, n/v, diarrhea, hematuria, dysuria      ASked to evaluate for VTE in presence of elevated D-dimer  No history of COPD or asthma; denies prior VTE    PAST MEDICAL & SURGICAL HISTORY:  Hypertension      Hypothyroidism      Hiatal hernia      Fistula, perirectal      S/P cholecystectomy  at age 20 ,s      S/P ileostomy  colectomy (  )      S/P arthroscopy of right knee      S/P foot joint surgery  right ( 10 years ago )    FAMILY HISTORY:     Social History:  · Substance use	No  · Social History (marital status, living situation, occupation, and sexual history)	currently at [facility] rehab, no active substance use,      Tobacco Screening:  · Core Measure Site	No       Review of Systems:  · Negative General Symptoms	no chills  · General Symptoms	fever  · Negative Skin Symptoms	no rash; no itching  · Negative Ophthalmologic Symptoms	no blurred vision L; no blurred vision R  · Negative ENMT Symptoms	no nasal congestion; no throat pain  · Negative Respiratory and Thorax Symptoms	no wheezing; no dyspnea  · Negative Cardiovascular Symptoms	no chest pain; no palpitations  · Negative Gastrointestinal Symptoms	no nausea; no vomiting  · Negative General Genitourinary Symptoms	no hematuria; no dysuria  · Musculoskeletal Symptoms	joint swelling; joint pain; leg pain L  · Negative Psychiatric Symptoms	no depression; no anxiety  · Negative Endocrine Symptoms	no cold intolerance; no heat intolerance  · Negative Allergic Reactions	no anaphylaxis; no photosensitivity      Allergies and Intolerances:        Allergies:  	iodine: Drug, Other, Anaphylaxis, h/o shellfidh allergy  	shellfish: Food, Anaphylaxis      Medications:  MEDICATIONS  (STANDING):  allopurinol 100 milliGRAM(s) Oral two times a day  enoxaparin Injectable 40 milliGRAM(s) SubCutaneous every 24 hours  levothyroxine 50 MICROGram(s) Oral daily  pantoprazole    Tablet 40 milliGRAM(s) Oral before breakfast  propranolol 20 milliGRAM(s) Oral two times a day  vancomycin  IVPB 1500 milliGRAM(s) IV Intermittent every 24 hours    MEDICATIONS  (PRN):  aluminum hydroxide/magnesium hydroxide/simethicone Suspension 30 milliLiter(s) Oral every 6 hours PRN Dyspepsia  diazepam    Tablet 5 milliGRAM(s) Oral at bedtime PRN insomnia    Vital Signs Last 24 Hrs  T(C): 36.9 (19 Sep 2022 04:15), Max: 37 (18 Sep 2022 22:27)  T(F): 98.5 (19 Sep 2022 04:15), Max: 98.6 (18 Sep 2022 22:27)  HR: 76 (19 Sep 2022 04:15) (66 - 85)  BP: 134/78 (19 Sep 2022 04:15) (125/80 - 147/88)  BP(mean): 83 (18 Sep 2022 12:58) (83 - 83)  RR: 18 (19 Sep 2022 04:15) (18 - 18)  SpO2: 99% (19 Sep 2022 04:15) (95% - 100%)    Parameters below as of 19 Sep 2022 04:15  Patient On (Oxygen Delivery Method): nasal cannula  O2 Flow (L/min): 2       @ 07:01  -   @ 07:00  --------------------------------------------------------  IN: 550 mL / OUT: 650 mL / NET: -100 mL      LABS:                        8.0    13.77 )-----------( 249      ( 19 Sep 2022 07:10 )             26.1         139  |  101  |  12  ----------------------------<  137<H>  4.1   |  26  |  0.67    Ca    9.2      19 Sep 2022 07:13  Phos  3.5       Mg     1.6         TPro  6.5  /  Alb  2.9<L>  /  TBili  0.2  /  DBili  x   /  AST  15  /  ALT  15  /  AlkPhos  87        PT/INR - ( 17 Sep 2022 20:39 )   PT: 14.3 sec;   INR: 1.23 ratio         PTT - ( 17 Sep 2022 20:39 )  PTT:21.8 sec  Urinalysis Basic - ( 17 Sep 2022 22:51 )    Color: Yellow / Appearance: Slightly Turbid / S.013 / pH: x  Gluc: x / Ketone: Negative  / Bili: Negative / Urobili: Negative   Blood: x / Protein: Trace / Nitrite: Negative   Leuk Esterase: Large / RBC: 3 /hpf / WBC 41 /HPF   Sq Epi: x / Non Sq Epi: 3 /hpf / Bacteria: Negative      CULTURES:  Culture Results:   No growth to date. ( @ 20:50)  Culture Results:   No growth to date. ( @ 20:40)      Physical Examination:    General: Non toxic, No acute distress.      HEENT: Pupils equal, reactive to light.  Symmetric.    PULM: Clear without wheeze or rhonchi    CVS: Regular rate and rhythm, no murmurs, rubs, or gallops    ABD: Soft, nondistended, nontender, normoactive bowel sounds, no masses    EXT: No edema, nontender    SKIN: Warm and well perfused, no rashes noted.    NEURO: Alert, oriented, interactive, nonfocal    RADIOLOGY REVIEWED PERSONALLY  CXR:    CT chest:    PROCEDURE DATE:  2022        INTERPRETATION:  CLINICAL INDICATION:  Sepsis    COMPARISON: Chest radiograph dated 9/10/2022    TECHNIQUE: Frontal radiograph of the chest.    FINDINGS:    Support devices: Left-sided PICC terminates in the proximal SVC.    Cardiac/mediastinum/hilum: Heart size cannot be accurately assessed on   this projection.    Lung parenchyma/Pleura: The lungs are clear. Trace left pleural effusion.   There is no pneumothorax.    Skeleton/soft tissues: No acute osseous abnormalities.    IMPRESSION:    Trace left pleural effusion.      TTE:

## 2022-09-19 NOTE — PROGRESS NOTE ADULT - ASSESSMENT
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, rectovaginal fistula, HTN, hypothyroidism, GERD and left hip arthroplasty presents for fever, pt was recently admitted 8/16-8/23 at [facility] s/p I&D 8/18, placement abx beads and revision L  hip hemiarthroplasty with head and liner replacement and retention of rest of prosthetic joint left hip discharged to [facility] on 6 weeks of ampicillin + ceftriaxone via PICC line for E faecalis PJI c/b persistent fever and leukocytosis    fever, leukocytosis  recent I&D 8/18  CXR on admission w/o evidence of focal consolidation; repeat today also w/o consolidation per my read; f/u official read  CT abd/pelvis 9/14- interval improvement of inflammation compared with prior imaging. No discrete associated collection.  c/w vancomycin 1.5g daily for now, goal trough 15-20  vanc trough ordered prior to tomorrow's dose  f/u blood cultures  f/u urine cultures  f/u VQ scan  f/u upper extremity US  would obtain LE US  consider TTE  remove PICC line  c/w vancomycin for now  trend temps/ wbc    E faecalis PJI infection  s/p I&D 8/18, placement abx beads and revision L  hip hemiarthroplasty with head and liner replacement  was discharged on ampicillin 2g every 6 hours and ceftriaxone 2g every 24 hours in the setting of retained prosthesis to complete 6 week course of antibiotics from date of surgical intervention w/ EOT 9/29/2022  was switched to vancomycin due to concerns for possible drug fever  c/w vancomycin for now as above    Josias De Leon M.D.  [facility], Division of Infectious Diseases  [phone]  After 5pm on weekdays and all day on weekends - please call [phone]

## 2022-09-19 NOTE — CONSULT NOTE ADULT - ASSESSMENT
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, rectovaginal fistula, HTN, hypothyroidism, GERD and left hip arthroplasty presents for fever, pt was recently admitted 8/16-8/23 at [facility] s/p I&D 8/18, placement abx beads and revision L  hip hemiarthroplasty with head and liner replacement and retention of rest of prosthetic joint left hip wound discharged to [facility] on 6 weeks of ampicillin + ceftriaxone via PICC line c/b persistent fever and leukocytosis    fever, leukocytosis, PJI infection  CXR w/o evidence of focal consolidation  CT abd/pelvis 9/14- interval improvement of inflammation compared with prior imaging. No discrete associated collection.  c/w vancomycin for now, goal trough 15-20  f/u blood cultures  f/u urine cultures  f/u CT chest  obtain upper and lower extremity US  remove PICC line  c/w vancomycin for now  trend temps/ wbc    Josias De Leon M.D.  [facility], Division of Infectious Diseases  [phone]  After 5pm on weekdays and all day on weekends - please call [phone]   
89F admitted with fever and chronic L hip PJI found to have D-dimer 1053. Patient/family declined multi-stage surgical intervention. She is currently afebrile on Vancomycin. V/Q scan very low probability for PE on 8/19. LE/UE dopplers pending.    REC    Awaiting dopplers  Suspect D-dimer related to infection  Taper nasal cannula

## 2022-09-19 NOTE — CHART NOTE - NSCHARTNOTEFT_GEN_A_CORE
Patient is a 89y old  Female who presents with a chief complaint of fever (19 Sep 2022 10:56)  called by radiology with abnormal UE duplex. + duplicated left axillary vein associated with the PICC line is thrombosed.    Vital Signs Last 24 Hrs  T(C): 36.9 (19 Sep 2022 04:15), Max: 37 (18 Sep 2022 22:27)  T(F): 98.5 (19 Sep 2022 04:15), Max: 98.6 (18 Sep 2022 22:27)  HR: 76 (19 Sep 2022 04:15) (72 - 85)  BP: 134/78 (19 Sep 2022 04:15) (125/80 - 147/88)  BP(mean): --  RR: 18 (19 Sep 2022 04:15) (18 - 18)  SpO2: 99% (19 Sep 2022 04:15) (95% - 99%)    Parameters below as of 19 Sep 2022 04:15  Patient On (Oxygen Delivery Method): nasal cannula  O2 Flow (L/min): 2        Labs:                          8.0    13.77 )-----------( 249      ( 19 Sep 2022 07:10 )             26.1     09-19    139  |  101  |  12  ----------------------------<  137<H>  4.1   |  26  |  0.67    Ca    9.2      19 Sep 2022 07:13  Phos  3.5     09-18  Mg     1.6     09-18    TPro  6.5  /  Alb  2.9<L>  /  TBili  0.2  /  DBili  x   /  AST  15  /  ALT  15  /  AlkPhos  87  09-19            Radiology:    Physical Exam:  General: WN/WD NAD  Neurology: A&Ox3, nonfocal, GRIFFITHS x 4  Head:  Normocephalic, atraumatic  Respiratory: CTA B/L  CV: RRR, S1S2, no murmur  Abdominal: Soft, NT, ND no palpable mass  MSK: No edema, + peripheral pulses, FROM all 4 extremity    Assessment & Plan:  HPI:  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 w/ perirectal fistula, HTN, hypothyroidism, GERD and left hip arthroplasty presents for fever, pt was recently admitted 8/16-8/23 at [facility] for IR drainage of chronic left hip wound, pt was discharged to [facility] on 6 weeks of ampicillin + ceftriaxone via PICC line but had continued once daily fevers, up to 102.9, fevers were responsive to Tylenol, pt was transitioned from Ampicillin/Ceftriaxone to Vancomycin however she continued to have fevers and associated increased in WBC, other infectious workup was negative at [facility], pt was transferred to [facility] for further management, in the ED, VSS, labs showed leukocytosis with neutrophil predominance, CXR showed trace left pleural effusion, EKG normal sinus rhythm, pt admitted to general medicine for further management, she denies chest pain, palpitations, cough, n/v, diarrhea, hematuria, dysuria  (18 Sep 2022 05:03)    >removed PICC line without incident  >notified Dr. Donald, awaiting a call back    TANA Oconnell NP

## 2022-09-20 LAB
ANION GAP SERPL CALC-SCNC: 11 MMOL/L — SIGNIFICANT CHANGE UP (ref 5–17)
BUN SERPL-MCNC: 9 MG/DL — SIGNIFICANT CHANGE UP (ref 7–23)
CALCIUM SERPL-MCNC: 9.1 MG/DL — SIGNIFICANT CHANGE UP (ref 8.4–10.5)
CHLORIDE SERPL-SCNC: 102 MMOL/L — SIGNIFICANT CHANGE UP (ref 96–108)
CO2 SERPL-SCNC: 27 MMOL/L — SIGNIFICANT CHANGE UP (ref 22–31)
CREAT SERPL-MCNC: 0.66 MG/DL — SIGNIFICANT CHANGE UP (ref 0.5–1.3)
D DIMER BLD IA.RAPID-MCNC: 1274 NG/ML DDU — HIGH
EGFR: 84 ML/MIN/1.73M2 — SIGNIFICANT CHANGE UP
GLUCOSE SERPL-MCNC: 128 MG/DL — HIGH (ref 70–99)
HCT VFR BLD CALC: 26.2 % — LOW (ref 34.5–45)
HGB BLD-MCNC: 7.9 G/DL — LOW (ref 11.5–15.5)
MCHC RBC-ENTMCNC: 28.8 PG — SIGNIFICANT CHANGE UP (ref 27–34)
MCHC RBC-ENTMCNC: 30.2 GM/DL — LOW (ref 32–36)
MCV RBC AUTO: 95.6 FL — SIGNIFICANT CHANGE UP (ref 80–100)
MRSA PCR RESULT.: SIGNIFICANT CHANGE UP
NRBC # BLD: 0 /100 WBCS — SIGNIFICANT CHANGE UP (ref 0–0)
PLATELET # BLD AUTO: 248 K/UL — SIGNIFICANT CHANGE UP (ref 150–400)
POTASSIUM SERPL-MCNC: 4.2 MMOL/L — SIGNIFICANT CHANGE UP (ref 3.5–5.3)
POTASSIUM SERPL-SCNC: 4.2 MMOL/L — SIGNIFICANT CHANGE UP (ref 3.5–5.3)
RBC # BLD: 2.74 M/UL — LOW (ref 3.8–5.2)
RBC # FLD: 15.8 % — HIGH (ref 10.3–14.5)
S AUREUS DNA NOSE QL NAA+PROBE: SIGNIFICANT CHANGE UP
SODIUM SERPL-SCNC: 140 MMOL/L — SIGNIFICANT CHANGE UP (ref 135–145)
URATE SERPL-MCNC: 4.9 MG/DL — SIGNIFICANT CHANGE UP (ref 2.5–7)
VANCOMYCIN TROUGH SERPL-MCNC: 17 UG/ML — SIGNIFICANT CHANGE UP (ref 10–20)
WBC # BLD: 12.04 K/UL — HIGH (ref 3.8–10.5)
WBC # FLD AUTO: 12.04 K/UL — HIGH (ref 3.8–10.5)

## 2022-09-20 PROCEDURE — 93306 TTE W/DOPPLER COMPLETE: CPT | Mod: 26

## 2022-09-20 RX ORDER — APIXABAN 2.5 MG/1
10 TABLET, FILM COATED ORAL EVERY 12 HOURS
Refills: 0 | Status: DISCONTINUED | OUTPATIENT
Start: 2022-09-20 | End: 2022-09-22

## 2022-09-20 RX ORDER — VANCOMYCIN HCL 1 G
1250 VIAL (EA) INTRAVENOUS EVERY 24 HOURS
Refills: 0 | Status: DISCONTINUED | OUTPATIENT
Start: 2022-09-21 | End: 2022-09-22

## 2022-09-20 RX ADMIN — PANTOPRAZOLE SODIUM 40 MILLIGRAM(S): 20 TABLET, DELAYED RELEASE ORAL at 05:38

## 2022-09-20 RX ADMIN — ENOXAPARIN SODIUM 40 MILLIGRAM(S): 100 INJECTION SUBCUTANEOUS at 05:37

## 2022-09-20 RX ADMIN — APIXABAN 10 MILLIGRAM(S): 2.5 TABLET, FILM COATED ORAL at 17:25

## 2022-09-20 RX ADMIN — Medication 325 MILLIGRAM(S): at 13:10

## 2022-09-20 RX ADMIN — Medication 100 MILLIGRAM(S): at 17:25

## 2022-09-20 RX ADMIN — Medication 100 MILLIGRAM(S): at 05:36

## 2022-09-20 RX ADMIN — Medication 300 MILLIGRAM(S): at 05:37

## 2022-09-20 RX ADMIN — CHLORHEXIDINE GLUCONATE 1 APPLICATION(S): 213 SOLUTION TOPICAL at 05:36

## 2022-09-20 RX ADMIN — Medication 5 MILLIGRAM(S): at 22:30

## 2022-09-20 RX ADMIN — Medication 50 MICROGRAM(S): at 05:36

## 2022-09-20 NOTE — PHYSICAL THERAPY INITIAL EVALUATION ADULT - ACTIVE RANGE OF MOTION EXAMINATION, REHAB EVAL
Except L hip/bilateral upper extremity Active ROM was WFL (within functional limits)/bilateral  lower extremity Active ROM was WFL (within functional limits)

## 2022-09-20 NOTE — PROGRESS NOTE ADULT - ASSESSMENT
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 w/ perirectal fistula, HTN, hypothyroidism, GERD and left hip arthroplasty presents for further work up of fever    # Fever of unknown origin  # acute hypoxic resp failure ro VTE  per history pt having daily fevers up to 102.9 with rising WBC, no fever documented here  daughter reports that blood cultures while at rehab have been negative  wound cultures on previous hospitalization grew Enterococcus  CXR small left pleural effusion, no consolidations  no clinical signs of cellulitis or worsening wound erythema/drainage  blood NTD, U/A mildly pos, fu urine cx  has PICC line  - to be removed  elevated DD - LE dopplers UE and LE, allergic to contrast, check VQ scan, pulm consult called  ID following  WBC downtrending  ESR high but down from august  cont vanco  TTE    # Left hip postoperative wound infection  appreciate ortho recs, does not feel periprosthetic hip joint infection  previous wound cultures grew enterococcus Faecalis, cont vanco per ID    # Hypothyroidism  cont synthroid    # Hypertension  c/w home nadolol --> propranolol inpatient    # Insomnia  c/w home valium PRN (confirmed via iSTOP Reference #[id number])    # Gout  c/w home allopurinol  check uric acid in AM    dvt ppx: lovenox    Please call [facility] with questions [phone]. 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, HTN, hypothyroidism, GERD and left hip arthroplasty presents for further work up of fever    # Fever likely 2/2 DVT  LUE axillary DVT provoked by PICC line, PICC line removed  CXR small left pleural effusion, no consolidations  blood NTD, U/A mildly pos, fu urine cx  VQ low prob PE  titrate off O2, check on RA  WBC downtrending  ESR high but down from august  cont vanco, ID following  TTE  will start on eliquis 10mg bid for 7 days, then 5mg bid  PT    # Left hip postoperative wound infection  appreciate ortho recs, does not feel periprosthetic hip joint infection    # Hypothyroidism  cont synthroid    # Hypertension  c/w home nadolol --> propranolol inpatient    # Insomnia  c/w home valium PRN (confirmed via iSTOP Reference #[id number])    # Gout  c/w home allopurinol  check uric acid in AM    dvt ppx: lovenox    unable to reach dtr Pia over the phone    Dr. Cesar Fiore will be covering me starting tomorrow.  Please contact with any questions or concerns [phone].REVIEW OF SYSTEMS    General:	Denies fatigue, fevers, chills, sweats, decreased appetite.    Skin/Breast: denies pruritis, rash  	  Ophthalmologic: no change in vision or blurring  	  HEENT	Denies dry mouth, oral sores, dysphagia,  change in hearing.    Respiratory and Thorax:  cough, sob, wheeze, hemoptysis  	  Cardiovascular:	no cp , palp, orthopnea    Gastrointestinal:	no n/v/d constipation    Genitourinary:	no dysuria of frequency, no hematuria, no flank pain    Musculoskeletal:	no bone or joint pain. no muscle aches.     Neurological:	no change in sensory or motor function. no headache. no weakness.     Psychiatric:	no depression, no anxiety, insomnia.     Hematology/Lymphatics:	no bleeding or bruising 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, HTN, hypothyroidism, GERD and left hip arthroplasty presents for further work up of fever    # Fever likely 2/2 DVT  LUE axillary DVT provoked by PICC line, PICC line removed  CXR small left pleural effusion, no consolidations  blood NTD, U/A mildly pos, fu urine cx  VQ low prob PE  titrate off O2, check on RA  WBC downtrending  ESR high but down from august  cont vanco, ID following  TTE  will start on eliquis 10mg bid for 7 days, then 5mg bid  PT    # Left hip postoperative wound infection  appreciate ortho recs, does not feel periprosthetic hip joint infection    # Hypothyroidism  cont synthroid    # Hypertension  c/w home nadolol --> propranolol inpatient    # Insomnia  c/w home valium PRN (confirmed via iSTOP Reference #[id number])    # Gout  c/w home allopurinol  check uric acid in AM    dvt ppx: lovenox    unable to reach dtr Pia over the phone    Dr. Cesar Fiore will be covering me starting tomorrow.  Please contact with any questions or concerns [phone].

## 2022-09-20 NOTE — PROGRESS NOTE ADULT - ASSESSMENT
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, rectovaginal fistula, HTN, hypothyroidism, GERD and left hip arthroplasty presents for fever, pt was recently admitted 8/16-8/23 at [facility] s/p I&D 8/18, placement abx beads and revision L  hip hemiarthroplasty with head and liner replacement and retention of rest of prosthetic joint left hip discharged to [facility] on 6 weeks of ampicillin + ceftriaxone via PICC line for E faecalis PJI c/b persistent fever and leukocytosis    fever, leukocytosis  recent I&D 8/18  patient w/o any localizing signs or symptoms  urine culture negative and patient denies urinary symptoms  no open wounds or evidence of SSTI on exam- L hip wound closed and w/o erythema, swelling or tenderness; also evaluated by ortho  no evidence of gout flare on exam  CXR on admission w/o evidence of focal consolidation  CT abd/pelvis 9/14- interval improvement of inflammation compared with prior imaging. No discrete associated collection.  VQ scan- very low probability  d-dimer elevated  LE US- negative for DVT  UE US- Associated with the PICC line, there is a thrombosed left axillary vein.  PICC line removed 9/19  afebrile since admission and leukocytosis continues to improve  Plan:  fever likely 2/2 DVT- anticoagulation per primary team  would explain elevated d-dimer and inflammatory markers as well  c/w vancomycin 1.5g daily for now, trough this AM was 17; goal trough 15-20 or AUC/PALMER of 400-600  next vanc trough prior to dose on 9/20  f/u blood cultures- NGTD  consider TTE  trend temps/ wbc    E faecalis PJI infection  s/p I&D 8/18, placement abx beads and revision L  hip hemiarthroplasty with head and liner replacement  was discharged on ampicillin 2g every 6 hours and ceftriaxone 2g every 24 hours in the setting of retained prosthesis to complete 6 week course of antibiotics from date of surgical intervention w/ EOT 9/29/2022 w/ plans to transition to PO amoxicillin for chronic suppression thereafter given retained hardware  was switched to vancomycin due to concerns for possible drug fever  c/w vancomycin for now at current dose as above    discussed w/ patient and patient's daughter Pia (over the phone)    Josias De Leon M.D.  [facility], Division of Infectious Diseases  [phone]  After 5pm on weekdays and all day on weekends - please call [phone]

## 2022-09-20 NOTE — PHYSICAL THERAPY INITIAL EVALUATION ADULT - PERTINENT HX OF CURRENT PROBLEM, REHAB EVAL
88yo F w/ PMHx of pseudomembranous colitis s/p total colectomy w/ perirectal fistula, HTN, hypothyroidism, GERD and I&D of left hip presents for fever, pt was recently admitted 8/16-8/23 at [facility] for IR drainage of chronic left hip wound, pt was discharged to [facility] w/ PICC line but had continued  daily fevers. Pt was transferred to [facility] for further management, in the ED, VSS, labs showed leukocytosis with neutrophil predominance, CXR showed trace left pleural effusion.

## 2022-09-20 NOTE — PHARMACOTHERAPY INTERVENTION NOTE - COMMENTS
Patient is a 89 y/o F w/ PMH of pseudomembranous colitis in 2005 s/p total colectomy, rectovaginal fistula, HTN, hypothyroidism, GERD and left hip arthroplasty presents for fever, pt was recently admitted 8/16-8/23 at [facility] s/p I&D 8/18, placement abx beads and revision L  hip hemiarthroplasty with head and liner replacement and retention of rest of prosthetic joint left hip discharged to [facility] on 6 weeks of ampicillin + ceftriaxone via PICC line for E faecalis PJI c/b persistent fever and leukocytosis. Switched abx to vancomycin due to concerns for possible drug fever.     Patient is currently on Vancomycin 1500mg q24h. SCr 0.66 on 9/20/22. Vancomycin trough level drawn 9/20/22 AM at 17 with estimated AUC of 645. Recommend to decrease dose to Vancomycin 1250mg q24h according to pharmacokinetic calculations with estimated trough 14.3 and . Discussed with Zee MONTENEGRO.     Brandi Urbano, PharmD   PGY-1 Pharmacy Resident   Spectra: [phone]  Available on Teams

## 2022-09-20 NOTE — PROGRESS NOTE ADULT - ASSESSMENT
D-dimer elevation due to axillary thrombosis and infection  Prosthetic L hip infectio    REC    Antibiotics per ID  No plan for orthopedic surgical intervention  Continue apixaban

## 2022-09-20 NOTE — PHYSICAL THERAPY INITIAL EVALUATION ADULT - ADDITIONAL COMMENTS
Patient reports she was admitted from [facility] rehab. She was there for about 1 month. Prior she lives alone with 24 hour private aide in a private house with 2 steps. She reports ambulating a few steps at rehab with RW. ( aide bedside)

## 2022-09-21 LAB
HCT VFR BLD CALC: 27 % — LOW (ref 34.5–45)
HGB BLD-MCNC: 8.5 G/DL — LOW (ref 11.5–15.5)
MCHC RBC-ENTMCNC: 29.1 PG — SIGNIFICANT CHANGE UP (ref 27–34)
MCHC RBC-ENTMCNC: 31.5 GM/DL — LOW (ref 32–36)
MCV RBC AUTO: 92.5 FL — SIGNIFICANT CHANGE UP (ref 80–100)
NRBC # BLD: 0 /100 WBCS — SIGNIFICANT CHANGE UP (ref 0–0)
PLATELET # BLD AUTO: 251 K/UL — SIGNIFICANT CHANGE UP (ref 150–400)
RBC # BLD: 2.92 M/UL — LOW (ref 3.8–5.2)
RBC # FLD: 15.3 % — HIGH (ref 10.3–14.5)
WBC # BLD: 11.13 K/UL — HIGH (ref 3.8–10.5)
WBC # FLD AUTO: 11.13 K/UL — HIGH (ref 3.8–10.5)

## 2022-09-21 RX ADMIN — APIXABAN 10 MILLIGRAM(S): 2.5 TABLET, FILM COATED ORAL at 17:30

## 2022-09-21 RX ADMIN — PANTOPRAZOLE SODIUM 40 MILLIGRAM(S): 20 TABLET, DELAYED RELEASE ORAL at 06:22

## 2022-09-21 RX ADMIN — Medication 100 MILLIGRAM(S): at 06:21

## 2022-09-21 RX ADMIN — APIXABAN 10 MILLIGRAM(S): 2.5 TABLET, FILM COATED ORAL at 06:21

## 2022-09-21 RX ADMIN — CHLORHEXIDINE GLUCONATE 1 APPLICATION(S): 213 SOLUTION TOPICAL at 06:22

## 2022-09-21 RX ADMIN — Medication 166.67 MILLIGRAM(S): at 06:21

## 2022-09-21 RX ADMIN — Medication 50 MICROGRAM(S): at 06:22

## 2022-09-21 RX ADMIN — Medication 100 MILLIGRAM(S): at 17:30

## 2022-09-21 RX ADMIN — Medication 325 MILLIGRAM(S): at 12:20

## 2022-09-21 NOTE — PROGRESS NOTE ADULT - ASSESSMENT
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, rectovaginal fistula, HTN, hypothyroidism, GERD and left hip arthroplasty presents for fever, pt was recently admitted 8/16-8/23 at [facility] s/p I&D 8/18, placement abx beads and revision L  hip hemiarthroplasty with head and liner replacement and retention of rest of prosthetic joint left hip discharged to [facility] on 6 weeks of ampicillin + ceftriaxone via PICC line for E faecalis PJI c/b persistent fever and leukocytosis    fever, leukocytosis  recent I&D 8/18  patient w/o any localizing signs or symptoms  urine culture negative and patient denies urinary symptoms  no open wounds or evidence of SSTI on exam- L hip wound closed and w/o erythema, swelling or tenderness; also evaluated by ortho  no evidence of gout flare on exam  CXR on admission w/o evidence of focal consolidation  CT abd/pelvis 9/14- interval improvement of inflammation compared with prior imaging. No discrete associated collection.  VQ scan- very low probability  d-dimer elevated  LE US- negative for DVT  UE US- Associated with the PICC line, there is a thrombosed left axillary vein.  blood cx NGTD  PICC line removed 9/19  afebrile since admission and leukocytosis continues to improve  Plan:  fever likely 2/2 DVT- anticoagulation per primary team/ pulm  would explain elevated d-dimer and inflammatory markers as well  c/w vancomycin; goal trough 15-20 or AUC/PALMER of 400-600  vanc trough yesterday 17, appreciate pharmacy assistance, vanc dose decreased to Vancomycin 1250mg q24h based on pharmacokinetic calculations for AUC  next vanc trough prior to tomorrow's dose on 9/22  trend temps/ wbc    E faecalis PJI infection  s/p I&D 8/18, placement abx beads and revision L  hip hemiarthroplasty with head and liner replacement  was discharged on ampicillin 2g every 6 hours and ceftriaxone 2g every 24 hours in the setting of retained prosthesis to complete 6 week course of antibiotics from date of surgical intervention w/ EOT 9/29/2022   was switched to vancomycin due to concerns for possible drug fever  c/w vancomycin to complete above course w/ EOT 9/29/2022  patient w/ 1 week of IV antibiotic therapy left, no need for weekly ESR, CRP at this point jina as these may be affected by clot burden  transition to PO amoxicillin 500mg tid thereafter for chronic suppression indefinitely given retained hardware    discussed w/ patient and patient's daughter Pia (over the phone)    Josias De Leon M.D.  [facility], Division of Infectious Diseases  [phone]  After 5pm on weekdays and all day on weekends - please call [phone]    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, rectovaginal fistula, HTN, hypothyroidism, GERD and left hip arthroplasty presents for fever, pt was recently admitted 8/16-8/23 at [facility] s/p I&D 8/18, placement abx beads and revision L  hip hemiarthroplasty with head and liner replacement and retention of rest of prosthetic joint left hip discharged to [facility] on 6 weeks of ampicillin + ceftriaxone via PICC line for E faecalis PJI c/b persistent fever and leukocytosis    fever, leukocytosis  recent I&D 8/18  patient w/o any localizing signs or symptoms  urine culture negative and patient denies urinary symptoms  no open wounds or evidence of SSTI on exam- L hip wound closed and w/o erythema, swelling or tenderness; also evaluated by ortho  no evidence of gout flare on exam  CXR on admission w/o evidence of focal consolidation  CT abd/pelvis 9/14- interval improvement of inflammation compared with prior imaging. No discrete associated collection.  VQ scan- very low probability  d-dimer elevated  LE US- negative for DVT  UE US- Associated with the PICC line, there is a thrombosed left axillary vein.  blood cx NGTD  PICC line removed 9/19  afebrile since admission and leukocytosis continues to improve  Plan:  fever likely 2/2 DVT- anticoagulation per primary team/ pulm  would explain elevated d-dimer and inflammatory markers as well  c/w vancomycin; goal trough 15-20 or AUC/PALMER of 400-600  vanc trough yesterday 17, appreciate pharmacy assistance, vanc dose decreased to Vancomycin 1250mg q24h based on pharmacokinetic calculations for AUC  next vanc trough prior to tomorrow's dose on 9/23  trend temps/ wbc    E faecalis PJI infection  s/p I&D 8/18, placement abx beads and revision L  hip hemiarthroplasty with head and liner replacement  was discharged on ampicillin 2g every 6 hours and ceftriaxone 2g every 24 hours in the setting of retained prosthesis to complete 6 week course of antibiotics from date of surgical intervention w/ EOT 9/29/2022   was switched to vancomycin due to concerns for possible drug fever  c/w vancomycin to complete above course w/ EOT 9/29/2022  patient w/ 1 week of IV antibiotic therapy left, no need for weekly ESR, CRP at this point jina as these may be affected by clot burden  transition to PO amoxicillin 500mg tid thereafter for chronic suppression indefinitely given retained hardware    discussed w/ patient and patient's daughter Pia (over the phone)    Josias De Leon M.D.  [facility], Division of Infectious Diseases  [phone]  After 5pm on weekdays and all day on weekends - please call [phone]

## 2022-09-21 NOTE — PROGRESS NOTE ADULT - ASSESSMENT
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, HTN, hypothyroidism, GERD and left hip arthroplasty presents for further work up of fever    # Fever likely 2/2 DVT / Drug Fever  LUE axillary DVT provoked by PICC line, PICC line removed on 19th  CXR small left pleural effusion, no consolidations  blood NTD, U/A mildly pos, fu urine cx  VQ low prob PE  titrate off O2, check on RA  WBC downtrending  ESR high but down from august  cont vanco, ID following  TTE noted lvef hyperdynamic ; no RV strain  C/w eliquis 10mg bid for 7 days, then 5mg bid  PT    # Left hip postoperative wound infection  recent revision of left hip ; s/p abx beads  appreciate ortho recs, does not feel periprosthetic hip joint infection  off unasyn / ceftriaxone as possible etiology of fever   c/w vancomycin until 09/29     # Hypothyroidism  cont synthroid    # Hypertension  c/w home nadolol --> propranolol inpatient    # Insomnia  c/w home valium PRN (confirmed via iSTOP Reference #[id number])    # Gout  c/w home allopurinol  check uric acid in AM    dvt ppx: lovenox    dtr Pia -- [phone]     Please contact with any questions or concerns [phone].

## 2022-09-22 LAB
ANION GAP SERPL CALC-SCNC: 10 MMOL/L — SIGNIFICANT CHANGE UP (ref 5–17)
BUN SERPL-MCNC: 12 MG/DL — SIGNIFICANT CHANGE UP (ref 7–23)
CALCIUM SERPL-MCNC: 9.5 MG/DL — SIGNIFICANT CHANGE UP (ref 8.4–10.5)
CHLORIDE SERPL-SCNC: 100 MMOL/L — SIGNIFICANT CHANGE UP (ref 96–108)
CO2 SERPL-SCNC: 29 MMOL/L — SIGNIFICANT CHANGE UP (ref 22–31)
CREAT SERPL-MCNC: 0.75 MG/DL — SIGNIFICANT CHANGE UP (ref 0.5–1.3)
EGFR: 76 ML/MIN/1.73M2 — SIGNIFICANT CHANGE UP
GLUCOSE SERPL-MCNC: 150 MG/DL — HIGH (ref 70–99)
HCT VFR BLD CALC: 27 % — LOW (ref 34.5–45)
HGB BLD-MCNC: 8.5 G/DL — LOW (ref 11.5–15.5)
MCHC RBC-ENTMCNC: 28.7 PG — SIGNIFICANT CHANGE UP (ref 27–34)
MCHC RBC-ENTMCNC: 31.5 GM/DL — LOW (ref 32–36)
MCV RBC AUTO: 91.2 FL — SIGNIFICANT CHANGE UP (ref 80–100)
NRBC # BLD: 0 /100 WBCS — SIGNIFICANT CHANGE UP (ref 0–0)
PLATELET # BLD AUTO: 266 K/UL — SIGNIFICANT CHANGE UP (ref 150–400)
POTASSIUM SERPL-MCNC: 3.8 MMOL/L — SIGNIFICANT CHANGE UP (ref 3.5–5.3)
POTASSIUM SERPL-SCNC: 3.8 MMOL/L — SIGNIFICANT CHANGE UP (ref 3.5–5.3)
RBC # BLD: 2.96 M/UL — LOW (ref 3.8–5.2)
RBC # FLD: 15.6 % — HIGH (ref 10.3–14.5)
SODIUM SERPL-SCNC: 139 MMOL/L — SIGNIFICANT CHANGE UP (ref 135–145)
WBC # BLD: 9.61 K/UL — SIGNIFICANT CHANGE UP (ref 3.8–10.5)
WBC # FLD AUTO: 9.61 K/UL — SIGNIFICANT CHANGE UP (ref 3.8–10.5)

## 2022-09-22 RX ORDER — CEFTRIAXONE 500 MG/1
2000 INJECTION, POWDER, FOR SOLUTION INTRAMUSCULAR; INTRAVENOUS EVERY 24 HOURS
Refills: 0 | Status: DISCONTINUED | OUTPATIENT
Start: 2022-09-23 | End: 2022-09-23

## 2022-09-22 RX ORDER — AMPICILLIN TRIHYDRATE 250 MG
2 CAPSULE ORAL EVERY 4 HOURS
Refills: 0 | Status: DISCONTINUED | OUTPATIENT
Start: 2022-09-23 | End: 2022-09-23

## 2022-09-22 RX ADMIN — Medication 5 MILLIGRAM(S): at 22:58

## 2022-09-22 RX ADMIN — Medication 50 MICROGRAM(S): at 06:53

## 2022-09-22 RX ADMIN — Medication 100 MILLIGRAM(S): at 17:28

## 2022-09-22 RX ADMIN — PANTOPRAZOLE SODIUM 40 MILLIGRAM(S): 20 TABLET, DELAYED RELEASE ORAL at 06:53

## 2022-09-22 RX ADMIN — CHLORHEXIDINE GLUCONATE 1 APPLICATION(S): 213 SOLUTION TOPICAL at 06:52

## 2022-09-22 RX ADMIN — Medication 325 MILLIGRAM(S): at 11:48

## 2022-09-22 RX ADMIN — APIXABAN 10 MILLIGRAM(S): 2.5 TABLET, FILM COATED ORAL at 06:53

## 2022-09-22 RX ADMIN — Medication 100 MILLIGRAM(S): at 06:53

## 2022-09-22 RX ADMIN — Medication 166.67 MILLIGRAM(S): at 06:53

## 2022-09-22 NOTE — PROGRESS NOTE ADULT - ASSESSMENT
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, HTN, hypothyroidism, GERD and left hip arthroplasty presents for further work up of fever    # Fever likely 2/2 DVT / Drug Fever  LUE axillary DVT provoked by PICC line, PICC line removed on 19th  CXR small left pleural effusion, no consolidations  blood NTD, U/A mildly pos, fu urine cx  VQ low prob PE  titrate off O2, check on RA  WBC downtrending  ESR high but down from august  changed abx to amp / ceftriaxone per ID -- midline tomorrow 23rd and dc to rehab with abx until 09/29  TTE noted lvef hyperdynamic ; no RV strain  C/w eliquis 10mg bid for 7 days, then 5mg bid  PT    # Left hip postoperative wound infection  recent revision of left hip ; s/p abx beads  appreciate ortho recs, does not feel periprosthetic hip joint infection  changed abx to amp / ceftriaxone per ID -- midline tomorrow 23rd and dc to rehab with abx until 09/29     # Hypothyroidism  cont synthroid    # Hypertension  c/w home nadolol --> propranolol inpatient    # Insomnia  c/w home valium PRN (confirmed via iSTOP Reference #[id number])    # Gout  c/w home allopurinol  check uric acid in AM    dvt ppx: lovenox    rochelle Palacio -- [phone]     Please contact with any questions or concerns [phone].

## 2022-09-22 NOTE — PROGRESS NOTE ADULT - ASSESSMENT
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, rectovaginal fistula, HTN, hypothyroidism, GERD and left hip arthroplasty presents for fever, pt was recently admitted 8/16-8/23 at [facility] s/p I&D 8/18, placement abx beads and revision L  hip hemiarthroplasty with head and liner replacement and retention of rest of prosthetic joint left hip discharged to [facility] on 6 weeks of ampicillin + ceftriaxone via PICC line for E faecalis PJI c/b persistent fever and leukocytosis    fever, leukocytosis  recent I&D 8/18  patient w/o any localizing signs or symptoms  urine culture negative and patient denies urinary symptoms  no open wounds or evidence of SSTI on exam- L hip wound closed and w/o erythema, swelling or tenderness; also evaluated by ortho  no evidence of gout flare on exam  CXR on admission w/o evidence of focal consolidation  CT abd/pelvis 9/14- interval improvement of inflammation compared with prior imaging. No discrete associated collection.  VQ scan- very low probability  d-dimer elevated  LE US- negative for DVT  UE US- Associated with the PICC line, there is a thrombosed left axillary vein.  blood cx NGTD  PICC line removed 9/19  afebrile since admission and leukocytosis resolved  Plan:  fever likely 2/2 DVT- anticoagulation per primary team/ pulm  would explain elevated d-dimer and inflammatory markers as well  c/w vancomycin; goal trough 15-20 or AUC/PALMER of 400-600  appreciate pharmacy assistance, c/w vancomycin 1250mg q24h based on pharmacokinetic calculations for AUC  next vanc trough prior to tomorrow's dose on 9/23    E faecalis PJI infection  s/p I&D 8/18, placement abx beads and revision L hip hemiarthroplasty with head and liner replacement  was discharged on ampicillin 2g every 6 hours and ceftriaxone 2g every 24 hours in the setting of retained prosthesis to complete 6 week course of antibiotics from date of surgical intervention w/ EOT 9/29/2022   was switched to vancomycin due to concerns for possible drug fever  c/w vancomycin to complete above course w/ EOT 9/29/2022  patient w/ 1 week of IV antibiotic therapy left, no need for weekly ESR, CRP at this point jina as these may be affected by clot burden  transition to PO amoxicillin 500mg tid thereafter for chronic suppression indefinitely given retained hardware    Josias De Leon M.D.  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 discharged to [facility] on 6 weeks of ampicillin + ceftriaxone via PICC line for E faecalis PJI c/b persistent fever and leukocytosis    fever, leukocytosis  recent I&D 8/18  patient w/o any localizing signs or symptoms  urine culture negative and patient denies urinary symptoms  no open wounds or evidence of SSTI on exam- L hip wound closed and w/o erythema, swelling or tenderness; also evaluated by ortho  no evidence of gout flare on exam  CXR on admission w/o evidence of focal consolidation  CT abd/pelvis 9/14- interval improvement of inflammation compared with prior imaging. No discrete associated collection.  VQ scan- very low probability  d-dimer elevated  LE US- negative for DVT  UE US- Associated with the PICC line, there is a thrombosed left axillary vein.  blood cx NGTD  PICC line removed 9/19  afebrile since admission and leukocytosis resolved  Plan:  fever likely 2/2 DVT- anticoagulation per primary team/ pulm  would explain elevated d-dimer and inflammatory markers as well  c/w vancomycin; goal trough 15-20 or AUC/PALMER of 400-600  appreciate pharmacy assistance, c/w vancomycin 1250mg q24h based on pharmacokinetic calculations for AUC  next vanc trough prior to tomorrow's dose on 9/23    E faecalis PJI infection  s/p I&D 8/18, placement abx beads and revision L hip hemiarthroplasty with head and liner replacement  was discharged on ampicillin 2g every 6 hours and ceftriaxone 2g every 24 hours in the setting of retained prosthesis to complete 6 week course of antibiotics from date of surgical intervention w/ EOT 9/29/2022   was switched to vancomycin due to concerns for possible drug fever, however, based on evaluation fever was likely due to DVT  can c/w vancomycin to complete above course w/ EOT 9/29/2022  patient w/ 1 week of IV antibiotic therapy left, no need for weekly ESR, CRP at this point jina as these may be affected by clot burden  transition to PO amoxicillin 500mg tid thereafter for chronic suppression indefinitely given retained hardware    Josias De Leon M.D.  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, rectovaginal fistula, HTN, hypothyroidism, GERD and left hip arthroplasty presents for fever, pt was recently admitted 8/16-8/23 at [facility] s/p I&D 8/18, placement abx beads and revision L  hip hemiarthroplasty with head and liner replacement and retention of rest of prosthetic joint left hip discharged to [facility] on 6 weeks of ampicillin + ceftriaxone via PICC line for E faecalis PJI c/b persistent fever and leukocytosis    fever, leukocytosis  recent I&D 8/18  patient w/o any localizing signs or symptoms  urine culture negative and patient denies urinary symptoms  no open wounds or evidence of SSTI on exam- L hip wound closed and w/o erythema, swelling or tenderness; also evaluated by ortho  no evidence of gout flare on exam  CXR on admission w/o evidence of focal consolidation  CT abd/pelvis 9/14- interval improvement of inflammation compared with prior imaging. No discrete associated collection.  VQ scan- very low probability  d-dimer elevated  LE US- negative for DVT  UE US- Associated with the PICC line, there is a thrombosed left axillary vein.  blood cx NGTD  PICC line removed 9/19  afebrile since admission and leukocytosis resolved  Plan:  fever likely 2/2 DVT- anticoagulation per primary team/ pulm  would explain elevated d-dimer and inflammatory markers as well    E faecalis PJI infection  s/p I&D 8/18, placement abx beads and revision L hip hemiarthroplasty with head and liner replacement  was discharged on ampicillin 2g every 6 hours and ceftriaxone 2g every 24 hours in the setting of retained prosthesis to complete 6 week course of antibiotics from date of surgical intervention w/ EOT 9/29/2022   was switched to vancomycin due to concerns for possible drug fever, however, based on evaluation fever was likely due to DVT  will switch vancomycin back to ampicillin and ceftriaxone 2g every 24 hours tomorrow as pt already receive today's vancomycin dose  this will also allow midline placement instead of pt having to receive PICC line for vancomycin  will adjust ampicillin to 2g every 4 hours given improvement in renal function  w/ EOT 9/29/2022  patient w/ 1 week of IV antibiotic therapy left, no need for weekly ESR, CRP at this point jina as these may be affected by clot burden  transition to PO amoxicillin 500mg tid thereafter for chronic suppression indefinitely given retained hardware    Josias De Leon M.D.  [facility], Division of Infectious Diseases  [phone]  After 5pm on weekdays and all day on weekends - please call [phone]

## 2022-09-23 ENCOUNTER — TRANSCRIPTION ENCOUNTER (OUTPATIENT)
Age: 87
End: 2022-09-23

## 2022-09-23 VITALS
RESPIRATION RATE: 18 BRPM | HEART RATE: 80 BPM | OXYGEN SATURATION: 94 % | DIASTOLIC BLOOD PRESSURE: 82 MMHG | TEMPERATURE: 99 F | SYSTOLIC BLOOD PRESSURE: 145 MMHG

## 2022-09-23 LAB
ANION GAP SERPL CALC-SCNC: 9 MMOL/L — SIGNIFICANT CHANGE UP (ref 5–17)
BUN SERPL-MCNC: 16 MG/DL — SIGNIFICANT CHANGE UP (ref 7–23)
CALCIUM SERPL-MCNC: 9.6 MG/DL — SIGNIFICANT CHANGE UP (ref 8.4–10.5)
CHLORIDE SERPL-SCNC: 99 MMOL/L — SIGNIFICANT CHANGE UP (ref 96–108)
CO2 SERPL-SCNC: 30 MMOL/L — SIGNIFICANT CHANGE UP (ref 22–31)
CREAT SERPL-MCNC: 0.83 MG/DL — SIGNIFICANT CHANGE UP (ref 0.5–1.3)
CULTURE RESULTS: SIGNIFICANT CHANGE UP
CULTURE RESULTS: SIGNIFICANT CHANGE UP
EGFR: 67 ML/MIN/1.73M2 — SIGNIFICANT CHANGE UP
GLUCOSE SERPL-MCNC: 151 MG/DL — HIGH (ref 70–99)
HCT VFR BLD CALC: 27.4 % — LOW (ref 34.5–45)
HGB BLD-MCNC: 8.6 G/DL — LOW (ref 11.5–15.5)
MCHC RBC-ENTMCNC: 28.9 PG — SIGNIFICANT CHANGE UP (ref 27–34)
MCHC RBC-ENTMCNC: 31.4 GM/DL — LOW (ref 32–36)
MCV RBC AUTO: 91.9 FL — SIGNIFICANT CHANGE UP (ref 80–100)
NRBC # BLD: 0 /100 WBCS — SIGNIFICANT CHANGE UP (ref 0–0)
PLATELET # BLD AUTO: 261 K/UL — SIGNIFICANT CHANGE UP (ref 150–400)
POTASSIUM SERPL-MCNC: 3.7 MMOL/L — SIGNIFICANT CHANGE UP (ref 3.5–5.3)
POTASSIUM SERPL-SCNC: 3.7 MMOL/L — SIGNIFICANT CHANGE UP (ref 3.5–5.3)
RBC # BLD: 2.98 M/UL — LOW (ref 3.8–5.2)
RBC # FLD: 15.7 % — HIGH (ref 10.3–14.5)
SARS-COV-2 RNA SPEC QL NAA+PROBE: SIGNIFICANT CHANGE UP
SODIUM SERPL-SCNC: 138 MMOL/L — SIGNIFICANT CHANGE UP (ref 135–145)
SPECIMEN SOURCE: SIGNIFICANT CHANGE UP
SPECIMEN SOURCE: SIGNIFICANT CHANGE UP
WBC # BLD: 8.79 K/UL — SIGNIFICANT CHANGE UP (ref 3.8–10.5)
WBC # FLD AUTO: 8.79 K/UL — SIGNIFICANT CHANGE UP (ref 3.8–10.5)

## 2022-09-23 PROCEDURE — 36569 INSJ PICC 5 YR+ W/O IMAGING: CPT

## 2022-09-23 PROCEDURE — 85027 COMPLETE CBC AUTOMATED: CPT

## 2022-09-23 PROCEDURE — 84550 ASSAY OF BLOOD/URIC ACID: CPT

## 2022-09-23 PROCEDURE — 81001 URINALYSIS AUTO W/SCOPE: CPT

## 2022-09-23 PROCEDURE — 82947 ASSAY GLUCOSE BLOOD QUANT: CPT

## 2022-09-23 PROCEDURE — 82803 BLOOD GASES ANY COMBINATION: CPT

## 2022-09-23 PROCEDURE — 80202 ASSAY OF VANCOMYCIN: CPT

## 2022-09-23 PROCEDURE — 97162 PT EVAL MOD COMPLEX 30 MIN: CPT

## 2022-09-23 PROCEDURE — 84466 ASSAY OF TRANSFERRIN: CPT

## 2022-09-23 PROCEDURE — 86140 C-REACTIVE PROTEIN: CPT

## 2022-09-23 PROCEDURE — 97116 GAIT TRAINING THERAPY: CPT

## 2022-09-23 PROCEDURE — A9567: CPT

## 2022-09-23 PROCEDURE — 71045 X-RAY EXAM CHEST 1 VIEW: CPT

## 2022-09-23 PROCEDURE — 87086 URINE CULTURE/COLONY COUNT: CPT

## 2022-09-23 PROCEDURE — 82565 ASSAY OF CREATININE: CPT

## 2022-09-23 PROCEDURE — 85652 RBC SED RATE AUTOMATED: CPT

## 2022-09-23 PROCEDURE — 85018 HEMOGLOBIN: CPT

## 2022-09-23 PROCEDURE — 83605 ASSAY OF LACTIC ACID: CPT

## 2022-09-23 PROCEDURE — 83550 IRON BINDING TEST: CPT

## 2022-09-23 PROCEDURE — 73502 X-RAY EXAM HIP UNI 2-3 VIEWS: CPT

## 2022-09-23 PROCEDURE — 93970 EXTREMITY STUDY: CPT

## 2022-09-23 PROCEDURE — 85610 PROTHROMBIN TIME: CPT

## 2022-09-23 PROCEDURE — 73552 X-RAY EXAM OF FEMUR 2/>: CPT

## 2022-09-23 PROCEDURE — 97530 THERAPEUTIC ACTIVITIES: CPT

## 2022-09-23 PROCEDURE — C1751: CPT

## 2022-09-23 PROCEDURE — 85730 THROMBOPLASTIN TIME PARTIAL: CPT

## 2022-09-23 PROCEDURE — 93306 TTE W/DOPPLER COMPLETE: CPT

## 2022-09-23 PROCEDURE — 78582 LUNG VENTILAT&PERFUS IMAGING: CPT

## 2022-09-23 PROCEDURE — 36415 COLL VENOUS BLD VENIPUNCTURE: CPT

## 2022-09-23 PROCEDURE — 97110 THERAPEUTIC EXERCISES: CPT

## 2022-09-23 PROCEDURE — U0003: CPT

## 2022-09-23 PROCEDURE — 83735 ASSAY OF MAGNESIUM: CPT

## 2022-09-23 PROCEDURE — 82435 ASSAY OF BLOOD CHLORIDE: CPT

## 2022-09-23 PROCEDURE — 0225U NFCT DS DNA&RNA 21 SARSCOV2: CPT

## 2022-09-23 PROCEDURE — 85014 HEMATOCRIT: CPT

## 2022-09-23 PROCEDURE — U0005: CPT

## 2022-09-23 PROCEDURE — 80048 BASIC METABOLIC PNL TOTAL CA: CPT

## 2022-09-23 PROCEDURE — 85379 FIBRIN DEGRADATION QUANT: CPT

## 2022-09-23 PROCEDURE — 85045 AUTOMATED RETICULOCYTE COUNT: CPT

## 2022-09-23 PROCEDURE — 87641 MR-STAPH DNA AMP PROBE: CPT

## 2022-09-23 PROCEDURE — 87040 BLOOD CULTURE FOR BACTERIA: CPT

## 2022-09-23 PROCEDURE — 82330 ASSAY OF CALCIUM: CPT

## 2022-09-23 PROCEDURE — 84295 ASSAY OF SERUM SODIUM: CPT

## 2022-09-23 PROCEDURE — 83540 ASSAY OF IRON: CPT

## 2022-09-23 PROCEDURE — A9540: CPT

## 2022-09-23 PROCEDURE — 84100 ASSAY OF PHOSPHORUS: CPT

## 2022-09-23 PROCEDURE — 84443 ASSAY THYROID STIM HORMONE: CPT

## 2022-09-23 PROCEDURE — 87640 STAPH A DNA AMP PROBE: CPT

## 2022-09-23 PROCEDURE — 85025 COMPLETE CBC W/AUTO DIFF WBC: CPT

## 2022-09-23 PROCEDURE — 80053 COMPREHEN METABOLIC PANEL: CPT

## 2022-09-23 PROCEDURE — 99285 EMERGENCY DEPT VISIT HI MDM: CPT

## 2022-09-23 PROCEDURE — 84132 ASSAY OF SERUM POTASSIUM: CPT

## 2022-09-23 PROCEDURE — 82728 ASSAY OF FERRITIN: CPT

## 2022-09-23 RX ORDER — AMPICILLIN TRIHYDRATE 250 MG
2 CAPSULE ORAL
Qty: 0 | Refills: 0 | DISCHARGE
Start: 2022-09-23 | End: 2022-09-29

## 2022-09-23 RX ORDER — DIAZEPAM 5 MG
1 TABLET ORAL
Qty: 0 | Refills: 0 | DISCHARGE
Start: 2022-09-23

## 2022-09-23 RX ORDER — APIXABAN 2.5 MG/1
2 TABLET, FILM COATED ORAL
Qty: 0 | Refills: 0 | DISCHARGE
Start: 2022-09-23

## 2022-09-23 RX ORDER — LEVOTHYROXINE SODIUM 125 MCG
1 TABLET ORAL
Qty: 0 | Refills: 0 | DISCHARGE
Start: 2022-09-23

## 2022-09-23 RX ORDER — DIAZEPAM 5 MG
1 TABLET ORAL
Qty: 0 | Refills: 0 | DISCHARGE

## 2022-09-23 RX ORDER — FERROUS SULFATE 325(65) MG
1 TABLET ORAL
Qty: 0 | Refills: 0 | DISCHARGE
Start: 2022-09-23

## 2022-09-23 RX ORDER — ALLOPURINOL 300 MG
1 TABLET ORAL
Qty: 0 | Refills: 0 | DISCHARGE
Start: 2022-09-23

## 2022-09-23 RX ORDER — VANCOMYCIN HCL 1 G
0 VIAL (EA) INTRAVENOUS
Qty: 0 | Refills: 0 | DISCHARGE

## 2022-09-23 RX ORDER — APIXABAN 2.5 MG/1
10 TABLET, FILM COATED ORAL EVERY 12 HOURS
Refills: 0 | Status: DISCONTINUED | OUTPATIENT
Start: 2022-09-23 | End: 2022-09-23

## 2022-09-23 RX ORDER — NADOLOL 80 MG/1
1 TABLET ORAL
Qty: 0 | Refills: 0 | DISCHARGE

## 2022-09-23 RX ORDER — PANTOPRAZOLE SODIUM 20 MG/1
1 TABLET, DELAYED RELEASE ORAL
Qty: 0 | Refills: 0 | DISCHARGE
Start: 2022-09-23

## 2022-09-23 RX ORDER — PROPRANOLOL HCL 160 MG
1 CAPSULE, EXTENDED RELEASE 24HR ORAL
Qty: 0 | Refills: 0 | DISCHARGE
Start: 2022-09-23

## 2022-09-23 RX ORDER — AMPICILLIN TRIHYDRATE 250 MG
2 CAPSULE ORAL EVERY 6 HOURS
Refills: 0 | Status: DISCONTINUED | OUTPATIENT
Start: 2022-09-23 | End: 2022-09-23

## 2022-09-23 RX ORDER — ALLOPURINOL 300 MG
1 TABLET ORAL
Qty: 0 | Refills: 0 | DISCHARGE

## 2022-09-23 RX ORDER — CEFTRIAXONE 500 MG/1
2 INJECTION, POWDER, FOR SOLUTION INTRAMUSCULAR; INTRAVENOUS
Qty: 0 | Refills: 0 | DISCHARGE
Start: 2022-09-23 | End: 2022-09-29

## 2022-09-23 RX ADMIN — Medication 200 GRAM(S): at 06:30

## 2022-09-23 RX ADMIN — Medication 100 MILLIGRAM(S): at 06:30

## 2022-09-23 RX ADMIN — Medication 200 GRAM(S): at 15:09

## 2022-09-23 RX ADMIN — Medication 325 MILLIGRAM(S): at 11:45

## 2022-09-23 RX ADMIN — CHLORHEXIDINE GLUCONATE 1 APPLICATION(S): 213 SOLUTION TOPICAL at 06:30

## 2022-09-23 RX ADMIN — CEFTRIAXONE 100 MILLIGRAM(S): 500 INJECTION, POWDER, FOR SOLUTION INTRAMUSCULAR; INTRAVENOUS at 05:28

## 2022-09-23 RX ADMIN — PANTOPRAZOLE SODIUM 40 MILLIGRAM(S): 20 TABLET, DELAYED RELEASE ORAL at 06:31

## 2022-09-23 RX ADMIN — Medication 200 GRAM(S): at 02:48

## 2022-09-23 RX ADMIN — Medication 200 GRAM(S): at 10:33

## 2022-09-23 RX ADMIN — Medication 50 MICROGRAM(S): at 06:30

## 2022-09-23 NOTE — DISCHARGE NOTE PROVIDER - CARE PROVIDER_API CALL
Haroldo Samayoa (MD)  Orthopaedic Surgery  [address], [address]  [place] [address]  Phone: [phone]  Fax: [phone]  Follow Up Time:     BERNARDO CARRASCO  Internal Medicine  [address]  [place] [address]  Phone: [phone]  Fax: [phone]  Follow Up Time:

## 2022-09-23 NOTE — DISCHARGE NOTE NURSING/CASE MANAGEMENT/SOCIAL WORK - NSDCPNINST_GEN_ALL_CORE
call md for any discomforts felt--left hip wound --scar present and wound healed (no drainage--no swelling noted)-- keep skin clean and dry --turn and change position frequently when in bed--bilateral heels--bilateral buttocks--sacrum and bilateral breasts-- liquid barrier film and barrier cream applied-- safety measures reemphasized

## 2022-09-23 NOTE — DISCHARGE NOTE PROVIDER - HOSPITAL COURSE
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 NTD, U/A mildly pos, fu urine cx  VQ low prob PE & titrated off O2, checked on RA / WBC downtrending / ESR high but down from august  TTE noted LVEF hyperdynamic ; no RV strain will c/w eliquis 10mg bid for 7 days, then 5mg bid  Seen by PT    Left hip postoperative wound infection - recent revision of left hip ; s/p abx   Per ortho recs. - does not feel periprosthetic hip joint infection  PER ID changed abx to amp / ceftriaxone -- midline placed on 9/23 and dc'd to rehab with abx until 09/29 , then needs to be on amox. chronically for suppression (see d/c instructions)  Per ID patient w/ 1 week of IV antibiotic therapy left, no need for weekly ESR, CRP at this point jina as these may be affected by clot burden  Per ID transition to PO amoxicillin 500mg tid thereafter for chronic suppression indefinitely given retained hardware    Hypothyroidism - cont synthroid    Hypertension - c/w home nadolol --> propranolol inpatient    Insomnia - c/w home valium PRN (confirmed via iSTOP Reference #[id number])    Gout - c/w home allopurinol; checked uric acid     DVT ppx: lovenox    On 9/23 dtr Pia -- [phone]; updated and pt. dc'd.    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 NTD, U/A mildly pos, fu urine cx  VQ low prob PE & titrated off O2, checked on RA / WBC downtrending / ESR high but down from august  TTE noted LVEF hyperdynamic ; no RV strain will c/w eliquis 10mg bid for 7 days, then 5mg bid  Seen by PT    Left hip postoperative wound infection - recent revision of left hip ; s/p abx   Per ortho recs. - does not feel periprosthetic hip joint infection  PER ID changed abx to amp / ceftriaxone -- midline placed on 9/23 and dc'd to rehab with abx until 09/29 , then needs to be on amox. chronically for suppression (see d/c instructions)  Per ID patient w/ 1 week of IV antibiotic therapy left, no need for weekly ESR, CRP at this point jina as these may be affected by clot burden  Per ID transition to PO amoxicillin 500mg tid thereafter for chronic suppression indefinitely given retained hardware    Hypothyroidism - cont synthroid    Hypertension - c/w home nadolol --> propranolol inpatient    Insomnia - c/w home valium PRN (confirmed via iSTOP Reference #[id number])    Gout - c/w home allopurinol; checked uric acid     DVT as above LUE DVT  - C/w Eliquis 10mg bid for 7 days, then 5mg bid (started 9/20  - s/p 4 doses)     On 9/23 dtr Pia -- [phone]; updated and pt. dc'd.

## 2022-09-23 NOTE — PROGRESS NOTE ADULT - SUBJECTIVE AND OBJECTIVE BOX
Patient is a 89y old  Female who presents with a chief complaint of fever (21 Sep 2022 11:00)      SUBJECTIVE / OVERNIGHT EVENTS:  Patient seen and examined.   Doing well.       Vital Signs Last 24 Hrs  T(C): 36.8 (21 Sep 2022 04:39), Max: 37.1 (20 Sep 2022 14:18)  T(F): 98.3 (21 Sep 2022 04:39), Max: 98.8 (20 Sep 2022 14:18)  HR: 74 (21 Sep 2022 04:39) (74 - 82)  BP: 152/82 (21 Sep 2022 04:39) (148/73 - 169/72)  BP(mean): --  RR: 18 (21 Sep 2022 04:39) (17 - 18)  SpO2: 92% (21 Sep 2022 04:39) (92% - 96%)    Parameters below as of 21 Sep 2022 04:39  Patient On (Oxygen Delivery Method): room air      I&O's Summary    20 Sep 2022 07:01  -  21 Sep 2022 07:00  --------------------------------------------------------  IN: 1450 mL / OUT: 1652 mL / NET: -202 mL    21 Sep 2022 07:01  -  21 Sep 2022 13:11  --------------------------------------------------------  IN: 480 mL / OUT: 1 mL / NET: 479 mL        PE:  GENERAL: NAD, AAOx3  CHEST/LUNG: ronchi bases bl  HEART: Regular rate and rhythm; no murmur  ABDOMEN: Soft, Nontender, Nondistended; Bowel sounds present  EXTREMITIES:  2+ Peripheral Pulses, trace le edema  NEURO: No focal deficits    LABS:                        8.5    11.13 )-----------( 251      ( 21 Sep 2022 07:06 )             27.0     09-20    140  |  102  |  9   ----------------------------<  128<H>  4.2   |  27  |  0.66    Ca    9.1      20 Sep 2022 07:20        CAPILLARY BLOOD GLUCOSE                RADIOLOGY & ADDITIONAL TESTS:    Imaging Personally Reviewed:  [x] YES  [ ] NO    Consultant(s) Notes Reviewed:  [x] YES  [ ] NO      MEDICATIONS  (STANDING):  allopurinol 100 milliGRAM(s) Oral two times a day  apixaban 10 milliGRAM(s) Oral every 12 hours  chlorhexidine 2% Cloths 1 Application(s) Topical <User Schedule>  ferrous    sulfate 325 milliGRAM(s) Oral daily  levothyroxine 50 MICROGram(s) Oral daily  pantoprazole    Tablet 40 milliGRAM(s) Oral before breakfast  propranolol 20 milliGRAM(s) Oral two times a day  vancomycin  IVPB 1250 milliGRAM(s) IV Intermittent every 24 hours    MEDICATIONS  (PRN):  aluminum hydroxide/magnesium hydroxide/simethicone Suspension 30 milliLiter(s) Oral every 6 hours PRN Dyspepsia  diazepam    Tablet 5 milliGRAM(s) Oral at bedtime PRN insomnia      Care Discussed with Consultants/Other Providers [x] YES  [ ] NO    HEALTH ISSUES - PROBLEM Dx:  Fever of unknown origin    Left hip postoperative wound infection    Hypothyroidism    Hypertension    Need for prophylactic measure    Insomnia    Gout        
[facility], Division of Infectious Diseases  STEVE Loaiza Y. Patel, S. Shah, G. Moises  [phone]  ([phone] - weekdays after 5pm and weekends)    Name: GEORGE ROSE  Age/Gender: 89y Female  MRN: [id number]    Interval History:  Patient seen this morning.   Notes reviewed.   No concerning overnight events.  Afebrile.   she is eager to go back to rehab    Allergies: iodine (Other; Anaphylaxis)  shellfish (Anaphylaxis)  sulfa drugs (Other)      Objective:  Vitals:   T(F): 98.5 (09-23-22 @ 04:16), Max: 99 (09-22-22 @ 11:54)  HR: 75 (09-23-22 @ 04:16) (72 - 86)  BP: 147/79 (09-23-22 @ 04:16) (126/78 - 147/79)  RR: 18 (09-23-22 @ 04:16) (18 - 18)  SpO2: 93% (09-23-22 @ 04:16) (93% - 95%)  Physical Examination:  General: no acute distress  HEENT: anicteric  Cardio: S1, S2, normal rate  Resp: clear to auscultation anteriorly  Abd: soft, NT, ND, colostomy  Ext: no LE edema  Skin: L hip wound closed, no surrounding erythema  Psych: appropriate affect and mood for situation    Laboratory Studies:  CBC:                       8.6    8.79  )-----------( 261      ( 23 Sep 2022 07:21 )             27.4     WBC Trend:  8.79 09-23-22 @ 07:21  9.61 09-22-22 @ 07:00  11.13 09-21-22 @ 07:06  12.04 09-20-22 @ 07:20  13.77 09-19-22 @ 07:10  18.37 09-18-22 @ 06:48  20.52 09-17-22 @ 20:39    CMP: 09-23    138  |  99  |  16  ----------------------------<  151<H>  3.7   |  30  |  0.83    Ca    9.6      23 Sep 2022 07:19              Microbiology: reviewed     Culture - Urine (collected 09-17-22 @ 22:51)  Source: Clean Catch Clean Catch (Midstream)  Final Report (09-19-22 @ 14:16):    <10,000 CFU/mL Normal Urogenital Kay    Culture - Blood (collected 09-17-22 @ 20:50)  Source: .Blood Blood-Peripheral  Final Report (09-23-22 @ 01:00):    No Growth Final    Culture - Blood (collected 09-17-22 @ 20:40)  Source: .Blood Blood-Peripheral  Final Report (09-23-22 @ 01:00):    No Growth Final      Radiology: reviewed     Medications:  allopurinol 100 milliGRAM(s) Oral two times a day  aluminum hydroxide/magnesium hydroxide/simethicone Suspension 30 milliLiter(s) Oral every 6 hours PRN  ampicillin  IVPB 2 Gram(s) IV Intermittent every 4 hours  cefTRIAXone   IVPB 2000 milliGRAM(s) IV Intermittent every 24 hours  chlorhexidine 2% Cloths 1 Application(s) Topical <User Schedule>  diazepam    Tablet 5 milliGRAM(s) Oral at bedtime PRN  ferrous    sulfate 325 milliGRAM(s) Oral daily  levothyroxine 50 MICROGram(s) Oral daily  pantoprazole    Tablet 40 milliGRAM(s) Oral before breakfast  propranolol 20 milliGRAM(s) Oral two times a day    Antimicrobials:  ampicillin  IVPB 2 Gram(s) IV Intermittent every 4 hours  cefTRIAXone   IVPB 2000 milliGRAM(s) IV Intermittent every 24 hours  
[facility], Division of Infectious Diseases  STEVE Loaiza Y. Patel, S. Shah, G. Moises  [phone]  ([phone] - weekdays after 5pm and weekends)    Name: GEORGE ROSE  Age/Gender: 89y Female  MRN: [id number]    Interval History:  Patient seen this morning.   Notes reviewed.   No concerning overnight events.  Afebrile.   leukocytosis continues to improve  s/p TTE yesterday  Has no complaints this AM  she is eager to go back to rehab    Allergies: iodine (Other; Anaphylaxis)  shellfish (Anaphylaxis)  sulfa drugs (Other)      Objective:  Vitals:   T(F): 98.3 (09-21-22 @ 04:39), Max: 98.8 (09-20-22 @ 14:18)  HR: 74 (09-21-22 @ 04:39) (74 - 82)  BP: 152/82 (09-21-22 @ 04:39) (148/73 - 169/72)  RR: 18 (09-21-22 @ 04:39) (17 - 18)  SpO2: 92% (09-21-22 @ 04:39) (92% - 96%)  Physical Examination:  General: no acute distress  HEENT: anicteric  Cardio: S1, S2, normal rate  Resp: clear to auscultation anteriorly  Abd: soft, NT, ND, colostomy  Ext: b/l LE edema  Skin: L hip wound closed, no surrounding erythema, swelling, tenderness  Psych: appropriate affect and mood for situation    Laboratory Studies:  CBC:                       8.5    11.13 )-----------( 251      ( 21 Sep 2022 07:06 )             27.0     WBC Trend:  11.13 09-21-22 @ 07:06  12.04 09-20-22 @ 07:20  13.77 09-19-22 @ 07:10  18.37 09-18-22 @ 06:48  20.52 09-17-22 @ 20:39    CMP: 09-20    140  |  102  |  9   ----------------------------<  128<H>  4.2   |  27  |  0.66    Ca    9.1      20 Sep 2022 07:20          Vancomycin Level, Trough: 17.0 ug/mL (09-20-22 @ 07:20)      Microbiology: reviewed     Culture - Urine (collected 09-17-22 @ 22:51)  Source: Clean Catch Clean Catch (Midstream)  Final Report (09-19-22 @ 14:16):    <10,000 CFU/mL Normal Urogenital Kay    Culture - Blood (collected 09-17-22 @ 20:50)  Source: .Blood Blood-Peripheral  Preliminary Report (09-19-22 @ 01:02):    No growth to date.    Culture - Blood (collected 09-17-22 @ 20:40)  Source: .Blood Blood-Peripheral  Preliminary Report (09-19-22 @ 01:02):    No growth to date.      Radiology: reviewed     Medications:  allopurinol 100 milliGRAM(s) Oral two times a day  aluminum hydroxide/magnesium hydroxide/simethicone Suspension 30 milliLiter(s) Oral every 6 hours PRN  apixaban 10 milliGRAM(s) Oral every 12 hours  chlorhexidine 2% Cloths 1 Application(s) Topical <User Schedule>  diazepam    Tablet 5 milliGRAM(s) Oral at bedtime PRN  ferrous    sulfate 325 milliGRAM(s) Oral daily  levothyroxine 50 MICROGram(s) Oral daily  pantoprazole    Tablet 40 milliGRAM(s) Oral before breakfast  propranolol 20 milliGRAM(s) Oral two times a day  vancomycin  IVPB 1250 milliGRAM(s) IV Intermittent every 24 hours    Antimicrobials:  vancomycin  IVPB 1250 milliGRAM(s) IV Intermittent every 24 hours  
Patient is a 89y old  Female who presents with a chief complaint of fever (22 Sep 2022 10:47)      SUBJECTIVE / OVERNIGHT EVENTS:  Patient seen and examined.   Doing better.       Vital Signs Last 24 Hrs  T(C): 37.2 (22 Sep 2022 11:54), Max: 37.2 (21 Sep 2022 20:31)  T(F): 99 (22 Sep 2022 11:54), Max: 99 (21 Sep 2022 20:31)  HR: 72 (22 Sep 2022 11:54) (72 - 80)  BP: 126/78 (22 Sep 2022 11:54) (126/78 - 146/80)  BP(mean): --  RR: 18 (22 Sep 2022 11:54) (18 - 18)  SpO2: 95% (22 Sep 2022 11:54) (93% - 95%)    Parameters below as of 22 Sep 2022 11:54  Patient On (Oxygen Delivery Method): room air      I&O's Summary    21 Sep 2022 07:01  -  22 Sep 2022 07:00  --------------------------------------------------------  IN: 1090 mL / OUT: 801 mL / NET: 289 mL      PE:  GENERAL: NAD, AAOx3  CHEST/LUNG: ronchi bases bl  HEART: Regular rate and rhythm; no murmur  ABDOMEN: Soft, Nontender, Nondistended; Bowel sounds present  EXTREMITIES:  2+ Peripheral Pulses, trace le edema  NEURO: No focal deficits    LABS:                        8.5    9.61  )-----------( 266      ( 22 Sep 2022 07:00 )             27.0     09-22    139  |  100  |  12  ----------------------------<  150<H>  3.8   |  29  |  0.75    Ca    9.5      22 Sep 2022 06:54        CAPILLARY BLOOD GLUCOSE                RADIOLOGY & ADDITIONAL TESTS:    Imaging Personally Reviewed:  [x] YES  [ ] NO    Consultant(s) Notes Reviewed:  [x] YES  [ ] NO      MEDICATIONS  (STANDING):  allopurinol 100 milliGRAM(s) Oral two times a day  chlorhexidine 2% Cloths 1 Application(s) Topical <User Schedule>  ferrous    sulfate 325 milliGRAM(s) Oral daily  levothyroxine 50 MICROGram(s) Oral daily  pantoprazole    Tablet 40 milliGRAM(s) Oral before breakfast  propranolol 20 milliGRAM(s) Oral two times a day    MEDICATIONS  (PRN):  aluminum hydroxide/magnesium hydroxide/simethicone Suspension 30 milliLiter(s) Oral every 6 hours PRN Dyspepsia  diazepam    Tablet 5 milliGRAM(s) Oral at bedtime PRN insomnia      Care Discussed with Consultants/Other Providers [x] YES  [ ] NO    HEALTH ISSUES - PROBLEM Dx:  Fever of unknown origin    Left hip postoperative wound infection    Hypothyroidism    Hypertension    Need for prophylactic measure    Insomnia    Gout        
Follow-up Pulm Progress Note  Hermes Huston MD  [phone]    Stable respiratory status  On full dose apixaban for axillary DVT  Normal RA oxygen sats      Vital Signs Last 24 Hrs  T(C): 37.2 (22 Sep 2022 11:54), Max: 37.2 (21 Sep 2022 20:31)  T(F): 99 (22 Sep 2022 11:54), Max: 99 (21 Sep 2022 20:31)  HR: 72 (22 Sep 2022 11:54) (72 - 80)  BP: 126/78 (22 Sep 2022 11:54) (126/78 - 146/80)  BP(mean): --  RR: 18 (22 Sep 2022 11:54) (18 - 18)  SpO2: 95% (22 Sep 2022 11:54) (93% - 95%)    Parameters below as of 22 Sep 2022 11:54  Patient On (Oxygen Delivery Method): room air                         8.5    9.61  )-----------( 266      ( 22 Sep 2022 07:00 )             27.0       09-22    139  |  100  |  12  ----------------------------<  150<H>  3.8   |  29  |  0.75    Ca    9.5      22 Sep 2022 06:54      CULTURES:  Culture Results:   <10,000 CFU/mL Normal Urogenital Kay (09-17 @ 22:51)  Culture Results:   No growth to date. (09-17 @ 20:50)  Culture Results:   No growth to date. (09-17 @ 20:40)    Most recent blood culture -- 09-17 @ 22:51   -- -- Clean Catch Clean Catch (Midstream) 09-17 @ 22:51  Most recent blood culture -- 09-17 @ 20:50   -- -- .Blood Blood-Peripheral 09-17 @ 20:50  Most recent blood culture -- 09-17 @ 20:40   -- -- .Blood Blood-Peripheral 09-17 @ 20:40      Physical Examination:  PULM: No wheeze or rhonchi  CVS: Regular rate and rhythm, no murmurs, rubs, or gallops  ABD: Soft, non-tender  EXT:  No clubbing, cyanosis, or edema    RADIOLOGY REVIEWED  CXR:    CT chest:    TTE:  
[facility], Division of Infectious Diseases  STEVE Loaiza Y. Patel, S. Shah, G. Moises  [phone]  ([phone] - weekdays after 5pm and weekends)    Name: GEORGE ROSE  Age/Gender: 89y Female  MRN: [id number]    Interval History:  Patient seen this morning.   Notes reviewed.   No concerning overnight events.  Afebrile.       Allergies: iodine (Other; Anaphylaxis)  shellfish (Anaphylaxis)  sulfa drugs (Other)      Objective:  Vitals:   T(F): 98.5 (22 @ 04:15), Max: 98.6 (22 @ 22:27)  HR: 76 (22 @ 04:15) (66 - 85)  BP: 134/78 (22 @ 04:15) (125/80 - 147/88)  RR: 18 (22 @ 04:15) (18 - 18)  SpO2: 99% (22 @ 04:15) (95% - 100%)  Physical Examination:  General: no acute distress  HEENT: anicteric, NC  Cardio: S1, S2, normal rate, no murmur  Resp: clear to auscultation bilaterally  Abd: soft, NT, ND, colostomy  Ext: b/l LE edema  Skin: L hip wound closed, no surrounding erythema, swelling, tenderness  Psych: appropriate affect and mood for situation    Laboratory Studies:  CBC:                       8.0    13.77 )-----------( 249      ( 19 Sep 2022 07:10 )             26.1     WBC Trend:  13.77 22 @ 07:10  18.37 22 @ 06:48  20.52 22 @ 20:39    CMP:     139  |  101  |  12  ----------------------------<  137<H>  4.1   |  26  |  0.67    Ca    9.2      19 Sep 2022 07:13  Phos  3.5       Mg     1.6         TPro  6.5  /  Alb  2.9<L>  /  TBili  0.2  /  DBili  x   /  AST  15  /  ALT  15  /  AlkPhos  87        LIVER FUNCTIONS - ( 19 Sep 2022 07:13 )  Alb: 2.9 g/dL / Pro: 6.5 g/dL / ALK PHOS: 87 U/L / ALT: 15 U/L / AST: 15 U/L / GGT: x             Urinalysis Basic - ( 17 Sep 2022 22:51 )    Color: Yellow / Appearance: Slightly Turbid / S.013 / pH: x  Gluc: x / Ketone: Negative  / Bili: Negative / Urobili: Negative   Blood: x / Protein: Trace / Nitrite: Negative   Leuk Esterase: Large / RBC: 3 /hpf / WBC 41 /HPF   Sq Epi: x / Non Sq Epi: 3 /hpf / Bacteria: Negative      Microbiology: reviewed     Culture - Blood (collected 22 @ 20:50)  Source: .Blood Blood-Peripheral  Preliminary Report (22 @ 01:02):    No growth to date.    Culture - Blood (collected 22 @ 20:40)  Source: .Blood Blood-Peripheral  Preliminary Report (22 @ 01:02):    No growth to date.      Radiology: reviewed     Medications:  allopurinol 100 milliGRAM(s) Oral two times a day  aluminum hydroxide/magnesium hydroxide/simethicone Suspension 30 milliLiter(s) Oral every 6 hours PRN  diazepam    Tablet 5 milliGRAM(s) Oral at bedtime PRN  enoxaparin Injectable 40 milliGRAM(s) SubCutaneous every 24 hours  levothyroxine 50 MICROGram(s) Oral daily  pantoprazole    Tablet 40 milliGRAM(s) Oral before breakfast  propranolol 20 milliGRAM(s) Oral two times a day  vancomycin  IVPB 1500 milliGRAM(s) IV Intermittent every 24 hours    Antimicrobials:  vancomycin  IVPB 1500 milliGRAM(s) IV Intermittent every 24 hours  
[facility], Division of Infectious Diseases  STEVE Loaiza Y. Patel, S. Shah, G. Moises  [phone]  ([phone] - weekdays after 5pm and weekends)    Name: GEORGE ROSE  Age/Gender: 89y Female  MRN: [id number]    Interval History:  Patient seen this morning.   Notes reviewed.   No concerning overnight events.  Afebrile.   Has no complaints   states she hopes to go back to rehab soon    Allergies: iodine (Other; Anaphylaxis)  shellfish (Anaphylaxis)  sulfa drugs (Other)      Objective:  Vitals:   T(F): 98.4 (09-22-22 @ 05:11), Max: 99 (09-21-22 @ 20:31)  HR: 75 (09-22-22 @ 05:11) (74 - 80)  BP: 146/80 (09-22-22 @ 05:11) (131/60 - 146/80)  RR: 18 (09-22-22 @ 05:11) (18 - 18)  SpO2: 95% (09-22-22 @ 05:11) (93% - 95%)  Physical Examination:  General: no acute distress  HEENT: anicteric  Cardio: S1, S2, normal rate  Resp: clear to auscultation anteriorly  Abd: soft, NT, ND, colostomy  Ext: no LE edema  Skin: L hip wound closed, no surrounding erythema, swelling, tenderness  Psych: appropriate affect and mood for situation    Laboratory Studies:  CBC:                       8.5    9.61  )-----------( 266      ( 22 Sep 2022 07:00 )             27.0     WBC Trend:  9.61 09-22-22 @ 07:00  11.13 09-21-22 @ 07:06  12.04 09-20-22 @ 07:20  13.77 09-19-22 @ 07:10  18.37 09-18-22 @ 06:48  20.52 09-17-22 @ 20:39    CMP: 09-22    139  |  100  |  12  ----------------------------<  150<H>  3.8   |  29  |  0.75    Ca    9.5      22 Sep 2022 06:54          Vancomycin Level, Trough: 17.0 ug/mL (09-20-22 @ 07:20)      Microbiology: reviewed     Culture - Urine (collected 09-17-22 @ 22:51)  Source: Clean Catch Clean Catch (Midstream)  Final Report (09-19-22 @ 14:16):    <10,000 CFU/mL Normal Urogenital Kay    Culture - Blood (collected 09-17-22 @ 20:50)  Source: .Blood Blood-Peripheral  Preliminary Report (09-19-22 @ 01:02):    No growth to date.    Culture - Blood (collected 09-17-22 @ 20:40)  Source: .Blood Blood-Peripheral  Preliminary Report (09-19-22 @ 01:02):    No growth to date.      Radiology: reviewed     Medications:  allopurinol 100 milliGRAM(s) Oral two times a day  aluminum hydroxide/magnesium hydroxide/simethicone Suspension 30 milliLiter(s) Oral every 6 hours PRN  chlorhexidine 2% Cloths 1 Application(s) Topical <User Schedule>  diazepam    Tablet 5 milliGRAM(s) Oral at bedtime PRN  ferrous    sulfate 325 milliGRAM(s) Oral daily  levothyroxine 50 MICROGram(s) Oral daily  pantoprazole    Tablet 40 milliGRAM(s) Oral before breakfast  propranolol 20 milliGRAM(s) Oral two times a day  vancomycin  IVPB 1250 milliGRAM(s) IV Intermittent every 24 hours    Antimicrobials:  vancomycin  IVPB 1250 milliGRAM(s) IV Intermittent every 24 hours  
Patient is a 89y old  Female who presents with a chief complaint of fever (19 Sep 2022 10:56)      SUBJECTIVE / OVERNIGHT EVENTS:    off floor getting TTE      Vital Signs Last 24 Hrs  T(C): 37.1 (20 Sep 2022 05:22), Max: 37.3 (19 Sep 2022 20:24)  T(F): 98.7 (20 Sep 2022 05:22), Max: 99.1 (19 Sep 2022 20:24)  HR: 72 (20 Sep 2022 05:22) (71 - 72)  BP: 153/77 (20 Sep 2022 05:22) (123/74 - 153/77)  BP(mean): --  RR: 18 (20 Sep 2022 11:02) (18 - 18)  SpO2: 94% (20 Sep 2022 11:02) (94% - 99%)    Parameters below as of 20 Sep 2022 11:02  Patient On (Oxygen Delivery Method): room air      I&O's Summary    19 Sep 2022 07:01  -  20 Sep 2022 07:00  --------------------------------------------------------  IN: 240 mL / OUT: 1350 mL / NET: -1110 mL    20 Sep 2022 07:01  -  20 Sep 2022 11:50  --------------------------------------------------------  IN: 240 mL / OUT: 200 mL / NET: 40 mL        LABS:                        7.9    12.04 )-----------( 248      ( 20 Sep 2022 07:20 )             26.2     09-20    140  |  102  |  9   ----------------------------<  128<H>  4.2   |  27  |  0.66    Ca    9.1      20 Sep 2022 07:20    TPro  6.5  /  Alb  2.9<L>  /  TBili  0.2  /  DBili  x   /  AST  15  /  ALT  15  /  AlkPhos  87  09-19      CAPILLARY BLOOD GLUCOSE                RADIOLOGY & ADDITIONAL TESTS:    Imaging Personally Reviewed:  [x] YES  [ ] NO    Consultant(s) Notes Reviewed:  [x] YES  [ ] NO    MEDICATIONS  (STANDING):  allopurinol 100 milliGRAM(s) Oral two times a day  apixaban 10 milliGRAM(s) Oral every 12 hours  chlorhexidine 2% Cloths 1 Application(s) Topical <User Schedule>  ferrous    sulfate 325 milliGRAM(s) Oral daily  levothyroxine 50 MICROGram(s) Oral daily  pantoprazole    Tablet 40 milliGRAM(s) Oral before breakfast  propranolol 20 milliGRAM(s) Oral two times a day    MEDICATIONS  (PRN):  aluminum hydroxide/magnesium hydroxide/simethicone Suspension 30 milliLiter(s) Oral every 6 hours PRN Dyspepsia  diazepam    Tablet 5 milliGRAM(s) Oral at bedtime PRN insomnia      Care Discussed with Consultants/Other Providers [x] YES  [ ] NO    HEALTH ISSUES - PROBLEM Dx:  Fever of unknown origin    Left hip postoperative wound infection    Hypothyroidism    Hypertension    Need for prophylactic measure    Insomnia    Gout        
Follow-up Pulm Progress Note  Hermes Huston MD  [phone]    Stable respiratory status  On full dose apixaban for axillary DVT  Normal RA oxygen sats      Vital Signs Last 24 Hrs  T(C): 36.8 (21 Sep 2022 04:39), Max: 37.1 (20 Sep 2022 14:18)  T(F): 98.3 (21 Sep 2022 04:39), Max: 98.8 (20 Sep 2022 14:18)  HR: 74 (21 Sep 2022 04:39) (74 - 82)  BP: 152/82 (21 Sep 2022 04:39) (148/73 - 169/72)  BP(mean): --  RR: 18 (21 Sep 2022 04:39) (17 - 18)  SpO2: 92% (21 Sep 2022 04:39) (92% - 96%)    Parameters below as of 21 Sep 2022 04:39  Patient On (Oxygen Delivery Method): room air                            8.5    11.13 )-----------( 251      ( 21 Sep 2022 07:06 )             27.0       09-20    140  |  102  |  9   ----------------------------<  128<H>  4.2   |  27  |  0.66    Ca    9.1      20 Sep 2022 07:20      CULTURES:  Culture Results:   <10,000 CFU/mL Normal Urogenital Kay (09-17 @ 22:51)  Culture Results:   No growth to date. (09-17 @ 20:50)  Culture Results:   No growth to date. (09-17 @ 20:40)    Most recent blood culture -- 09-17 @ 22:51   -- -- Clean Catch Clean Catch (Midstream) 09-17 @ 22:51  Most recent blood culture -- 09-17 @ 20:50   -- -- .Blood Blood-Peripheral 09-17 @ 20:50  Most recent blood culture -- 09-17 @ 20:40   -- -- .Blood Blood-Peripheral 09-17 @ 20:40      Physical Examination:  PULM: No wheeze or rhonchi  CVS: Regular rate and rhythm, no murmurs, rubs, or gallops  ABD: Soft, non-tender  EXT:  No clubbing, cyanosis, or edema    RADIOLOGY REVIEWED  CXR:    CT chest:    TTE:  
Follow-up Pulm Progress Note  Hermes Huston MD  [phone]    Events noted  Axillary DVT noted: otherwise UE/LE dopplers are negative  V/Q scan very low probability    Medications:  Vital Signs Last 24 Hrs  T(C): 37.1 (20 Sep 2022 05:22), Max: 37.3 (19 Sep 2022 20:24)  T(F): 98.7 (20 Sep 2022 05:22), Max: 99.1 (19 Sep 2022 20:24)  HR: 72 (20 Sep 2022 05:22) (71 - 72)  BP: 153/77 (20 Sep 2022 05:22) (123/74 - 153/77)  BP(mean): --  RR: 18 (20 Sep 2022 11:02) (18 - 18)  SpO2: 94% (20 Sep 2022 11:02) (94% - 99%)    Parameters below as of 20 Sep 2022 11:02  Patient On (Oxygen Delivery Method): room air    09-19 @ 07:01  -  09-20 @ 07:00  --------------------------------------------------------  IN: 240 mL / OUT: 1350 mL / NET: -1110 mL        LABS:                        7.9    12.04 )-----------( 248      ( 20 Sep 2022 07:20 )             26.2     09-20    140  |  102  |  9   ----------------------------<  128<H>  4.2   |  27  |  0.66    Ca    9.1      20 Sep 2022 07:20    TPro  6.5  /  Alb  2.9<L>  /  TBili  0.2  /  DBili  x   /  AST  15  /  ALT  15  /  AlkPhos  87  09-19    CULTURES:  Culture Results:   <10,000 CFU/mL Normal Urogenital Kay (09-17 @ 22:51)  Culture Results:   No growth to date. (09-17 @ 20:50)  Culture Results:   No growth to date. (09-17 @ 20:40)    Most recent blood culture -- 09-17 @ 22:51   -- -- Clean Catch Clean Catch (Midstream) 09-17 @ 22:51  Most recent blood culture -- 09-17 @ 20:50   -- -- .Blood Blood-Peripheral 09-17 @ 20:50  Most recent blood culture -- 09-17 @ 20:40   -- -- .Blood Blood-Peripheral 09-17 @ 20:40      Physical Examination:  PULM: No wheeze or rhonchi  CVS: Regular rate and rhythm, no murmurs, rubs, or gallops  ABD: Soft, non-tender  EXT:  No clubbing, cyanosis, or edema    RADIOLOGY REVIEWED  CXR:    CT chest:    TTE:  
[facility], Division of Infectious Diseases  STEVE Loaiza Y. Patel, S. Shah, G. Moises  [phone]  ([phone] - weekdays after 5pm and weekends)    Name: GEORGE ROSE  Age/Gender: 89y Female  MRN: [id number]    Interval History:  Patient seen this morning.   Notes reviewed.   No concerning overnight events.  Afebrile.   friend at bedside  patient states she has no complaints  US demonstrated DVT in L axillary vein, started on AC  PICC line removed yesterday    Allergies: iodine (Other; Anaphylaxis)  shellfish (Anaphylaxis)  sulfa drugs (Other)      Objective:  Vitals:   T(F): 98.7 (09-20-22 @ 05:22), Max: 99.1 (09-19-22 @ 20:24)  HR: 72 (09-20-22 @ 05:22) (71 - 72)  BP: 153/77 (09-20-22 @ 05:22) (123/74 - 153/77)  RR: 18 (09-20-22 @ 11:02) (18 - 18)  SpO2: 94% (09-20-22 @ 11:02) (94% - 99%)  Physical Examination:  General: no acute distress  HEENT: anicteric, NC  Cardio: S1, S2, normal rate, no murmur  Resp: clear to auscultation bilaterally  Abd: soft, NT, ND, colostomy  Ext: b/l LE edema  Skin: L hip wound closed, no surrounding erythema, swelling, tenderness  Psych: appropriate affect and mood for situation    Laboratory Studies:  CBC:                       7.9    12.04 )-----------( 248      ( 20 Sep 2022 07:20 )             26.2     WBC Trend:  12.04 09-20-22 @ 07:20  13.77 09-19-22 @ 07:10  18.37 09-18-22 @ 06:48  20.52 09-17-22 @ 20:39    CMP: 09-20    140  |  102  |  9   ----------------------------<  128<H>  4.2   |  27  |  0.66    Ca    9.1      20 Sep 2022 07:20    TPro  6.5  /  Alb  2.9<L>  /  TBili  0.2  /  DBili  x   /  AST  15  /  ALT  15  /  AlkPhos  87  09-19      LIVER FUNCTIONS - ( 19 Sep 2022 07:13 )  Alb: 2.9 g/dL / Pro: 6.5 g/dL / ALK PHOS: 87 U/L / ALT: 15 U/L / AST: 15 U/L / GGT: x           Vancomycin Level, Trough: 17.0 ug/mL (09-20-22 @ 07:20)      Microbiology: reviewed     Culture - Urine (collected 09-17-22 @ 22:51)  Source: Clean Catch Clean Catch (Midstream)  Final Report (09-19-22 @ 14:16):    <10,000 CFU/mL Normal Urogenital Kay    Culture - Blood (collected 09-17-22 @ 20:50)  Source: .Blood Blood-Peripheral  Preliminary Report (09-19-22 @ 01:02):    No growth to date.    Culture - Blood (collected 09-17-22 @ 20:40)  Source: .Blood Blood-Peripheral  Preliminary Report (09-19-22 @ 01:02):    No growth to date.      Radiology: reviewed     Medications:  allopurinol 100 milliGRAM(s) Oral two times a day  aluminum hydroxide/magnesium hydroxide/simethicone Suspension 30 milliLiter(s) Oral every 6 hours PRN  apixaban 10 milliGRAM(s) Oral every 12 hours  chlorhexidine 2% Cloths 1 Application(s) Topical <User Schedule>  diazepam    Tablet 5 milliGRAM(s) Oral at bedtime PRN  ferrous    sulfate 325 milliGRAM(s) Oral daily  levothyroxine 50 MICROGram(s) Oral daily  pantoprazole    Tablet 40 milliGRAM(s) Oral before breakfast  propranolol 20 milliGRAM(s) Oral two times a day    Antimicrobials:  
Patient is a 89y old  Female who presents with a chief complaint of fever (23 Sep 2022 09:09)      SUBJECTIVE / OVERNIGHT EVENTS:  Patient seen and examined.   Doing well, no complaints.       Vital Signs Last 24 Hrs  T(C): 36.9 (23 Sep 2022 04:16), Max: 37.2 (22 Sep 2022 21:05)  T(F): 98.5 (23 Sep 2022 04:16), Max: 99 (22 Sep 2022 21:05)  HR: 75 (23 Sep 2022 04:16) (75 - 86)  BP: 147/79 (23 Sep 2022 04:16) (131/78 - 147/79)  BP(mean): --  RR: 18 (23 Sep 2022 04:16) (18 - 18)  SpO2: 93% (23 Sep 2022 04:16) (93% - 95%)    Parameters below as of 23 Sep 2022 04:16  Patient On (Oxygen Delivery Method): room air      I&O's Summary    22 Sep 2022 07:01  -  23 Sep 2022 07:00  --------------------------------------------------------  IN: 1930 mL / OUT: 1500 mL / NET: 430 mL    23 Sep 2022 07:01  -  23 Sep 2022 12:31  --------------------------------------------------------  IN: 100 mL / OUT: 0 mL / NET: 100 mL        PHYSICAL EXAM:  GENERAL: NAD, AAOx3  HEAD:  Atraumatic, Normocephalic  EYES: EOMI; conjunctiva and sclera clear  NECK: Supple, No JVD, No LAD  CHEST/LUNG: B/L air entry; No wheezes, rales or rhonci   HEART: Regular rate and rhythm; No murmurs, rubs, or gallops  ABDOMEN: Soft, Nontender, Nondistended; Bowel sounds present  EXTREMITIES:  2+ Peripheral Pulses, No clubbing, cyanosis, or edema  SKIN: No rashes or lesions    LABS:                        8.6    8.79  )-----------( 261      ( 23 Sep 2022 07:21 )             27.4     09-23    138  |  99  |  16  ----------------------------<  151<H>  3.7   |  30  |  0.83    Ca    9.6      23 Sep 2022 07:19        CAPILLARY BLOOD GLUCOSE                RADIOLOGY & ADDITIONAL TESTS:    Imaging Personally Reviewed:  [x] YES  [ ] NO    Consultant(s) Notes Reviewed:  [x] YES  [ ] NO      MEDICATIONS  (STANDING):  allopurinol 100 milliGRAM(s) Oral two times a day  ampicillin  IVPB 2 Gram(s) IV Intermittent every 6 hours  cefTRIAXone   IVPB 2000 milliGRAM(s) IV Intermittent every 24 hours  chlorhexidine 2% Cloths 1 Application(s) Topical <User Schedule>  ferrous    sulfate 325 milliGRAM(s) Oral daily  levothyroxine 50 MICROGram(s) Oral daily  pantoprazole    Tablet 40 milliGRAM(s) Oral before breakfast  propranolol 20 milliGRAM(s) Oral two times a day    MEDICATIONS  (PRN):  aluminum hydroxide/magnesium hydroxide/simethicone Suspension 30 milliLiter(s) Oral every 6 hours PRN Dyspepsia  diazepam    Tablet 5 milliGRAM(s) Oral at bedtime PRN insomnia      Care Discussed with Consultants/Other Providers [x] YES  [ ] NO    HEALTH ISSUES - PROBLEM Dx:  Fever of unknown origin    Left hip postoperative wound infection    Hypothyroidism    Hypertension    Need for prophylactic measure    Insomnia    Gout        
Patient is a 89y old  Female who presents with a chief complaint of fever (19 Sep 2022 10:56)      SUBJECTIVE / OVERNIGHT EVENTS:    Patient seen and examined. states she feels mildly better. sob improved.      Vital Signs Last 24 Hrs  T(C): 36.9 (19 Sep 2022 04:15), Max: 37 (18 Sep 2022 22:27)  T(F): 98.5 (19 Sep 2022 04:15), Max: 98.6 (18 Sep 2022 22:27)  HR: 76 (19 Sep 2022 04:15) (66 - 85)  BP: 134/78 (19 Sep 2022 04:15) (125/80 - 147/88)  BP(mean): 83 (18 Sep 2022 12:58) (83 - 83)  RR: 18 (19 Sep 2022 04:15) (18 - 18)  SpO2: 99% (19 Sep 2022 04:15) (95% - 100%)    Parameters below as of 19 Sep 2022 04:15  Patient On (Oxygen Delivery Method): nasal cannula  O2 Flow (L/min): 2    I&O's Summary    18 Sep 2022 07:01  -  19 Sep 2022 07:00  --------------------------------------------------------  IN: 550 mL / OUT: 650 mL / NET: -100 mL        PE:  GENERAL: NAD, AAOx3  CHEST/LUNG: ronchi bases bl  HEART: Regular rate and rhythm; no murmur  ABDOMEN: Soft, Nontender, Nondistended; Bowel sounds present  EXTREMITIES:  2+ Peripheral Pulses, trace le edema  NEURO: No focal deficits    LABS:                        8.0    13.77 )-----------( 249      ( 19 Sep 2022 07:10 )             26.1     09-    139  |  101  |  12  ----------------------------<  137<H>  4.1   |  26  |  0.67    Ca    9.2      19 Sep 2022 07:13  Phos  3.5     09-18  Mg     1.6     -18    TPro  6.5  /  Alb  2.9<L>  /  TBili  0.2  /  DBili  x   /  AST  15  /  ALT  15  /  AlkPhos  87  09-19    PT/INR - ( 17 Sep 2022 20:39 )   PT: 14.3 sec;   INR: 1.23 ratio         PTT - ( 17 Sep 2022 20:39 )  PTT:21.8 sec  CAPILLARY BLOOD GLUCOSE            Urinalysis Basic - ( 17 Sep 2022 22:51 )    Color: Yellow / Appearance: Slightly Turbid / S.013 / pH: x  Gluc: x / Ketone: Negative  / Bili: Negative / Urobili: Negative   Blood: x / Protein: Trace / Nitrite: Negative   Leuk Esterase: Large / RBC: 3 /hpf / WBC 41 /HPF   Sq Epi: x / Non Sq Epi: 3 /hpf / Bacteria: Negative        RADIOLOGY & ADDITIONAL TESTS:    Imaging Personally Reviewed:  [x] YES  [ ] NO    Consultant(s) Notes Reviewed:  [x] YES  [ ] NO    MEDICATIONS  (STANDING):  allopurinol 100 milliGRAM(s) Oral two times a day  enoxaparin Injectable 40 milliGRAM(s) SubCutaneous every 24 hours  levothyroxine 50 MICROGram(s) Oral daily  pantoprazole    Tablet 40 milliGRAM(s) Oral before breakfast  propranolol 20 milliGRAM(s) Oral two times a day  vancomycin  IVPB 1500 milliGRAM(s) IV Intermittent every 24 hours    MEDICATIONS  (PRN):  aluminum hydroxide/magnesium hydroxide/simethicone Suspension 30 milliLiter(s) Oral every 6 hours PRN Dyspepsia  diazepam    Tablet 5 milliGRAM(s) Oral at bedtime PRN insomnia      Care Discussed with Consultants/Other Providers [x] YES  [ ] NO    HEALTH ISSUES - PROBLEM Dx:  Fever of unknown origin    Left hip postoperative wound infection    Hypothyroidism    Hypertension    Need for prophylactic measure    Insomnia    Gout

## 2022-09-23 NOTE — DISCHARGE NOTE PROVIDER - NSDCFUADDINST_GEN_ALL_CORE_FT
On 9/23 per ID Ampicillin 2g every 6 hours and ceftriaxone 2g every 24 hours today and ok to place midline  EOT 9/29/2022  Per ID patient w/ 1 week of IV antibiotic therapy left, no need for weekly ESR, CRP at this point jina as these may be affected by clot burden  Per ID transition to PO amoxicillin 500mg tid thereafter for chronic suppression indefinitely given retained hardware

## 2022-09-23 NOTE — PROGRESS NOTE ADULT - ASSESSMENT
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, rectovaginal fistula, HTN, hypothyroidism, GERD and left hip arthroplasty presents for fever, pt was recently admitted 8/16-8/23 at [facility] s/p I&D 8/18, placement abx beads and revision L  hip hemiarthroplasty with head and liner replacement and retention of rest of prosthetic joint left hip discharged to [facility] on 6 weeks of ampicillin + ceftriaxone via PICC line for E faecalis PJI c/b persistent fever and leukocytosis    fever, leukocytosis  recent I&D 8/18  patient w/o any localizing signs or symptoms  urine culture negative and patient denies urinary symptoms  no open wounds or evidence of SSTI on exam- L hip wound closed and w/o erythema, swelling or tenderness; also evaluated by ortho  no evidence of gout flare on exam  CXR on admission w/o evidence of focal consolidation  CT abd/pelvis 9/14- interval improvement of inflammation compared with prior imaging. No discrete associated collection.  VQ scan- very low probability  d-dimer elevated  LE US- negative for DVT  UE US- Associated with the PICC line, there is a thrombosed left axillary vein.  blood cx NGTD  PICC line removed 9/19  afebrile since admission and leukocytosis resolved  Plan:  fever likely 2/2 DVT- anticoagulation per primary team/ pulm  would explain elevated d-dimer and inflammatory markers as well    E faecalis PJI infection  s/p I&D 8/18, placement abx beads and revision L hip hemiarthroplasty with head and liner replacement  was discharged on ampicillin 2g every 6 hours and ceftriaxone 2g every 24 hours in the setting of retained prosthesis to complete 6 week course of antibiotics from date of surgical intervention w/ EOT 9/29/2022   was switched to vancomycin due to concerns for possible drug fever, however, based on evaluation fever was likely due to DVT  vancomycin switched back to ampicillin and ceftriaxone 2g every 24 hours today  ok to place midline  ampicillin adjusted to 2g every 4 hours given improvement in renal function  EOT 9/29/2022  patient w/ 1 week of IV antibiotic therapy left, no need for weekly ESR, CRP at this point jina as these may be affected by clot burden  transition to PO amoxicillin 500mg tid thereafter for chronic suppression indefinitely given retained hardware  No objection to discharge from ID perspective    Discussed plan with patient yesterday and today. Discussed plan with daughter yesterday    Josias De Leon M.D.  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, rectovaginal fistula, HTN, hypothyroidism, GERD and left hip arthroplasty presents for fever, pt was recently admitted 8/16-8/23 at [facility] s/p I&D 8/18, placement abx beads and revision L  hip hemiarthroplasty with head and liner replacement and retention of rest of prosthetic joint left hip discharged to [facility] on 6 weeks of ampicillin + ceftriaxone via PICC line for E faecalis PJI c/b persistent fever and leukocytosis    fever, leukocytosis  recent I&D 8/18  patient w/o any localizing signs or symptoms  urine culture negative and patient denies urinary symptoms  no open wounds or evidence of SSTI on exam- L hip wound closed and w/o erythema, swelling or tenderness; also evaluated by ortho  no evidence of gout flare on exam  CXR on admission w/o evidence of focal consolidation  CT abd/pelvis 9/14- interval improvement of inflammation compared with prior imaging. No discrete associated collection.  VQ scan- very low probability  d-dimer elevated  LE US- negative for DVT  UE US- Associated with the PICC line, there is a thrombosed left axillary vein.  blood cx NGTD  PICC line removed 9/19  afebrile since admission and leukocytosis resolved  Plan:  fever likely 2/2 DVT- anticoagulation per primary team/ pulm  would explain elevated d-dimer and inflammatory markers as well    E faecalis PJI infection  s/p I&D 8/18, placement abx beads and revision L hip hemiarthroplasty with head and liner replacement  was discharged on ampicillin 2g every 6 hours and ceftriaxone 2g every 24 hours in the setting of retained prosthesis to complete 6 week course of antibiotics from date of surgical intervention w/ EOT 9/29/2022   was switched to vancomycin due to concerns for possible drug fever, however, based on evaluation fever was likely due to DVT  vancomycin switched back to ampicillin 2g every 6 hours and ceftriaxone 2g every 24 hours today  ok to place midline  EOT 9/29/2022  patient w/ 1 week of IV antibiotic therapy left, no need for weekly ESR, CRP at this point jina as these may be affected by clot burden  transition to PO amoxicillin 500mg tid thereafter for chronic suppression indefinitely given retained hardware  No objection to discharge from ID perspective    Discussed plan with patient yesterday and today. Discussed plan with daughter yesterday    Josias De Leon M.D.  [facility], Division of Infectious Diseases  [phone]  After 5pm on weekdays and all day on weekends - please call [phone]

## 2022-09-23 NOTE — DISCHARGE NOTE PROVIDER - NSDCCPCAREPLAN_GEN_ALL_CORE_FT
PRINCIPAL DISCHARGE DIAGNOSIS  Diagnosis: Fever of unknown origin  Assessment and Plan of Treatment: Fever likely 2/2 DVT - LUE axillary DVT provoked by PICC line,   PICC line removed on 19th  CXR small left pleural effusion, no consolidations / blood cx. NTD, U/A mildly pos, fu urine cx  VQ low prob PE & titrated off O2,   checked on RA / WBC downtrending / ESR high but down from august  TTE noted LVEF hyperdynamic ; no RV strain will c/w eliquis 10mg bid for 7 days, then 5mg bid  Seen by PT / Seen and followed by ID      SECONDARY DISCHARGE DIAGNOSES  Diagnosis: Left hip postoperative wound infection  Assessment and Plan of Treatment: Left hip postoperative wound infection   recent revision of left hip ; s/p abx   Per ortho recs. - does not feel periprosthetic hip joint infection  PER ID changed abx to amp / ceftriaxone -- midline placed on 9/23 and dc'd to rehab with abx until 09/29 , then needs to be on amox. chronically for suppression    Diagnosis: Hypothyroidism  Assessment and Plan of Treatment: c/w Synthroid    Diagnosis: Hypertension  Assessment and Plan of Treatment: Hypertension - c/w home nadolol --> propranolol inpatient

## 2022-09-23 NOTE — DISCHARGE NOTE PROVIDER - NSDCMRMEDTOKEN_GEN_ALL_CORE_FT
allopurinol 100 mg oral tablet: 1 tab(s) orally 2 times a day  aluminum hydroxide-magnesium hydroxide 200 mg-200 mg/5 mL oral suspension: 30 milliliter(s) orally every 6 hours, As needed, Dyspepsia  ampicillin: 2 gram(s) intravenous every 6 hours through 9/29 via midline   Per ID patient w/ 1 week of IV antibiotic therapy left, no need for weekly ESR, CRP at this point jina as these may be affected by clot burden  Per ID transition to PO amoxicillin 500mg tid thereafter for chronic suppression indefinitely given retained hardware  cefTRIAXone: 2 gram(s) intravenous once a day through 9/29 via midline   Per ID patient w/ 1 week of IV antibiotic therapy left, no need for weekly ESR, CRP at this point jina as these may be affected by clot burden  Per ID transition to PO amoxicillin 500mg tid thereafter for chronic suppression indefinitely given retained hardware    diazePAM 5 mg oral tablet: 1 tab(s) orally once a day (at bedtime), As needed, insomnia  Hold for sedation  ferrous sulfate 325 mg (65 mg elemental iron) oral tablet: 1 tab(s) orally once a day  levothyroxine 50 mcg (0.05 mg) oral tablet: 1 tab(s) orally once a day  pantoprazole 40 mg oral delayed release tablet: 1 tab(s) orally once a day (before a meal)  propranolol 20 mg oral tablet: 1 tab(s) orally 2 times a day   allopurinol 100 mg oral tablet: 1 tab(s) orally 2 times a day  aluminum hydroxide-magnesium hydroxide 200 mg-200 mg/5 mL oral suspension: 30 milliliter(s) orally every 6 hours, As needed, Dyspepsia  ampicillin: 2 gram(s) intravenous every 6 hours through 9/29 via midline   Per ID patient w/ 1 week of IV antibiotic therapy left, no need for weekly ESR, CRP at this point jina as these may be affected by clot burden  Per ID transition to PO amoxicillin 500mg tid thereafter for chronic suppression indefinitely given retained hardware  apixaban 5 mg oral tablet: 2 tab(s) orally every 12 hours  started on 9/20  - rec&#x27;d 4 doses   restarted after midline placed 9/23    cefTRIAXone: 2 gram(s) intravenous once a day through 9/29 via midline   Per ID patient w/ 1 week of IV antibiotic therapy left, no need for weekly ESR, CRP at this point jina as these may be affected by clot burden  Per ID transition to PO amoxicillin 500mg tid thereafter for chronic suppression indefinitely given retained hardware    diazePAM 5 mg oral tablet: 1 tab(s) orally once a day (at bedtime), As needed, insomnia  Hold for sedation  ferrous sulfate 325 mg (65 mg elemental iron) oral tablet: 1 tab(s) orally once a day  levothyroxine 50 mcg (0.05 mg) oral tablet: 1 tab(s) orally once a day  pantoprazole 40 mg oral delayed release tablet: 1 tab(s) orally once a day (before a meal)  propranolol 20 mg oral tablet: 1 tab(s) orally 2 times a day

## 2022-09-23 NOTE — PROGRESS NOTE ADULT - ASSESSMENT
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, HTN, hypothyroidism, GERD and left hip arthroplasty presents for further work up of fever    # Fever likely 2/2 DVT   LUE axillary DVT provoked by PICC line, PICC line removed on 19th  CXR small left pleural effusion, no consolidations  blood NTD, U/A mildly pos, fu urine cx  VQ low prob PE  titrate off O2, check on RA  WBC downtrending  ESR high but down from august  TTE noted lvef hyperdynamic ; no RV strain  C/w eliquis 10mg bid for 7 days, then 5mg bid  PT    # Left hip postoperative wound infection  recent revision of left hip ; s/p abx beads  appreciate ortho recs, does not feel periprosthetic hip joint infection  changed abx to amp / ceftriaxone per ID -- midline  and dc to rehab with abx until 09/29 , then needs to be on amox chronically for supression     # Hypothyroidism  cont synthroid    # Hypertension  c/w home nadolol --> propranolol inpatient    # Insomnia  c/w home valium PRN (confirmed via i[employer] Reference #[id number])    # Gout  c/w home allopurinol  check uric acid in AM    dvt ppx: lovenox    dtr Pia -- [phone]; updated today.     Please contact with any questions or concerns [phone].

## 2022-09-23 NOTE — DISCHARGE NOTE PROVIDER - CARE PROVIDERS DIRECT ADDRESSES
[email or web address]

## 2022-09-23 NOTE — PROGRESS NOTE ADULT - PROVIDER SPECIALTY LIST ADULT
Infectious Disease
Infectious Disease
Internal Medicine
Infectious Disease
Internal Medicine
Pulmonology
Internal Medicine
Pulmonology
Pulmonology
Infectious Disease
Infectious Disease

## 2022-09-23 NOTE — DISCHARGE NOTE NURSING/CASE MANAGEMENT/SOCIAL WORK - NSDCPEFALRISK_GEN_ALL_CORE
For information on Fall & Injury Prevention, visit: [email or web address] OR  [email or web address] OR  https://www.cdc.gov/steadi/patient.html

## 2022-10-08 LAB
CULTURE RESULTS: SIGNIFICANT CHANGE UP
SPECIMEN SOURCE: SIGNIFICANT CHANGE UP

## 2023-01-26 NOTE — ED PROVIDER NOTE - CADM POA PRESS ULCER
Pt returns call, discussed with patient GI providers in the area. Pt will view website and call to schedule.    No

## 2023-01-28 NOTE — PROGRESS NOTE ADULT - SUBJECTIVE AND OBJECTIVE BOX
Patient seen and examined  no complaints    iodine (Other; Anaphylaxis)  shellfish (Anaphylaxis)  sulfa drugs (Other)    Hospital Medications:   MEDICATIONS  (STANDING):  aspirin enteric coated 81 milliGRAM(s) Oral daily  heparin  Injectable 5000 Unit(s) SubCutaneous every 8 hours  lactated ringers. 1000 milliLiter(s) (75 mL/Hr) IV Continuous <Continuous>  levothyroxine 50 MICROGram(s) Oral daily  prochlorperazine   Injectable 5 milliGRAM(s) IV Push once        VITALS:  T(F): 98.3 (18 @ 05:41), Max: 100.3 (18 @ 14:02)  HR: 67 (18 @ 05:41)  BP: 90/63 (18 @ 05:41)  RR: 18 (18 @ 05:41)  SpO2: 95% (18 @ 05:41)  Wt(kg): --     @ 07:01  -   @ 07:00  --------------------------------------------------------  IN: 1240 mL / OUT: 850 mL / NET: 390 mL     @ 07:01  -   @ 10:50  --------------------------------------------------------  IN: 0 mL / OUT: 400 mL / NET: -400 mL        Physical Exam :-  Constitutional: NAD  Neck: Supple.  Respiratory: Bilateral equal breath sounds,  Cardiovascular: S1, S2 normal,  Gastrointestinal: Bowel Sounds present, soft, non tender.  Extremities: No edema  Neurological: Alert and Oriented x 3, no focal deficits  Psychiatric: Normal mood, normal affect    LABS:      135  |  102  |  53<H>  ----------------------------<  131<H>  4.3   |  20<L>  |  1.49<H>    Ca    9.0      07 Dec 2018 07:26      Creatinine Trend: 1.49 <--, 1.62 <--, 1.69 <--, 1.78 <--, 2.27 <--, 2.90 <--, 3.42 <--                        11.7   22.7  )-----------( 200      ( 07 Dec 2018 07:26 )             34.8     Urine Studies:  Urinalysis Basic - ( 04 Dec 2018 13:28 )    Color: Light Yellow / Appearance: Clear / S.014 / pH:   Gluc:  / Ketone: Negative  / Bili: Negative / Urobili: Negative   Blood:  / Protein: Negative / Nitrite: Negative   Leuk Esterase: Negative / RBC:  / WBC    Sq Epi:  / Non Sq Epi:  / Bacteria:       Creatinine, Random Urine: 79 mg/dL ( @ 16:38)  Protein/Creatinine Ratio Calculation: 0.1 Ratio ( @ 16:38)  Osmolality, Random Urine: 395 mos/kg ( @ 13:29)  Sodium, Random Urine: <20 mmol/L ( @ 13:29)    RADIOLOGY & ADDITIONAL STUDIES: 2

## 2023-06-22 NOTE — SWALLOW BEDSIDE ASSESSMENT ADULT - SLP PERTINENT HISTORY OF CURRENT PROBLEM
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 Per daughter at bedside pt lives alone in a house with an aid, who noticed that she was not her self for the last week. She seemed to have low energy and was fatigued intermittently for a week. The PT that comes to the house reported that the pt was have intermittent L flank pain as well during the PT session only. She currently has L flank pain, 8/10, non radiating, moving exacerbates it. She denies fever, chills, cough, abdominal pain, and any changes in the ileostomy output. Per daughter (Pia) started complaining of increased urinary urgency 4 days ago, so her Daughter Sandra (NP) prescribed her cipro. subsequently her urinary complaints improved. Hrs         Neuro/Psych:               GI/Hepatic/Renal:             Endo/Other:    (+) Diabetes, . Abdominal:             Vascular: Other Findings:             Anesthesia Plan      MAC and general     ASA 3       Induction: intravenous. MIPS: prophylactic pharmacologic antiemetic agents not administered perioperatively for documented reasons. Anesthetic plan and risks discussed with patient. Plan discussed with CRNA.     Attending anesthesiologist reviewed and agrees with Preprocedure content                Romeo Chase DO   6/22/2023

## 2023-09-15 NOTE — H&P ADULT - PROBLEM SELECTOR PLAN 4
ENT ISSUE/POD:  Left otitis externa and otomastoiditis     HPI: Patient seen and examined at bedside. No acute complaints per  at bedside. Patient is on full ventilator support. Unable to answer any question.        PAST MEDICAL & SURGICAL HISTORY:  Breast CA      Diabetes      Stroke      Cardiac arrest      HTN (hypertension)      H/O mastectomy, bilateral        Allergies    isoniazid (Rash)  nafcillin (Unknown)  hydrALAZINE (Rash)  vitamin E (Short breath; Urticaria; Hives)  doxycycline (Rash)  cefepime (Rash)  NIFEdipine (Urticaria; Hives)    Intolerances      MEDICATIONS  (STANDING):  albuterol/ipratropium for Nebulization 3 milliLiter(s) Nebulizer every 6 hours  atorvastatin 40 milliGRAM(s) Oral at bedtime  chlorhexidine 0.12% Liquid 15 milliLiter(s) Oral Mucosa every 12 hours  chlorhexidine 2% Cloths 1 Application(s) Topical daily  ciprofloxacin/hydrocortisone Suspension Otic 4 Drop(s) Left Ear two times a day  dextrose 5%. 1000 milliLiter(s) (50 mL/Hr) IV Continuous <Continuous>  dextrose 5%. 1000 milliLiter(s) (100 mL/Hr) IV Continuous <Continuous>  dextrose 50% Injectable 12.5 Gram(s) IV Push once  dextrose 50% Injectable 25 Gram(s) IV Push once  dextrose 50% Injectable 25 Gram(s) IV Push once  doxazosin 6 milliGRAM(s) Oral <User Schedule>  epoetin odalis (EPOGEN) Injectable 23459 Unit(s) SubCutaneous <User Schedule>  ferrous    sulfate Liquid 300 milliGRAM(s) Enteral Tube daily  FIRST- Mouthwash  BLM 10 milliLiter(s) Swish and Spit four times a day  glucagon  Injectable 1 milliGRAM(s) IntraMuscular once  heparin  Infusion 1000 Unit(s)/Hr (9.5 mL/Hr) IV Continuous <Continuous>  insulin lispro (ADMELOG) corrective regimen sliding scale   SubCutaneous every 6 hours  insulin NPH human recombinant 3 Unit(s) SubCutaneous every 6 hours  meropenem  IVPB 500 milliGRAM(s) IV Intermittent every 12 hours  mupirocin 2% Ointment 1 Application(s) Topical two times a day  mupirocin 2% Ointment 1 Application(s) Both Nostrils two times a day  pantoprazole  Injectable 40 milliGRAM(s) IV Push two times a day  psyllium Powder 1 Packet(s) Oral daily  sevelamer carbonate Powder 800 milliGRAM(s) Oral three times a day  sodium chloride 2 Gram(s) Oral two times a day  sodium chloride 1.5%. 500 milliLiter(s) (50 mL/Hr) IV Continuous <Continuous>  sodium chloride 3%  Inhalation 4 milliLiter(s) Inhalation every 6 hours    MEDICATIONS  (PRN):  acetaminophen   Oral Liquid .. 650 milliGRAM(s) Oral every 6 hours PRN Temp greater or equal to 38C (100.4F), Mild Pain (1 - 3)  dextrose Oral Gel 15 Gram(s) Oral once PRN Blood Glucose LESS THAN 70 milliGRAM(s)/deciliter      Social History: see consult note    Family history: see consult note    ROS:   ENT: all negative except as noted in HPI   Pulm: denies SOB, cough, hemoptysis  Neuro: denies numbness/tingling, loss of sensation  Endo: denies heat/cold intolerance, excessive sweating      Vital Signs Last 24 Hrs  T(C): 37.6 (15 Sep 2023 04:00), Max: 38.2 (15 Sep 2023 00:00)  T(F): 99.7 (15 Sep 2023 04:00), Max: 100.8 (15 Sep 2023 00:00)  HR: 100 (15 Sep 2023 07:49) (92 - 108)  BP: 101/63 (15 Sep 2023 04:00) (101/63 - 149/48)  BP(mean): 74 (15 Sep 2023 00:00) (69 - 74)  RR: 20 (15 Sep 2023 04:00) (20 - 26)  SpO2: 98% (15 Sep 2023 07:49) (94% - 100%)    Parameters below as of 15 Sep 2023 04:45  Patient On (Oxygen Delivery Method): ventilator                              7.5    6.16  )-----------( 169      ( 15 Sep 2023 01:37 )             22.9    09-15    123<L>  |  88<L>  |  88<H>  ----------------------------<  229<H>  4.0   |  22  |  2.59<H>    Ca    7.7<L>      15 Sep 2023 01:37  Phos  3.0     09-15  Mg     2.40     09-15     PTT - ( 15 Sep 2023 01:37 )  PTT:79.6 sec    PHYSICAL EXAM:  Gen: NAD  Skin: No rashes, bruises, or lesions  Head: Normocephalic, Atraumatic  Face: no edema, erythema, or fluctuance. Parotid glands soft without mass  Eyes: no scleral injection  Ears: Left - conchal bowl tissue with healthy pink color, mupirocin ointment applied to the conchal bowl. external ear canal clean and dry, TM intact but thickened. No mastoid tenderness, erythema, or ear bulging  Nose: Nares bilaterally patent, no discharge  Mouth: No Stridor / Drooling / Trismus.  Mucosa moist, tongue/uvula midline, oropharynx clear  Neck: + tracheostomy tube in the neck secured with posey. Flat, supple, no lymphadenopathy, trachea midline, no masses  Lymphatic: No lymphadenopathy  Resp: breathing easily, no stridor  Neuro: facial nerve intact, no facial droop         - will hold HTN meds at pt had hypotension on presentation and currently BP is stable at 110s/60s, 2/2 dehydration   - start home nadolol once BP improves

## 2023-11-03 NOTE — DISCHARGE NOTE PROVIDER - NSDCHHATTENDCERT_GEN_ALL_CORE
My signature below certifies that the above stated patient is homebound and upon completion of the Face-To-Face encounter, has the need for intermittent skilled nursing, physical therapy and/or speech or occupational therapy services in their home for their current diagnosis as outlined in their initial plan of care. These services will continue to be monitored by myself or another physician.
Patient instructed on the following medications adjustments: Hold janumet on DOS  POCT glucose testing ordered STAT upon admission

## 2023-11-15 NOTE — CONSULT NOTE ADULT - CONSULT REQUESTED BY NAME
INR results reviewed with Dr. Vanessa Washburn. Orders received. Patient notified via phone of these orders. Patient voices understanding of the information and follow-up. AVS printed and mailed to patient with appointment card for next INR check.
ED
Dr. Cesar Fiore
[place]

## 2023-11-15 NOTE — ED PROVIDER NOTE - NS_SCRIBENAMERES_ED_ALL_ED_FT
Amanda Stanton is a 54 year old  female presenting for a complete physical/preventative health care exam.     Additional issues to address:    1. Hypertension:  No chest pain, no shortness of breath, no dizziness or edema. Taking medication daily, including within the past 24 hours.  10/23 BMP.      10/23 mammogram ordered***    No results found for: \"CHOLESTEROL\"  No results found for: \"HDL\"  No results found for: \"TRIGLYCERIDE\"  No results found for: \"CALCLDL\"    Sodium (mmol/L)   Date Value   10/25/2023 137     Potassium (mmol/L)   Date Value   10/25/2023 4.4     Chloride (mmol/L)   Date Value   10/25/2023 105     Glucose (mg/dL)   Date Value   10/25/2023 100 (H)     Calcium (mg/dL)   Date Value   10/25/2023 9.4     Carbon Dioxide (mmol/L)   Date Value   10/25/2023 25     BUN (mg/dL)   Date Value   10/25/2023 21 (H)     Creatinine (mg/dL)   Date Value   10/25/2023 0.85       Immunization History   Administered Date(s) Administered   • Influenza, quadrivalent, multi-dose 10/29/2015   • Influenza, quadrivalent, preserve-free 10/24/2023   • Influenza, seasonal, injectable, trivalent 09/30/2013   • Pneumococcal Conjugate 20 Valent Vacc (Prevnar 20) 10/24/2023   • Tdap 10/29/2015       ALLERGIES:  No Known Allergies    Current Outpatient Medications   Medication Sig Dispense Refill   • fluticasone-vilanterol (Breo Ellipta) 100-25 MCG/ACT inhaler Inhale 1 puff into the lungs daily. Rinse mouth and throat after each use 1 each 11   • ferrous sulfate 325 (65 FE) MG tablet Take 1 tablet by mouth daily (with breakfast). 30 tablet 1   • albuterol 108 (90 Base) MCG/ACT inhaler Inhale 2 puffs into the lungs every 4 hours as needed for Shortness of Breath or Wheezing. 18 g 1   • spironolactone-hydrochlorothiazide (Aldactazide) 25-25 MG per tablet Take 1 tablet by mouth daily. 90 tablet 1   • losartan (COZAAR) 50 MG tablet Take 1 tablet by mouth in the morning and 1 tablet in the evening. 180 tablet 3   • dilTIAZem (CARDIZEM CD)  240 MG 24 hr capsule Take 1 capsule by mouth in the morning and 1 capsule in the evening. 180 capsule 3   • amLODIPine (NORVASC) 10 MG tablet Take 1 tablet by mouth daily. 90 tablet 3   • GuaiFENesin 1200 MG 12 hr tablet Take 1 tablet by mouth every 12 hours. 14 tablet 0     No current facility-administered medications for this visit.       Past Medical History:   Diagnosis Date   • Anemia    • Congestive cardiac failure (CMD)    • COVID-19 2021   • Fibroid 2019    left pedunculated 4.4 cm   • HYPERTENSION    • Hypertrophic cardiomyopathy (CMD)     septum 19 mm by MRI with 36 mmHg Valsalva gradient= echo= 9/15/21); Gene negative = 21   • Influenza A 2022    admit [facility] 22-22   • Inguinal hernia 2019    bilateral, fat containing, incidental   • Menorrhagia    • Ovarian cyst 2019    right, 4.7 cm, with calcification   • Vasovagal syncope 2021    admitted       Patient Active Problem List    Diagnosis Date Noted   • Pyelonephritis 09/15/2023     Priority: Low   • Acute hypoxemic respiratory failure due to COVID-19 (CMD) 2023     Priority: Low   • Respiratory failure (CMD) 2023     Priority: Low   • COVID-19 virus infection 2021     Priority: Low   • Hypertensive urgency 2021     Priority: Low   • Hypertrophic cardiomyopathy (CMD)      Priority: Low     septum 19 mm by MRI with 36 mmHg Valsalva gradient= echo= 9/15/21); Gene negative = 21     • Fibroid 2019     Priority: Low     left pedunculated 4.4 cm     • Ovarian cyst 2019     Priority: Low     right, 4.7 cm, with calcification     • Vasovagal syncope 10/29/2015     Priority: Low   • Iron deficiency anemia 10/29/2015     Priority: Low   • Essential hypertension, benign 2013     Priority: Low       Past Surgical History:   Procedure Laterality Date   •  delivery+postpartum care       x4   •  section, classic     • Pap smear  06/15/2021     normal pap, negative HPV repeat 5 years   • Us guide breast biopsy 2 lesions left  11/2015    Left Breast Biopsy x2 with clip placement at 1100 and 1200       Social History     Socioeconomic History   • Marital status: Single     Spouse name: Not on file   • Number of children: Not on file   • Years of education: Not on file   • Highest education level: Not on file   Occupational History   • Occupation: unemployed     Comment: ON LEAVE FROM [employer]   Tobacco Use   • Smoking status: Former     Current packs/day: 0.25     Average packs/day: 0.3 packs/day for 21.5 years (5.4 ttl pk-yrs)     Types: Cigarettes     Start date: 11/14/2016   • Smokeless tobacco: Never   • Tobacco comments:     OFF AND ON   Vaping Use   • Vaping Use: never used   Substance and Sexual Activity   • Alcohol use: Not Currently   • Drug use: Not Currently   • Sexual activity: Yes   Other Topics Concern   •  Service Not Asked   • Blood Transfusions Yes     Comment: multiple, x8   • Caffeine Concern Not Asked   • Occupational Exposure Not Asked   • Hobby Hazards Not Asked   • Sleep Concern Not Asked   • Stress Concern Not Asked   • Weight Concern Not Asked   • Special Diet Not Asked   • Back Care Not Asked   • Exercise Yes   • Bike Helmet Not Asked   • Seat Belt Yes   • Self-Exams Not Asked   Social History Narrative    Grew up in [place].     Single.  4 grown children with previous partner.          Unemployed.      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              Social Determinants of Health     Financial Resource Strain: Low Risk  (9/15/2023)    Financial Resource Strain    • Social Determinants: Financial Resource Strain: None   Food Insecurity: No Food Insecurity (9/15/2023)    Food Insecurity    • Social Determinants: Food Insecurity: Rarely   Transportation Needs: No Transportation Needs (9/15/2023)    PRAPARE - Transportation    • Lack of Transportation (Medical): No    • Lack  of Transportation (Non-Medical): No   Physical Activity: Insufficiently Active (2019)    Exercise Vital Sign    • Days of Exercise per Week: 2 days    • Minutes of Exercise per Session: 40 min   Stress: Not on file   Social Connections: Socially Integrated (9/15/2023)    Social Connections    • Social Determinants: Social Connections: 3 to 5 times a week   Intimate Partner Violence: Not At Risk (9/15/2023)    Intimate Partner Violence    • Social Determinants: Intimate Partner Violence Past Fear: No    • Social Determinants: Intimate Partner Violence Current Fear: No       Family Status   Relation Name Status   • Mo   at age 78        ?brain/stomach cancer, metastatic   • Fa   at age 76        diabetes   • Salbador x3 Alive   • Son x1 Alive   • HSis  Alive   • HBro  Alive   • HSis   at age 54   • HSis  Alive   • HBro  Alive   • Sis  Alive   • Sis  Alive       Family History   Problem Relation Age of Onset   • Cancer Mother         stomach/brain   • Hypertension Mother    • Diabetes Father    • Diabetes Half-Sister    • Hypertension Half-Brother    • Hypertension Sister          REVIEW OF SYSTEMS: The patient DENIES symptoms of:   General: Fever, chills, night sweats, feeling tired, weight loss, weight gain, decreased appetite.  Skin: Rash, itching, lumps or bumps or moles that are changing, hair changes, nail changes.  Eyes: Visual blurring, double vision, seeing spots, eye pain.  Ears: Decreased auditory acuity, ringing in the ears, pain in the ears, drainage from the ears.  Nose: Nosebleeds, drainage from the nose, decrease in ability to smell.  Mouth: Soreness of the mouth or tongue or lips, abnormality of the teeth or gums.  Throat: Sore throat, hoarseness or voice change.  Neck: Stiffness or pain in the neck.  Breasts: Changes of the breasts, lumps or bumps in the breasts, discharge from the nipples, pain in the breasts, nipple changes.  Cardiorespiratory: Chest pain, edema, cough,  coughing up bloody mucus, wheezing, shortness of breath.  Gastrointestinal: Abdominal pain, nausea, vomiting, constipation, diarrhea, black tarry stools, blood in the stools, change in the bowel habits, sour burps or heartburn, indigestion.  Genitourinary: Urgency, frequency, pain with urination, hematuria, getting up in the night to urinate. No spotting or abnormal discharges ***  Neurologic: Headache, weakness, loss of sensation, visual disturbance, trouble with balance or coordination, loss of memory.  Musculoskeletal: Joint pain, muscle pain.  Psychiatric: Symptoms of depression, feeling sad or blue.      Visit Vitals  LMP 03/08/2017 (Exact Date)     PHYSICAL EXAM:  General:  Alert, in no acute distress.  Skin:  Warm with normal turgor.  Head:  Atraumatic and normocephalic.  Eyes:  Eyelids and conjunctivae appear normal, no excessive tearing or discharge.  Ears:  Right - Tympanic membrane is clear and normal appearing.            Left - Tympanic membrane is clear and normal appearing.  Nose:  No flaring or discharge present.  Throat:  Oropharynx is clear, no redness or exudate present.   Neck:  Supple with no significant adenopathy, no thyromegaly.  Lungs:  Clear to auscultation, no wheezing or rhonchi noted.  Heart:  Regular rhythm, no murmur present.  Abdomen:  Soft, no guarding or masses, no organomegaly.  Extremities:  Full range of motion, with normal appearing joints.  Breasts:  Symmetric, no abnormal masses.  {HLPEGENDER:528171}    Assessment/Plan:    Routine general medical examination at health care facility        No follow-ups on file.                       Zee Capone (Scribe)

## 2023-12-21 NOTE — DIETITIAN INITIAL EVALUATION ADULT - NS FNS DIET ORDER
Resident
Resident
Diet, Regular:   DASH/TLC {Sodium & Cholesterol Restricted} (DASH) (08-18-22 @ 10:25)

## 2023-12-26 NOTE — CONSULT NOTE ADULT - PROBLEM/RECOMMENDATION-2
previous_biopsy_has_been_previously_biopsied Body Location Override (Optional): Left mid anterior leg DISPLAY PLAN FREE TEXT

## 2024-01-02 NOTE — DISCHARGE NOTE PROVIDER - NSDCHHBASESERVICE_GEN_ALL_CORE
[FreeTextEntry6] : diaper rash for 2 days Was recently treated for a diaper rash with nystatin but this rash looks different
Physical therapy

## 2024-05-23 NOTE — ED ADULT NURSE NOTE - NSSEPSISCRITERIAMET_ED_A_ED
fall precautions/cardiac precautions Abnormal Lactate cardiac precautions/obesity precautions/fall precautions 4 = No assist / stand by assistance

## 2024-07-03 NOTE — DIETITIAN INITIAL EVALUATION ADULT. - OTHER INFO
Comprehensive Nutrition Assessment    Type and Reason for Visit:  RD Nutrition Re-Screen/LOS, Initial    Nutrition Recommendations/Plan:   Advance PO diet back to Regular texture, 3CHO/meal, heart-healthy diet 2g NA  Order Glucerna (each provides 220 kcal, 10g protein) daily  Continue to monitor tolerance of PO, compliance of oral supplements, weight, labs, and plan of care during admission.     Malnutrition Assessment:  Malnutrition Status:  Insufficient data (07/03/24 1621)    Context:  Chronic Illness     Findings of the 6 clinical characteristics of malnutrition:  Energy Intake:  Unable to assess  Weight Loss:  No significant weight loss     Body Fat Loss:  Unable to assess     Muscle Mass Loss:  Unable to assess    Fluid Accumulation:  No significant fluid accumulation     Strength:  Not Performed    Nutrition History and Allergies:    PMHx: CAD, CHFpEF, DM, ESRD on HD, hx mI, HTN, scoliosis. No nutrition hx at this time.     Weight Hx: reviewed. Overall, pt fairly wt stable over last few years, UBW ~205# per EMR.   Wt Readings from Last 10 Encounters:   07/03/24 90.4 kg (199 lb 4.7 oz)   10/20/23 89.4 kg (197 lb)   07/26/21 91.2 kg (201 lb)   07/08/21 91.2 kg (201 lb)   06/24/21 89.7 kg (197 lb 12.8 oz)     NFPE: unable to perform NFPE. Food Allergies: NKFA    Nutrition Assessment:    Admitted for Diabetic foot infection,+osteomyelitis. Clinical course significant for: (6/26, 7/3) I/D of R foot with eventual wound vac placement. Pt seen for - LOS. Wt hx reviewed, no significant drops. Reviewed intakes in I/O, noted pt NPO on 7/1 (3 intakes of 0% documented), otherwise intakes % of meals? Unable to reach pt via phone d/t being in OR. Per nsg notes pt \"has a great appetite\". Will start Glucerna daily for additional protein needs as well as to help stabilize BG. Discuss Rusty with pt at f/u    Nutrition Related Findings:    Last BM (including prior to admit): 07/02/24. Pertinent Meds: Statin, Colace  2x/d, lantus, SSI, culturelle, PPI, Zosyn, vancomycin.  Pertinent Labs: POC Glucose 55-93 mg/dl x 24 hrs, no BMP or CBC x2d.  Edema: +1 b/l LE Wound Type: Surgical Incision, Diabetic Ulcer            Temp: 98.1 °F (36.7 °C)  Temp (24hrs), Av.6 °F (37 °C), Min:98.1 °F (36.7 °C), Max:99.1 °F (37.3 °C)       Current Nutrition Intake & Therapies:    Average Meal Intake: 26-50%, 51-75%, %  Average Supplements Intake: None Ordered  Diet NPO Exceptions are: Sips of Water with Meds  ADULT ORAL NUTRITION SUPPLEMENT; Dinner; Diabetic Oral Supplement    Anthropometric Measures:  Height: 162.4 cm (5' 3.94\")  Ideal Body Weight (IBW): 120 lbs (55 kg)       Current Body Weight: 90.4 kg (199 lb 4.7 oz), 166.1 % IBW. Weight Source: Bed Scale  Current BMI (kg/m2): 34.3  Usual Body Weight: 93 kg (205 lb) (per EMRq)  % Weight Change (Calculated): -2.8  Weight Adjustment For: No Adjustment                 BMI Categories: Obese Class 1 (BMI 30.0-34.9)    Estimated Daily Nutrient Needs:  Energy Requirements Based On: Formula  Weight Used for Energy Requirements: Current  Energy (kcal/day): 1709 kcals (MSJ x1.2AF)  Weight Used for Protein Requirements: Current  Protein (g/day): 108g (1.2g/kg, 25% of kcals from pro; wounds and ESRD)  Method Used for Fluid Requirements: 1 ml/kcal  Fluid (ml/day): 1709 ml    Nutrition Diagnosis:   Increased nutrient needs related to increase demand for energy/nutrients as evidenced by wounds, dialysis    Nutrition Interventions:   Food and/or Nutrient Delivery: Start Oral Diet, Start Oral Nutrition Supplement  Nutrition Education/Counseling: No recommendation at this time  Coordination of Nutrition Care: Continue to monitor while inpatient       Goals:     Goals: PO intake 75% or greater, within 7 days       Nutrition Monitoring and Evaluation:   Behavioral-Environmental Outcomes: None Identified  Food/Nutrient Intake Outcomes: Food and Nutrient Intake, Supplement Intake  Physical Signs/Symptoms  Pt seen today due to Length Of Stay. She lives at home with a live in aide whom does the food cooking. Her daughter does the food shopping. She follows a low purine diet. Pt has a colostomy. Reports allergy to shellfish -> Hives, throat closes. POD # 5 1st ray toe amputation. Consuming 75 - 90 % of meals. Awaiting possible discharge to rehab today. Pt without c/o

## 2024-11-18 NOTE — PROGRESS NOTE ADULT - PROBLEM SELECTOR PROBLEM 6
Preventive measure - pt takes rosuvastatin 20 mg at home  - c/w atorvastatin 40 mg   - f/u lipid profile  - Dash Diet - c/w atorvastatin 10 mg   - Dash Diet

## 2025-06-02 NOTE — H&P ADULT - ASSESSMENT
Multilayer Compression Wrap   (Not Unna) Below the Knee    NAME:  Melania Dave  YOB: 1939  MEDICAL RECORD NUMBER:  [id number]  DATE:  6/2/2025    Multilayer compression wrap: Removed old Multilayer wrap if indicated and wash leg with mild soap/water.  Applied moisturizing agent to dry skin as needed.   Applied primary and secondary dressing as ordered.  Applied multilayered dressing below the knee to right lower leg.  Applied multilayered dressing below the knee to left lower leg.  Instructed patient/caregiver not to remove dressing and to keep it clean and dry.   Instructed patient/caregiver on complications to report to provider, such as pain, numbness in toes, heavy drainage, and slippage of dressing.  Instructed patient on purpose of compression dressing and on activity and exercise recommendations.      Electronically signed by Aliya Tillman RN on 6/2/2025 at 4:00 PM  
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

## 2025-06-11 NOTE — PATIENT PROFILE ADULT - COMPLETE THE FOLLOWING IF THE PATIENT REFUSES THE INFLUENZA VACCINE:
[Fully active, able to carry on all pre-disease performance without restriction] : Status 0 - Fully active, able to carry on all pre-disease performance without restriction [Normal] : affect appropriate [de-identified] : b/l reconstructed breasts Risks/benefits discussed with patient/surrogate

## (undated) DEVICE — GLV 8.5 PROTEXIS (WHITE)

## (undated) DEVICE — Device

## (undated) DEVICE — SUT TICRON 0 30" TIES

## (undated) DEVICE — DRAPE INSTRUMENT POUCH 6.75" X 11"

## (undated) DEVICE — ELCTR BOVIE TIP BLADE VALLEYLAB 6.5"

## (undated) DEVICE — TUBING SUCTION 20FT

## (undated) DEVICE — GLV 7 PROTEXIS (WHITE)

## (undated) DEVICE — PRESSURIZER FEM CNL W/O HUB MED

## (undated) DEVICE — GLV 8 PROTEXIS (WHITE)

## (undated) DEVICE — POSITIONER FOAM EGG CRATE ULNAR 2PCS (PINK)

## (undated) DEVICE — MARKING PEN W RULER

## (undated) DEVICE — SOL IRR POUR NS 0.9% 500ML

## (undated) DEVICE — SAW BLADE MICROAIRE RECIPROCATING 12.2MMX80MMX1.78MM

## (undated) DEVICE — STAPLER SKIN VISI-STAT 35 WIDE

## (undated) DEVICE — SUT POLYSORB 2-0 30" GS-21 UNDYED

## (undated) DEVICE — DRAPE TOWEL BLUE 17" X 24"

## (undated) DEVICE — SOL IRR POUR H2O 250ML

## (undated) DEVICE — TAPE SILK 3"

## (undated) DEVICE — DRSG XEROFORM 5 X 9"

## (undated) DEVICE — WARMING BLANKET UPPER ADULT

## (undated) DEVICE — DRAPE MAYO STAND 30"

## (undated) DEVICE — FOLEY TRAY 16FR 5CC LTX UMETER CLOSED

## (undated) DEVICE — SUT ETHIBOND EXCEL 2 30" OS-4

## (undated) DEVICE — DRAPE IOBAN 23" X 23"

## (undated) DEVICE — GLV 9 DURAPRENE

## (undated) DEVICE — SUCTION YANKAUER NO CONTROL VENT

## (undated) DEVICE — PULLER PLUG ULTRADRIVE  DISP 6 MM

## (undated) DEVICE — DRSG TAPE MICROFOAM 3"

## (undated) DEVICE — PRESSURIZER CMT W/O HUB SM

## (undated) DEVICE — MIDAS REX LEGEND METAL CUTTER DIAMOND CARBIDE 25.4MM X 0.8MM

## (undated) DEVICE — MIDAS REX LEGEND BALL FLUTED ACORN LG BORE 6.0MM X 9CM

## (undated) DEVICE — GLV 6.5 PROTEXIS (WHITE)

## (undated) DEVICE — POSITIONER FOAM ABDUCTION PILLOW MED (PINK)

## (undated) DEVICE — VENODYNE/SCD SLEEVE CALF LARGE

## (undated) DEVICE — PACK TOTAL HIP (2 PACKS)

## (undated) DEVICE — GOWN TRIMAX LG

## (undated) DEVICE — LAP PAD 18 X 18"

## (undated) DEVICE — MEDICATION LABELS W MARKER

## (undated) DEVICE — DRAPE OVERHEAD TABLE COVER 80X90"

## (undated) DEVICE — CANNULA MEDONE DUAL BORE SIDEFLO 25G

## (undated) DEVICE — TUBING BRAT 2 SUCTION ASSEMBLY TWIST LOCK

## (undated) DEVICE — SUT POLYSORB 1 36" GS-21 UNDYED

## (undated) DEVICE — DRSG ACE BANDAGE 6"

## (undated) DEVICE — GLV 7.5 PROTEXIS (WHITE)

## (undated) DEVICE — SUT POLYSORB 0 30" GS-21 UNDYED

## (undated) DEVICE — SPECIMEN CONTAINER 100ML

## (undated) DEVICE — DRAPE 3/4 SHEET W REINFORCEMENT 56X77"

## (undated) DEVICE — HOOD FLYTE STRYKER HELMET SHIELD

## (undated) DEVICE — TUBING ULTRA DRIVE 3 IRRIGATION

## (undated) DEVICE — DRAIN JACKSON PRATT 3 SPRING RESERVOIR W 10FR PVC DRAIN

## (undated) DEVICE — SOL IRR BAG NS 0.9% 3000ML

## (undated) DEVICE — STRYKER MIXEVAC 3 BONE CEMENT MIXER

## (undated) DEVICE — DRSG AQUACEL 3.5 X 12"